# Patient Record
Sex: FEMALE | Race: WHITE | NOT HISPANIC OR LATINO | Employment: FULL TIME | URBAN - METROPOLITAN AREA
[De-identification: names, ages, dates, MRNs, and addresses within clinical notes are randomized per-mention and may not be internally consistent; named-entity substitution may affect disease eponyms.]

---

## 2017-01-10 ENCOUNTER — OFFICE VISIT (OUTPATIENT)
Dept: MIDWIFE SERVICES | Facility: CLINIC | Age: 37
End: 2017-01-10
Payer: COMMERCIAL

## 2017-01-10 VITALS
HEART RATE: 112 BPM | SYSTOLIC BLOOD PRESSURE: 143 MMHG | BODY MASS INDEX: 26.92 KG/M2 | OXYGEN SATURATION: 95 % | WEIGHT: 177 LBS | DIASTOLIC BLOOD PRESSURE: 82 MMHG

## 2017-01-10 DIAGNOSIS — N76.0 VAGINITIS AND VULVOVAGINITIS: Primary | ICD-10-CM

## 2017-01-10 LAB
MICRO REPORT STATUS: NORMAL
SPECIMEN SOURCE: NORMAL
WET PREP SPEC: NORMAL

## 2017-01-10 PROCEDURE — 87210 SMEAR WET MOUNT SALINE/INK: CPT | Performed by: ADVANCED PRACTICE MIDWIFE

## 2017-01-10 PROCEDURE — 99207 ZZC PRENATAL VISIT: CPT | Performed by: ADVANCED PRACTICE MIDWIFE

## 2017-01-10 RX ORDER — TRIAMCINOLONE ACETONIDE 1 MG/G
OINTMENT TOPICAL
Qty: 80 G | Refills: 0 | Status: SHIPPED | OUTPATIENT
Start: 2017-01-10 | End: 2018-01-10

## 2017-01-10 RX ORDER — OXYCODONE AND ACETAMINOPHEN 5; 325 MG/1; MG/1
1 TABLET ORAL EVERY 4 HOURS PRN
Qty: 10 TABLET | Refills: 0 | Status: SHIPPED | OUTPATIENT
Start: 2017-01-10 | End: 2018-01-10

## 2017-01-10 NOTE — NURSING NOTE
"Chief Complaint   Patient presents with     Vaginal Discharge       Initial /82 mmHg  Pulse 112  Wt 177 lb (80.287 kg)  SpO2 95%  Breastfeeding? Yes Estimated body mass index is 26.92 kg/(m^2) as calculated from the following:    Height as of 10/5/16: 5' 8\" (1.727 m).    Weight as of this encounter: 177 lb (80.287 kg).  BP completed using cuff size: regular        The following HM Due: NONE      The following patient reported/Care Every where data was sent to:  P ABSTRACT QUALITY INITIATIVES [[id number]]  none     n/a               "

## 2017-01-11 NOTE — PROGRESS NOTES
"  SUBJECTIVE:                                                    Traci Sloan is a 36 year old female who is here today for a medication check and ADHD follow up as well as skin check.      Skin check - rash    Recurring rash - first started four years ago on back; grew for over one year.  Attributed to yeast at [employer].  Never fully resolves, but could be dormant for 6 months.  Got worse during pregnancy and breastfeeding when not taking the medication.  This is the worst case she has had and and she has the rash on her labia.  Saw midwifery yesterday and prescribed cream.     Intensity:  severe    Accompanying signs and symptoms: Redness and swelling inner thighs. Very itchy. Been using cream.    History (similar episodes/previous evaluation): Yes, not as bad    Precipitating or alleviating factors:  New exposures:  Sun hitting skin.  Recent travel: no      Therapies tried and outcome:  ketoconazole 200 mg tablets x 5 days - tends to resolve for up to 6 months and then return        CURRENT ADHD CONCERNS:  No just refilling prescriptions.      CURRENT PRESCRIPTIONS:    Adderall XR 15 mg in the morning, 22.5 mg in the PM     MEDICATION BENEFITS:  Controlled symptoms:  Hyperactivity - motor restlessness, Attention span, Distractability, Finishing tasks and Frustration tolerance  Uncontrolled symptoms:  None     MEDICATION SIDE EFFECTS:   Has:  none  Denies:  appetite suppression, weight loss, insomnia, tics, palpitations, stomach ache, headache, emotional lability, rebound irritability, drowsiness, \"zombie\" effect, growth suppression and dry mouth      Problem list and histories reviewed & adjusted, as indicated.  Additional history: as documented    Patient Active Problem List   Diagnosis     ADHD (attention deficit hyperactivity disorder)     Infertility     MTHFR mutation (H)     Shift work sleep disorder     Adjustment disorder with anxiety     Past Surgical History   Procedure Laterality Date     " "Cholecystectomy, laporoscopic  10/2010     Hc tooth extraction w/forcep  2013     Blood patch N/A 10/29/2015     Procedure: EPIDURAL BLOOD PATCH;  Surgeon: GENERIC ANESTHESIA PROVIDER;  Location:  OR       Social History   Substance Use Topics     Smoking status: Former Smoker     Smokeless tobacco: Former User     Quit date: 01/01/2009     Alcohol Use: No     Family History   Problem Relation Age of Onset     DIABETES Father      d/t benign pituitary tumors     Breast Cancer Paternal Aunt      age 58         Current Outpatient Prescriptions   Medication Sig Dispense Refill     amphetamine-dextroamphetamine (ADDERALL) 20 MG tablet Take 1 tablet (20 mg) by mouth 2 times daily 60 tablet 0     amphetamine-dextroamphetamine (ADDERALL) 20 MG tablet Take 1 tablet (20 mg) by mouth 2 times daily 60 tablet 0     amphetamine-dextroamphetamine (ADDERALL) 20 MG tablet Take 1 tablet (20 mg) by mouth 2 times daily 60 tablet 0     METHYLPHENIDATE HCL PO Take 10 mg by mouth daily       BP Readings from Last 3 Encounters:   06/07/16 123/79   03/10/16 98/72   12/03/15 103/68    Wt Readings from Last 3 Encounters:   06/07/16 187 lb 14.4 oz (85.231 kg)   03/10/16 194 lb 9.6 oz (88.27 kg)   12/03/15 189 lb 6.4 oz (85.911 kg)               ROS:  Constitutional, HEENT, cardiovascular, pulmonary, gi and gu systems are negative, except as otherwise noted.    OBJECTIVE:                                                    /82 mmHg  Pulse 70  Temp(Src) 96.7  F (35.9  C) (Oral)  Ht 5' 6.53\" (1.69 m)  Wt 177 lb 1.6 oz (80.332 kg)  BMI 28.13 kg/m2  SpO2 97%  LMP 01/09/2017      EXAM:GENERAL:  Alert and interactive., EYES:  Normal extra-ocular movements.  PERRLA, LUNGS:  Clear, HEART:  Normal rate and rhythm.  Normal S1 and S2.  No murmurs., ABDOMEN:  Soft, non-tender, no organomegaly., NEURO:  No tics or tremor.  Normal tone and strength. Normal gait and balance.  and   SKIN: hypopigmented macular lesions shoulders bilaterally, " faint pink macular lesions on back, red macular lesions on chest between and under breasts  MENTAL STATUS EXAM  Appearance: appropriate  Attitude: cooperative  Behavior: normal  Ere Contact: normal  Speech: normal  Orientation: oriented to person , place, time and situation  Mood:  happy  Affect: Mood Congruent      Diagnostic Test Results:  none      ASSESSMENT/PLAN:                                                    (F90.2) Attention deficit hyperactivity disorder (ADHD), combined type  (primary encounter diagnosis)  Comment:   Plan: amphetamine-dextroamphetamine (ADDERALL XR) 15         MG per 24 hr capsule,         amphetamine-dextroamphetamine (ADDERALL XR) 15         MG per 24 hr capsule,         amphetamine-dextroamphetamine (ADDERALL XR) 15         MG per 24 hr capsule,         amphetamine-dextroamphetamine (ADDERALL XR) 15         MG per 24 hr capsule,         amphetamine-dextroamphetamine (ADDERALL XR) 15         MG per 24 hr capsule,         amphetamine-dextroamphetamine (ADDERALL XR) 15         MG per 24 hr capsule            (B36.0) Tinea versicolor  Comment:   Plan: ketoconazole (NIZORAL) 200 MG tablet        Will try longer course.  If return, see dermatology    (R09.81) Nasal congestion  Comment:   Plan: loratadine (CLARITIN) 10 MG tablet        Refill request      See Patient Instructions    WINSTON [facility]

## 2017-01-11 NOTE — PROGRESS NOTES
Quick Note:    Dear Traci,    Your test results are attached below. Your wet prep was negative for infections. If you have any questions, please contact me via [id number] or you can call our office at [phone].    Cindy Hatfield CNM, DAVID  ______

## 2017-01-12 ENCOUNTER — OFFICE VISIT (OUTPATIENT)
Dept: FAMILY MEDICINE | Facility: CLINIC | Age: 37
End: 2017-01-12
Payer: COMMERCIAL

## 2017-01-12 VITALS
DIASTOLIC BLOOD PRESSURE: 82 MMHG | HEART RATE: 70 BPM | WEIGHT: 177.1 LBS | BODY MASS INDEX: 27.8 KG/M2 | SYSTOLIC BLOOD PRESSURE: 124 MMHG | OXYGEN SATURATION: 97 % | HEIGHT: 67 IN | TEMPERATURE: 96.7 F

## 2017-01-12 DIAGNOSIS — F90.2 ATTENTION DEFICIT HYPERACTIVITY DISORDER (ADHD), COMBINED TYPE: Primary | ICD-10-CM

## 2017-01-12 DIAGNOSIS — R09.81 NASAL CONGESTION: ICD-10-CM

## 2017-01-12 DIAGNOSIS — B36.0 TINEA VERSICOLOR: ICD-10-CM

## 2017-01-12 LAB
SPECIMEN SOURCE: ABNORMAL
STATUS - QUEST: ABNORMAL
VZV SPEC QL CULT: ABNORMAL

## 2017-01-12 PROCEDURE — 99214 OFFICE O/P EST MOD 30 MIN: CPT | Performed by: NURSE PRACTITIONER

## 2017-01-12 RX ORDER — KETOCONAZOLE 200 MG/1
200 TABLET ORAL DAILY
Qty: 10 TABLET | Refills: 0 | Status: SHIPPED | OUTPATIENT
Start: 2017-01-12 | End: 2018-01-10

## 2017-01-12 RX ORDER — DEXTROAMPHETAMINE SACCHARATE, AMPHETAMINE ASPARTATE MONOHYDRATE, DEXTROAMPHETAMINE SULFATE AND AMPHETAMINE SULFATE 3.75; 3.75; 3.75; 3.75 MG/1; MG/1; MG/1; MG/1
15 CAPSULE, EXTENDED RELEASE ORAL DAILY
Qty: 45 CAPSULE | Refills: 0 | Status: SHIPPED | OUTPATIENT
Start: 2017-01-12 | End: 2017-02-11

## 2017-01-12 RX ORDER — DEXTROAMPHETAMINE SACCHARATE, AMPHETAMINE ASPARTATE MONOHYDRATE, DEXTROAMPHETAMINE SULFATE AND AMPHETAMINE SULFATE 3.75; 3.75; 3.75; 3.75 MG/1; MG/1; MG/1; MG/1
15 CAPSULE, EXTENDED RELEASE ORAL DAILY
Qty: 28 CAPSULE | Refills: 0 | Status: SHIPPED | OUTPATIENT
Start: 2017-02-12 | End: 2017-03-12

## 2017-01-12 RX ORDER — LORATADINE 10 MG/1
10 TABLET ORAL DAILY
Qty: 90 TABLET | Refills: 3 | Status: SHIPPED | OUTPATIENT
Start: 2017-01-12 | End: 2018-01-10

## 2017-01-12 RX ORDER — DEXTROAMPHETAMINE SACCHARATE, AMPHETAMINE ASPARTATE MONOHYDRATE, DEXTROAMPHETAMINE SULFATE AND AMPHETAMINE SULFATE 3.75; 3.75; 3.75; 3.75 MG/1; MG/1; MG/1; MG/1
15 CAPSULE, EXTENDED RELEASE ORAL DAILY
Qty: 45 CAPSULE | Refills: 0 | Status: SHIPPED | OUTPATIENT
Start: 2017-03-12 | End: 2017-04-11

## 2017-01-12 RX ORDER — DEXTROAMPHETAMINE SACCHARATE, AMPHETAMINE ASPARTATE MONOHYDRATE, DEXTROAMPHETAMINE SULFATE AND AMPHETAMINE SULFATE 3.75; 3.75; 3.75; 3.75 MG/1; MG/1; MG/1; MG/1
15 CAPSULE, EXTENDED RELEASE ORAL DAILY
Qty: 75 CAPSULE | Refills: 0 | Status: SHIPPED | OUTPATIENT
Start: 2017-02-12 | End: 2017-03-14

## 2017-01-12 RX ORDER — DEXTROAMPHETAMINE SACCHARATE, AMPHETAMINE ASPARTATE MONOHYDRATE, DEXTROAMPHETAMINE SULFATE AND AMPHETAMINE SULFATE 3.75; 3.75; 3.75; 3.75 MG/1; MG/1; MG/1; MG/1
15 CAPSULE, EXTENDED RELEASE ORAL DAILY
Qty: 75 CAPSULE | Refills: 0 | Status: SHIPPED | OUTPATIENT
Start: 2017-03-12 | End: 2017-04-11

## 2017-01-12 RX ORDER — DEXTROAMPHETAMINE SACCHARATE, AMPHETAMINE ASPARTATE MONOHYDRATE, DEXTROAMPHETAMINE SULFATE AND AMPHETAMINE SULFATE 3.75; 3.75; 3.75; 3.75 MG/1; MG/1; MG/1; MG/1
15 CAPSULE, EXTENDED RELEASE ORAL DAILY
Qty: 75 CAPSULE | Refills: 0 | Status: SHIPPED | OUTPATIENT
Start: 2017-01-12 | End: 2017-02-11

## 2017-01-12 NOTE — PROGRESS NOTES
SUBJECTIVE:  Traci Sloan is a 36 year old female presents with local irritation and pain for 7 days. She has used monsitat, and lidocaine gell. Reports pain with movement or touch.       REVIEW OF SYSTEMS  C: NEGATIVE for fever, chills, change in weight  R: NEGATIVE for significant cough or SOB  CV: NEGATIVE for chest pain, palpitations or peripheral edema  GI: NEGATIVE for nausea, abdominal pain, heartburn, or change in bowel habits  : NEGATIVE for frequency, dysuria, or hematuria      General medical, surgical, OB/Gyn and social histories   reviewed and updated in Histories section of [facility].     OBJECTIVE:   EXAM  /82 mmHg  Pulse 112  Wt 177 lb (80.287 kg)  SpO2 95%  LMP 01/09/2017  Breastfeeding? Yes  Patient appears well.    Abdomen normal, soft without tenderness, guarding, mass or organomegaly.   No inguinal adenopathy or CVA tenderness.    PELVIC EXAM:  Vulva: BUS WNL, Diffuse rash over vulva, inner thighs, and perianal area. Very tender to touch.   Vagina: Discharge normal and physiologic, no lesions, well rugated, good tone,   Cervix: Smooth, pink, no visible lesions, neg CMT,   Uterus: Normal size and position, non-tender, mobile,   Ovaries: No masses palpable, non-tender, mobile,   Rectal exam: deferred    WET PREP: no trichomonas, monilia, or clue cells   GC/CHLAMYDIA CULTURE OBTAINED:PATIENT DECLINED    ASSESSMENT:   Vaginitis r/t likely contact dermatitis.  (N76.0) Vaginitis and vulvovaginitis  (primary encounter diagnosis)  Comment:   Plan: Wet prep, Herpes simplex culture, triamcinolone        (KENALOG) 0.1 % ointment,         oxyCODONE-acetaminophen (PERCOCET) 5-325 MG per        tablet              PLAN:  Patient instructed to avoid putting any lotion, or soap on area. Rx given for triamcinolone. Soak in warm water.   Treatment plan per orders in [facility].   STD prevention discussed. Birth control needs reviewed.  Abstain from intercourse for duration of treatment.   Return if  symptoms do not resolve as anticipated.  Given letter/ handout on healthy vaginal environment.      Cindy Liao CNM APRN

## 2017-01-12 NOTE — PATIENT INSTRUCTIONS
We will try a little longer course of the ketoconazole to try to eliminate the tinea infection.  We won't know until 6-12 months if this has worked.    If it returns, I would recommend seeing a dermatologist at that point.

## 2017-01-12 NOTE — NURSING NOTE
"Chief Complaint   Patient presents with     A.D.H.D     Derm Problem       Initial /82 mmHg  Pulse 70  Temp(Src) 96.7  F (35.9  C) (Oral)  Ht 5' 6.53\" (1.69 m)  Wt 177 lb 1.6 oz (80.332 kg)  BMI 28.13 kg/m2  SpO2 97%  LMP 01/09/2017 Estimated body mass index is 28.13 kg/(m^2) as calculated from the following:    Height as of this encounter: 5' 6.54\" (1.69 m).    Weight as of this encounter: 177 lb 1.6 oz (80.332 kg).  BP completed using cuff size: regular      Rodger Munoz MA      "

## 2017-01-12 NOTE — MR AVS SNAPSHOT
After Visit Summary   1/12/2017    Traci Sloan    MRN: [id number]           Patient Information     Date Of Birth          1980        Visit Information        Provider Department      1/12/2017 9:30 AM Valarie Medrano APRN CNP [facility]        Today's Diagnoses     Attention deficit hyperactivity disorder (ADHD), combined type    -  1     Tinea versicolor         Nasal congestion           Care Instructions    We will try a little longer course of the ketoconazole to try to eliminate the tinea infection.  We won't know until 6-12 months if this has worked.    If it returns, I would recommend seeing a dermatologist at that point.        Follow-ups after your visit        Who to contact     If you have questions or need follow up information about today's clinic visit or your schedule please contact [facility] directly at [phone].  Normal or non-critical lab and imaging results will be communicated to you by [employer], letter or phone within 4 business days after the clinic has received the results. If you do not hear from us within 7 days, please contact the clinic through [employer] or phone. If you have a critical or abnormal lab result, we will notify you by phone as soon as possible.  Submit refill requests through [employer] or call your pharmacy and they will forward the refill request to us. Please allow 3 business days for your refill to be completed.          Additional Information About Your Visit        MyChart Information     [employer] gives you secure access to your electronic health record. If you see a primary care provider, you can also send messages to your care team and make appointments. If you have questions, please call your primary care clinic.  If you do not have a primary care provider, please call [phone] and they will assist you.        Care EveryWhere ID     This is your Care EveryWhere ID. This could be used by other organizations to  "access your [place] medical records  [id number]        Your Vitals Were     Pulse Temperature Height BMI (Body Mass Index) Pulse Oximetry Last Period    70 96.7  F (35.9  C) (Oral) 5' 6.53\" (1.69 m) 28.13 kg/m2 97% 01/09/2017       Blood Pressure from Last 3 Encounters:   01/12/17 124/82   01/10/17 143/82   10/20/16 123/79    Weight from Last 3 Encounters:   01/12/17 177 lb 1.6 oz (80.332 kg)   01/10/17 177 lb (80.287 kg)   10/20/16 172 lb 6.4 oz (78.2 kg)              Today, you had the following     No orders found for display         Today's Medication Changes          These changes are accurate as of: 1/12/17 10:17 AM.  If you have any questions, ask your nurse or doctor.               Start taking these medicines.        Dose/Directions    * amphetamine-dextroamphetamine 15 MG per 24 hr capsule   Commonly known as:  ADDERALL XR   Used for:  Attention deficit hyperactivity disorder (ADHD), combined type   Replaces:  amphetamine-dextroamphetamine 15 MG per tablet   Started by:  Valarie Medrano APRN CNP        Dose:  15 mg   Take 1 capsule (15 mg) by mouth daily CHANGE TO: ?Take 1.5 tablet (22.5 mg) by mouth in the AM and 1 tablet (15 mg) in the PM   Quantity:  45 capsule   Refills:  0       * amphetamine-dextroamphetamine 15 MG per 24 hr capsule   Commonly known as:  ADDERALL XR   Used for:  Attention deficit hyperactivity disorder (ADHD), combined type   Started by:  Valarie Medrano APRN CNP        Dose:  15 mg   Take 1 capsule (15 mg) by mouth daily CHANGE TO: ?Take 1.5 tablet (22.5 mg) by mouth in the AM and 1 tablet (15 mg) in the PM   Quantity:  75 capsule   Refills:  0       * amphetamine-dextroamphetamine 15 MG per 24 hr capsule   Commonly known as:  ADDERALL XR   Used for:  Attention deficit hyperactivity disorder (ADHD), combined type   Started by:  Valarie Medrano APRN CNP        Dose:  15 mg   Start taking on:  2/12/2017   Take 1 capsule (15 mg) by mouth daily for 28 days CHANGE TO: ?Take 1.5 " tablet (22.5 mg) by mouth in the AM and 1 tablet (15 mg) in the PM   Quantity:  28 capsule   Refills:  0       * amphetamine-dextroamphetamine 15 MG per 24 hr capsule   Commonly known as:  ADDERALL XR   Used for:  Attention deficit hyperactivity disorder (ADHD), combined type   Started by:  Valarie Medrano APRN CNP        Dose:  15 mg   Start taking on:  2/12/2017   Take 1 capsule (15 mg) by mouth daily CHANGE TO: ?Take 1.5 tablet (22.5 mg) by mouth in the AM and 1 tablet (15 mg) in the PM   Quantity:  75 capsule   Refills:  0       * amphetamine-dextroamphetamine 15 MG per 24 hr capsule   Commonly known as:  ADDERALL XR   Used for:  Attention deficit hyperactivity disorder (ADHD), combined type   Started by:  Valarie Medrano APRN CNP        Dose:  15 mg   Start taking on:  3/12/2017   Take 1 capsule (15 mg) by mouth daily CHANGE TO: ?Take 1.5 tablet (22.5 mg) by mouth in the AM and 1 tablet (15 mg) in the PM   Quantity:  45 capsule   Refills:  0       * amphetamine-dextroamphetamine 15 MG per 24 hr capsule   Commonly known as:  ADDERALL XR   Used for:  Attention deficit hyperactivity disorder (ADHD), combined type   Started by:  Valarie Medrano APRN CNP        Dose:  15 mg   Start taking on:  3/12/2017   Take 1 capsule (15 mg) by mouth daily CHANGE TO: ?Take 1.5 tablet (22.5 mg) by mouth in the AM and 1 tablet (15 mg) in the PM   Quantity:  75 capsule   Refills:  0       ketoconazole 200 MG tablet   Commonly known as:  NIZORAL   Used for:  Tinea versicolor   Started by:  Valarie Medrano APRN CNP        Dose:  200 mg   Take 1 tablet (200 mg) by mouth daily   Quantity:  10 tablet   Refills:  0       * Notice:  This list has 6 medication(s) that are the same as other medications prescribed for you. Read the directions carefully, and ask your doctor or other care provider to review them with you.      Stop taking these medicines if you haven't already. Please contact your care team if you have questions.      amphetamine-dextroamphetamine 15 MG per tablet   Commonly known as:  ADDERALL   Replaced by:  amphetamine-dextroamphetamine 15 MG per 24 hr capsule   Stopped by:  Valarie Medrano APRN CNP           NIFEdipine ER 30 MG Tb24   Commonly known as:  ADALAT CC   Stopped by:  Valarie Medrano APRN CNP           senna-docusate 8.6-50 MG per tablet   Commonly known as:  SENOKOT-S;PERICOLACE   Stopped by:  Valarie Medrano APRN CNP                Where to get your medicines      These medications were sent to [place], [place] - [address]  [address] [address]     Phone:  [phone]    - ketoconazole 200 MG tablet  - loratadine 10 MG tablet      Some of these will need a paper prescription and others can be bought over the counter.  Ask your nurse if you have questions.     Bring a paper prescription for each of these medications    - amphetamine-dextroamphetamine 15 MG per 24 hr capsule  - amphetamine-dextroamphetamine 15 MG per 24 hr capsule  - amphetamine-dextroamphetamine 15 MG per 24 hr capsule  - amphetamine-dextroamphetamine 15 MG per 24 hr capsule  - amphetamine-dextroamphetamine 15 MG per 24 hr capsule  - amphetamine-dextroamphetamine 15 MG per 24 hr capsule             Primary Care Provider    Physician No Ref-Primary       No address on file        Thank you!     Thank you for choosing [facility]  for your care. Our goal is always to provide you with excellent care. Hearing back from our patients is one way we can continue to improve our services. Please take a few minutes to complete the written survey that you may receive in the mail after your visit with us. Thank you!             Your Updated Medication List - Protect others around you: Learn how to safely use, store and throw away your medicines at www.disposemymeds.org.          This list is accurate as of: 1/12/17 10:17 AM.  Always use your most recent med list.                    Brand Name Dispense Instructions for use    * amphetamine-dextroamphetamine 15 MG per 24 hr capsule    ADDERALL XR    45 capsule    Take 1 capsule (15 mg) by mouth daily CHANGE TO: ?Take 1.5 tablet (22.5 mg) by mouth in the AM and 1 tablet (15 mg) in the PM       * amphetamine-dextroamphetamine 15 MG per 24 hr capsule    ADDERALL XR    75 capsule    Take 1 capsule (15 mg) by mouth daily CHANGE TO: ?Take 1.5 tablet (22.5 mg) by mouth in the AM and 1 tablet (15 mg) in the PM       * amphetamine-dextroamphetamine 15 MG per 24 hr capsule   Start taking on:  2/12/2017    ADDERALL XR    28 capsule    Take 1 capsule (15 mg) by mouth daily for 28 days CHANGE TO: ?Take 1.5 tablet (22.5 mg) by mouth in the AM and 1 tablet (15 mg) in the PM       * amphetamine-dextroamphetamine 15 MG per 24 hr capsule   Start taking on:  2/12/2017    ADDERALL XR    75 capsule    Take 1 capsule (15 mg) by mouth daily CHANGE TO: ?Take 1.5 tablet (22.5 mg) by mouth in the AM and 1 tablet (15 mg) in the PM       * amphetamine-dextroamphetamine 15 MG per 24 hr capsule   Start taking on:  3/12/2017    ADDERALL XR    45 capsule    Take 1 capsule (15 mg) by mouth daily CHANGE TO: ?Take 1.5 tablet (22.5 mg) by mouth in the AM and 1 tablet (15 mg) in the PM       * amphetamine-dextroamphetamine 15 MG per 24 hr capsule   Start taking on:  3/12/2017    ADDERALL XR    75 capsule    Take 1 capsule (15 mg) by mouth daily CHANGE TO: ?Take 1.5 tablet (22.5 mg) by mouth in the AM and 1 tablet (15 mg) in the PM       ketoconazole 200 MG tablet    NIZORAL    10 tablet    Take 1 tablet (200 mg) by mouth daily       loratadine 10 MG tablet    CLARITIN    90 tablet    Take 1 tablet (10 mg) by mouth daily       oxyCODONE-acetaminophen 5-325 MG per tablet    PERCOCET    10 tablet    Take 1 tablet by mouth every 4 hours as needed for pain maximum 6 tablet(s) per day       prenatal multivitamin  plus iron 27-0.8 MG Tabs per tablet      Take 1 tablet by mouth daily        triamcinolone 0.1 % ointment    KENALOG    80 g    Apply sparingly to affected area three times daily for 14 days.       * Notice:  This list has 6 medication(s) that are the same as other medications prescribed for you. Read the directions carefully, and ask your doctor or other care provider to review them with you.

## 2017-01-13 ENCOUNTER — TELEPHONE (OUTPATIENT)
Dept: FAMILY MEDICINE | Facility: CLINIC | Age: 37
End: 2017-01-13

## 2017-01-13 DIAGNOSIS — F90.2 ATTENTION DEFICIT HYPERACTIVITY DISORDER (ADHD), COMBINED TYPE: Primary | ICD-10-CM

## 2017-01-13 NOTE — TELEPHONE ENCOUNTER
Pt states the script for Adderall is written wrong; it should be the tablets and not the XR    Pt can be reached @ [phone] [facility]

## 2017-01-13 NOTE — TELEPHONE ENCOUNTER
Patient calling to request tablets instead of capsules for her Adderall 15 mg medication.     Patient will  new scripts in clinic and provide us the old to shred.     Medication orders cued for review by Yee Medrano.         Thank you,   Mellissa Carrasco RN  [facility]

## 2017-01-17 RX ORDER — DEXTROAMPHETAMINE SACCHARATE, AMPHETAMINE ASPARTATE, DEXTROAMPHETAMINE SULFATE AND AMPHETAMINE SULFATE 3.75; 3.75; 3.75; 3.75 MG/1; MG/1; MG/1; MG/1
TABLET ORAL
Qty: 75 TABLET | Refills: 0 | Status: SHIPPED | OUTPATIENT
Start: 2017-03-12 | End: 2017-04-04

## 2017-01-17 RX ORDER — DEXTROAMPHETAMINE SACCHARATE, AMPHETAMINE ASPARTATE, DEXTROAMPHETAMINE SULFATE AND AMPHETAMINE SULFATE 3.75; 3.75; 3.75; 3.75 MG/1; MG/1; MG/1; MG/1
TABLET ORAL
Qty: 75 TABLET | Refills: 0 | Status: SHIPPED | OUTPATIENT
Start: 2017-02-12 | End: 2017-03-14

## 2017-01-17 RX ORDER — DEXTROAMPHETAMINE SACCHARATE, AMPHETAMINE ASPARTATE, DEXTROAMPHETAMINE SULFATE AND AMPHETAMINE SULFATE 3.75; 3.75; 3.75; 3.75 MG/1; MG/1; MG/1; MG/1
TABLET ORAL
Qty: 75 TABLET | Refills: 0 | Status: SHIPPED | OUTPATIENT
Start: 2017-01-17 | End: 2017-02-11

## 2017-01-17 NOTE — TELEPHONE ENCOUNTER
Patient brought in scripts that were given to her previously for Adderall XR, they have been shredded

## 2017-01-17 NOTE — TELEPHONE ENCOUNTER
Spoke with patient making her aware the scripts for Adderall Tablets are ready to be picked up at the , also reminded pt to bring photo ID

## 2017-04-04 ENCOUNTER — MYC REFILL (OUTPATIENT)
Dept: FAMILY MEDICINE | Facility: CLINIC | Age: 37
End: 2017-04-04

## 2017-04-04 DIAGNOSIS — F90.2 ATTENTION DEFICIT HYPERACTIVITY DISORDER (ADHD), COMBINED TYPE: ICD-10-CM

## 2017-04-04 NOTE — TELEPHONE ENCOUNTER
Yee--    Patient is requesting refill of Adderall 15 mg tablets.       Mellissa Carrasco RN  [facility]

## 2017-04-05 RX ORDER — DEXTROAMPHETAMINE SACCHARATE, AMPHETAMINE ASPARTATE, DEXTROAMPHETAMINE SULFATE AND AMPHETAMINE SULFATE 3.75; 3.75; 3.75; 3.75 MG/1; MG/1; MG/1; MG/1
15 TABLET ORAL 2 TIMES DAILY
Qty: 75 TABLET | Refills: 0 | Status: SHIPPED | OUTPATIENT
Start: 2017-06-05 | End: 2017-08-16

## 2017-04-05 RX ORDER — DEXTROAMPHETAMINE SACCHARATE, AMPHETAMINE ASPARTATE, DEXTROAMPHETAMINE SULFATE AND AMPHETAMINE SULFATE 3.75; 3.75; 3.75; 3.75 MG/1; MG/1; MG/1; MG/1
TABLET ORAL
Qty: 75 TABLET | Refills: 0 | Status: SHIPPED | OUTPATIENT
Start: 2017-04-05 | End: 2017-07-03

## 2017-04-05 RX ORDER — DEXTROAMPHETAMINE SACCHARATE, AMPHETAMINE ASPARTATE, DEXTROAMPHETAMINE SULFATE AND AMPHETAMINE SULFATE 3.75; 3.75; 3.75; 3.75 MG/1; MG/1; MG/1; MG/1
15 TABLET ORAL 2 TIMES DAILY
Qty: 75 TABLET | Refills: 0 | Status: SHIPPED | OUTPATIENT
Start: 2017-05-05 | End: 2017-08-16

## 2017-05-03 ENCOUNTER — HOSPITAL ENCOUNTER (EMERGENCY)
Facility: CLINIC | Age: 37
Discharge: HOME | End: 2017-05-03
Attending: EMERGENCY MEDICINE | Admitting: EMERGENCY MEDICINE
Payer: COMMERCIAL

## 2017-05-03 VITALS
DIASTOLIC BLOOD PRESSURE: 79 MMHG | SYSTOLIC BLOOD PRESSURE: 126 MMHG | OXYGEN SATURATION: 99 % | RESPIRATION RATE: 18 BRPM | TEMPERATURE: 98.2 F | HEART RATE: 89 BPM

## 2017-05-03 DIAGNOSIS — T07.XXXA MULTIPLE ABRASIONS: ICD-10-CM
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
## 2017-05-03 DIAGNOSIS — S31.114A: ICD-10-CM

## 2017-05-03 DIAGNOSIS — T07.XXXA MULTIPLE CONTUSIONS: ICD-10-CM

## 2017-05-03 DIAGNOSIS — S60.512A ABRASION OF LEFT HAND: ICD-10-CM

## 2017-05-03 DIAGNOSIS — S70.12XA CONTUSION OF LEFT THIGH: ICD-10-CM

## 2017-05-03 DIAGNOSIS — S60.511A ABRASION OF RIGHT HAND: ICD-10-CM

## 2017-05-03 DIAGNOSIS — S31.119A LACERATION OF GROIN, INITIAL ENCOUNTER: ICD-10-CM

## 2017-05-03 PROCEDURE — 12002 RPR S/N/AX/GEN/TRNK2.6-7.5CM: CPT | Mod: Z6 | Performed by: EMERGENCY MEDICINE

## 2017-05-03 PROCEDURE — 25000128 H RX IP 250 OP 636

## 2017-05-03 PROCEDURE — 96361 HYDRATE IV INFUSION ADD-ON: CPT | Mod: 59

## 2017-05-03 PROCEDURE — 99285 EMERGENCY DEPT VISIT HI MDM: CPT | Mod: 25

## 2017-05-03 PROCEDURE — 96374 THER/PROPH/DIAG INJ IV PUSH: CPT

## 2017-05-03 PROCEDURE — 12002 RPR S/N/AX/GEN/TRNK2.6-7.5CM: CPT

## 2017-05-03 PROCEDURE — 96375 TX/PRO/DX INJ NEW DRUG ADDON: CPT

## 2017-05-03 PROCEDURE — 99284 EMERGENCY DEPT VISIT MOD MDM: CPT | Mod: 25 | Performed by: EMERGENCY MEDICINE

## 2017-05-03 PROCEDURE — 25000128 H RX IP 250 OP 636: Performed by: EMERGENCY MEDICINE

## 2017-05-03 RX ORDER — SODIUM CHLORIDE 9 MG/ML
INJECTION, SOLUTION INTRAVENOUS CONTINUOUS
Status: DISCONTINUED | OUTPATIENT
Start: 2017-05-03 | End: 2017-05-03 | Stop reason: HOSPADM

## 2017-05-03 RX ORDER — HYDROMORPHONE HYDROCHLORIDE 1 MG/ML
0.5 INJECTION, SOLUTION INTRAMUSCULAR; INTRAVENOUS; SUBCUTANEOUS
Status: COMPLETED | OUTPATIENT
Start: 2017-05-03 | End: 2017-05-03

## 2017-05-03 RX ORDER — HYDROCODONE BITARTRATE AND ACETAMINOPHEN 5; 325 MG/1; MG/1
1-2 TABLET ORAL EVERY 4 HOURS PRN
Qty: 15 TABLET | Refills: 0 | Status: SHIPPED | OUTPATIENT
Start: 2017-05-03 | End: 2018-01-10

## 2017-05-03 RX ORDER — IBUPROFEN 600 MG/1
600 TABLET, FILM COATED ORAL EVERY 8 HOURS PRN
Qty: 20 TABLET | Refills: 0 | Status: SHIPPED | OUTPATIENT
Start: 2017-05-03 | End: 2018-01-10

## 2017-05-03 RX ORDER — SODIUM CHLORIDE 9 MG/ML
INJECTION, SOLUTION INTRAVENOUS
Status: COMPLETED
Start: 2017-05-03 | End: 2017-05-03

## 2017-05-03 RX ORDER — LIDOCAINE HYDROCHLORIDE AND EPINEPHRINE 10; 10 MG/ML; UG/ML
INJECTION, SOLUTION INFILTRATION; PERINEURAL
Status: DISCONTINUED
Start: 2017-05-03 | End: 2017-05-03 | Stop reason: HOSPADM

## 2017-05-03 RX ORDER — ONDANSETRON 2 MG/ML
4 INJECTION INTRAMUSCULAR; INTRAVENOUS EVERY 30 MIN PRN
Status: DISCONTINUED | OUTPATIENT
Start: 2017-05-03 | End: 2017-05-03 | Stop reason: HOSPADM

## 2017-05-03 RX ADMIN — HYDROMORPHONE HYDROCHLORIDE 0.5 MG: 1 INJECTION, SOLUTION INTRAMUSCULAR; INTRAVENOUS; SUBCUTANEOUS at 17:15

## 2017-05-03 RX ADMIN — HYDROMORPHONE HYDROCHLORIDE 0.5 MG: 1 INJECTION, SOLUTION INTRAMUSCULAR; INTRAVENOUS; SUBCUTANEOUS at 16:59

## 2017-05-03 RX ADMIN — HYDROMORPHONE HYDROCHLORIDE 0.5 MG: 1 INJECTION, SOLUTION INTRAMUSCULAR; INTRAVENOUS; SUBCUTANEOUS at 17:27

## 2017-05-03 RX ADMIN — SODIUM CHLORIDE: 9 INJECTION, SOLUTION INTRAVENOUS at 16:59

## 2017-05-03 RX ADMIN — ONDANSETRON 4 MG: 2 INJECTION INTRAMUSCULAR; INTRAVENOUS at 16:59

## 2017-05-03 ASSESSMENT — ENCOUNTER SYMPTOMS
BRUISES/BLEEDS EASILY: 1
WOUND: 1
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
## 2017-05-03 NOTE — ED AVS SNAPSHOT
[facility], [place], Emergency Department    [address]    [place] [address]    Phone:  [phone]    Fax:  [phone]                                       Traci Sloan   MRN: [id number]    Department:  [facility], Emergency Department   Date of Visit:  5/3/2017           After Visit Summary Signature Page     I have received my discharge instructions, and my questions have been answered. I have discussed any challenges I see with this plan with the nurse or doctor.    ..........................................................................................................................................  Patient/Patient Representative Signature      ..........................................................................................................................................  Patient Representative Print Name and Relationship to Patient    ..................................................               ................................................  Date                                            Time    ..........................................................................................................................................  Reviewed by Signature/Title    ...................................................              ..............................................  Date                                                            Time
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
## 2017-05-03 NOTE — DISCHARGE INSTRUCTIONS
No vigorous activity.  Apply bacitracin daily, keep the wound covered for 5 days.    Use pain medication as needed.    Have the sutures removed in 10-14 days  Return to the Emergency Department if any concerns, new injuries, signs of infection.

## 2017-05-19 ENCOUNTER — OFFICE VISIT (OUTPATIENT)
Dept: FAMILY MEDICINE | Facility: CLINIC | Age: 37
End: 2017-05-19
Payer: COMMERCIAL

## 2017-05-19 VITALS
TEMPERATURE: 98.5 F | HEART RATE: 83 BPM | WEIGHT: 179.9 LBS | DIASTOLIC BLOOD PRESSURE: 64 MMHG | SYSTOLIC BLOOD PRESSURE: 122 MMHG | BODY MASS INDEX: 28.57 KG/M2 | OXYGEN SATURATION: 100 %

## 2017-05-19 DIAGNOSIS — S31.119D: Primary | ICD-10-CM

## 2017-05-19 PROCEDURE — 99212 OFFICE O/P EST SF 10 MIN: CPT | Performed by: NURSE PRACTITIONER
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
## 2017-05-19 NOTE — NURSING NOTE
"Chief Complaint   Patient presents with     Suture Removal       Initial /64  Pulse 83  Temp 98.5  F (36.9  C) (Oral)  Wt 179 lb 14.4 oz (81.6 kg)  SpO2 100%  BMI 28.57 kg/m2 Estimated body mass index is 28.57 kg/(m^2) as calculated from the following:    Height as of 1/12/17: 5' 6.53\" (1.69 m).    Weight as of this encounter: 179 lb 14.4 oz (81.6 kg).  Medication Reconciliation: complete     Arianna Velasquez MA      "
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
## 2017-07-03 ENCOUNTER — TELEPHONE (OUTPATIENT)
Dept: FAMILY MEDICINE | Facility: CLINIC | Age: 37
End: 2017-07-03

## 2017-07-03 DIAGNOSIS — F90.2 ATTENTION DEFICIT HYPERACTIVITY DISORDER (ADHD), COMBINED TYPE: ICD-10-CM

## 2017-07-03 RX ORDER — DEXTROAMPHETAMINE SACCHARATE, AMPHETAMINE ASPARTATE, DEXTROAMPHETAMINE SULFATE AND AMPHETAMINE SULFATE 3.75; 3.75; 3.75; 3.75 MG/1; MG/1; MG/1; MG/1
TABLET ORAL
Qty: 75 TABLET | Refills: 0 | Status: SHIPPED | OUTPATIENT
Start: 2017-07-03 | End: 2017-10-17

## 2017-07-03 NOTE — TELEPHONE ENCOUNTER
Writer called patient to offer an appointment with WINSTON Howard sooner that 7/12. Writer realized that was when patient stated she would be out of town. No availability. Routing request to provider for review.       [facility] request for refill of medication per patient:        ----- Message -----         From: Traci Sloan        Sent: 7/1/2017  12:28 PM          To: Bryan Reception     Subject: RE: Appointment scheduled from [employer] [facility]                I was wondering if it would be possible to get my medication prior to appointment on 7/12?  I normally fill my Adderall prescription on the 5th of each month but was unable to get in prior to that.  If so, I was hoping to come and get it on Monday 7/3 or Tuesday 7/4, depending on holiday hours.  I will be out of town for a week and am not returning until 7/10.          Thank you,       Thanks! Fadumo Interiano RN

## 2017-08-15 NOTE — PROGRESS NOTES
SUBJECTIVE:                                                    Traci Sloan is a 37 year old female who presents to clinic today for the following health issues:    CURRENT CONCERNS:  Used to take Adderall XR 30 mg with a small IR dose  Rash on stomach has returned.  Has tried two treatments and they work, but rash returns     Review of past medical history:  Data Unavailable     CURRENT PRESCRIPTIONS:    Adderall 22.5 mg in the morning and 15 mg in the afternoon     MEDICATION BENEFITS:  Controlled symptoms:  Hyperactivity - motor restlessness, Attention span, Distractability, Finishing tasks, Impulse control and Frustration tolerance  Uncontrolled symptoms:  None     MEDICATION SIDE EFFECTS:   Has:  none  Denies:  none    Problem list and histories reviewed & adjusted, as indicated.  Additional history: as documented    Reviewed and updated as needed this visit by clinical staff  Tobacco  Allergies  Meds  Med Hx  Surg Hx  Fam Hx  Soc Hx      Reviewed and updated as needed this visit by Provider         ROS:  Constitutional, HEENT, cardiovascular, pulmonary, gi and gu systems are negative, except as otherwise noted.      OBJECTIVE:   /86  Pulse 94  Temp 98.2  F (36.8  C) (Oral)  Wt 178 lb 3.2 oz (80.8 kg)  SpO2 97%  BMI 28.3 kg/m2  Body mass index is 28.3 kg/(m^2).  GENERAL: healthy, alert and no distress  EYES: Eyes grossly normal to inspection, PERRL and conjunctivae and sclerae normal  HENT: ear canals and TM's normal, nose and mouth without ulcers or lesions  NECK: no adenopathy, no asymmetry, masses, or scars and thyroid normal to palpation  RESP: lungs clear to auscultation - no rales, rhonchi or wheezes  CV: regular rate and rhythm, normal S1 S2, no S3 or S4, no murmur, click or rub, no peripheral edema and peripheral pulses strong  ABDOMEN: soft, nontender, no hepatosplenomegaly, no masses and bowel sounds normal  MS: no gross musculoskeletal defects noted, no edema  SKIN: no suspicious  lesions or rashes  NEURO: Normal strength and tone, mentation intact and speech normal  PSYCH: mentation appears normal, affect normal/bright    Diagnostic Test Results:  none     ASSESSMENT/PLAN:         (F90.2) Attention deficit hyperactivity disorder (ADHD), combined type  (primary encounter diagnosis)  Comment:   Plan: amphetamine-dextroamphetamine (ADDERALL XR) 30         MG per 24 hr capsule,         amphetamine-dextroamphetamine (ADDERALL XR) 30         MG per 24 hr capsule,         amphetamine-dextroamphetamine (ADDERALL XR) 30         MG per 24 hr capsule,         amphetamine-dextroamphetamine (ADDERALL) 5 MG         per tablet   Will try XR script and see if covered.  Provided one IR 5 mg script for possible breakthrough afternoon needs.    (B36.0) Tinea versicolor  Comment:   Plan: DERMATOLOGY REFERRAL            MARIBETH Hernandez

## 2017-08-16 ENCOUNTER — OFFICE VISIT (OUTPATIENT)
Dept: FAMILY MEDICINE | Facility: CLINIC | Age: 37
End: 2017-08-16
Payer: COMMERCIAL

## 2017-08-16 VITALS
DIASTOLIC BLOOD PRESSURE: 86 MMHG | BODY MASS INDEX: 28.3 KG/M2 | TEMPERATURE: 98.2 F | WEIGHT: 178.2 LBS | SYSTOLIC BLOOD PRESSURE: 138 MMHG | HEART RATE: 94 BPM | OXYGEN SATURATION: 97 %

## 2017-08-16 DIAGNOSIS — F90.2 ATTENTION DEFICIT HYPERACTIVITY DISORDER (ADHD), COMBINED TYPE: Primary | ICD-10-CM

## 2017-08-16 DIAGNOSIS — B36.0 TINEA VERSICOLOR: ICD-10-CM

## 2017-08-16 PROCEDURE — 99214 OFFICE O/P EST MOD 30 MIN: CPT | Performed by: NURSE PRACTITIONER

## 2017-08-16 RX ORDER — DEXTROAMPHETAMINE SACCHARATE, AMPHETAMINE ASPARTATE MONOHYDRATE, DEXTROAMPHETAMINE SULFATE AND AMPHETAMINE SULFATE 7.5; 7.5; 7.5; 7.5 MG/1; MG/1; MG/1; MG/1
30 CAPSULE, EXTENDED RELEASE ORAL DAILY
Qty: 30 CAPSULE | Refills: 0 | Status: SHIPPED | OUTPATIENT
Start: 2017-10-17 | End: 2017-10-18

## 2017-08-16 RX ORDER — DEXTROAMPHETAMINE SACCHARATE, AMPHETAMINE ASPARTATE MONOHYDRATE, DEXTROAMPHETAMINE SULFATE AND AMPHETAMINE SULFATE 7.5; 7.5; 7.5; 7.5 MG/1; MG/1; MG/1; MG/1
30 CAPSULE, EXTENDED RELEASE ORAL DAILY
Qty: 30 CAPSULE | Refills: 0 | Status: SHIPPED | OUTPATIENT
Start: 2017-08-16 | End: 2017-09-15

## 2017-08-16 RX ORDER — DEXTROAMPHETAMINE SACCHARATE, AMPHETAMINE ASPARTATE MONOHYDRATE, DEXTROAMPHETAMINE SULFATE AND AMPHETAMINE SULFATE 7.5; 7.5; 7.5; 7.5 MG/1; MG/1; MG/1; MG/1
30 CAPSULE, EXTENDED RELEASE ORAL DAILY
Qty: 30 CAPSULE | Refills: 0 | Status: SHIPPED | OUTPATIENT
Start: 2017-09-16 | End: 2017-10-16

## 2017-08-16 RX ORDER — DEXTROAMPHETAMINE SACCHARATE, AMPHETAMINE ASPARTATE, DEXTROAMPHETAMINE SULFATE AND AMPHETAMINE SULFATE 1.25; 1.25; 1.25; 1.25 MG/1; MG/1; MG/1; MG/1
5 TABLET ORAL DAILY
Qty: 30 TABLET | Refills: 0 | Status: SHIPPED | OUTPATIENT
Start: 2017-08-16 | End: 2018-01-10

## 2017-08-16 NOTE — MR AVS SNAPSHOT
After Visit Summary   8/16/2017    Traci Sloan    MRN: [id number]           Patient Information     Date Of Birth          1980        Visit Information        Provider Department      8/16/2017 9:30 AM Valarie eMdrano APRN [facility]        Today's Diagnoses     Attention deficit hyperactivity disorder (ADHD), combined type    -  1    Tinea versicolor           Follow-ups after your visit        Additional Services     DERMATOLOGY REFERRAL       Your provider has referred you to:   [facility]: [facility] [place] [phone]   [email or web address]  [facility]: [facility] [place] [phone]   [email or web address]    Please be aware that coverage of these services is subject to the terms and limitations of your health insurance plan.  Call member services at your health plan with any benefit or coverage questions.      Please bring the following with you to your appointment:    (1) Any X-Rays, CTs or MRIs which have been performed.  Contact the facility where they were done to arrange for  prior to your scheduled appointment.    (2) List of current medications  (3) This referral request   (4) Any documents/labs given to you for this referral                  Who to contact     If you have questions or need follow up information about today's clinic visit or your schedule please contact [facility] [place] at [phone].  Normal or non-critical lab and imaging results will be communicated to you by MyChart, letter or phone within 4 business days after the clinic has received the results. If you do not hear from us within 7 days, please contact the clinic through MyChart or phone. If you have a critical or abnormal lab result, we will notify you by phone as soon as possible.  Submit refill requests through [employer] or call your pharmacy and they will forward the refill request to us. Please allow 3  business days for your refill to be completed.          Additional Information About Your Visit        [employer] Information     [employer] gives you secure access to your electronic health record. If you see a primary care provider, you can also send messages to your care team and make appointments. If you have questions, please call your primary care clinic.  If you do not have a primary care provider, please call [phone] and they will assist you.        Care EveryWhere ID     This is your Care EveryWhere ID. This could be used by other organizations to access your [facility] medical records  [id number]        Your Vitals Were     Pulse Temperature Pulse Oximetry BMI (Body Mass Index)          94 98.2  F (36.8  C) (Oral) 97% 28.3 kg/m2         Blood Pressure from Last 3 Encounters:   08/16/17 138/86   05/19/17 122/64   05/03/17 126/79    Weight from Last 3 Encounters:   08/16/17 178 lb 3.2 oz (80.8 kg)   05/19/17 179 lb 14.4 oz (81.6 kg)   01/12/17 177 lb 1.6 oz (80.3 kg)              We Performed the Following     DERMATOLOGY REFERRAL          Today's Medication Changes          These changes are accurate as of: 8/16/17 10:01 AM.  If you have any questions, ask your nurse or doctor.               These medicines have changed or have updated prescriptions.        Dose/Directions    * amphetamine-dextroamphetamine 15 MG per tablet   Commonly known as:  ADDERALL   This may have changed:    - Another medication with the same name was added. Make sure you understand how and when to take each.  - Another medication with the same name was removed. Continue taking this medication, and follow the directions you see here.   Used for:  Attention deficit hyperactivity disorder (ADHD), combined type   Changed by:  No Ref-Primary, Physician        Take 1.5 tablet (22.5 mg) by mouth in the AM and 1 tablet (15 mg) in the PM   Quantity:  75 tablet   Refills:  0       * amphetamine-dextroamphetamine 30 MG per 24 hr capsule    Commonly known as:  ADDERALL XR   This may have changed:  You were already taking a medication with the same name, and this prescription was added. Make sure you understand how and when to take each.   Used for:  Attention deficit hyperactivity disorder (ADHD), combined type   Replaces:  amphetamine-dextroamphetamine 15 MG per tablet   Changed by:  Valarie Medrano APRN CNP        Dose:  30 mg   Take 1 capsule (30 mg) by mouth daily   Quantity:  30 capsule   Refills:  0       * amphetamine-dextroamphetamine 5 MG per tablet   Commonly known as:  ADDERALL   This may have changed:  You were already taking a medication with the same name, and this prescription was added. Make sure you understand how and when to take each.   Used for:  Attention deficit hyperactivity disorder (ADHD), combined type   Changed by:  Valarie Medrano APRN CNP        Dose:  5 mg   Take 1 tablet (5 mg) by mouth daily   Quantity:  30 tablet   Refills:  0       * amphetamine-dextroamphetamine 30 MG per 24 hr capsule   Commonly known as:  ADDERALL XR   This may have changed:  You were already taking a medication with the same name, and this prescription was added. Make sure you understand how and when to take each.   Used for:  Attention deficit hyperactivity disorder (ADHD), combined type   Replaces:  amphetamine-dextroamphetamine 15 MG per tablet   Changed by:  Valarie Medrano APRN CNP        Dose:  30 mg   Start taking on:  9/16/2017   Take 1 capsule (30 mg) by mouth daily   Quantity:  30 capsule   Refills:  0       * amphetamine-dextroamphetamine 30 MG per 24 hr capsule   Commonly known as:  ADDERALL XR   This may have changed:  You were already taking a medication with the same name, and this prescription was added. Make sure you understand how and when to take each.   Used for:  Attention deficit hyperactivity disorder (ADHD), combined type   Changed by:  Valarie Medrano APRN CNP        Dose:  30 mg   Start taking on:  10/17/2017   Take  1 capsule (30 mg) by mouth daily   Quantity:  30 capsule   Refills:  0       * Notice:  This list has 5 medication(s) that are the same as other medications prescribed for you. Read the directions carefully, and ask your doctor or other care provider to review them with you.      Stop taking these medicines if you haven't already. Please contact your care team if you have questions.     amphetamine-dextroamphetamine 15 MG per tablet   Commonly known as:  ADDERALL   Replaced by:  amphetamine-dextroamphetamine 30 MG per 24 hr capsule   You also have another medication with the same name that you need to continue taking as instructed.   Stopped by:  Valarie Medrano APRN CNP           amphetamine-dextroamphetamine 15 MG per tablet   Commonly known as:  ADDERALL   Replaced by:  amphetamine-dextroamphetamine 30 MG per 24 hr capsule   You also have another medication with the same name that you need to continue taking as instructed.   Stopped by:  Valarie Medrano APRN CNP                Where to get your medicines      Some of these will need a paper prescription and others can be bought over the counter.  Ask your nurse if you have questions.     Bring a paper prescription for each of these medications     amphetamine-dextroamphetamine 30 MG per 24 hr capsule    amphetamine-dextroamphetamine 30 MG per 24 hr capsule    amphetamine-dextroamphetamine 30 MG per 24 hr capsule    amphetamine-dextroamphetamine 5 MG per tablet                Primary Care Provider    Physician No Ref-Primary       No address on file        Equal Access to Services     ESHA CURIEL : Hadii michelle aguilar Sosayra, waaxda luqadaha, qaybta kaalmada adeegyada, winifred villaseñor. So [facility] [phone].    ATENCIÓN: Si habla español, tiene a garcia disposición servicios gratuitos de asistencia lingüística. Llame al [phone].    We comply with applicable federal civil rights laws and [place] laws. We do not discriminate on the  basis of race, color, national origin, age, disability sex, sexual orientation or gender identity.            Thank you!     Thank you [place] [facility] [place]. Our goal is always to provide you with excellent care. Hearing back from our patients is one way we can continue to improve our services. Please take a few minutes to complete the written survey that you may receive in the mail after your visit with us. Thank you!             Your Updated Medication List - Protect others around you: Learn how to safely use, store and throw away your medicines at www.disposemymeds.org.          This list is accurate as of: 8/16/17 10:01 AM.  Always use your most recent med list.                   Brand Name Dispense Instructions for use Diagnosis    * amphetamine-dextroamphetamine 15 MG per tablet    ADDERALL    75 tablet    Take 1.5 tablet (22.5 mg) by mouth in the AM and 1 tablet (15 mg) in the PM    Attention deficit hyperactivity disorder (ADHD), combined type       * amphetamine-dextroamphetamine 30 MG per 24 hr capsule    ADDERALL XR    30 capsule    Take 1 capsule (30 mg) by mouth daily    Attention deficit hyperactivity disorder (ADHD), combined type       * amphetamine-dextroamphetamine 5 MG per tablet    ADDERALL    30 tablet    Take 1 tablet (5 mg) by mouth daily    Attention deficit hyperactivity disorder (ADHD), combined type       * amphetamine-dextroamphetamine 30 MG per 24 hr capsule   Start taking on:  9/16/2017    ADDERALL XR    30 capsule    Take 1 capsule (30 mg) by mouth daily    Attention deficit hyperactivity disorder (ADHD), combined type       * amphetamine-dextroamphetamine 30 MG per 24 hr capsule   Start taking on:  10/17/2017    ADDERALL XR    30 capsule    Take 1 capsule (30 mg) by mouth daily    Attention deficit hyperactivity disorder (ADHD), combined type       HYDROcodone-acetaminophen 5-325 MG per tablet    NORCO    15 tablet    Take 1-2 tablets by mouth every 4  hours as needed for moderate to severe pain        ibuprofen 600 MG tablet    ADVIL/MOTRIN    20 tablet    Take 1 tablet (600 mg) by mouth every 8 hours as needed for moderate pain        ketoconazole 200 MG tablet    NIZORAL    10 tablet    Take 1 tablet (200 mg) by mouth daily    Tinea versicolor       loratadine 10 MG tablet    CLARITIN    90 tablet    Take 1 tablet (10 mg) by mouth daily    Nasal congestion       oxyCODONE-acetaminophen 5-325 MG per tablet    PERCOCET    10 tablet    Take 1 tablet by mouth every 4 hours as needed for pain maximum 6 tablet(s) per day    Vaginitis and vulvovaginitis       prenatal multivitamin plus iron 27-0.8 MG Tabs per tablet      Take 1 tablet by mouth daily        triamcinolone 0.1 % ointment    KENALOG    80 g    Apply sparingly to affected area three times daily for 14 days.    Vaginitis and vulvovaginitis       * Notice:  This list has 5 medication(s) that are the same as other medications prescribed for you. Read the directions carefully, and ask your doctor or other care provider to review them with you.

## 2017-08-16 NOTE — NURSING NOTE
"Chief Complaint   Patient presents with     Behavioral Problem       Initial /86  Pulse 94  Temp 98.2  F (36.8  C) (Oral)  Wt 178 lb 3.2 oz (80.8 kg)  SpO2 97%  BMI 28.3 kg/m2 Estimated body mass index is 28.3 kg/(m^2) as calculated from the following:    Height as of 1/12/17: 5' 6.53\" (1.69 m).    Weight as of this encounter: 178 lb 3.2 oz (80.8 kg).  Medication Reconciliation: complete     Rodger Munoz MA      "

## 2017-10-17 ENCOUNTER — MYC MEDICAL ADVICE (OUTPATIENT)
Dept: FAMILY MEDICINE | Facility: CLINIC | Age: 37
End: 2017-10-17

## 2017-10-17 DIAGNOSIS — F90.2 ATTENTION DEFICIT HYPERACTIVITY DISORDER (ADHD), COMBINED TYPE: ICD-10-CM

## 2017-10-17 NOTE — TELEPHONE ENCOUNTER
Yee,     Patient's insurance did not cover Adderall 30 mg XR.     She is wondering if she can get a new prescription for what she was using previously, Adderall 15 mg.     Medication cued, if okay. Patient states she will exchange old script for new, if okay.     Thank you,   JOSHUA LooN RN  [facility]

## 2017-10-18 RX ORDER — DEXTROAMPHETAMINE SACCHARATE, AMPHETAMINE ASPARTATE, DEXTROAMPHETAMINE SULFATE AND AMPHETAMINE SULFATE 3.75; 3.75; 3.75; 3.75 MG/1; MG/1; MG/1; MG/1
TABLET ORAL
Qty: 75 TABLET | Refills: 0 | Status: SHIPPED | OUTPATIENT
Start: 2017-10-18 | End: 2018-01-10

## 2017-10-18 RX ORDER — DEXTROAMPHETAMINE SACCHARATE, AMPHETAMINE ASPARTATE, DEXTROAMPHETAMINE SULFATE AND AMPHETAMINE SULFATE 3.75; 3.75; 3.75; 3.75 MG/1; MG/1; MG/1; MG/1
15 TABLET ORAL 2 TIMES DAILY
Qty: 75 TABLET | Refills: 0 | Status: SHIPPED | OUTPATIENT
Start: 2017-11-18 | End: 2017-11-10

## 2017-10-18 RX ORDER — DEXTROAMPHETAMINE SACCHARATE, AMPHETAMINE ASPARTATE, DEXTROAMPHETAMINE SULFATE AND AMPHETAMINE SULFATE 3.75; 3.75; 3.75; 3.75 MG/1; MG/1; MG/1; MG/1
15 TABLET ORAL 2 TIMES DAILY
Qty: 75 TABLET | Refills: 0 | Status: SHIPPED | OUTPATIENT
Start: 2017-10-18 | End: 2017-10-24

## 2017-10-18 NOTE — TELEPHONE ENCOUNTER
Pt informed and will bring the other scripts back to us when she p/u new scripts  scripts at reception desk    Lexy Luciano RN   [facility]

## 2017-10-24 ENCOUNTER — MYC MEDICAL ADVICE (OUTPATIENT)
Dept: FAMILY MEDICINE | Facility: CLINIC | Age: 37
End: 2017-10-24

## 2017-10-24 DIAGNOSIS — F90.2 ATTENTION DEFICIT HYPERACTIVITY DISORDER (ADHD), COMBINED TYPE: ICD-10-CM

## 2017-10-24 NOTE — TELEPHONE ENCOUNTER
Yee,  Please see [facility] message below. Pended requested script.  report printed and placed on your desk. Thanks.    Liz Bernard RN  [facility]

## 2017-10-25 RX ORDER — DEXTROAMPHETAMINE SACCHARATE, AMPHETAMINE ASPARTATE, DEXTROAMPHETAMINE SULFATE AND AMPHETAMINE SULFATE 3.75; 3.75; 3.75; 3.75 MG/1; MG/1; MG/1; MG/1
15 TABLET ORAL 2 TIMES DAILY
Qty: 15 TABLET | Refills: 0 | Status: SHIPPED | OUTPATIENT
Start: 2017-10-25 | End: 2017-11-10

## 2017-11-09 ENCOUNTER — MYC MEDICAL ADVICE (OUTPATIENT)
Dept: FAMILY MEDICINE | Facility: CLINIC | Age: 37
End: 2017-11-09

## 2017-11-09 DIAGNOSIS — F90.2 ATTENTION DEFICIT HYPERACTIVITY DISORDER (ADHD), COMBINED TYPE: ICD-10-CM

## 2017-11-09 NOTE — TELEPHONE ENCOUNTER
WINSTON Esquivel,     Patient lost Rx for Adderall for November and December. Please advise.       Controlled Substance Refill Request for amphetamine-dextroamphetamine (ADDERALL) 15 MG per tablet    Last refill: 10/25/2017     Last clinic visit: 8/16/2017    Clinic visit frequency required: Q 3 months      Controlled substance agreement on file: No.    Documentation in problem list reviewed:  Yes    Processing:  Patient will  in clinic    [place] reviewed:  reviewed- no concerns  [place]:  [email or web address]      Thanks! Fadumo Interiano RN

## 2017-11-10 RX ORDER — DEXTROAMPHETAMINE SACCHARATE, AMPHETAMINE ASPARTATE, DEXTROAMPHETAMINE SULFATE AND AMPHETAMINE SULFATE 3.75; 3.75; 3.75; 3.75 MG/1; MG/1; MG/1; MG/1
15 TABLET ORAL 2 TIMES DAILY
Qty: 75 TABLET | Refills: 0 | Status: SHIPPED | OUTPATIENT
Start: 2017-12-10 | End: 2018-01-10

## 2017-11-10 RX ORDER — DEXTROAMPHETAMINE SACCHARATE, AMPHETAMINE ASPARTATE, DEXTROAMPHETAMINE SULFATE AND AMPHETAMINE SULFATE 3.75; 3.75; 3.75; 3.75 MG/1; MG/1; MG/1; MG/1
15 TABLET ORAL 2 TIMES DAILY
Qty: 75 TABLET | Refills: 0 | Status: SHIPPED | OUTPATIENT
Start: 2017-11-18 | End: 2018-01-10

## 2017-11-10 NOTE — TELEPHONE ENCOUNTER
Refill on adderall at  called patient and left voice mail informing her of this- with proper photo id

## 2018-01-09 NOTE — PROGRESS NOTES
"  SUBJECTIVE:                                                    Traci Sloan is a 37 year old female who is here today for a medication check and ADHD follow up.      CURRENT CONCERNS:  None     CURRENT PRESCRIPTIONS:    Adderall 15 mg for 75 mg total daily   Best dose combo was adderall 30 xl with 5 mg IR PRN - not doing this due to insurance coverage of the XL     MEDICATION BENEFITS:  Controlled symptoms:  Hyperactivity - motor restlessness, Attention span, Distractability and Finishing tasks  Uncontrolled symptoms:  None     MEDICATION SIDE EFFECTS:   Has:  none  Denies:  no  Problem list and histories reviewed & adjusted, as indicated.  Additional history: as documented    Patient Active Problem List   Diagnosis     Need for Tdap vaccination     Polypharmacy     Gastroesophageal reflux disease     Irritable bowel syndrome     History of chronic urinary tract infection     Attention deficit hyperactivity disorder (ADHD), combined type     Encounter for supervision of normal first pregnancy     Indication for care in labor or delivery     Pregnancy      (spontaneous vaginal delivery)     BP Readings from Last 6 Encounters:   01/10/18 136/80   17 138/86   17 122/64   17 126/79   17 124/82   01/10/17 143/82     Current Outpatient Prescriptions   Medication     amphetamine-dextroamphetamine (ADDERALL) 15 MG per tablet     amphetamine-dextroamphetamine (ADDERALL) 15 MG per tablet     amphetamine-dextroamphetamine (ADDERALL) 15 MG per tablet     amphetamine-dextroamphetamine (ADDERALL) 5 MG per tablet     No current facility-administered medications for this visit.      ROS:  Constitutional, HEENT, cardiovascular, pulmonary, gi and gu systems are negative, except as otherwise noted.      OBJECTIVE:                                                    /80  Pulse 110  Temp 98.2  F (36.8  C) (Oral)  Ht 5' 6.34\" (1.685 m)  Wt 171 lb (77.6 kg)  SpO2 96%  BMI 27.32 " kg/m2    Exam:  Constitutional: healthy, alert and no distress  Head: Normocephalic  Neck: Neck supple. No adenopathy. Thyroid symmetric, normal size,  ENT: ENT exam normal, no neck nodes or sinus tenderness  Cardiovascular: negative RRR. No murmurs, clicks gallops or rub  Respiratory: negative, lungs clear  Musculoskeletal: extremities normal- no gross deformities noted, gait normal and normal muscle tone  Skin: no suspicious lesions or rashes  Neurologic: Gait normal. Reflexes normal and symmetric. Sensation grossly WNL.  Psychiatric: mentation appears normal and affect normal/bright  Hematologic/Lymphatic/Immunologic: Normal cervical lymph nodes  MENTAL STATUS EXAM  Appearance: appropriate  Attitude: cooperative  Behavior: normal  Eye Contact: normal  Speech: normal  Orientation: oriented to person , place, time and situation  Mood:  happy  Affect: Mood Congruent  Thought Process: clear  Hallucination: no    Diagnostic Test Results:  none      ASSESSMENT/PLAN:                                                        (F90.2) Attention deficit hyperactivity disorder (ADHD), combined type  Comment:   Plan: amphetamine-dextroamphetamine (ADDERALL) 15 MG         per tablet    New insurance.  Will test script this visit and if covered without problem patient will call clinic for two more scripts      WINSTON Richards CNP  [facility]

## 2018-01-10 ENCOUNTER — OFFICE VISIT (OUTPATIENT)
Dept: FAMILY MEDICINE | Facility: CLINIC | Age: 38
End: 2018-01-10

## 2018-01-10 VITALS
BODY MASS INDEX: 27.48 KG/M2 | WEIGHT: 171 LBS | TEMPERATURE: 98.2 F | HEART RATE: 110 BPM | DIASTOLIC BLOOD PRESSURE: 80 MMHG | OXYGEN SATURATION: 96 % | HEIGHT: 66 IN | SYSTOLIC BLOOD PRESSURE: 136 MMHG

## 2018-01-10 DIAGNOSIS — F90.2 ATTENTION DEFICIT HYPERACTIVITY DISORDER (ADHD), COMBINED TYPE: ICD-10-CM

## 2018-01-10 PROCEDURE — 99213 OFFICE O/P EST LOW 20 MIN: CPT | Performed by: NURSE PRACTITIONER

## 2018-01-10 RX ORDER — DEXTROAMPHETAMINE SACCHARATE, AMPHETAMINE ASPARTATE, DEXTROAMPHETAMINE SULFATE AND AMPHETAMINE SULFATE 3.75; 3.75; 3.75; 3.75 MG/1; MG/1; MG/1; MG/1
TABLET ORAL
Qty: 75 TABLET | Refills: 0 | Status: SHIPPED | OUTPATIENT
Start: 2018-01-10 | End: 2018-02-02

## 2018-01-10 NOTE — NURSING NOTE
"Chief Complaint   Patient presents with     Behavioral Problem       Initial /80  Pulse 110  Temp 98.2  F (36.8  C) (Oral)  Ht 5' 6.34\" (1.685 m)  Wt 171 lb (77.6 kg)  SpO2 96%  BMI 27.32 kg/m2 Estimated body mass index is 27.32 kg/(m^2) as calculated from the following:    Height as of this encounter: 5' 6.34\" (1.685 m).    Weight as of this encounter: 171 lb (77.6 kg).  Medication Reconciliation: complete       Rodger Munoz MA      "

## 2018-01-10 NOTE — MR AVS SNAPSHOT
"              After Visit Summary   1/10/2018    Traci Sloan    MRN: [id number]           Patient Information     Date Of Birth          1980        Visit Information        Provider Department      1/10/2018 4:00 PM Valarie Medrano APRN CNP [facility]        Today's Diagnoses     Attention deficit hyperactivity disorder (ADHD), combined type           Follow-ups after your visit        Who to contact     If you have questions or need follow up information about today's clinic visit or your schedule please contact [facility] directly at [phone].  Normal or non-critical lab and imaging results will be communicated to you by E96hart, letter or phone within 4 business days after the clinic has received the results. If you do not hear from us within 7 days, please contact the clinic through [employer] or phone. If you have a critical or abnormal lab result, we will notify you by phone as soon as possible.  Submit refill requests through [facility] ([facility]) or call your pharmacy and they will forward the refill request to us. Please allow 3 business days for your refill to be completed.          Additional Information About Your Visit        MyChart Information     [facility] ([facility]) gives you secure access to your electronic health record. If you see a primary care provider, you can also send messages to your care team and make appointments. If you have questions, please call your primary care clinic.  If you do not have a primary care provider, please call [phone] and they will assist you.        Care EveryWhere ID     This is your Care EveryWhere ID. This could be used by other organizations to access your [facility] medical records  [id number]        Your Vitals Were     Pulse Temperature Height Pulse Oximetry BMI (Body Mass Index)       110 98.2  F (36.8  C) (Oral) 5' 6.34\" (1.685 m) 96% 27.32 kg/m2        Blood Pressure from Last 3 Encounters:   01/10/18 136/80   08/16/17 138/86   05/19/17 " 122/64    Weight from Last 3 Encounters:   01/10/18 171 lb (77.6 kg)   08/16/17 178 lb 3.2 oz (80.8 kg)   05/19/17 179 lb 14.4 oz (81.6 kg)              Today, you had the following     No orders found for display         Today's Medication Changes          These changes are accurate as of: 1/10/18  6:30 PM.  If you have any questions, ask your nurse or doctor.               These medicines have changed or have updated prescriptions.        Dose/Directions    amphetamine-dextroamphetamine 15 MG per tablet   Commonly known as:  ADDERALL   This may have changed:  Another medication with the same name was removed. Continue taking this medication, and follow the directions you see here.   Used for:  Attention deficit hyperactivity disorder (ADHD), combined type   Changed by:  Valarie Medrano APRN CNP        Take 1.5 tablet (22.5 mg) by mouth in the AM and 1 tablet (15 mg) in the PM   Quantity:  75 tablet   Refills:  0            Where to get your medicines      Some of these will need a paper prescription and others can be bought over the counter.  Ask your nurse if you have questions.     Bring a paper prescription for each of these medications     amphetamine-dextroamphetamine 15 MG per tablet                Primary Care Provider Fax #    Physician No Ref-Primary [phone]       No address on file        Equal Access to Services     ESHA CURIEL : Kaushik Monk, waaxda luqadaha, qaybta kaalmada adeegyada, winifred villaseñor. So [facility] [phone].    ATENCIÓN: Si habla español, tiene a garcia disposición servicios gratuitos de asistencia lingüística. Llame al [phone].    We comply with applicable federal civil rights laws and [place] laws. We do not discriminate on the basis of race, color, national origin, age, disability, sex, sexual orientation, or gender identity.            Thank you!     Thank you for choosing [facility]  for your care. Our goal is  always to provide you with excellent care. Hearing back from our patients is one way we can continue to improve our services. Please take a few minutes to complete the written survey that you may receive in the mail after your visit with us. Thank you!             Your Updated Medication List - Protect others around you: Learn how to safely use, store and throw away your medicines at www.disposemymeds.org.          This list is accurate as of: 1/10/18  6:30 PM.  Always use your most recent med list.                   Brand Name Dispense Instructions for use Diagnosis    amphetamine-dextroamphetamine 15 MG per tablet    ADDERALL    75 tablet    Take 1.5 tablet (22.5 mg) by mouth in the AM and 1 tablet (15 mg) in the PM    Attention deficit hyperactivity disorder (ADHD), combined type

## 2018-02-02 ENCOUNTER — MYC REFILL (OUTPATIENT)
Dept: FAMILY MEDICINE | Facility: CLINIC | Age: 38
End: 2018-02-02

## 2018-02-02 DIAGNOSIS — F90.2 ATTENTION DEFICIT HYPERACTIVITY DISORDER (ADHD), COMBINED TYPE: ICD-10-CM

## 2018-02-02 NOTE — TELEPHONE ENCOUNTER
Yee -- please review and sign/advise. Thank you    Makenna Newberry, JOSHUAN, RN  [facility]

## 2018-02-02 NOTE — TELEPHONE ENCOUNTER
Message from [employer]:  Original authorizing provider: WINSTON Richards CNP    Traci Luther would like a refill of the following medications:  amphetamine-dextroamphetamine (ADDERALL) 15 MG per tablet [WINSTON Richards CNP]    Preferred pharmacy: [id number] [facility] [address]    Comment:  For now, the best version of my script through my insurance is the 15mg instant adderall 1.5 in am 1 in pm. I'll check back in in 3 months. Thank you, Traci

## 2018-02-05 ENCOUNTER — MYC MEDICAL ADVICE (OUTPATIENT)
Dept: FAMILY MEDICINE | Facility: CLINIC | Age: 38
End: 2018-02-05

## 2018-02-07 RX ORDER — DEXTROAMPHETAMINE SACCHARATE, AMPHETAMINE ASPARTATE, DEXTROAMPHETAMINE SULFATE AND AMPHETAMINE SULFATE 3.75; 3.75; 3.75; 3.75 MG/1; MG/1; MG/1; MG/1
TABLET ORAL
Qty: 75 TABLET | Refills: 0 | Status: SHIPPED | OUTPATIENT
Start: 2018-02-07 | End: 2018-02-28

## 2018-02-07 NOTE — TELEPHONE ENCOUNTER
Script for Adderall was left at the  as Traci was contacted and said that she wanted to pick it up.

## 2018-02-28 ENCOUNTER — MYC REFILL (OUTPATIENT)
Dept: FAMILY MEDICINE | Facility: CLINIC | Age: 38
End: 2018-02-28

## 2018-02-28 DIAGNOSIS — F90.2 ATTENTION DEFICIT HYPERACTIVITY DISORDER (ADHD), COMBINED TYPE: ICD-10-CM

## 2018-03-01 RX ORDER — DEXTROAMPHETAMINE SACCHARATE, AMPHETAMINE ASPARTATE, DEXTROAMPHETAMINE SULFATE AND AMPHETAMINE SULFATE 3.75; 3.75; 3.75; 3.75 MG/1; MG/1; MG/1; MG/1
TABLET ORAL
Qty: 75 TABLET | Refills: 0 | Status: SHIPPED | OUTPATIENT
Start: 2018-03-01 | End: 2018-03-28

## 2018-03-01 NOTE — TELEPHONE ENCOUNTER
Message from MyChart:  Original authorizing provider: WINSTON Richards CNP would like a refill of the following medications:  amphetamine-dextroamphetamine (ADDERALL) 15 MG per tablet [WINSTON Richards CNP]    Preferred pharmacy: [facility] [id number] [address]    Comment:  Requesting for March 2018. Thank you, Doc

## 2018-03-01 NOTE — TELEPHONE ENCOUNTER
Yee--    Please review/sign or advise. Per , last filled on 2/7/18    Thank you,   JOSHUA LooN RN  [facility]

## 2018-04-02 ENCOUNTER — MYC MEDICAL ADVICE (OUTPATIENT)
Dept: FAMILY MEDICINE | Facility: CLINIC | Age: 38
End: 2018-04-02

## 2018-04-26 ENCOUNTER — MYC REFILL (OUTPATIENT)
Dept: FAMILY MEDICINE | Facility: CLINIC | Age: 38
End: 2018-04-26

## 2018-04-26 DIAGNOSIS — F90.2 ATTENTION DEFICIT HYPERACTIVITY DISORDER (ADHD), COMBINED TYPE: ICD-10-CM

## 2018-04-26 RX ORDER — DEXTROAMPHETAMINE SACCHARATE, AMPHETAMINE ASPARTATE, DEXTROAMPHETAMINE SULFATE AND AMPHETAMINE SULFATE 3.75; 3.75; 3.75; 3.75 MG/1; MG/1; MG/1; MG/1
TABLET ORAL
Qty: 75 TABLET | Refills: 0 | Status: SHIPPED | OUTPATIENT
Start: 2018-04-26 | End: 2018-05-22

## 2018-04-26 NOTE — TELEPHONE ENCOUNTER
Yee,     Please review/sign or advise on refill request for:     amphetamine-dextroamphetamine (ADDERALL) 15 MG per tablets    Per , last filled 4/2/18 #75, 30 days.    Thank you,  JOSHUA LooN RN  [facility]

## 2018-04-26 NOTE — TELEPHONE ENCOUNTER
Message from MyChart:  Original authorizing provider: Haider Mullins MD    Traci Sloan would like a refill of the following medications:  amphetamine-dextroamphetamine (ADDERALL) 15 MG per tablet [Haider Mullins MD]    Preferred pharmacy: [facility] [address] [place] [address] AT [address]    Comment:  Requesting renewal. Thank you.

## 2018-05-06 ENCOUNTER — HEALTH MAINTENANCE LETTER (OUTPATIENT)
Age: 38
End: 2018-05-06

## 2018-05-08 ENCOUNTER — OFFICE VISIT (OUTPATIENT)
Dept: DERMATOLOGY | Facility: CLINIC | Age: 38
End: 2018-05-08
Payer: COMMERCIAL

## 2018-05-08 VITALS
SYSTOLIC BLOOD PRESSURE: 133 MMHG | DIASTOLIC BLOOD PRESSURE: 81 MMHG | WEIGHT: 169 LBS | HEIGHT: 66 IN | HEART RATE: 90 BPM | BODY MASS INDEX: 27.16 KG/M2

## 2018-05-08 DIAGNOSIS — B36.0 TV (TINEA VERSICOLOR): Primary | ICD-10-CM

## 2018-05-08 PROCEDURE — 99213 OFFICE O/P EST LOW 20 MIN: CPT | Performed by: PHYSICIAN ASSISTANT

## 2018-05-08 RX ORDER — ITRACONAZOLE 100 MG/1
CAPSULE ORAL
Qty: 28 CAPSULE | Refills: 1 | Status: SHIPPED | OUTPATIENT
Start: 2018-05-08 | End: 2018-07-26

## 2018-05-08 ASSESSMENT — PAIN SCALES - GENERAL: PAINLEVEL: MILD PAIN (2)

## 2018-05-08 NOTE — MR AVS SNAPSHOT
"              After Visit Summary   5/8/2018    Traci Sloan    MRN: [id number]           Patient Information     Date Of Birth          1980        Visit Information        Provider Department      5/8/2018 11:00 AM Yee Mccormack PA-C [facility]        Today's Diagnoses     TV (tinea versicolor)    -  1       Follow-ups after your visit        Who to contact     If you have questions or need follow up information about today's clinic visit or your schedule please contact [facility] directly at [phone].  Normal or non-critical lab and imaging results will be communicated to you by Sportsgrith[employer], letter or phone within 4 business days after the clinic has received the results. If you do not hear from us within 7 days, please contact the clinic through [employer] or phone. If you have a critical or abnormal lab result, we will notify you by phone as soon as possible.  Submit refill requests through [employer] or call your pharmacy and they will forward the refill request to us. Please allow 3 business days for your refill to be completed.          Additional Information About Your Visit        MyChart Information     [employer] gives you secure access to your electronic health record. If you see a primary care provider, you can also send messages to your care team and make appointments. If you have questions, please call your primary care clinic.  If you do not have a primary care provider, please call [phone] and they will assist you.        Care EveryWhere ID     This is your Care EveryWhere ID. This could be used by other organizations to access your [facility] medical records  [id number]        Your Vitals Were     Pulse Height BMI (Body Mass Index)             90 1.676 m (5' 6\") 27.28 kg/m2          Blood Pressure from Last 3 Encounters:   05/08/18 133/81   01/10/18 136/80   08/16/17 138/86    Weight from Last 3 Encounters:   05/08/18 76.7 kg (169 lb) "   01/10/18 77.6 kg (171 lb)   08/16/17 80.8 kg (178 lb 3.2 oz)              Today, you had the following     No orders found for display         Today's Medication Changes          These changes are accurate as of 5/8/18  1:38 PM.  If you have any questions, ask your nurse or doctor.               Start taking these medicines.        Dose/Directions    itraconazole 100 MG capsule   Commonly known as:  SPORANOX   Used for:  TV (tinea versicolor)   Started by:  Yee Mccormack PA-C        2 tablets PO BID x 1 week   Quantity:  28 capsule   Refills:  1            Where to get your medicines      These medications were sent to [facility] [address] [place] [address] AT [address] [address]     Phone:  [phone]     itraconazole 100 MG capsule                Primary Care Provider Fax #    Physician No Ref-Primary [phone]       No address on file        Equal Access to Services     ESHA CURIEL : Hadii michelle aponte hadasho Soomaali, waaxda luqadaha, qaybta kaalmada adeegyada, waxay rosa mariain haymónica bowen . So [facility] [phone].    ATENCIÓN: Si habla español, tiene a garcia disposición servicios gratuitos de asistencia lingüística. Llame al [phone].    We comply with applicable federal civil rights laws and [place] laws. We do not discriminate on the basis of race, color, national origin, age, disability, sex, sexual orientation, or gender identity.            Thank you!     Thank you for choosing [facility]  for your care. Our goal is always to provide you with excellent care. Hearing back from our patients is one way we can continue to improve our services. Please take a few minutes to complete the written survey that you may receive in the mail after your visit with us. Thank you!             Your Updated Medication List - Protect others around you: Learn how to safely use, store and throw away your medicines at  www.disposemymeds.org.          This list is accurate as of 5/8/18  1:38 PM.  Always use your most recent med list.                   Brand Name Dispense Instructions for use Diagnosis    amphetamine-dextroamphetamine 15 MG per tablet    ADDERALL    75 tablet    Take 1.5 tablet (22.5 mg) by mouth in the AM and 1 tablet (15 mg) in the PM    Attention deficit hyperactivity disorder (ADHD), combined type       itraconazole 100 MG capsule    SPORANOX    28 capsule    2 tablets PO BID x 1 week    TV (tinea versicolor)

## 2018-05-08 NOTE — PROGRESS NOTES
"HPI:   Traci Sloan is a 37 year old female who presents for evaluation of recurrent rash   chief complaint  Location: chest, back, neck  Condition present for: years.   Previous treatments include: antifungal pills and cream, show improvement but returns every year.     Review Of Systems  Eyes: negative  Ears/Nose/Throat: negative  Respiratory: No shortness of breath, dyspnea on exertion, cough, or hemoptysis  Cardiovascular: negative  Gastrointestinal: negative  Genitourinary: negative  Musculoskeletal: negative  Neurologic: negative  Psychiatric: negative    This document serves as a record of the services and decisions personally performed and made by Yee Mccormack, MS, PA-C. It was created on her behalf by Marylou Easton, a trained medical scribe. The creation of this document is based on the provider's statements to the medical scribe.  Marylou Easton 11:05 AM May 8, 2018    PHYSICAL EXAM:    /81  Pulse 90  Ht 1.676 m (5' 6\")  Wt 76.7 kg (169 lb)  BMI 27.28 kg/m2  Skin exam performed as follows: Type 2 skin. Mood appropriate  Alert and Oriented X 3. Well developed, well nourished in no distress.  General appearance: Normal  Head including face: Normal  Eyes: conjunctiva and lids: Normal  Mouth: Lips, teeth, gums: Normal  Neck: Normal  Chest-breast/axillae: Normal  Back: Normal  Spleen and liver: Normal  Cardiovascular: Exam of peripheral vascular system by observation for swelling, varicosities, edema: Normal  Genitalia: groin, buttocks: Normal  Extremities: digits/nails (clubbing): Normal  Eccrine and Apocrine glands: Normal  Right upper extremity: Normal  Left upper extremity: Normal  Right lower extremity: Normal  Left lower extremity: Normal  Skin: Scalp and body hair: See below    1. Hyperpigmented patches with scale on the upper back, chest and left neck    ASSESSMENT/PLAN:     1. Tinea versicolor - advised on diagnosis and treatment options. Discussed that is secondary " to fungal overgrowth that tends to worsen with heat and perspiration. Condition does tend be be recurrent. Has tried PO ketoconazole which helps, but then returns.  Discussed different antifungal vs topicals. She would prefer to start with PO medications.   --Start Sporanox 200 mg BID x 1 week        Follow-up: yearly/PRN sooner  CC:   Scribed By:Marylou Easton, Medical Scribe    The information in this document, created by the medical scribe for me, accurately reflects the services I personally performed and the decisions made by me. I have reviewed and approved this document for accuracy prior to leaving the patient care area.  May 8, 2018 11:09 AM    Yee Mccormack MS, PA-C

## 2018-05-08 NOTE — LETTER
"    5/8/2018         RE: Traci Sloan  [address]  [place] [address]        Dear Colleague,    Thank you for referring your patient, Traci Sloan, to the [facility]. Please see a copy of my visit note below.    HPI:   Traci Sloan is a 37 year old female who presents for evaluation of recurrent rash   chief complaint  Location: chest, back, neck  Condition present for: years.   Previous treatments include: antifungal pills and cream, show improvement but returns every year.     Review Of Systems  Eyes: negative  Ears/Nose/Throat: negative  Respiratory: No shortness of breath, dyspnea on exertion, cough, or hemoptysis  Cardiovascular: negative  Gastrointestinal: negative  Genitourinary: negative  Musculoskeletal: negative  Neurologic: negative  Psychiatric: negative    This document serves as a record of the services and decisions personally performed and made by Yee Mccormack, MS, PA-C. It was created on her behalf by Marylou Easton, a trained medical scribe. The creation of this document is based on the provider's statements to the medical scribe.  Marylou Easton 11:05 AM May 8, 2018    PHYSICAL EXAM:    /81  Pulse 90  Ht 1.676 m (5' 6\")  Wt 76.7 kg (169 lb)  BMI 27.28 kg/m2  Skin exam performed as follows: Type 2 skin. Mood appropriate  Alert and Oriented X 3. Well developed, well nourished in no distress.  General appearance: Normal  Head including face: Normal  Eyes: conjunctiva and lids: Normal  Mouth: Lips, teeth, gums: Normal  Neck: Normal  Chest-breast/axillae: Normal  Back: Normal  Spleen and liver: Normal  Cardiovascular: Exam of peripheral vascular system by observation for swelling, varicosities, edema: Normal  Genitalia: groin, buttocks: Normal  Extremities: digits/nails (clubbing): Normal  Eccrine and Apocrine glands: Normal  Right upper extremity: Normal  Left upper extremity: Normal  Right lower extremity: Normal  Left lower " extremity: Normal  Skin: Scalp and body hair: See below    1. Hyperpigmented patches with scale on the upper back, chest and left neck    ASSESSMENT/PLAN:     1. Tinea versicolor - advised on diagnosis and treatment options. Discussed that is secondary to fungal overgrowth that tends to worsen with heat and perspiration. Condition does tend be be recurrent. Has tried PO ketoconazole which helps, but then returns.  Discussed different antifungal vs topicals. She would prefer to start with PO medications.   --Start Sporanox 200 mg BID x 1 week        Follow-up: yearly/PRN sooner  CC:   Scribed By:Marylou Easton, Medical Scribe    The information in this document, created by the medical scribe for me, accurately reflects the services I personally performed and the decisions made by me. I have reviewed and approved this document for accuracy prior to leaving the patient care area.  May 8, 2018 11:09 AM    Yee Mccormack MS, JOSEPH      Again, thank you for allowing me to participate in the care of your patient.        Sincerely,        Yee Mccormack PA-C

## 2018-05-22 ENCOUNTER — MYC REFILL (OUTPATIENT)
Dept: FAMILY MEDICINE | Facility: CLINIC | Age: 38
End: 2018-05-22

## 2018-05-22 DIAGNOSIS — F90.2 ATTENTION DEFICIT HYPERACTIVITY DISORDER (ADHD), COMBINED TYPE: ICD-10-CM

## 2018-05-22 RX ORDER — DEXTROAMPHETAMINE SACCHARATE, AMPHETAMINE ASPARTATE, DEXTROAMPHETAMINE SULFATE AND AMPHETAMINE SULFATE 3.75; 3.75; 3.75; 3.75 MG/1; MG/1; MG/1; MG/1
TABLET ORAL
Qty: 75 TABLET | Refills: 0 | Status: SHIPPED | OUTPATIENT
Start: 2018-05-22 | End: 2018-06-21

## 2018-05-22 NOTE — TELEPHONE ENCOUNTER
Called Traci to let her know that script was ready and she said that she would pick it up either today or tomorrow. Left at the .

## 2018-05-22 NOTE — TELEPHONE ENCOUNTER
Yee/Provider Pool,     Please review/sign or advise for refill of Adderall 15 mg tablets.    : Last filled on 04/29/18 for 75 tablets.  LOV: 01/10/18.    Patient's "[employer]t message stated that she will be in for OV next month. No visit scheduled currently.    Luna Bran RN  [facility]

## 2018-05-22 NOTE — TELEPHONE ENCOUNTER
[facility]  Original authorizing provider: WINSTON Richards CNP    Traci Sloan would like a refill of the following medications:  amphetamine-dextroamphetamine (ADDERALL) 15 MG per tablet [WINSTON Richards CNP]    Preferred pharmacy: [facility] [address] [address]    Comment:  Fill request. Will be in for office visit next month.

## 2018-06-13 ENCOUNTER — HOSPITAL ENCOUNTER (OUTPATIENT)
Dept: GENERAL RADIOLOGY | Facility: CLINIC | Age: 38
Discharge: HOME | End: 2018-06-13
Attending: NURSE PRACTITIONER | Admitting: NURSE PRACTITIONER

## 2018-06-13 ENCOUNTER — OFFICE VISIT (OUTPATIENT)
Dept: FAMILY MEDICINE | Facility: CLINIC | Age: 38
End: 2018-06-13

## 2018-06-13 VITALS
BODY MASS INDEX: 28.05 KG/M2 | OXYGEN SATURATION: 96 % | SYSTOLIC BLOOD PRESSURE: 136 MMHG | DIASTOLIC BLOOD PRESSURE: 86 MMHG | HEART RATE: 62 BPM | TEMPERATURE: 98.5 F | WEIGHT: 173.8 LBS

## 2018-06-13 DIAGNOSIS — S99.912A ANKLE INJURY, LEFT, INITIAL ENCOUNTER: ICD-10-CM

## 2018-06-13 DIAGNOSIS — S82.892A ANKLE FRACTURE, LEFT, CLOSED, INITIAL ENCOUNTER: Primary | ICD-10-CM

## 2018-06-13 PROCEDURE — 73610 X-RAY EXAM OF ANKLE: CPT | Mod: LT

## 2018-06-13 PROCEDURE — 99213 OFFICE O/P EST LOW 20 MIN: CPT | Performed by: NURSE PRACTITIONER

## 2018-06-13 NOTE — PATIENT INSTRUCTIONS
Start wearing walking boot  Schedule with orthopedics within the next two days    Leg Fracture    You have a break (fracture) of the leg. A fracture is treated with a splint, cast, or special boot. It will usually take at about 8 to 12 weeks for the fracture to heal, but it can take several months in some cases. If you have a severe injury, you may need surgery to fix it.  Home care  Follow these guidelines when caring for yourself at home:    You will be given a splint, cast, boot, or other device to keep the injured area from moving. Unless you were told otherwise, use crutches or a walker. Don t put weight on the injured leg until your healthcare provider says you can do so. (You can rent crutches and a walker at many pharmacies and surgical or orthopedic supply stores.)    Keep your leg elevated to reduce pain and swelling. When sleeping, put a pillow under the injured leg. When sitting, support the injured leg so it is above your waist. This is very important during the first 2 days (48 hours).    Put an ice pack on the injured area. Do this for 20 minutes every 1 to 2 hours the first day for pain relief. You can make an ice pack by wrapping a plastic bag of ice cubes in a thin towel. As the ice melts, be careful that the cast, splint, or boot doesn t get wet. You can put the ice pack directly over the splint or cast. Continue using the ice pack 3 to 4 times a day for the next 2 days. Then use the ice pack as needed to ease pain and swelling.    Keep the cast, splint, or boot completely dry at all times. Bathe with your cast, splint, or boot out of the water. Protect it with a large plastic bag, rubber-banded at the top end. If a boot or fiberglass cast or splint gets wet, you can dry it with a hair dryer.    You may use acetaminophen or ibuprofen to control pain, unless another pain medicine was prescribed. If you have chronic liver or kidney disease, talk with your healthcare provider before using these  medicines. Also talk with your provider if you ve had a stomach ulcer or gastrointestinal bleeding.    Don t put creams or objects under the cast if you have itching.  Follow-up care  Follow up with your healthcare provider, or as advised. This is to make sure the bone is healing the way it should. If a splint was put on, it may be converted to a cast at your next visit.  X-rays may be taken. You will be told of any new findings that may affect your care.  When to seek medical advice  Call your healthcare provider right away if any of these occur:    The cast or splint cracks    The plaster cast or splint becomes wet or soft    The fiberglass cast or splint stays wet for more than 24 hours    Bad odor from the cast or wound fluid stains the cast    Tightness or pain under the cast or splint gets worse    Toes become swollen, cold, blue, numb, or tingly    You can t move your toes    Skin around cast or splint becomes red    Fever of 100.4 F (38 C) or higher, or as directed by your healthcare provider  Date Last Reviewed: 2/1/2017 2000-2017 [employer]. [address] [place] [address]. All rights reserved. This information is not intended as a substitute for professional medical care. Always follow your healthcare professional's instructions.

## 2018-06-13 NOTE — MR AVS SNAPSHOT
After Visit Summary   6/13/2018    Traci Sloan    MRN: [id number]           Patient Information     Date Of Birth          1980        Visit Information        Provider Department      6/13/2018 11:45 AM Valarie Medrano APRN [facility]        Today's Diagnoses     Ankle injury, left, initial encounter    -  1    Ankle fracture, left, closed, initial encounter          Care Instructions    Start wearing walking boot  Schedule with orthopedics within the next two days    Leg Fracture    You have a break (fracture) of the leg. A fracture is treated with a splint, cast, or special boot. It will usually take at about 8 to 12 weeks for the fracture to heal, but it can take several months in some cases. If you have a severe injury, you may need surgery to fix it.  Home care  Follow these guidelines when caring for yourself at home:    You will be given a splint, cast, boot, or other device to keep the injured area from moving. Unless you were told otherwise, use crutches or a walker. Don t put weight on the injured leg until your healthcare provider says you can do so. (You can rent crutches and a walker at many pharmacies and surgical or orthopedic supply stores.)    Keep your leg elevated to reduce pain and swelling. When sleeping, put a pillow under the injured leg. When sitting, support the injured leg so it is above your waist. This is very important during the first 2 days (48 hours).    Put an ice pack on the injured area. Do this for 20 minutes every 1 to 2 hours the first day for pain relief. You can make an ice pack by wrapping a plastic bag of ice cubes in a thin towel. As the ice melts, be careful that the cast, splint, or boot doesn t get wet. You can put the ice pack directly over the splint or cast. Continue using the ice pack 3 to 4 times a day for the next 2 days. Then use the ice pack as needed to ease pain and swelling.    Keep the cast, splint, or boot completely  dry at all times. Bathe with your cast, splint, or boot out of the water. Protect it with a large plastic bag, rubber-banded at the top end. If a boot or fiberglass cast or splint gets wet, you can dry it with a hair dryer.    You may use acetaminophen or ibuprofen to control pain, unless another pain medicine was prescribed. If you have chronic liver or kidney disease, talk with your healthcare provider before using these medicines. Also talk with your provider if you ve had a stomach ulcer or gastrointestinal bleeding.    Don t put creams or objects under the cast if you have itching.  Follow-up care  Follow up with your healthcare provider, or as advised. This is to make sure the bone is healing the way it should. If a splint was put on, it may be converted to a cast at your next visit.  X-rays may be taken. You will be told of any new findings that may affect your care.  When to seek medical advice  Call your healthcare provider right away if any of these occur:    The cast or splint cracks    The plaster cast or splint becomes wet or soft    The fiberglass cast or splint stays wet for more than 24 hours    Bad odor from the cast or wound fluid stains the cast    Tightness or pain under the cast or splint gets worse    Toes become swollen, cold, blue, numb, or tingly    You can t move your toes    Skin around cast or splint becomes red    Fever of 100.4 F (38 C) or higher, or as directed by your healthcare provider  Date Last Reviewed: 2/1/2017 2000-2017 [employer]. [address] [place] [address], [place] [address]. All rights reserved. This information is not intended as a substitute for professional medical care. Always follow your healthcare professional's instructions.                Follow-ups after your visit        Additional Services     ORTHO  REFERRAL       [facility] is referring you to the Orthopedic  Services at [facility].       The  Kristyn Representative will assist you in the coordination of your Orthopedic and Musculoskeletal Care as prescribed by your physician.    The Kristyn Representative will call you within 1 business day to help schedule your appointment, or you may contact the [facility] Representative at:    All areas ~ [phone]     Type of Referral : [facility] / [facility]le [facility]       Timeframe requested: 1 - 2 days    Coverage of these services is subject to the terms and limitations of your health insurance plan.  Please call member services at your health plan with any benefit or coverage questions.      If X-rays, CT or MRI's have been performed, please contact the facility where they were done to arrange for , prior to your scheduled appointment.  Please bring this referral request to your appointment and present it to your specialist.                  Future tests that were ordered for you today     Open Future Orders        Priority Expected Expires Ordered    XR Ankle Left G/E 3 Views Routine 6/13/2018 6/13/2019 6/13/2018            Who to contact     If you have questions or need follow up information about today's clinic visit or your schedule please contact [facility] directly at [phone].  Normal or non-critical lab and imaging results will be communicated to you by MyChart, letter or phone within 4 business days after the clinic has received the results. If you do not hear from us within 7 days, please contact the clinic through [employer] or phone. If you have a critical or abnormal lab result, we will notify you by phone as soon as possible.  Submit refill requests through [email or web address] or call your pharmacy and they will forward the refill request to us. Please allow 3 business days for your refill to be completed.          Additional Information About Your Visit        [employer] Information     [email or web address] gives you secure access to your electronic health record. If you see a  primary care provider, you can also send messages to your care team and make appointments. If you have questions, please call your primary care clinic.  If you do not have a primary care provider, please call [phone] and they will assist you.        Care EveryWhere ID     This is your Care EveryWhere ID. This could be used by other organizations to access your [facility] [facility] medical records  [id number]        Your Vitals Were     Pulse Temperature Pulse Oximetry BMI (Body Mass Index)          62 98.5  F (36.9  C) (Oral) 96% 28.05 kg/m2         Blood Pressure from Last 3 Encounters:   06/13/18 136/86   05/08/18 133/81   01/10/18 136/80    Weight from Last 3 Encounters:   06/13/18 173 lb 12.8 oz (78.8 kg)   05/08/18 169 lb (76.7 kg)   01/10/18 171 lb (77.6 kg)              We Performed the Following     ORTHO  REFERRAL        Primary Care Provider Fax #    Physician No Ref-Primary [phone]       No address on file        Equal Access to Services     ESHA CURIEL : Hadii michelle ku hadasho Soomaali, waaxda luqadaha, qaybta kaalmada adeegyalion, winifred bowen . So [facility] [phone].    ATENCIÓN: Si habla español, tiene a garcia disposición servicios gratuitos de asistencia lingüística. Llame al [phone].    We comply with applicable federal civil rights laws and [place] laws. We do not discriminate on the basis of race, color, national origin, age, disability, sex, sexual orientation, or gender identity.            Thank you!     Thank you for choosing [facility]  for your care. Our goal is always to provide you with excellent care. Hearing back from our patients is one way we can continue to improve our services. Please take a few minutes to complete the written survey that you may receive in the mail after your visit with us. Thank you!             Your Updated Medication List - Protect others around you: Learn how to safely use, store and throw away your medicines  at www.disposemymeds.org.          This list is accurate as of 6/13/18  2:05 PM.  Always use your most recent med list.                   Brand Name Dispense Instructions for use Diagnosis    amphetamine-dextroamphetamine 15 MG per tablet    ADDERALL    75 tablet    Take 1.5 tablet (22.5 mg) by mouth in the AM and 1 tablet (15 mg) in the PM    Attention deficit hyperactivity disorder (ADHD), combined type       itraconazole 100 MG capsule    SPORANOX    28 capsule    2 tablets PO BID x 1 week    TV (tinea versicolor)

## 2018-06-13 NOTE — PROGRESS NOTES
SUBJECTIVE:   Traci Sloan is a 37 year old female who presents to clinic today for the following health issues:    Joint Pain    Onset: Sunday night     Description:   Location: left ankle - attached to pedal of bike and went around with pedal, felt a snap  Character: Sharp, Dull ache, Stabbing and Burning  Difficulty with flex and point  Swelling has reduced  Able to walk on it    Intensity: moderate    Progression of Symptoms: hurt more but swelling is going down     Accompanying Signs & Symptoms:  Other symptoms: swelling    History:   Previous similar pain: YES- sprained it a few times over her life.  Last was 6 years ago      Precipitating factors:   Trauma or overuse: YES- was getting on a bike and it had cages on the peddles, and foot went in the wrong way.     Alleviating factors:  Improved by: rest/inactivity, ice and elevating it.     Therapies Tried and outcome: ibuprofen but no changes.       Problem list and histories reviewed & adjusted, as indicated.  Additional history: as documented    Reviewed and updated as needed this visit by clinical staff       Reviewed and updated as needed this visit by Provider         ROS:    ROS:5 point ROS including CONST, HEENT, Respiratory, CV, and GI other than that noted in the HPI,  is negative       OBJECTIVE:     /86  Pulse 62  Temp 98.5  F (36.9  C) (Oral)  Wt 173 lb 12.8 oz (78.8 kg)  SpO2 96%  BMI 28.05 kg/m2  Body mass index is 28.05 kg/(m^2).  GENERAL: healthy, alert and no distress  MS: left foot moderately swollen with pitting, mild ankle swelling, no ecchymosis; point tenderness at posterior lateral malleolus, no proximal 5th metatarsal tenderness, no anterior ligament tenderness; minimal ROM in any direction, able to walk with limp about 3 steps    Diagnostic Test Results:  Xray - left distal tibal transverse fracture, not appearing displaced, soft tissue swelling    ASSESSMENT/PLAN:       (B16.464Y) Ankle injury, left, initial encounter   (primary encounter diagnosis)  Comment:   Plan: XR Ankle Left G/E 3 Views        Provided with short walking boot and orthopedic referral.  Advised to minimize weight bearing activity and wear boot when weight bearing.      (M50.184W) Ankle fracture, left, closed, initial encounter  Comment:   Plan: ORTHO  REFERRAL              See Patient Instructions    WINSTON Richards [facility]

## 2018-06-14 ENCOUNTER — OFFICE VISIT (OUTPATIENT)
Dept: PODIATRY | Facility: CLINIC | Age: 38
End: 2018-06-14

## 2018-06-14 VITALS
BODY MASS INDEX: 27.93 KG/M2 | SYSTOLIC BLOOD PRESSURE: 128 MMHG | WEIGHT: 173.8 LBS | HEIGHT: 66 IN | DIASTOLIC BLOOD PRESSURE: 80 MMHG

## 2018-06-14 DIAGNOSIS — S82.832A OTHER CLOSED FRACTURE OF DISTAL END OF LEFT FIBULA, INITIAL ENCOUNTER: Primary | ICD-10-CM

## 2018-06-14 PROCEDURE — 99243 OFF/OP CNSLTJ NEW/EST LOW 30: CPT | Performed by: PODIATRIST

## 2018-06-14 NOTE — LETTER
2018         RE: Traci Sloan  [address]  [place] [address]        Dear Colleague,    Thank you for referring your patient, Traci Sloan, to the [facility]. Please see a copy of my visit note below.      ASSESSMENT/PLAN:    Encounter Diagnosis   Name Primary?     Other closed fracture of distal end of left fibula, initial encounter Yes     Recommendations:    CAM walker/ aircast x 6 weeks  Rest, ice, compression, elevation  Tylenol for pain    Pt advised to remove CAM walker several times a day and do ankle ROM exercises/wiggle toes.   It is also recommended that a thick-soled shoe be worn on the contralateral foot to offset any created leg length issue.  CAM does not have to be worn at night.    We discussed immoblization and the risk of blood clot.  ROM exercises and knee-high compression recommended.  Patient to seek medical attention if calf swelling and/or pain, chest pain, shortness of breath.    Follow up in 2 weeks for XR     Body mass index is 28.05 kg/(m^2).    Weight management plan: Patient was referred to their PCP to discuss a diet and exercise plan.      Sorin Mcrae DPM, FACFAS, MS    [place] Department of Podiatry/Foot & Ankle Surgery      ____________________________________________________________________    HPI:       I was asked by Valarie Medrano CNP to evaluate this patient regarding left ankle fracture.     Chief Complaint: fracture, left fibula  She got her foot caught in stirrup on her bike and twisted her ankle.  Onset of problem: 3 days  Pain/ discomfort is described as:  Sharp, shooting  Ratin/10   Frequency:  daily    The pain is made worse with sudden movement, being bumped  Previous treatment: evaluated in primary care yesterday. XR, CAM walker, ice, compression      Patient Active Problem List   Diagnosis     Need for Tdap vaccination     Polypharmacy     Gastroesophageal reflux disease     Irritable bowel syndrome     History of chronic urinary  tract infection     Attention deficit hyperactivity disorder (ADHD), combined type     Encounter for supervision of normal first pregnancy     Indication for care in labor or delivery     Pregnancy      (spontaneous vaginal delivery)   *  *  Past Surgical History:   Procedure Laterality Date     CYSTOSCOPY, DILATE URETHRA, COMBINED  1999    for frequent UTI'S     KNEE SURGERY  10/1997   *  *  Current Outpatient Prescriptions   Medication Sig Dispense Refill     amphetamine-dextroamphetamine (ADDERALL) 15 MG per tablet Take 1.5 tablet (22.5 mg) by mouth in the AM and 1 tablet (15 mg) in the PM 75 tablet 0     itraconazole (SPORANOX) 100 MG capsule 2 tablets PO BID x 1 week (Patient not taking: Reported on 2018) 28 capsule 1       ROS:     A 10-point review of systems was performed and is positive for that noted in the HPI and as seen below.  All other areas are negative.     Numbness in feet?  yes   Calf pain with walking? no  Recent foot/ankle injury? History of ankle sprains  Weight change over past 12 months? 20# loss  Self perception as overweight? yes  Recent flu-like symptoms? no  Joint pain other than feet ? no    Social History: Employment:  [employer];  Exercise/Physical activity:  7 days/ week - gym, biking;  Tobacco use:  no  Social History     Social History     Marital status: Single     Spouse name: N/A     Number of children: N/A     Years of education: N/A     Occupational History     Not on file.     Social History Main Topics     Smoking status: Former Smoker     Quit date: 10/25/2015     Smokeless tobacco: Never Used     Alcohol use 0.0 oz/week     0 Standard drinks or equivalent per week      Comment: occ     Drug use: No     Sexual activity: Yes     Partners: Male     Other Topics Concern     Parent/Sibling W/ Cabg, Mi Or Angioplasty Before 65f 55m? No     Social History Narrative    Caffeine intake/servings daily - 1    Calcium intake/servings daily - 3    Exercise 5 times weekly -  "describe ; bikes    Sunscreen used - Yes    Seatbelts used - Yes    Guns stored in the home - No    Self Breast Exam - No    Pap test up to date -  No    Eye exam up to date -  No    Dental exam up to date -  No    DEXA scan up to date -  No    Flex Sig/Colonoscopy up to date -  No    Mammography up to date -  No    Immunizations reviewed and up to date - Yes    Abuse: Current or Past (Physical, Sexual or Emotional) - No    Do you feel safe in your environment - Yes    Do you cope well with stress - Yes    Do you suffer from insomnia - No    Last updated by: Halley Dela Cruz  3/30/2016               Family history:  Family History   Problem Relation Age of Onset     Heart Failure Maternal Grandmother      CEREBROVASCULAR DISEASE Maternal Grandfather      DIABETES Maternal Grandfather      Breast Cancer Maternal Grandmother      Colon Cancer Maternal Grandmother      Other Cancer Maternal Grandfather      MENTAL ILLNESS Paternal Grandfather        Rheumatoid arthritis:  no  Foot Problems: no  Diabetes: grandparent.      EXAM:    Vitals: /80  Ht 5' 6\" (1.676 m)  Wt 173 lb 12.8 oz (78.8 kg)  BMI 28.05 kg/m2  BMI: Body mass index is 28.05 kg/(m^2).  Height: 5' 6\"    Constitutional/ general:  Pt is in no apparent distress, appears well-nourished.  Cooperative with history and physical exam.     Vascular:  Pedal pulses are palpable bilaterally for both the DP and PT arteries.  CFT < 3 sec.  No edema.  Pedal hair growth noted.     Neuro:  Alert and oriented x 3. Coordinated gait.  Light touch sensation is intact to the L4, L5, S1 distributions. No obvious deficits.  No evidence of neurological-based weakness, spasticity, or contracture in the lower extremities.     Derm: Normal texture and turgor.    No open lesions. Ecchymosis along the medial left heel.     Musculoskeletal:    Lower extremity muscle strength is normal.  Patient is ambulatory without an assistive device or brace .  No gross " deformities.  Pain on palpation: distal left fibula. Some pain over the anterior aspect of the deltoid ligament.  Mild lateral pain with abduction of the foot on the ankle.       Radiographic Exam:  3 views left ankle radiographs 6/13/2018 2:45 PM     History: ankle injury 6/8; meets [place] rules with point tenderness of  posterior lateral malleolus; Ankle injury, left, initial encounter      Comparison: None     Findings:     AP, oblique, and lateral  views of the left ankle were obtained.      Transverse fracture through the distal fibula with minimal  displacements.     Ankle mortise and syndesmosis are congruent on this non-weight bearing  images.     Os trigonum.     Lateral soft tissue swelling.         Impression:  Transverse fracture through the distal fibula.     MD Sorin GARCÍA DPM (Joe), MS LINDSEY    [place] Department of Podiatry/Foot & Ankle Surgery                Again, thank you for allowing me to participate in the care of your patient.        Sincerely,        Sorin Mcrae DPM

## 2018-06-14 NOTE — PROGRESS NOTES
ASSESSMENT/PLAN:    Encounter Diagnosis   Name Primary?     Other closed fracture of distal end of left fibula, initial encounter Yes     Recommendations:    CAM walker/ aircast x 6 weeks  Rest, ice, compression, elevation  Tylenol for pain    Pt advised to remove CAM walker several times a day and do ankle ROM exercises/wiggle toes.   It is also recommended that a thick-soled shoe be worn on the contralateral foot to offset any created leg length issue.  CAM does not have to be worn at night.    We discussed immoblization and the risk of blood clot.  ROM exercises and knee-high compression recommended.  Patient to seek medical attention if calf swelling and/or pain, chest pain, shortness of breath.    Follow up in 2 weeks for XR     Body mass index is 28.05 kg/(m^2).    Weight management plan: Patient was referred to their PCP to discuss a diet and exercise plan.      Sorin Mcrae DPM, FACFAS, [facility] Department of Podiatry/Foot & Ankle Surgery      ____________________________________________________________________    HPI:       I was asked by Valarie Medrano CNP to evaluate this patient regarding left ankle fracture.     Chief Complaint: fracture, left fibula  She got her foot caught in stirrup on her bike and twisted her ankle.  Onset of problem: 3 days  Pain/ discomfort is described as:  Sharp, shooting  Ratin/10   Frequency:  daily    The pain is made worse with sudden movement, being bumped  Previous treatment: evaluated in primary care yesterday. XR, CAM walker, ice, compression      Patient Active Problem List   Diagnosis     Need for Tdap vaccination     Polypharmacy     Gastroesophageal reflux disease     Irritable bowel syndrome     History of chronic urinary tract infection     Attention deficit hyperactivity disorder (ADHD), combined type     Encounter for supervision of normal first pregnancy     Indication for care in labor or delivery     Pregnancy      (spontaneous vaginal delivery)    *  *  Past Surgical History:   Procedure Laterality Date     CYSTOSCOPY, DILATE URETHRA, COMBINED  11/1999    for frequent UTI'S     KNEE SURGERY  10/1997   *  *  Current Outpatient Prescriptions   Medication Sig Dispense Refill     amphetamine-dextroamphetamine (ADDERALL) 15 MG per tablet Take 1.5 tablet (22.5 mg) by mouth in the AM and 1 tablet (15 mg) in the PM 75 tablet 0     itraconazole (SPORANOX) 100 MG capsule 2 tablets PO BID x 1 week (Patient not taking: Reported on 6/13/2018) 28 capsule 1       ROS:     A 10-point review of systems was performed and is positive for that noted in the HPI and as seen below.  All other areas are negative.     Numbness in feet?  yes   Calf pain with walking? no  Recent foot/ankle injury? History of ankle sprains  Weight change over past 12 months? 20# loss  Self perception as overweight? yes  Recent flu-like symptoms? no  Joint pain other than feet ? no    Social History: Employment:  [employer];  Exercise/Physical activity:  7 days/ week - gym, biking;  Tobacco use:  no  Social History     Social History     Marital status: Single     Spouse name: N/A     Number of children: N/A     Years of education: N/A     Occupational History     Not on file.     Social History Main Topics     Smoking status: Former Smoker     Quit date: 10/25/2015     Smokeless tobacco: Never Used     Alcohol use 0.0 oz/week     0 Standard drinks or equivalent per week      Comment: occ     Drug use: No     Sexual activity: Yes     Partners: Male     Other Topics Concern     Parent/Sibling W/ Cabg, Mi Or Angioplasty Before 65f 55m? No     Social History Narrative    Caffeine intake/servings daily - 1    Calcium intake/servings daily - 3    Exercise 5 times weekly - describe ; bikes    Sunscreen used - Yes    Seatbelts used - Yes    Guns stored in the home - No    Self Breast Exam - No    Pap test up to date -  No    Eye exam up to date -  No    Dental exam up to date -  No    DEXA scan up to date -   "No    Flex Sig/Colonoscopy up to date -  No    Mammography up to date -  No    Immunizations reviewed and up to date - Yes    Abuse: Current or Past (Physical, Sexual or Emotional) - No    Do you feel safe in your environment - Yes    Do you cope well with stress - Yes    Do you suffer from insomnia - No    Last updated by: Halley Dela Cruz  3/30/2016               Family history:  Family History   Problem Relation Age of Onset     Heart Failure Maternal Grandmother      CEREBROVASCULAR DISEASE Maternal Grandfather      DIABETES Maternal Grandfather      Breast Cancer Maternal Grandmother      Colon Cancer Maternal Grandmother      Other Cancer Maternal Grandfather      MENTAL ILLNESS Paternal Grandfather        Rheumatoid arthritis:  no  Foot Problems: no  Diabetes: grandparent.      EXAM:    Vitals: /80  Ht 5' 6\" (1.676 m)  Wt 173 lb 12.8 oz (78.8 kg)  BMI 28.05 kg/m2  BMI: Body mass index is 28.05 kg/(m^2).  Height: 5' 6\"    Constitutional/ general:  Pt is in no apparent distress, appears well-nourished.  Cooperative with history and physical exam.     Vascular:  Pedal pulses are palpable bilaterally for both the DP and PT arteries.  CFT < 3 sec.  No edema.  Pedal hair growth noted.     Neuro:  Alert and oriented x 3. Coordinated gait.  Light touch sensation is intact to the L4, L5, S1 distributions. No obvious deficits.  No evidence of neurological-based weakness, spasticity, or contracture in the lower extremities.     Derm: Normal texture and turgor.    No open lesions. Ecchymosis along the medial left heel.     Musculoskeletal:    Lower extremity muscle strength is normal.  Patient is ambulatory without an assistive device or brace .  No gross deformities.  Pain on palpation: distal left fibula. Some pain over the anterior aspect of the deltoid ligament.  Mild lateral pain with abduction of the foot on the ankle.       Radiographic Exam:  3 views left ankle radiographs 6/13/2018 2:45 " PM     History: ankle injury 6/8; meets Bethel rules with point tenderness of  posterior lateral malleolus; Ankle injury, left, initial encounter      Comparison: None     Findings:     AP, oblique, and lateral  views of the left ankle were obtained.      Transverse fracture through the distal fibula with minimal  displacements.     Ankle mortise and syndesmosis are congruent on this non-weight bearing  images.     Os trigonum.     Lateral soft tissue swelling.         Impression:  Transverse fracture through the distal fibula.     MD Sorin GARCÍA DPM (Joe), FACFAS, MS    [facility] Department of Podiatry/Foot & Ankle Surgery

## 2018-06-14 NOTE — MR AVS SNAPSHOT
After Visit Summary   6/14/2018    Traci Sloan    MRN: [id number]           Patient Information     Date Of Birth          1980        Visit Information        Provider Department      6/14/2018 1:00 PM Sorin Rodas DPM [facility]        Care Instructions    Thank you for choosing [facility] / [facility]!    DR. RODAS'S CLINIC LOCATIONS     MONDAY - [place] WEDNES[place] - [place]   [address] [address] [place] [address]   [phone] / [phone] / [phone]       [facility] [phone]   [address] APPOINTMENTS: [phone]   [place] [address] BILLING QUESTIONS: [phone] / [phone]       AIRCAST / CAM WALKING BOOT INSTRUCTIONS  - Do NOT drive with CAM walker on. This is due to safety and legal issues.   - Remove the CAM walker several times a day and do ankle range of motion (ROM) exercises/wiggle toes.  - It is recommended that a thick-soled shoe be worn on the other foot to offset any created leg length issue.   - The boot does not have to be worn at night.   - There is an increased risk of developing a blood clot with lower extremity immobilization. ROM exercises and knee-high compression (tenso /ACE wrap) is recommended to lower that risk.   - You should seek medical attention if you experience calf swelling and/or pain, chest pain, or shortness of breath.     PRICE THERAPY  Many aches and pains throughout the foot and ankle can be helped with many simple treatments. This is usually described as PRICE Therapy.      P - Protection - often times, inflammation/pain in the lower extremity is not able to improve simply because the areas involved are never allowed to rest. Every step we take can bother the problematic area. Protecting those areas is an important step in the healing process. This may involve a walking cast  boot, a special insert/orthotic device, an ankle brace, or simply avoiding barefoot walking.    R - Rest - in addition to protecting the foot/ankle, resting is an important, but often times difficult, treatment option. Getting off your feet when they bother you, and specifically avoiding activities that cause pain/discomfort, are very beneficial to prevent, and treat, foot/ankle pain.      I - Ice - icing regularly can help to decrease inflammation and swelling in the foot, thus decreasing pain. Using an ice pack or a bag of frozen veggies works very well. Ice for 20 minutes multiple times per day as needed.  Do not place the ice directly on the skin as this can cause tissue damage.    C - Compression - using a compression wrap or an ACE wrap can help to decrease swelling, which can help to decrease pain. Wearing the wraps is generally not needed at night, but they should be worn on a regular basis when you are going to be on your feet for prolonged periods as gravity tends to pull fluids down to your feet/ankles.    E - Elevation - elevating your lower extremities multiple times daily for 15-20 minutes can help to decrease swelling, which works well in decreasing pain levels.    NSAID/Tylenol - Anti-inflammatories like Aleve or ibuprofen, and/or a pain medication, such as Tylenol, can help to improve pain levels and get the issue resolved sooner rather than later. Anyone with liver issues should be careful with Tylenol, and anyone with high blood pressure or heart, stomach or kidney issues should be careful with anti-inflammatories. Please ask if you have questions about these medications, including dosage.        BODY MASS INDEX (BMI)  Many things can cause foot and ankle problems. Foot structure, activity level, foot mechanics and injuries are common causes of pain.  One very important issue that often goes unmentioned, is body weight.  Extra weight can cause increased stress on muscles, ligaments, bones and  tendons.  Sometimes just a few extra pounds is all it takes to put one over her/his threshold. Without reducing that stress, it can be difficult to alleviate pain. Some people are uncomfortable addressing this issue, but we feel it is important for you to think about it. As Foot &  Ankle specialists, our job is addressing the lower extremity problem and possible causes. Regarding extra body weight, we encourage patients to discuss diet and weight management plans with their primary care doctors. It is this team approach that gives you the best opportunity for pain relief and getting you back on your feet.                Follow-ups after your visit        Future tests that were ordered for you today     Open Future Orders        Priority Expected Expires Ordered    XR Ankle Left G/E 3 Views Routine 6/13/2018 6/13/2019 6/13/2018            Who to contact     If you have questions or need follow up information about today's clinic visit or your schedule please contact [facility] directly at [phone].  Normal or non-critical lab and imaging results will be communicated to you by Likeabilityhart, letter or phone within 4 business days after the clinic has received the results. If you do not hear from us within 7 days, please contact the clinic through Silvigent or phone. If you have a critical or abnormal lab result, we will notify you by phone as soon as possible.  Submit refill requests through Happy Industry or call your pharmacy and they will forward the refill request to us. Please allow 3 business days for your refill to be completed.          Additional Information About Your Visit        Happy [employer] Information     [employer] gives you secure access to your electronic health record. If you see a primary care provider, you can also send messages to your care team and make appointments. If you have questions, please call your primary care clinic.  If you do not have a primary care provider, please call [phone] and  "they will assist you.        Care EveryWhere ID     This is your Care EveryWhere ID. This could be used by other organizations to access your [place] medical records  [id number]        Your Vitals Were     Height BMI (Body Mass Index)                5' 6\" (1.676 m) 28.05 kg/m2           Blood Pressure from Last 3 Encounters:   06/14/18 128/80   06/13/18 136/86   05/08/18 133/81    Weight from Last 3 Encounters:   06/14/18 173 lb 12.8 oz (78.8 kg)   06/13/18 173 lb 12.8 oz (78.8 kg)   05/08/18 169 lb (76.7 kg)              Today, you had the following     No orders found for display       Primary Care Provider Fax #    Physician No Ref-Primary [phone]       No address on file        Equal Access to Services     [facility] : Hadii michelle Monk, waaxlion luqadaha, qaybdre kaalmada maricruz, winifred bowen . So [facility] [phone].    ATENCIÓN: Si habla español, tiene a garcia disposición servicios gratuitos de asistencia lingüística. Manju al [phone].    We comply with applicable federal civil rights laws and [place] laws. We do not discriminate on the basis of race, color, national origin, age, disability, sex, sexual orientation, or gender identity.            Thank you!     Thank you for choosing [facility]  for your care. Our goal is always to provide you with excellent care. Hearing back from our patients is one way we can continue to improve our services. Please take a few minutes to complete the written survey that you may receive in the mail after your visit with us. Thank you!             Your Updated Medication List - Protect others around you: Learn how to safely use, store and throw away your medicines at www.disposemymeds.org.          This list is accurate as of 6/14/18  1:12 PM.  Always use your most recent med list.                   Brand Name Dispense Instructions for use Diagnosis    amphetamine-dextroamphetamine 15 MG per tablet    " ADDERALL    75 tablet    Take 1.5 tablet (22.5 mg) by mouth in the AM and 1 tablet (15 mg) in the PM    Attention deficit hyperactivity disorder (ADHD), combined type       itraconazole 100 MG capsule    SPORANOX    28 capsule    2 tablets PO BID x 1 week    TV (tinea versicolor)

## 2018-06-14 NOTE — PATIENT INSTRUCTIONS
Thank you for choosing [facility] / [facility] & [facility]le Surgery!    DR. RODAS'S CLINIC LOCATIONS     MONDAY - [place] WEDNESDAY - [place]   [address] [place] [address] [place] [address]   [phone] / [phone] / [phone]       THURSDAY - [place] SCHEDULE SURGERY: [phone]   [address] APPOINTMENTS: [phone]   [place] [address] BILLING QUESTIONS: [phone] / [phone]       AIRCAST / CAM WALKING BOOT INSTRUCTIONS  - Do NOT drive with CAM walker on. This is due to safety and legal issues.   - Remove the CAM walker several times a day and do ankle range of motion (ROM) exercises/wiggle toes.  - It is recommended that a thick-soled shoe be worn on the other foot to offset any created leg length issue.   - The boot does not have to be worn at night.   - There is an increased risk of developing a blood clot with lower extremity immobilization. ROM exercises and knee-high compression (tenso /ACE wrap) is recommended to lower that risk.   - You should seek medical attention if you experience calf swelling and/or pain, chest pain, or shortness of breath.     PRICE THERAPY  Many aches and pains throughout the foot and ankle can be helped with many simple treatments. This is usually described as PRICE Therapy.      P - Protection - often times, inflammation/pain in the lower extremity is not able to improve simply because the areas involved are never allowed to rest. Every step we take can bother the problematic area. Protecting those areas is an important step in the healing process. This may involve a walking cast boot, a special insert/orthotic device, an ankle brace, or simply avoiding barefoot walking.    R - Rest - in addition to protecting the foot/ankle, resting is an important, but often times difficult, treatment option. Getting off your feet when they bother you, and specifically avoiding activities  that cause pain/discomfort, are very beneficial to prevent, and treat, foot/ankle pain.      I - Ice - icing regularly can help to decrease inflammation and swelling in the foot, thus decreasing pain. Using an ice pack or a bag of frozen veggies works very well. Ice for 20 minutes multiple times per day as needed.  Do not place the ice directly on the skin as this can cause tissue damage.    C - Compression - using a compression wrap or an ACE wrap can help to decrease swelling, which can help to decrease pain. Wearing the wraps is generally not needed at night, but they should be worn on a regular basis when you are going to be on your feet for prolonged periods as gravity tends to pull fluids down to your feet/ankles.    E - Elevation - elevating your lower extremities multiple times daily for 15-20 minutes can help to decrease swelling, which works well in decreasing pain levels.    NSAID/Tylenol - Anti-inflammatories like Aleve or ibuprofen, and/or a pain medication, such as Tylenol, can help to improve pain levels and get the issue resolved sooner rather than later. Anyone with liver issues should be careful with Tylenol, and anyone with high blood pressure or heart, stomach or kidney issues should be careful with anti-inflammatories. Please ask if you have questions about these medications, including dosage.        BODY MASS INDEX (BMI)  Many things can cause foot and ankle problems. Foot structure, activity level, foot mechanics and injuries are common causes of pain.  One very important issue that often goes unmentioned, is body weight.  Extra weight can cause increased stress on muscles, ligaments, bones and tendons.  Sometimes just a few extra pounds is all it takes to put one over her/his threshold. Without reducing that stress, it can be difficult to alleviate pain. Some people are uncomfortable addressing this issue, but we feel it is important for you to think about it. As Foot &  Ankle specialists,  our job is addressing the lower extremity problem and possible causes. Regarding extra body weight, we encourage patients to discuss diet and weight management plans with their primary care doctors. It is this team approach that gives you the best opportunity for pain relief and getting you back on your feet.

## 2018-06-21 ENCOUNTER — MYC REFILL (OUTPATIENT)
Dept: FAMILY MEDICINE | Facility: CLINIC | Age: 38
End: 2018-06-21

## 2018-06-21 DIAGNOSIS — F90.2 ATTENTION DEFICIT HYPERACTIVITY DISORDER (ADHD), COMBINED TYPE: ICD-10-CM

## 2018-06-21 RX ORDER — DEXTROAMPHETAMINE SACCHARATE, AMPHETAMINE ASPARTATE, DEXTROAMPHETAMINE SULFATE AND AMPHETAMINE SULFATE 3.75; 3.75; 3.75; 3.75 MG/1; MG/1; MG/1; MG/1
TABLET ORAL
Qty: 75 TABLET | Refills: 0 | Status: SHIPPED | OUTPATIENT
Start: 2018-06-21 | End: 2018-07-17

## 2018-06-21 NOTE — TELEPHONE ENCOUNTER
Yee,    Please review/sign or advise on refill request for Adderall 15 mg tablet.    : last filled on 05/26/18 for #75, 0 refills.    Patient's mychart comment:  Requesting re-fill.  There was an error made recently in regards to my insurance.  It should be 'cleared' up soon but I want to hold off on an office visit until things are squared away if possible.  As soon as that happens, I will schedule an office visit. Thank you, Traci Bran RN  [facility]

## 2018-06-21 NOTE — TELEPHONE ENCOUNTER
Message from HelpH[employer]:  Original authorizing provider: Michael Grayson MD    Traci Sloan would like a refill of the following medications:  amphetamine-dextroamphetamine (ADDERALL) 15 MG per tablet [Michael Grayson MD]    Preferred pharmacy: [facility] [address] [facility] [address]    Comment:  Requesting re-fill. There was an error made recently in regards to my insurance. It should be 'cleared' up soon but I want to hold off on an office visit until things are squared away if possible. As soon as that happens, I will schedule an office visit. Thank you, Traci

## 2018-07-12 ENCOUNTER — OFFICE VISIT (OUTPATIENT)
Dept: PODIATRY | Facility: CLINIC | Age: 38
End: 2018-07-12

## 2018-07-12 ENCOUNTER — RADIANT APPOINTMENT (OUTPATIENT)
Dept: GENERAL RADIOLOGY | Facility: CLINIC | Age: 38
End: 2018-07-12
Attending: PODIATRIST

## 2018-07-12 VITALS
BODY MASS INDEX: 27.8 KG/M2 | WEIGHT: 173 LBS | SYSTOLIC BLOOD PRESSURE: 124 MMHG | DIASTOLIC BLOOD PRESSURE: 76 MMHG | HEIGHT: 66 IN

## 2018-07-12 DIAGNOSIS — S82.832D OTHER CLOSED FRACTURE OF DISTAL END OF LEFT FIBULA WITH ROUTINE HEALING, SUBSEQUENT ENCOUNTER: Primary | ICD-10-CM

## 2018-07-12 DIAGNOSIS — S82.832D OTHER CLOSED FRACTURE OF DISTAL END OF LEFT FIBULA WITH ROUTINE HEALING, SUBSEQUENT ENCOUNTER: ICD-10-CM

## 2018-07-12 PROCEDURE — 73610 X-RAY EXAM OF ANKLE: CPT | Mod: LT

## 2018-07-12 PROCEDURE — 99213 OFFICE O/P EST LOW 20 MIN: CPT | Performed by: PODIATRIST

## 2018-07-12 NOTE — PROGRESS NOTES
"ASSESSMENT/PLAN:    Encounter Diagnosis   Name Primary?     Other closed fracture of distal end of left fibula with routine healing, subsequent encounter Yes     Clinically healing left ankle fracture.   She was reminded to try to wear the CAM walker whenever weight bearing.  Continue ankle ROM exercises  In tw weeks, she is to wean out of CAM walker into supportive shoes.  Follow up in 1 month  No high impact activity for another two months.    Body mass index is 27.92 kg/(m^2).    Weight management plan: Patient was referred to their PCP to discuss a diet and exercise plan.      Sorin Mcrae DPM, FACFAS, MS    [place] Department of Podiatry/Foot & Ankle Surgery      ____________________________________________________________________    HPI:         Chief Complaint: follow up for a distal left fibula fracture  Onset of problem: 1 month  Pain/ discomfort is described as:  Tightness, stiffness  Ratin/10   Frequency:  rare    Previous treatment: CAM walker immobilization; she admits to going to the store w/o the boot on; \"I had to move it around.\"  There was no pain.    *  Past Medical History:   Diagnosis Date     ADHD (attention deficit hyperactivity disorder)      Anemia      Gastric reflux      IBS (irritable bowel syndrome)    *  *  Past Surgical History:   Procedure Laterality Date     CYSTOSCOPY, DILATE URETHRA, COMBINED  1999    for frequent UTI'S     KNEE SURGERY  10/1997   *  *  Current Outpatient Prescriptions   Medication Sig Dispense Refill     amphetamine-dextroamphetamine (ADDERALL) 15 MG per tablet Take 1.5 tablet (22.5 mg) by mouth in the AM and 1 tablet (15 mg) in the PM 75 tablet 0     itraconazole (SPORANOX) 100 MG capsule 2 tablets PO BID x 1 week (Patient not taking: Reported on 2018) 28 capsule 1     EXAM:    Vitals: There were no vitals taken for this visit.  BMI: There is no height or weight on file to calculate BMI.  Height: Data Unavailable    Constitutional/ general:  Pt is " in no apparent distress, appears well-nourished.  Cooperative with history and physical exam.     Vascular:  Pedal pulses are palpable bilaterally for both the DP and PT arteries.  CFT < 3 sec.  No edema.  Pedal hair growth noted.     Neuro:  Alert and oriented x 3. Coordinated gait.  Light touch sensation is intact to the L4, L5, S1 distributions. No obvious deficits.  No evidence of neurological-based weakness, spasticity, or contracture in the lower extremities.     Derm: Normal texture and turgor.  No erythema, ecchymosis, or cyanosis.  No open lesions.     Musculoskeletal:    Lower extremity muscle strength is normal.  Patient is ambulatory without an assistive device or brace .  No gross deformities.  Pain on palpation: no pain on light palpation around the left ankle. Smooth left ankle ROM.  No pain with passive inversion of the foot.       Radiographic Exam:  X-Ray Findings:  I personally reviewed the left ankle films.  No interval displacement.  Fracture still seen. I do not appreciate any callus formation.      Sorin Mcrae DPM, FACFAS, MS    [place] Department of Podiatry/Foot & Ankle Surgery

## 2018-07-12 NOTE — LETTER
"    2018         RE: Traci Sloan  [address]  [facility] [address]        Dear Colleague,    Thank you for referring your patient, Traci Sloan, to the [facility]. Please see a copy of my visit note below.    ASSESSMENT/PLAN:    Encounter Diagnosis   Name Primary?     Other closed fracture of distal end of left fibula with routine healing, subsequent encounter Yes     Clinically healing left ankle fracture.   She was reminded to try to wear the CAM walker whenever weight bearing.  Continue ankle ROM exercises  In tw weeks, she is to wean out of CAM walker into supportive shoes.  Follow up in 1 month  No high impact activity for another two months.    Body mass index is 27.92 kg/(m^2).    Weight management plan: Patient was referred to their PCP to discuss a diet and exercise plan.      Sorin Mcrae DPM, FACFAS, MS    [place] Department of Podiatry/Foot & Ankle Surgery      ____________________________________________________________________    HPI:         Chief Complaint: follow up for a distal left fibula fracture  Onset of problem: 1 month  Pain/ discomfort is described as:  Tightness, stiffness  Ratin/10   Frequency:  rare    Previous treatment: CAM walker immobilization; she admits to going to the store w/o the boot on; \"I had to move it around.\"  There was no pain.    *  Past Medical History:   Diagnosis Date     ADHD (attention deficit hyperactivity disorder)      Anemia      Gastric reflux      IBS (irritable bowel syndrome)    *  *  Past Surgical History:   Procedure Laterality Date     CYSTOSCOPY, DILATE URETHRA, COMBINED  1999    for frequent UTI'S     KNEE SURGERY  10/1997   *  *  Current Outpatient Prescriptions   Medication Sig Dispense Refill     amphetamine-dextroamphetamine (ADDERALL) 15 MG per tablet Take 1.5 tablet (22.5 mg) by mouth in the AM and 1 tablet (15 mg) in the PM 75 tablet 0     itraconazole (SPORANOX) 100 MG capsule 2 tablets PO BID x 1 week (Patient " not taking: Reported on 6/13/2018) 28 capsule 1     EXAM:    Vitals: There were no vitals taken for this visit.  BMI: There is no height or weight on file to calculate BMI.  Height: Data Unavailable    Constitutional/ general:  Pt is in no apparent distress, appears well-nourished.  Cooperative with history and physical exam.     Vascular:  Pedal pulses are palpable bilaterally for both the DP and PT arteries.  CFT < 3 sec.  No edema.  Pedal hair growth noted.     Neuro:  Alert and oriented x 3. Coordinated gait.  Light touch sensation is intact to the L4, L5, S1 distributions. No obvious deficits.  No evidence of neurological-based weakness, spasticity, or contracture in the lower extremities.     Derm: Normal texture and turgor.  No erythema, ecchymosis, or cyanosis.  No open lesions.     Musculoskeletal:    Lower extremity muscle strength is normal.  Patient is ambulatory without an assistive device or brace .  No gross deformities.  Pain on palpation: no pain on light palpation around the left ankle. Smooth left ankle ROM.  No pain with passive inversion of the foot.       Radiographic Exam:  X-Ray Findings:  I personally reviewed the left ankle films.  No interval displacement.  Fracture still seen. I do not appreciate any callus formation.      Sorin Mcrae DPM, FACFAS, MS    [place] Department of Podiatry/Foot & Ankle Surgery              Again, thank you for allowing me to participate in the care of your patient.        Sincerely,        Sorin Mcrae DPM

## 2018-07-12 NOTE — MR AVS SNAPSHOT
"              After Visit Summary   7/12/2018    Traci Sloan    MRN: [id number]           Patient Information     Date Of Birth          1980        Visit Information        Provider Department      7/12/2018 7:30 AM Sorin Mcrae DPM [facility]        Today's Diagnoses     Other closed fracture of distal end of left fibula with routine healing, subsequent encounter    -  1       Follow-ups after your visit        Who to contact     If you have questions or need follow up information about today's clinic visit or your schedule please contact [facility] directly at [phone].  Normal or non-critical lab and imaging results will be communicated to you by [employer], letter or phone within 4 business days after the clinic has received the results. If you do not hear from us within 7 days, please contact the clinic through [employer] or phone. If you have a critical or abnormal lab result, we will notify you by phone as soon as possible.  Submit refill requests through [employer] or call your pharmacy and they will forward the refill request to us. Please allow 3 business days for your refill to be completed.          Additional Information About Your Visit        MyChart Information     [employer] gives you secure access to your electronic health record. If you see a primary care provider, you can also send messages to your care team and make appointments. If you have questions, please call your primary care clinic.  If you do not have a primary care provider, please call [phone] and they will assist you.        Care EveryWhere ID     This is your Care EveryWhere ID. This could be used by other organizations to access your [place] medical records  [id number]        Your Vitals Were     Height BMI (Body Mass Index)                5' 6\" (1.676 m) 27.92 kg/m2           Blood Pressure from Last 3 Encounters:   07/12/18 124/76   06/14/18 128/80   06/13/18 136/86    Weight from Last 3 " Encounters:   07/12/18 173 lb (78.5 kg)   06/14/18 173 lb 12.8 oz (78.8 kg)   06/13/18 173 lb 12.8 oz (78.8 kg)               Primary Care Provider Office Phone # Fax #    WINSTON Griffin [facility] [phone]       [address] [facility] [address]  [facility] [address]        Equal Access to Services     ESHA CURIEL AH: Hadii aad ku hadasho Soomaali, waaxda luqadaha, qaybta kaalmada adeegyada, waxay idiin hayaan adeeg kharash la'leenan . So [facility] [phone].    ATENCIÓN: Si habla espcee, tiene a garcia disposición servicios gratuitos de asistencia lingüística. Llame al [phone].    We comply with applicable federal civil rights laws and [place] laws. We do not discriminate on the basis of race, color, national origin, age, disability, sex, sexual orientation, or gender identity.            Thank you!     Thank you for choosing [facility]  for your care. Our goal is always to provide you with excellent care. Hearing back from our patients is one way we can continue to improve our services. Please take a few minutes to complete the written survey that you may receive in the mail after your visit with us. Thank you!             Your Updated Medication List - Protect others around you: Learn how to safely use, store and throw away your medicines at www.disposemymeds.org.          This list is accurate as of 7/12/18  8:16 AM.  Always use your most recent med list.                   Brand Name Dispense Instructions for use Diagnosis    amphetamine-dextroamphetamine 15 MG per tablet    ADDERALL    75 tablet    Take 1.5 tablet (22.5 mg) by mouth in the AM and 1 tablet (15 mg) in the PM    Attention deficit hyperactivity disorder (ADHD), combined type       itraconazole 100 MG capsule    SPORANOX    28 capsule    2 tablets PO BID x 1 week    TV (tinea versicolor)

## 2018-07-17 ENCOUNTER — MYC REFILL (OUTPATIENT)
Dept: FAMILY MEDICINE | Facility: CLINIC | Age: 38
End: 2018-07-17

## 2018-07-17 DIAGNOSIS — F90.2 ATTENTION DEFICIT HYPERACTIVITY DISORDER (ADHD), COMBINED TYPE: ICD-10-CM

## 2018-07-17 NOTE — TELEPHONE ENCOUNTER
Message from MyChart:  Original authorizing provider: WINSTON Richards CNP would like a refill of the following medications:  amphetamine-dextroamphetamine (ADDERALL) 15 MG per tablet [WINSTON Richards CNP]    Preferred pharmacy: [facility] [id number] [address]    Comment:  Requesting monthly refill. Thank you, Traci

## 2018-07-20 ENCOUNTER — MYC REFILL (OUTPATIENT)
Dept: FAMILY MEDICINE | Facility: CLINIC | Age: 38
End: 2018-07-20

## 2018-07-20 DIAGNOSIS — F90.2 ATTENTION DEFICIT HYPERACTIVITY DISORDER (ADHD), COMBINED TYPE: ICD-10-CM

## 2018-07-20 RX ORDER — DEXTROAMPHETAMINE SACCHARATE, AMPHETAMINE ASPARTATE, DEXTROAMPHETAMINE SULFATE AND AMPHETAMINE SULFATE 3.75; 3.75; 3.75; 3.75 MG/1; MG/1; MG/1; MG/1
TABLET ORAL
Qty: 75 TABLET | Refills: 0 | Status: SHIPPED | OUTPATIENT
Start: 2018-07-20 | End: 2018-07-26

## 2018-07-20 RX ORDER — DEXTROAMPHETAMINE SACCHARATE, AMPHETAMINE ASPARTATE, DEXTROAMPHETAMINE SULFATE AND AMPHETAMINE SULFATE 3.75; 3.75; 3.75; 3.75 MG/1; MG/1; MG/1; MG/1
TABLET ORAL
Qty: 75 TABLET | Refills: 0 | Status: CANCELLED | OUTPATIENT
Start: 2018-07-20

## 2018-07-20 NOTE — TELEPHONE ENCOUNTER
Yee,    Please review/sign or advise for refill of amphetamine-dextroamphetamine (Adderall) 15 mg tablet.    : Last filled on 06/22/18 for 75 tablets.     Luna Bran RN  [facility]

## 2018-07-20 NOTE — TELEPHONE ENCOUNTER
Duplicate request.     Please see encounter 07/17/18.    Luna Bran RN  [facility]

## 2018-07-20 NOTE — TELEPHONE ENCOUNTER
Message from [employer]:  Original authorizing provider: WINSTON Richards CNP would like a refill of the following medications:  amphetamine-dextroamphetamine (ADDERALL) 15 MG per tablet [WINSTON Richards CNP]    Preferred pharmacy: [facility] [id number] [address]    Comment:  Just checking on prescription request sent a few days ago. Wanted to check in before the weekend since Yee will be out of the office next week. Thank you, Traci

## 2018-07-26 ENCOUNTER — PRENATAL OFFICE VISIT (OUTPATIENT)
Dept: NURSING | Facility: CLINIC | Age: 38
End: 2018-07-26

## 2018-07-26 VITALS
HEART RATE: 81 BPM | HEIGHT: 66 IN | WEIGHT: 170.5 LBS | DIASTOLIC BLOOD PRESSURE: 84 MMHG | SYSTOLIC BLOOD PRESSURE: 126 MMHG | TEMPERATURE: 98 F | BODY MASS INDEX: 27.4 KG/M2

## 2018-07-26 DIAGNOSIS — Z23 NEED FOR TDAP VACCINATION: ICD-10-CM

## 2018-07-26 DIAGNOSIS — O09.529 AMA (ADVANCED MATERNAL AGE) MULTIGRAVIDA 35+: Primary | ICD-10-CM

## 2018-07-26 LAB
ABO + RH BLD: NORMAL
ABO + RH BLD: NORMAL
ALBUMIN UR-MCNC: NEGATIVE MG/DL
APPEARANCE UR: CLEAR
BILIRUB UR QL STRIP: NEGATIVE
BLD GP AB SCN SERPL QL: NORMAL
BLOOD BANK CMNT PATIENT-IMP: NORMAL
COLOR UR AUTO: YELLOW
ERYTHROCYTE [DISTWIDTH] IN BLOOD BY AUTOMATED COUNT: 11.8 % (ref 10–15)
GLUCOSE UR STRIP-MCNC: NEGATIVE MG/DL
HCT VFR BLD AUTO: 35 % (ref 35–47)
HGB BLD-MCNC: 11.6 G/DL (ref 11.7–15.7)
HGB UR QL STRIP: NEGATIVE
KETONES UR STRIP-MCNC: NEGATIVE MG/DL
LEUKOCYTE ESTERASE UR QL STRIP: NEGATIVE
MCH RBC QN AUTO: 30.6 PG (ref 26.5–33)
MCHC RBC AUTO-ENTMCNC: 33.1 G/DL (ref 31.5–36.5)
MCV RBC AUTO: 92 FL (ref 78–100)
NITRATE UR QL: NEGATIVE
PH UR STRIP: 5.5 PH (ref 5–7)
PLATELET # BLD AUTO: 325 10E9/L (ref 150–450)
RBC # BLD AUTO: 3.79 10E12/L (ref 3.8–5.2)
SOURCE: NORMAL
SP GR UR STRIP: 1.02 (ref 1–1.03)
SPECIMEN EXP DATE BLD: NORMAL
UROBILINOGEN UR STRIP-ACNC: 0.2 EU/DL (ref 0.2–1)
WBC # BLD AUTO: 6.1 10E9/L (ref 4–11)

## 2018-07-26 PROCEDURE — 99207 ZZC NO CHARGE NURSE ONLY: CPT

## 2018-07-26 PROCEDURE — 81003 URINALYSIS AUTO W/O SCOPE: CPT | Performed by: OBSTETRICS & GYNECOLOGY

## 2018-07-26 PROCEDURE — 87086 URINE CULTURE/COLONY COUNT: CPT | Performed by: OBSTETRICS & GYNECOLOGY

## 2018-07-26 PROCEDURE — 86762 RUBELLA ANTIBODY: CPT | Performed by: OBSTETRICS & GYNECOLOGY

## 2018-07-26 PROCEDURE — 86901 BLOOD TYPING SEROLOGIC RH(D): CPT | Performed by: OBSTETRICS & GYNECOLOGY

## 2018-07-26 PROCEDURE — 86780 TREPONEMA PALLIDUM: CPT | Performed by: OBSTETRICS & GYNECOLOGY

## 2018-07-26 PROCEDURE — 87389 HIV-1 AG W/HIV-1&-2 AB AG IA: CPT | Performed by: OBSTETRICS & GYNECOLOGY

## 2018-07-26 PROCEDURE — 36415 COLL VENOUS BLD VENIPUNCTURE: CPT | Performed by: OBSTETRICS & GYNECOLOGY

## 2018-07-26 PROCEDURE — 86850 RBC ANTIBODY SCREEN: CPT | Performed by: OBSTETRICS & GYNECOLOGY

## 2018-07-26 PROCEDURE — 86900 BLOOD TYPING SEROLOGIC ABO: CPT | Performed by: OBSTETRICS & GYNECOLOGY

## 2018-07-26 PROCEDURE — 87340 HEPATITIS B SURFACE AG IA: CPT | Performed by: OBSTETRICS & GYNECOLOGY

## 2018-07-26 PROCEDURE — 85027 COMPLETE CBC AUTOMATED: CPT | Performed by: OBSTETRICS & GYNECOLOGY

## 2018-07-26 RX ORDER — PYRIDOXINE HCL (VITAMIN B6) 25 MG
25 TABLET ORAL 4 TIMES DAILY PRN
Qty: 100 TABLET | Refills: 1 | Status: ON HOLD | OUTPATIENT
Start: 2018-07-26 | End: 2019-02-17

## 2018-07-26 RX ORDER — PRENATAL VIT/IRON FUM/FOLIC AC 27MG-0.8MG
1 TABLET ORAL DAILY
Qty: 100 TABLET | Refills: 3 | Status: SHIPPED | OUTPATIENT
Start: 2018-07-26 | End: 2019-02-19

## 2018-07-26 RX ORDER — DEXTROAMPHETAMINE SACCHARATE, AMPHETAMINE ASPARTATE, DEXTROAMPHETAMINE SULFATE AND AMPHETAMINE SULFATE 3.75; 3.75; 3.75; 3.75 MG/1; MG/1; MG/1; MG/1
TABLET ORAL
Refills: 0 | COMMUNITY
Start: 2018-06-22 | End: 2018-10-18

## 2018-07-26 NOTE — PROGRESS NOTES
.    Important Information for Provider:   Patient presents for new ob teaching and labs, fourth pregnancy, AMA. History of , SAB's. Ultrasound scheduled 18 with CNM appointment after to review results. NOB with Dr Haider 18. First trimester screening was ordered  Prenatal OB Questionnaire    Patient supplied answers from flow sheet for:  Prenatal OB Questionnaire.  Past Medical History  Diabetes?: No  Hypertension : No  Heart disease, mitral valve prolapse or rheumatic fever?: No  An autoimmune disease such as lupus or rheumatoid arthritis?: No  Kidney disease or urinary tract infection?: (!) Yes  Epilepsy, seizures or spells?: No  Migraine headaches?: No  A stroke or loss of function or sensation?: No  Any other neurological problems?: No  Have you ever been treated for depression?: (!) Yes takes Adderall  Are you having problems with crying spells or loss of self-esteem?: No  Have you ever required psychiatric care?: No  Have you ever had hepatitis, liver disease or jaundice?: No  Have you been treated for blood clots in your veins, deep vein thromosis, inflammation in the veins, thrombosis, phlebitis, pulmonary embolism or varicosities?: No  Have you had excessive bleeding after surgery or dental work?: No  Do you bleed more than other women after a cut or scratch?: No  Do you have a history of anemia?: (!) Yes  Have you ever had thyroid problems or taken thyroid medication?: No   Do you have any endocrine problems?: No  Have you ever been in a major accident or suffered serious trauma?: No  Within the last year, has anyone hit, slapped, kicked or otherwise hurt you?: No  In the last year, has anyone forced you to have sex when you didn't want to?: No    Past Medical History 2   Have you ever received a blood transfusion?: No  Would you refuse a blood transfusion if a doctor judged it to be medically necessary?: No   If you answered Yes, would you rather die than receive a blood transfusion?:  No  If you answered Yes, is this for Lutheran reasons?: No  Does anyone in your home smoke?: No  Do you use tobacco products?: No  Do you drink beer, wine or hard liquor?: No  Do you use any of the following: marijuana, speed, cocaine, heroin, hallucinogens or other drugs?: No   Is your blood type Rh negative?: No  Have you ever had abnormal antibodies in your blood?: No  Have you ever had asthma?: No  Have you ever had tuberculosis?: No  Do you have any allergies to drugs or over-the-counter medications?: No  Allergies: Dust Mites, Aspartame, Ethanol, Venlafaxine, Hydrochloride, Sertraline: (!) Yes  Have you had any breast problems?: No  Have you ever ?: (!) Yes  Have you had any gynecological surgical procedures such as cervical conization, a LEEP procedure, laser treatment, cryosurgery of the cervix or a dilation and curettage, etc?: No  Have you ever had any other surgical procedures?: (!) Yes  Have you been hospitalized for a nonsurgical reason excluding normal delivery?: (!) Yes  Have you ever had any anesthetic complications?: No  Have you ever had an abnormal pap smear?: No    Past Medical History (Continued)  Do you have a history of abnormalities of the uterus?: (!) Yes (cysts and fibroids )  Did your mother take BRITTNEE or any other hormones when she was pregnant with you?: No  Did it take you more than a year to become pregnant?: No  Have you ever been evaluated or treated for infertility?: No  Is there a history of medical problems in your family, which you feel may be important to this pregnancy?: No  Do you have any other problems we have not asked about which you feel may be important to this pregnancy?: No    Symptoms since last menstrual period  Do you have any of the following symptoms: abdominal pain, blood in stools or urine, chest pain, shortness of breath, coughing or vomiting up blood, your heart racing or skipping beats, nausea and vomiting, pain on urination or vaginal discharge or  bleed: (!) Yes (constipation/ gas pains/ reviewed medications)  Current medications, including over-the-counter medications, you are using? (If not applicable answer none): adderall  Will the patient be 35 years old or older at the time of delivery?: (!) Yes    Has the patient, baby's father or anyone in either family had:  Thalassemia (Italian, Greek, Mediterranean or  background only) and an MCV result less than 80?: No  Neural tube defect such as meningomyelocele, spina bifida or anencephaly?: No  Congenital heart defect?: No  Down's Syndrome?: No  Solomon-Sachs disease (Anabaptism, Cajun, Korean-Ulster)?: No  Sickle cell disease or trait ()?: No  Hemophilia or other inherited problems of blood?: No  Muscular dystrophy?: No  Cystic fibrosis?: No  [place]'s chorea?: No  Mental retardation/autism?: No  If yes, was the person tested for fragile X?: No  Any other inherited genetic or chromosomal disorder?: No  Maternal metabolic disorder (e.g Insulin-dependent diabetes, PKU)?: No  A child with birth defects not listed above?: No  Recurrent pregnancy loss or stillbirth?: No   Has the patient had any medications/street drugs/alcohol since her last menstrual period?: No  Does the patient or baby's father have any other genetic risks?: No    Infection History   Do you object to being tested for Hepatitis B?: No  Do you object to being tested for HIV?: No   Do you feel that you are at high risk for coming in contact with the AIDS virus?: No  Have you ever been treated for tuberculosis?: Unknown  Have you ever had a positive skin test for tuberculosis?: No  Do you live with someone who has tuberculosis?: No  Have you ever been exposed to tuberculosis?: No  Do you have genital herpes?: No  Does your partner have genital herpes?: No  Have you had a viral illness since your last period?: No  Have you ever had gonorrhea, chlamydia, syphilis, venereal warts, trichomoniasis, pelvic inflammatory disease or any other  sexually transmitted disease?: No  Do you know if you are a genital group B streptococcus carrier?: No  Have you had chicken pox/varicella?: (!) Yes   Have you been vaccinated against chicken Pox?: No  Have you had any other infectious diseases?: No    {Allergies as of 7/26/2018:    Allergies as of 07/26/2018     (No Known Allergies)       Current medications are:  Current Outpatient Prescriptions   Medication Sig Dispense Refill     amphetamine-dextroamphetamine (ADDERALL) 15 MG per tablet TK 1.5 TB BY MOUTH IN THE AM AND 1 TB IN THE PM  0     doxylamine (UNISOM) 25 MG TABS tablet Take 0.5 tablets (12.5 mg) by mouth 4 times daily as needed 100 each 1     Prenatal Vit-Fe Fumarate-FA (PRENATAL MULTIVITAMIN PLUS IRON) 27-0.8 MG TABS per tablet Take 1 tablet by mouth daily 100 tablet 3     pyridOXINE (VITAMIN  B-6) 25 MG tablet Take 1 tablet (25 mg) by mouth 4 times daily as needed 100 tablet 1         Early ultrasound screening tool:    Does patient have irregular periods?  Yes  Did patient use hormonal birth control in the three months prior to positive urine pregnancy test? No  Is the patient breastfeeding?  No  Is the patient 10 weeks or greater at time of education visit?  No

## 2018-07-26 NOTE — MR AVS SNAPSHOT
After Visit Summary   7/26/2018    Traci Sloan    MRN: [id number]           Patient Information     Date Of Birth          1980        Visit Information        Provider Department      7/26/2018 11:00 AM RD OB NURSE EDUCATION [facility]        Today's Diagnoses     AMA (advanced maternal age) multigravida 35+    -  1    Need for Tdap vaccination           Follow-ups after your visit        Additional Services     MAT FETAL MED CTR REFERRAL-PREGNANCY       There is no height or weight on file to calculate BMI.    >> Patient may proceed with recommendations for further testing as directed by the Maternal Fetal Medicine Specialist >>    >> If requesting Fetal Echo: MFM will determine appropriate location for exam due to indication.    >> If requesting Lung Maturity Amnio:  If results indicate fetal lung maturity, induction or C/S is recommended within 36 hours.  Please schedule accordingly.     Dear Patient:   Please be aware that coverage of these services is subject to the terms and limitations of your health insurance plan.  Call member services at your health plan with any benefit or coverage questions.      Please bring the following to your appointment:    >>  Any x-rays, CTs or MRIs which have been performed.  Contact the facility where they were done to arrange for  prior to your scheduled appointment.  Any new CT, MRI or other procedures ordered by your specialist must be performed at a [facility] facility or coordinated by your clinic's referral office.  >>  List of current medications   >>  This referral request   >>  Any documents/labs given to you for this referral                  Your next 10 appointments already scheduled     Aug 01, 2018  9:00 AM LAN Martinez with WINSTON Griffin CNP   [facility] ([facility])    [address] [place] [id number] [phone]            Aug 09, 2018 10:20 AM  CDT   US OB < 14 WEEKS SINGLE with RDUS1   [facility] [place]    [address] [place] [id number] [phone]           Please bring a list of your medicines (including vitamins, minerals and over-the-counter drugs). Also, tell your doctor about any allergies you may have. Wear comfortable clothes and leave your valuables at home.  Drink four 8-ounce glasses of fluid an hour before your exam. If you need to empty your bladder before your exam, try to release only a little urine. Then, drink another glass of fluid.  You may have up to two family members in the exam room. If you bring a small child, an adult must be there to care for him or her. No video or camera photography during the procedure.  Please call the Imaging Department at your exam site with any questions.            Aug 09, 2018 11:00 AM CDT   SHORT with WINSTON Main CNM   [facility] ([facility]    [address] [place] [id number] [phone]            Aug 22, 2018 10:00 AM CDT   New Prenatal with Liz Haider MD   [facility] ([facility])    [address] [place] [id number] [phone]              Future tests that were ordered for you today     Open Future Orders        Priority Expected Expires Ordered    US OB < 14 Weeks Single Routine  7/26/2019 7/26/2018            Who to contact     If you have questions or need follow up information about today's clinic visit or your schedule please contact [facility] directly at [phone].  Normal or non-critical lab and imaging results will be communicated to you by MyChart, letter or phone within 4 business days after the clinic has received the results. If you do not hear from us within 7 days, please contact the clinic through MyChart or phone. If you have a critical or abnormal lab result, we  "will notify you by phone as soon as possible.  Submit refill requests through [employer] or call your pharmacy and they will forward the refill request to us. Please allow 3 business days for your refill to be completed.          Additional Information About Your Visit        [email or web address] Information     [employer] gives you secure access to your electronic health record. If you see a primary care provider, you can also send messages to your care team and make appointments. If you have questions, please call your primary care clinic.  If you do not have a primary care provider, please call [phone] and they will assist you.        Care EveryWhere ID     This is your Care EveryWhere ID. This could be used by other organizations to access your [facility] medical records  [id number]        Your Vitals Were     Pulse Temperature Height Last Period Breastfeeding? BMI (Body Mass Index)    81 98  F (36.7  C) 5' 6\" (1.676 m) 06/10/2018 No 27.52 kg/m2       Blood Pressure from Last 3 Encounters:   07/26/18 126/84   07/12/18 124/76   06/14/18 128/80    Weight from Last 3 Encounters:   07/26/18 170 lb 8 oz (77.3 kg)   07/12/18 173 lb (78.5 kg)   06/14/18 173 lb 12.8 oz (78.8 kg)              We Performed the Following     ABO/Rh type and screen     CBC with platelets     Hepatitis B surface antigen     HIV Antigen Antibody Combo     MAT FETAL MED CTR REFERRAL-PREGNANCY     Rubella Antibody IgG Quantitative     Treponema Abs w Reflex to RPR and Titer     UA without Microscopic     Urine Culture Aerobic Bacterial          Today's Medication Changes          These changes are accurate as of 7/26/18 12:20 PM.  If you have any questions, ask your nurse or doctor.               Start taking these medicines.        Dose/Directions    doxylamine 25 MG Tabs tablet   Commonly known as:  UNISOM   Used for:  AMA (advanced maternal age) multigravida 35+        Dose:  12.5 mg   Take 0.5 tablets (12.5 mg) by mouth 4 times daily as needed "   Quantity:  100 each   Refills:  1       prenatal multivitamin plus iron 27-0.8 MG Tabs per tablet   Used for:  AMA (advanced maternal age) multigravida 35+        Dose:  1 tablet   Take 1 tablet by mouth daily   Quantity:  100 tablet   Refills:  3       pyridOXINE 25 MG tablet   Commonly known as:  vitamin B-6   Used for:  AMA (advanced maternal age) multigravida 35+        Dose:  25 mg   Take 1 tablet (25 mg) by mouth 4 times daily as needed   Quantity:  100 tablet   Refills:  1         These medicines have changed or have updated prescriptions.        Dose/Directions    amphetamine-dextroamphetamine 15 MG per tablet   Commonly known as:  ADDERALL   This may have changed:  Another medication with the same name was removed. Continue taking this medication, and follow the directions you see here.        TK 1.5 TB BY MOUTH IN THE AM AND 1 TB IN THE PM   Refills:  0         Stop taking these medicines if you haven't already. Please contact your care team if you have questions.     itraconazole 100 MG capsule   Commonly known as:  SPORANOX                Where to get your medicines      These medications were sent to [facility] [id number] [place] [address] AT [address] [address]     Phone:  [phone]     doxylamine 25 MG Tabs tablet    prenatal multivitamin plus iron 27-0.8 MG Tabs per tablet    pyridOXINE 25 MG tablet                Primary Care Provider Office Phone # Fax #    Valarie Medrano, WINSTON [facility] [phone]       [address]  [facility] [address]        Equal Access to Services     [facility] HospitalYASMINE AH: Hadii aad ku hadasho Soomaali, waaxda luqadaha, qaybta kaalmada adeegyada, waxay vikki villaseñor. So [facility] [phone].    ATENCIÓN: Si habla español, tiene a garcia disposición servicios gratuitos de asistencia lingüística. Llame al [phone].    We comply with applicable federal civil rights laws and [place]  laws. We do not discriminate on the basis of race, color, national origin, age, disability, sex, sexual orientation, or gender identity.            Thank you!     Thank you for choosing [facility]  for your care. Our goal is always to provide you with excellent care. Hearing back from our patients is one way we can continue to improve our services. Please take a few minutes to complete the written survey that you may receive in the mail after your visit with us. Thank you!             Your Updated Medication List - Protect others around you: Learn how to safely use, store and throw away your medicines at www.disposemymeds.org.          This list is accurate as of 7/26/18 12:20 PM.  Always use your most recent med list.                   Brand Name Dispense Instructions for use Diagnosis    amphetamine-dextroamphetamine 15 MG per tablet    ADDERALL     TK 1.5 TB BY MOUTH IN THE AM AND 1 TB IN THE PM        doxylamine 25 MG Tabs tablet    UNISOM    100 each    Take 0.5 tablets (12.5 mg) by mouth 4 times daily as needed    AMA (advanced maternal age) multigravida 35+       prenatal multivitamin plus iron 27-0.8 MG Tabs per tablet     100 tablet    Take 1 tablet by mouth daily    AMA (advanced maternal age) multigravida 35+       pyridOXINE 25 MG tablet    vitamin B-6    100 tablet    Take 1 tablet (25 mg) by mouth 4 times daily as needed    AMA (advanced maternal age) multigravida 35+

## 2018-07-27 LAB
BACTERIA SPEC CULT: NO GROWTH
HBV SURFACE AG SERPL QL IA: NONREACTIVE
HIV 1+2 AB+HIV1 P24 AG SERPL QL IA: NONREACTIVE
RUBV IGG SERPL IA-ACNC: 20 IU/ML
SPECIMEN SOURCE: NORMAL
T PALLIDUM AB SER QL: NONREACTIVE

## 2018-08-01 ENCOUNTER — OFFICE VISIT (OUTPATIENT)
Dept: FAMILY MEDICINE | Facility: CLINIC | Age: 38
End: 2018-08-01

## 2018-08-01 VITALS
HEART RATE: 77 BPM | DIASTOLIC BLOOD PRESSURE: 80 MMHG | WEIGHT: 171.4 LBS | TEMPERATURE: 99 F | BODY MASS INDEX: 27.66 KG/M2 | SYSTOLIC BLOOD PRESSURE: 126 MMHG | OXYGEN SATURATION: 97 %

## 2018-08-01 DIAGNOSIS — Z3A.01 LESS THAN 8 WEEKS GESTATION OF PREGNANCY: ICD-10-CM

## 2018-08-01 DIAGNOSIS — F90.2 ATTENTION DEFICIT HYPERACTIVITY DISORDER (ADHD), COMBINED TYPE: Primary | ICD-10-CM

## 2018-08-01 DIAGNOSIS — H69.93 DYSFUNCTION OF BOTH EUSTACHIAN TUBES: ICD-10-CM

## 2018-08-01 PROCEDURE — 99214 OFFICE O/P EST MOD 30 MIN: CPT | Performed by: NURSE PRACTITIONER

## 2018-08-01 RX ORDER — FLUTICASONE PROPIONATE 50 MCG
1-2 SPRAY, SUSPENSION (ML) NASAL DAILY
Qty: 3 BOTTLE | Refills: 11 | Status: ON HOLD | OUTPATIENT
Start: 2018-08-01 | End: 2019-02-17

## 2018-08-01 RX ORDER — DEXTROAMPHETAMINE SACCHARATE, AMPHETAMINE ASPARTATE, DEXTROAMPHETAMINE SULFATE AND AMPHETAMINE SULFATE 3.75; 3.75; 3.75; 3.75 MG/1; MG/1; MG/1; MG/1
TABLET ORAL
Qty: 75 TABLET | Refills: 0 | Status: SHIPPED | OUTPATIENT
Start: 2018-08-01 | End: 2018-10-12

## 2018-08-01 RX ORDER — DEXTROAMPHETAMINE SACCHARATE, AMPHETAMINE ASPARTATE, DEXTROAMPHETAMINE SULFATE AND AMPHETAMINE SULFATE 3.75; 3.75; 3.75; 3.75 MG/1; MG/1; MG/1; MG/1
TABLET ORAL
Qty: 75 TABLET | Refills: 0 | Status: SHIPPED | OUTPATIENT
Start: 2018-09-01 | End: 2018-10-12

## 2018-08-01 RX ORDER — LORATADINE 10 MG/1
10 TABLET ORAL DAILY
Qty: 90 TABLET | Refills: 3 | Status: ON HOLD | OUTPATIENT
Start: 2018-08-01 | End: 2019-02-17

## 2018-08-01 RX ORDER — OXYMETAZOLINE HYDROCHLORIDE 0.05 G/100ML
2-3 SPRAY NASAL 2 TIMES DAILY PRN
Qty: 1 BOTTLE | Refills: 0 | Status: ON HOLD | OUTPATIENT
Start: 2018-08-01 | End: 2019-02-17

## 2018-08-01 RX ORDER — DEXTROAMPHETAMINE SACCHARATE, AMPHETAMINE ASPARTATE, DEXTROAMPHETAMINE SULFATE AND AMPHETAMINE SULFATE 3.75; 3.75; 3.75; 3.75 MG/1; MG/1; MG/1; MG/1
TABLET ORAL
Qty: 75 TABLET | Refills: 0 | Status: SHIPPED | OUTPATIENT
Start: 2018-10-01 | End: 2018-10-12

## 2018-08-01 NOTE — NURSING NOTE
HEARING FREQUENCY    Right Ear:       500 Hz RESPONSE- on Level: 20 db (Conditioning sound)   1000 Hz: RESPONSE- on Level:   20 db    2000 Hz: RESPONSE- on Level:   20 db    4000 Hz: RESPONSE- on Level:   25 db     Left Ear:      4000 Hz: RESPONSE- on Level:   20 db    2000 Hz: RESPONSE- on Level:   20 db    1000 Hz: RESPONSE- on Level:   20 db     500 Hz: RESPONSE- on Level: 25 db    Right Ear:    500 Hz: RESPONSE- on Level: 25 db    Hearing Acuity: Pass    Hearing Assessment: normal    Yessenia Lux, CMA

## 2018-08-01 NOTE — PATIENT INSTRUCTIONS
Stop the claritin D (not because of pregnancy, but for rebound concern)  Instead take: claritin regular and flonase (nasal steroid)   In a week if no change, add afrin (decongestant nasal spray) for three days  Can also use a saline nasal spray for pregnancy congestion     If these are not working, we have the option to try a topical steroid cream in the outer ear    If hearing screening is unusual or if symptoms worsen we should have you see ENT    Please discuss adderall with MFM and have them send their notes to me as well as OB-GYN    Pap with OB-GYN when they recommend

## 2018-08-01 NOTE — MR AVS SNAPSHOT
After Visit Summary   8/1/2018    Traci Sloan    MRN: [id number]           Patient Information     Date Of Birth          1980        Visit Information        Provider Department      8/1/2018 9:00 AM Valarie Medrano APRN CNP [facility]        Today's Diagnoses     Attention deficit hyperactivity disorder (ADHD), combined type    -  1    Dysfunction of both eustachian tubes          Care Instructions    Stop the claritin D (not because of pregnancy, but for rebound concern)  Instead take: claritin regular and flonase (nasal steroid)   In a week if no change, add afrin (decongestant nasal spray) for three days  Can also use a saline nasal spray for pregnancy congestion     If these are not working, we have the option to try a topical steroid cream in the outer ear    If hearing screening is unusual or if symptoms worsen we should have you see ENT    Please discuss adderall with MFM and have them send their notes to me as well as OB-GYN    Pap with OB-GYN when they recommend          Follow-ups after your visit        Your next 10 appointments already scheduled     Aug 09, 2018 10:20 AM CDT   US OB < 14 WEEKS SINGLE with RDUS1   [facility] ([facility])    [address] [place] [address]  [address] [place] [id number] [phone]           Please bring a list of your medicines (including vitamins, minerals and over-the-counter drugs). Also, tell your doctor about any allergies you may have. Wear comfortable clothes and leave your valuables at home.  Drink four 8-ounce glasses of fluid an hour before your exam. If you need to empty your bladder before your exam, try to release only a little urine. Then, drink another glass of fluid.  You may have up to two family members in the exam room. If you bring a small child, an adult must be there to care for him or her. No video or camera photography during the procedure.  Please call the Imaging Department at  your exam site with any questions.            Aug 09, 2018 11:00 AM CDT   SHORT with WINSTON Main CNM   [facility] ([facility])    [address] [place] [id number] [phone]            Aug 22, 2018 10:00 AM CDT   New Prenatal with Liz Haider MD   [facility] ([facility])    [address] [place] [id number] [phone]              Who to contact     If you have questions or need follow up information about today's clinic visit or your schedule please contact [facility] directly at [phone].  Normal or non-critical lab and imaging results will be communicated to you by MyChart, letter or phone within 4 business days after the clinic has received the results. If you do not hear from us within 7 days, please contact the clinic through [employer] or phone. If you have a critical or abnormal lab result, we will notify you by phone as soon as possible.  Submit refill requests through [employer] or call your pharmacy and they will forward the refill request to us. Please allow 3 business days for your refill to be completed.          Additional Information About Your Visit        [employer] Information     [employer] gives you secure access to your electronic health record. If you see a primary care provider, you can also send messages to your care team and make appointments. If you have questions, please call your primary care clinic.  If you do not have a primary care provider, please call [phone] and they will assist you.        Care EveryWhere ID     This is your Care EveryWhere ID. This could be used by other organizations to access your [facility] medical records  [id number]        Your Vitals Were     Pulse Temperature Last Period Pulse Oximetry BMI (Body Mass Index)       77 99  F (37.2  C) (Oral) 06/10/2018 97% 27.66 kg/m2        Blood Pressure from Last 3  Encounters:   08/01/18 126/80   07/26/18 126/84   07/12/18 124/76    Weight from Last 3 Encounters:   08/01/18 171 lb 6.4 oz (77.7 kg)   07/26/18 170 lb 8 oz (77.3 kg)   07/12/18 173 lb (78.5 kg)              Today, you had the following     No orders found for display         Today's Medication Changes          These changes are accurate as of 8/1/18  9:46 AM.  If you have any questions, ask your nurse or doctor.               Start taking these medicines.        Dose/Directions    fluticasone 50 MCG/ACT spray   Commonly known as:  FLONASE   Used for:  Dysfunction of both eustachian tubes   Started by:  Valarie Medrano APRN CNP        Dose:  1-2 spray   Spray 1-2 sprays into both nostrils daily   Quantity:  3 Bottle   Refills:  11       loratadine 10 MG tablet   Commonly known as:  CLARITIN   Used for:  Dysfunction of both eustachian tubes   Started by:  Valarie Medrano APRN CNP        Dose:  10 mg   Take 1 tablet (10 mg) by mouth daily   Quantity:  90 tablet   Refills:  3       oxymetazoline 0.05 % spray   Commonly known as:  AFRIN NASAL SPRAY   Used for:  Dysfunction of both eustachian tubes   Started by:  Valarie Medrano APRN CNP        Dose:  2-3 spray   Spray 2-3 sprays into both nostrils 2 times daily as needed for congestion   Quantity:  1 Bottle   Refills:  0         These medicines have changed or have updated prescriptions.        Dose/Directions    * amphetamine-dextroamphetamine 15 MG per tablet   Commonly known as:  ADDERALL   This may have changed:  Another medication with the same name was added. Make sure you understand how and when to take each.   Changed by:  Valarie Medrano APRN CNP        TK 1.5 TB BY MOUTH IN THE AM AND 1 TB IN THE PM   Refills:  0       * amphetamine-dextroamphetamine 15 MG per tablet   Commonly known as:  ADDERALL   This may have changed:  You were already taking a medication with the same name, and this prescription was added. Make sure you understand how and when to  take each.   Used for:  Attention deficit hyperactivity disorder (ADHD), combined type   Changed by:  Valarie Medrano APRN CNP        TAKE 1.5 TAB BY MOUTH IN THE AM AND 1 TAB IN THE PM   Quantity:  75 tablet   Refills:  0       * amphetamine-dextroamphetamine 15 MG per tablet   Commonly known as:  ADDERALL   This may have changed:  You were already taking a medication with the same name, and this prescription was added. Make sure you understand how and when to take each.   Used for:  Attention deficit hyperactivity disorder (ADHD), combined type   Changed by:  Valarie Medrano APRN CNP        Start taking on:  9/1/2018   TAKE 1.5 TAB BY MOUTH IN THE AM AND 1 TAB IN THE PM   Quantity:  75 tablet   Refills:  0       * amphetamine-dextroamphetamine 15 MG per tablet   Commonly known as:  ADDERALL   This may have changed:  You were already taking a medication with the same name, and this prescription was added. Make sure you understand how and when to take each.   Used for:  Attention deficit hyperactivity disorder (ADHD), combined type   Changed by:  Valarie Medrano APRN CNP        Start taking on:  10/1/2018   TAKE 1.5 TAB BY MOUTH IN THE AM AND 1 TAB IN THE PM   Quantity:  75 tablet   Refills:  0       * Notice:  This list has 4 medication(s) that are the same as other medications prescribed for you. Read the directions carefully, and ask your doctor or other care provider to review them with you.         Where to get your medicines      These medications were sent to [facility] [address] [place] [address] AT [address] [address]     Phone:  [phone]     fluticasone 50 MCG/ACT spray    loratadine 10 MG tablet    oxymetazoline 0.05 % spray         Some of these will need a paper prescription and others can be bought over the counter.  Ask your nurse if you have questions.     Bring a paper prescription for each of these medications      amphetamine-dextroamphetamine 15 MG per tablet    amphetamine-dextroamphetamine 15 MG per tablet    amphetamine-dextroamphetamine 15 MG per tablet                Primary Care Provider Office Phone # Fax #    WINSTON Griffin [facility] [phone]       [address] [facility] [address]  [facility] [address]        Equal Access to Services     ESHA CURIEL : Hadii aad ku hadasho Soomaali, waaxda luqadaha, qaybta kaalmada adeegyada, waxay idiin hayaan adeeg kharash la'aan . So [facility] [phone].    ATENCIÓN: Si habla español, tiene a garcia disposición servicios gratuitos de asistencia lingüística. Manju al [phone].    We comply with applicable federal civil rights laws and [place] laws. We do not discriminate on the basis of race, color, national origin, age, disability, sex, sexual orientation, or gender identity.            Thank you!     Thank you for choosing [facility]  for your care. Our goal is always to provide you with excellent care. Hearing back from our patients is one way we can continue to improve our services. Please take a few minutes to complete the written survey that you may receive in the mail after your visit with us. Thank you!             Your Updated Medication List - Protect others around you: Learn how to safely use, store and throw away your medicines at www.disposemymeds.org.          This list is accurate as of 8/1/18  9:46 AM.  Always use your most recent med list.                   Brand Name Dispense Instructions for use Diagnosis    * amphetamine-dextroamphetamine 15 MG per tablet    ADDERALL     TK 1.5 TB BY MOUTH IN THE AM AND 1 TB IN THE PM        * amphetamine-dextroamphetamine 15 MG per tablet    ADDERALL    75 tablet    TAKE 1.5 TAB BY MOUTH IN THE AM AND 1 TAB IN THE PM    Attention deficit hyperactivity disorder (ADHD), combined type       * amphetamine-dextroamphetamine 15 MG per tablet   Start taking on:  9/1/2018    ADDERALL    75 tablet    TAKE 1.5 TAB  BY MOUTH IN THE AM AND 1 TAB IN THE PM    Attention deficit hyperactivity disorder (ADHD), combined type       * amphetamine-dextroamphetamine 15 MG per tablet   Start taking on:  10/1/2018    ADDERALL    75 tablet    TAKE 1.5 TAB BY MOUTH IN THE AM AND 1 TAB IN THE PM    Attention deficit hyperactivity disorder (ADHD), combined type       doxylamine 25 MG Tabs tablet    UNISOM    100 each    Take 0.5 tablets (12.5 mg) by mouth 4 times daily as needed    AMA (advanced maternal age) multigravida 35+       fluticasone 50 MCG/ACT spray    FLONASE    3 Bottle    Spray 1-2 sprays into both nostrils daily    Dysfunction of both eustachian tubes       loratadine 10 MG tablet    CLARITIN    90 tablet    Take 1 tablet (10 mg) by mouth daily    Dysfunction of both eustachian tubes       oxymetazoline 0.05 % spray    AFRIN NASAL SPRAY    1 Bottle    Spray 2-3 sprays into both nostrils 2 times daily as needed for congestion    Dysfunction of both eustachian tubes       prenatal multivitamin plus iron 27-0.8 MG Tabs per tablet     100 tablet    Take 1 tablet by mouth daily    AMA (advanced maternal age) multigravida 35+       pyridOXINE 25 MG tablet    vitamin B-6    100 tablet    Take 1 tablet (25 mg) by mouth 4 times daily as needed    AMA (advanced maternal age) multigravida 35+       * Notice:  This list has 4 medication(s) that are the same as other medications prescribed for you. Read the directions carefully, and ask your doctor or other care provider to review them with you.

## 2018-08-01 NOTE — PROGRESS NOTES
SUBJECTIVE:                                                    Traci Sloan is a 38 year old female who is here today for a medication check and ADHD follow up.      CURRENT CONCERNS:  None related to ADHD    Ears plugged since April - has been thinking it is her allergies  Both ears feel plugged  Left sounds like a crackly stereo  Has made her feel nauseous  Gotten a little better with Claritin-D which she has taken since last Friday    Pregnant 7-8 weeks  OB approved adderall use in first pregnancy through all trimesters  Will be seeing MFM due to age, late premature birth (36 weeks), and previous multiple pregnancy  Feeling overall good with some nausea, but she feels this is more her ears than her pregnancy     CURRENT PRESCRIPTIONS:    Adderall 22.5 mg in the morning and 15 mg in the afternoon     MEDICATION BENEFITS:  Controlled symptoms:  Hyperactivity - motor restlessness, Attention span, Distractability and Finishing tasks  Uncontrolled symptoms:  None     MEDICATION SIDE EFFECTS:   Has:  none  Denies:  none      Problem list and histories reviewed & adjusted, as indicated.  Additional history: as documented    Patient Active Problem List   Diagnosis     Polypharmacy     Gastroesophageal reflux disease     Irritable bowel syndrome     History of chronic urinary tract infection     Attention deficit hyperactivity disorder (ADHD), combined type     Other closed fracture of distal end of left fibula, initial encounter     Need for Tdap vaccination     BP Readings from Last 6 Encounters:   08/01/18 126/80   07/26/18 126/84   07/12/18 124/76   06/14/18 128/80   06/13/18 136/86   05/08/18 133/81     Current Outpatient Prescriptions   Medication     amphetamine-dextroamphetamine (ADDERALL) 15 MG per tablet     [START ON 9/1/2018] amphetamine-dextroamphetamine (ADDERALL) 15 MG per tablet     [START ON 10/1/2018] amphetamine-dextroamphetamine (ADDERALL) 15 MG per tablet     amphetamine-dextroamphetamine (ADDERALL)  15 MG per tablet     doxylamine (UNISOM) 25 MG TABS tablet     fluticasone (FLONASE) 50 MCG/ACT spray     loratadine (CLARITIN) 10 MG tablet     oxymetazoline (AFRIN NASAL SPRAY) 0.05 % spray     Prenatal Vit-Fe Fumarate-FA (PRENATAL MULTIVITAMIN PLUS IRON) 27-0.8 MG TABS per tablet     pyridOXINE (VITAMIN  B-6) 25 MG tablet     No current facility-administered medications for this visit.             ROS:  Constitutional, HEENT, cardiovascular, pulmonary, gi and gu systems are negative, except as otherwise noted.    OBJECTIVE:                                                    /80  Pulse 77  Temp 99  F (37.2  C) (Oral)  Wt 171 lb 6.4 oz (77.7 kg)  LMP 06/10/2018  SpO2 97%  BMI 27.66 kg/m2    Exam:  GENERAL: healthy, alert and no distress  EYES: Eyes grossly normal to inspection, PERRL and conjunctivae and sclerae normal  HENT: ear canals normal and TM's grey with clear fluid, nose and mouth without ulcers or lesions  NECK: no adenopathy, no asymmetry, masses, or scars and thyroid normal to palpation  RESP: lungs clear to auscultation - no rales, rhonchi or wheezes  CV: regular rate and rhythm, normal S1 S2, no S3 or S4, no murmur, click or rub, no peripheral edema and peripheral pulses strong  MS: no gross musculoskeletal defects noted, no edema  SKIN: no suspicious lesions or rashes  NEURO: Normal strength and tone, mentation intact and speech normal  PSYCH: mentation appears normal, affect normal/bright    MENTAL STATUS EXAM  Appearance: appropriate  Attitude: cooperative  Behavior: normal  Eye Contact: normal  Speech: normal  Orientation: oriented to person , place, time and situation  Mood:  happy  Affect: Mood Congruent  Thought Process: clear    Diagnostic Test Results:  Hearing      ASSESSMENT/PLAN:                                                      (F90.2) Attention deficit hyperactivity disorder (ADHD), combined type  (primary encounter diagnosis)  Comment:   Plan:  amphetamine-dextroamphetamine (ADDERALL) 15 MG         per tablet, amphetamine-dextroamphetamine         (ADDERALL) 15 MG per tablet,         amphetamine-dextroamphetamine (ADDERALL) 15 MG         per tablet   Will review use with MFM    (H69.83) Dysfunction of both eustachian tubes  Comment:   Plan: loratadine (CLARITIN) 10 MG tablet, fluticasone        (FLONASE) 50 MCG/ACT spray, oxymetazoline         (AFRIN NASAL SPRAY) 0.05 % spray   No infection or clear pathology other than mild amount of clear fluid.  Limit oral and nasal decongestant use due to rebound, but otherwise ok to use in pregnancy.  Try consistent claritin, flonase and saline use and let me know if no improvement.  Hearing screening today in clinic.    (Z3A.01) Less than 8 weeks gestation of pregnancy  Comment:   Plan: Adderall use acceptable to OB-GYN in previous pregnancy.  Not thought to be related to 36 week delivery.  Asked for MFM consultation and notes.    Patient Instructions   Stop the claritin D (not because of pregnancy, but for rebound concern)  Instead take: claritin regular and flonase (nasal steroid)   In a week if no change, add afrin (decongestant nasal spray) for three days  Can also use a saline nasal spray for pregnancy congestion     If these are not working, we have the option to try a topical steroid cream in the outer ear    If hearing screening is unusual or if symptoms worsen we should have you see ENT    Please discuss adderall with MFM and have them send their notes to me as well as OB-GYN    Pap with OB-GYN when they recommend      WINSTON [facility]          HEARING FREQUENCY    Right Ear:       500 Hz RESPONSE- on Level: 20 db (Conditioning sound)   1000 Hz: RESPONSE- on Level:   20 db    2000 Hz: RESPONSE- on Level:   20 db    4000 Hz: RESPONSE- on Level:   25 db     Left Ear:      4000 Hz: RESPONSE- on Level:   20 db    2000 Hz: RESPONSE- on Level:   20 db    1000 Hz: RESPONSE- on  Level:   20 db     500 Hz: RESPONSE- on Level: 25 db    Right Ear:    500 Hz: RESPONSE- on Level: 25 db    Hearing Acuity: Pass    Hearing Assessment: normal

## 2018-08-09 ENCOUNTER — RADIANT APPOINTMENT (OUTPATIENT)
Dept: ULTRASOUND IMAGING | Facility: CLINIC | Age: 38
End: 2018-08-09
Attending: OBSTETRICS & GYNECOLOGY

## 2018-08-09 ENCOUNTER — OFFICE VISIT (OUTPATIENT)
Dept: MIDWIFE SERVICES | Facility: CLINIC | Age: 38
End: 2018-08-09

## 2018-08-09 VITALS
WEIGHT: 172 LBS | TEMPERATURE: 98.8 F | DIASTOLIC BLOOD PRESSURE: 84 MMHG | BODY MASS INDEX: 27.76 KG/M2 | SYSTOLIC BLOOD PRESSURE: 126 MMHG | HEART RATE: 69 BPM

## 2018-08-09 DIAGNOSIS — O09.529 AMA (ADVANCED MATERNAL AGE) MULTIGRAVIDA 35+: ICD-10-CM

## 2018-08-09 DIAGNOSIS — Z34.80 PRENATAL CARE OF MULTIGRAVIDA, ANTEPARTUM: Primary | ICD-10-CM

## 2018-08-09 PROCEDURE — 99212 OFFICE O/P EST SF 10 MIN: CPT | Performed by: ADVANCED PRACTICE MIDWIFE

## 2018-08-09 PROCEDURE — 76815 OB US LIMITED FETUS(S): CPT | Performed by: OBSTETRICS & GYNECOLOGY

## 2018-08-09 NOTE — MR AVS SNAPSHOT
After Visit Summary   8/9/2018    Traci Sloan    MRN: [id number]           Patient Information     Date Of Birth          1980        Visit Information        Provider Department      8/9/2018 11:00 AM Richard Orlando APRN CNM [facility]        Today's Diagnoses     Prenatal care of multigravida, antepartum    -  1       Follow-ups after your visit        Your next 10 appointments already scheduled     Aug 22, 2018 10:00 AM CDT   New Prenatal with Liz Haider MD   [facility])    [address] [facility]  [address]  [facility] [address]   [phone]            Sep 04, 2018  1:30 PM CDT   Genetic Counseling with UR GEN COUNSELOR 1   [facility]etal [facility] - [facility])    [address]  [facility] [address]   [phone]            Sep 04, 2018  2:15 PM CDT   MFM NUCHAL TRANSLUCENCY with [id number]   [facility])    [address]  [facility] [address]   [phone]            Sep 04, 2018  2:45 PM CDT   Radiology MD with ESTER SHEARER MD   [facility] - [facility])    [address]  [facility] [address]   [phone]           Please arrive at the time given for your first appointment. This visit is used internally to schedule the physician's time during your ultrasound.              Who to contact     If you have questions or need follow up information about today's clinic visit or your schedule please contact [facility] directly at [phone].  Normal or non-critical lab and imaging results will be communicated to you by MyChart, letter or phone within 4 business days after the clinic has received the results. If you do not hear from us within 7 days, please contact the clinic  through [employer] or phone. If you have a critical or abnormal lab result, we will notify you by phone as soon as possible.  Submit refill requests through [employer] or call your pharmacy and they will forward the refill request to us. Please allow 3 business days for your refill to be completed.          Additional Information About Your Visit        [employer] Information     [employer] gives you secure access to your electronic health record. If you see a primary care provider, you can also send messages to your care team and make appointments. If you have questions, please call your primary care clinic.  If you do not have a primary care provider, please call [phone] and they will assist you.        Care EveryWhere ID     This is your Care EveryWhere ID. This could be used by other organizations to access your [facility] medical records  [id number]        Your Vitals Were     Pulse Temperature Last Period BMI (Body Mass Index)          69 98.8  F (37.1  C) (Oral) 06/10/2018 27.76 kg/m2         Blood Pressure from Last 3 Encounters:   08/09/18 126/84   08/01/18 126/80   07/26/18 126/84    Weight from Last 3 Encounters:   08/09/18 172 lb (78 kg)   08/01/18 171 lb 6.4 oz (77.7 kg)   07/26/18 170 lb 8 oz (77.3 kg)              Today, you had the following     No orders found for display       Primary Care Provider Office Phone # Fax #    Valarie WINSTON Wa[facility] [phone]       [address] [facility] [address]  [facility] [address]        Equal Access to Services     KATELYN CURIEL : Hadii aad ku hadasho Soomaali, waaxda luqadaha, qaybta kaalmada adeegyada, winifred bowen . So [facility] [phone].    ATENCIÓN: Si habla español, tiene a garcia disposición servicios gratuitos de asistencia lingüística. Llame al [phone].    We comply with applicable federal civil rights laws and [place] laws. We do not discriminate on the basis of race, color, national origin, age, disability, sex, sexual  orientation, or gender identity.            Thank you!     Thank you for choosing [facility]  for your care. Our goal is always to provide you with excellent care. Hearing back from our patients is one way we can continue to improve our services. Please take a few minutes to complete the written survey that you may receive in the mail after your visit with us. Thank you!             Your Updated Medication List - Protect others around you: Learn how to safely use, store and throw away your medicines at www.disposemymeds.org.          This list is accurate as of 8/9/18  2:40 PM.  Always use your most recent med list.                   Brand Name Dispense Instructions for use Diagnosis    * amphetamine-dextroamphetamine 15 MG per tablet    ADDERALL     TK 1.5 TB BY MOUTH IN THE AM AND 1 TB IN THE PM        * amphetamine-dextroamphetamine 15 MG per tablet    ADDERALL    75 tablet    TAKE 1.5 TAB BY MOUTH IN THE AM AND 1 TAB IN THE PM    Attention deficit hyperactivity disorder (ADHD), combined type       * amphetamine-dextroamphetamine 15 MG per tablet   Start taking on:  9/1/2018    ADDERALL    75 tablet    TAKE 1.5 TAB BY MOUTH IN THE AM AND 1 TAB IN THE PM    Attention deficit hyperactivity disorder (ADHD), combined type       * amphetamine-dextroamphetamine 15 MG per tablet   Start taking on:  10/1/2018    ADDERALL    75 tablet    TAKE 1.5 TAB BY MOUTH IN THE AM AND 1 TAB IN THE PM    Attention deficit hyperactivity disorder (ADHD), combined type       doxylamine 25 MG Tabs tablet    UNISOM    100 each    Take 0.5 tablets (12.5 mg) by mouth 4 times daily as needed    AMA (advanced maternal age) multigravida 35+       fluticasone 50 MCG/ACT spray    FLONASE    3 Bottle    Spray 1-2 sprays into both nostrils daily    Dysfunction of both eustachian tubes       loratadine 10 MG tablet    CLARITIN    90 tablet    Take 1 tablet (10 mg) by mouth daily    Dysfunction of both eustachian tubes        oxymetazoline 0.05 % spray    AFRIN NASAL SPRAY    1 Bottle    Spray 2-3 sprays into both nostrils 2 times daily as needed for congestion    Dysfunction of both eustachian tubes       prenatal multivitamin plus iron 27-0.8 MG Tabs per tablet     100 tablet    Take 1 tablet by mouth daily    AMA (advanced maternal age) multigravida 35+       pyridOXINE 25 MG tablet    vitamin B-6    100 tablet    Take 1 tablet (25 mg) by mouth 4 times daily as needed    AMA (advanced maternal age) multigravida 35+       * Notice:  This list has 4 medication(s) that are the same as other medications prescribed for you. Read the directions carefully, and ask your doctor or other care provider to review them with you.

## 2018-08-09 NOTE — PROGRESS NOTES
Had US for dating today and viable pregnancy at 9 3/7 with SEGUN of 3/11 vs 3/17 based on LMP 8/4/7.     Pt has both NOB and MFM appt for 1st trimester screen scheduled and no reason to change either.    Richard Orlando APRN, CNM

## 2018-08-22 ENCOUNTER — PRENATAL OFFICE VISIT (OUTPATIENT)
Dept: OBGYN | Facility: CLINIC | Age: 38
End: 2018-08-22

## 2018-08-22 ENCOUNTER — TELEPHONE (OUTPATIENT)
Dept: OBGYN | Facility: CLINIC | Age: 38
End: 2018-08-22

## 2018-08-22 VITALS
DIASTOLIC BLOOD PRESSURE: 86 MMHG | BODY MASS INDEX: 27.76 KG/M2 | TEMPERATURE: 98.8 F | HEART RATE: 73 BPM | WEIGHT: 172 LBS | SYSTOLIC BLOOD PRESSURE: 130 MMHG

## 2018-08-22 DIAGNOSIS — Z12.4 CERVICAL CANCER SCREENING: ICD-10-CM

## 2018-08-22 DIAGNOSIS — O09.891 HISTORY OF PRETERM DELIVERY, CURRENTLY PREGNANT IN FIRST TRIMESTER: Primary | ICD-10-CM

## 2018-08-22 PROBLEM — O09.899 HISTORY OF PRETERM DELIVERY, CURRENTLY PREGNANT: Status: ACTIVE | Noted: 2018-08-22

## 2018-08-22 PROCEDURE — 87624 HPV HI-RISK TYP POOLED RSLT: CPT | Performed by: OBSTETRICS & GYNECOLOGY

## 2018-08-22 PROCEDURE — G0124 SCREEN C/V THIN LAYER BY MD: HCPCS | Performed by: OBSTETRICS & GYNECOLOGY

## 2018-08-22 PROCEDURE — G0145 SCR C/V CYTO,THINLAYER,RESCR: HCPCS | Performed by: OBSTETRICS & GYNECOLOGY

## 2018-08-22 PROCEDURE — 99214 OFFICE O/P EST MOD 30 MIN: CPT | Performed by: OBSTETRICS & GYNECOLOGY

## 2018-08-22 RX ORDER — HYDROXYPROGESTERONE CAPROATE 250 MG/ML
250 INJECTION INTRAMUSCULAR
Qty: 5 ML | Refills: 4 | Status: ON HOLD | OUTPATIENT
Start: 2018-08-22 | End: 2019-02-17

## 2018-08-22 NOTE — Clinical Note
Traci Sloan has a h/o fo  delivery, will begin Neha shots at 16wks.  Please arrange/order for her.  Thanks

## 2018-08-22 NOTE — LETTER
September 18, 2018      Traci Sloan  [address] [facility] [address]    Dear ,      We are contacting you in writing because we have been unable to reach you by phone.     We have recently received your PAP smear results and they were not normal.  Your results came back as low grade squamous intraepithelial lesion (LGSIL).  Your HPV Human Papilloma Virus test was negative or normal.    Because of this, we need to do further testing. It is recommended that you schedule a colposcopy. Colposcopy is a way for your doctor to use a special magnifying device to look at your vulva, vagina, and cervix. If a problem is seen during colposcopy, a small sample of tissue may be collected for laboratory testing (biopsy). The exam and test will help determine the reason for your abnormal pap smear.    Please call [facility] at [phone] to schedule this procedure.  You can take an over the counter pain reliever (2 tablets of Acetaminophen) 1 hour before your colposcopy. Nothing in the vagina for 24 hours before your colposcopy (no sex, douches, vaginal medications or lubricants).     If you have additional questions regarding this result, please call the pap nurse Ilsa at [phone].    Sincerely,      Liz Haider MD./  Ilsa Strange RN-Pap Tracking

## 2018-08-22 NOTE — MR AVS SNAPSHOT
After Visit Summary   2018    Traci Sloan    MRN: [id number]           Patient Information     Date Of Birth          1980        Visit Information        Provider Department      2018 10:00 AM Liz Haider MD [facility]        Today's Diagnoses     History of  delivery, currently pregnant in first trimester    -  1       Follow-ups after your visit        Your next 10 appointments already scheduled     Sep 04, 2018  1:30 PM CDT   Genetic Counseling with UR GEN COUNSELOR 1   e[facility] Maternal Fetal Medicine - [facility])    [address]  [facility] [address]   [phone]            Sep 04, 2018  2:15 PM CDT   MFM NUCHAL TRANSLUCENCY with [id number]   [facility] M[facility]etal Medicine Ultrasound - [facility])    [address]  [facility] [address]   [phone]            Sep 04, 2018  2:45 PM CDT   Radiology MD with UR MANFRED STROUD   [facility] - [facility])    [address]  [facility] [address]   [phone]           Please arrive at the time given for your first appointment. This visit is used internally to schedule the physician's time during your ultrasound.              Who to contact     If you have questions or need follow up information about today's clinic visit or your schedule please contact [facility] directly at [phone].  Normal or non-critical lab and imaging results will be communicated to you by MyChart, letter or phone within 4 business days after the clinic has received the results. If you do not hear from us within 7 days, please contact the clinic through [employer] or phone. If you have a critical or abnormal lab result, we will notify you by phone as soon as possible.  Submit refill requests through [employer] or call your pharmacy and they  will forward the refill request to us. Please allow 3 business days for your refill to be completed.          Additional Information About Your Visit        [employer] Information     [employer] gives you secure access to your electronic health record. If you see a primary care provider, you can also send messages to your care team and make appointments. If you have questions, please call your primary care clinic.  If you do not have a primary care provider, please call [phone] and they will assist you.        Care EveryWhere ID     This is your Care EveryWhere ID. This could be used by other organizations to access your [facility] medical records  [id number]        Your Vitals Were     Pulse Temperature Last Period BMI (Body Mass Index)          73 98.8  F (37.1  C) (Oral) 06/10/2018 27.76 kg/m2         Blood Pressure from Last 3 Encounters:   08/22/18 130/86   08/09/18 126/84   08/01/18 126/80    Weight from Last 3 Encounters:   08/22/18 172 lb (78 kg)   08/09/18 172 lb (78 kg)   08/01/18 171 lb 6.4 oz (77.7 kg)              Today, you had the following     No orders found for display       Primary Care Provider Office Phone # Fax #    Valarie WINSTON Waite [facility] [phone]       [address] [facility] [address]  [facility] [address]        Equal Access to Services     ESHA CURIEL : Hadii aad ku hadasho Soomaali, waaxda luqadaha, qaybta kaalmada adeegyada, winifred villaseñor. So [facility] [phone].    ATENCIÓN: Si habla español, tiene a garcia disposición servicios gratuitos de asistencia lingüística. Llame al [phone].    We comply with applicable federal civil rights laws and [place] laws. We do not discriminate on the basis of race, color, national origin, age, disability, sex, sexual orientation, or gender identity.            Thank you!     Thank you for choosing [facility]  for your care. Our goal is always to provide you with excellent care. Hearing back from our  patients is one way we can continue to improve our services. Please take a few minutes to complete the written survey that you may receive in the mail after your visit with us. Thank you!             Your Updated Medication List - Protect others around you: Learn how to safely use, store and throw away your medicines at www.disposemymeds.org.          This list is accurate as of 8/22/18 10:27 AM.  Always use your most recent med list.                   Brand Name Dispense Instructions for use Diagnosis    * amphetamine-dextroamphetamine 15 MG per tablet    ADDERALL     TK 1.5 TB BY MOUTH IN THE AM AND 1 TB IN THE PM        * amphetamine-dextroamphetamine 15 MG per tablet    ADDERALL    75 tablet    TAKE 1.5 TAB BY MOUTH IN THE AM AND 1 TAB IN THE PM    Attention deficit hyperactivity disorder (ADHD), combined type       * amphetamine-dextroamphetamine 15 MG per tablet   Start taking on:  9/1/2018    ADDERALL    75 tablet    TAKE 1.5 TAB BY MOUTH IN THE AM AND 1 TAB IN THE PM    Attention deficit hyperactivity disorder (ADHD), combined type       * amphetamine-dextroamphetamine 15 MG per tablet   Start taking on:  10/1/2018    ADDERALL    75 tablet    TAKE 1.5 TAB BY MOUTH IN THE AM AND 1 TAB IN THE PM    Attention deficit hyperactivity disorder (ADHD), combined type       doxylamine 25 MG Tabs tablet    UNISOM    100 each    Take 0.5 tablets (12.5 mg) by mouth 4 times daily as needed    AMA (advanced maternal age) multigravida 35+       fluticasone 50 MCG/ACT spray    FLONASE    3 Bottle    Spray 1-2 sprays into both nostrils daily    Dysfunction of both eustachian tubes       loratadine 10 MG tablet    CLARITIN    90 tablet    Take 1 tablet (10 mg) by mouth daily    Dysfunction of both eustachian tubes       oxymetazoline 0.05 % spray    AFRIN NASAL SPRAY    1 Bottle    Spray 2-3 sprays into both nostrils 2 times daily as needed for congestion    Dysfunction of both eustachian tubes       prenatal multivitamin plus  iron 27-0.8 MG Tabs per tablet     100 tablet    Take 1 tablet by mouth daily    AMA (advanced maternal age) multigravida 35+       pyridOXINE 25 MG tablet    vitamin B-6    100 tablet    Take 1 tablet (25 mg) by mouth 4 times daily as needed    AMA (advanced maternal age) multigravida 35+       * Notice:  This list has 4 medication(s) that are the same as other medications prescribed for you. Read the directions carefully, and ask your doctor or other care provider to review them with you.

## 2018-08-22 NOTE — LETTER
[facility]  [address] [place] [address] [address]  [facility] [address] [phone]          October 9, 2018    Traci Sloan                                                                                                                     [address]  [facility] [address]        Dear ,      We are contacting you in writing by certified letter because we have been unable to reach you by phone and by letter and want to ensure that you have recieved these results and recommendations.     We have recently received your PAP smear results and they were not normal.  Your results came back as low grade squamous intraepithelial lesion (LGSIL).  Your HPV Human Papilloma Virus test was negative or normal.    Because of this, we need to do further testing. It is recommended that you schedule a colposcopy. Colposcopy is a way for your doctor to use a special magnifying device to look at your vulva, vagina, and cervix. If a problem is seen during colposcopy, a small sample of tissue may be collected for laboratory testing (biopsy). The exam and test will help determine the reason for your abnormal pap smear.    Please call [facility] at [phone] to schedule this procedure.  You can take an over the counter pain reliever (2 tablets of Acetaminophen) 1 hour before your colposcopy. Nothing in the vagina for 24 hours before your colposcopy (no sex, douches, vaginal medications or lubricants).     If you have additional questions regarding this result, please call the pap nurse Ilsa at [phone].    Sincerely,      Liz Haider MD./  Ilsa Strange RN-Pap Tracking

## 2018-08-22 NOTE — PROGRESS NOTES
CC: New Ob visit  HPI: Traci Cosme is a 38 year old  here for new Ob visit.  Patient's last menstrual period was 06/10/2018..  She is 11w2d by 9w 3d us, giving her an EDC of 3/11/19.  Her EDC was changed to us dating.  Her LMP was about 6 days off from us dating, but the us dating was exact on her conception date and cycle length.  She knows her conception date because she broke her fibula the next day and did not have sex after that prior to her positive upt.  The pregnancy was planned and she and her  are feeling excited.    This far the pregnancy has been uneventful other than mild nausea in the evenings.  Her previous pregnancy was uncomplicated until she had PPROM and then PTD at 36wks.  She had an  of 5lb 4oz female.    Past Medical History:   Diagnosis Date     ADHD (attention deficit hyperactivity disorder)      Anemia      Gastric reflux      IBS (irritable bowel syndrome)        Past Surgical History:   Procedure Laterality Date     CYSTOSCOPY, DILATE URETHRA, COMBINED  1999    for frequent UTI'S     KNEE SURGERY  10/1997       Obstetric History       T0      L1     SAB1   TAB0   Ectopic0   Multiple0   Live Births1       # Outcome Date GA Lbr Scar/2nd Weight Sex Delivery Anes PTL Lv   4 Current            3  16 36w0d 77:00 / 00:31 5 lb 4.3 oz (2.39 kg) F Vag-Spont EPI  ALKA      Name: RICK COSME      Apgar1:  9                Apgar5: 9   2 SAB 13 5w0d          1 AB 2010 5w0d                     Current Outpatient Prescriptions:      amphetamine-dextroamphetamine (ADDERALL) 15 MG per tablet, TAKE 1.5 TAB BY MOUTH IN THE AM AND 1 TAB IN THE PM, Disp: 75 tablet, Rfl: 0     [START ON 2018] amphetamine-dextroamphetamine (ADDERALL) 15 MG per tablet, TAKE 1.5 TAB BY MOUTH IN THE AM AND 1 TAB IN THE PM, Disp: 75 tablet, Rfl: 0     [START ON 10/1/2018] amphetamine-dextroamphetamine (ADDERALL) 15 MG per tablet, TAKE 1.5 TAB BY MOUTH IN THE AM AND 1 TAB  IN THE PM, Disp: 75 tablet, Rfl: 0     amphetamine-dextroamphetamine (ADDERALL) 15 MG per tablet, TK 1.5 TB BY MOUTH IN THE AM AND 1 TB IN THE PM, Disp: , Rfl: 0     doxylamine (UNISOM) 25 MG TABS tablet, Take 0.5 tablets (12.5 mg) by mouth 4 times daily as needed, Disp: 100 each, Rfl: 1     fluticasone (FLONASE) 50 MCG/ACT spray, Spray 1-2 sprays into both nostrils daily, Disp: 3 Bottle, Rfl: 11     loratadine (CLARITIN) 10 MG tablet, Take 1 tablet (10 mg) by mouth daily, Disp: 90 tablet, Rfl: 3     oxymetazoline (AFRIN NASAL SPRAY) 0.05 % spray, Spray 2-3 sprays into both nostrils 2 times daily as needed for congestion, Disp: 1 Bottle, Rfl: 0     Prenatal Vit-Fe Fumarate-FA (PRENATAL MULTIVITAMIN PLUS IRON) 27-0.8 MG TABS per tablet, Take 1 tablet by mouth daily, Disp: 100 tablet, Rfl: 3     pyridOXINE (VITAMIN  B-6) 25 MG tablet, Take 1 tablet (25 mg) by mouth 4 times daily as needed, Disp: 100 tablet, Rfl: 1    No Known Allergies    Family History   Problem Relation Age of Onset     Heart Failure Maternal Grandmother      Breast Cancer Maternal Grandmother      Colon Cancer Maternal Grandmother      Cerebrovascular Disease Maternal Grandfather      Diabetes Maternal Grandfather      Other Cancer Maternal Grandfather      Mental Illness Paternal Grandfather      Hypertension Sister        Past medical, social, surgical and family history were reviewed and updated in EPIC.    ROS: No TIA's or unusual headaches, no dysphagia.  No prolonged cough. No dyspnea or chest pain on exertion.  No abdominal pain, change in bowel habits, black or bloody stools.  No urinary tract symptoms.  No new or unusual musculoskeletal symptoms.  Normal menses, no abnormal vaginal bleeding, discharge or unexpected pelvic pain. No new breast lumps, breast pain or nipple discharge.    PE: /86 (BP Location: Right arm)  Pulse 73  Temp 98.8  F (37.1  C) (Oral)  Wt 172 lb (78 kg)  LMP 06/10/2018  BMI 27.76 kg/m2    Gen: Healthy  appearing female in no acute distress  Heart: RRR  Lungs: CTAB  Abd: +BS, SNT  Ex: No C/C/E, no suspicious rashes or lesions    Pelvic: Normal vulva and vagina with scant white d/c.  Cervix without lesions, no CMT.  Uterus approximately 12 week size, mobile, NT.  Adnexa NT, no masses.    A/P:  1) IUP at 11w2d, h/o PPROM and PTD at 36wks, AMA        PNL wnl, pap today        Reviewed anticipated course of prenatal care        Reviewed recommendations for weight gain, activity and diet        We discussed her previous pregnancy and that she has an increased risk of subsequent  birth.  We discussed use of 17-OHP beginning at 16wks with weekly injections through 36wks to help decrease risk of another  delivery.  We discussed it gives about a 30% reduction.  She would like to do the shots.       We discussed options for genetic screening and diagnosis including CVS/amnio, first trimester screen and quad screen.  We discussed a fetal survey will be performed around 18-20 weeks. She is scheduled for FTS, will plan level II       Discussed MD call schedule as well as role of residents and med students both in clinic and hospital.  She is ok with resident care        RTC 4 weeks    Liz Haider MD

## 2018-08-22 NOTE — TELEPHONE ENCOUNTER
Per JAMIE: Traci Sloan has a h/o fo  delivery, will begin Starr shots at 16wks.  Please arrange/order for her.  Thanks     Medication ordered and sent to [facility]. Left message to call clinic - patient will need to call pharmacy to set up account before medication will be shipped to clinic. Pharmacy number to call is [phone].  Carol Ann Bryant

## 2018-08-22 NOTE — LETTER
[facility]  [address] [place] [address] [address]  [facility] [address] [phone]    October 9, 2018    Traci Sloan                                                                                                                     [address]  [facility] [address]        Dear ,      We are contacting you in writing by certified letter because we have been unable to reach you by phone and by letter and want to ensure that you have recieved these results and recommendations.     We have recently received your PAP smear results and they were not normal.  Your results came back as low grade squamous intraepithelial lesion (LGSIL).  Your HPV Human Papilloma Virus test was negative or normal.    Because of this, we need to do further testing. It is recommended that you schedule a colposcopy. Colposcopy is a way for your doctor to use a special magnifying device to look at your vulva, vagina, and cervix. If a problem is seen during colposcopy, a small sample of tissue may be collected for laboratory testing (biopsy). The exam and test will help determine the reason for your abnormal pap smear.    Please call [facility] at [phone] to schedule this procedure.  You can take an over the counter pain reliever (2 tablets of Acetaminophen) 1 hour before your colposcopy. Nothing in the vagina for 24 hours before your colposcopy (no sex, douches, vaginal medications or lubricants).     If you have additional questions regarding this result, please call the pap nurse Ilsa at [phone].    Sincerely,      Liz Haider MD./  Ilsa Strange RN-Pap Tracking

## 2018-08-28 LAB
COPATH REPORT: ABNORMAL
PAP: ABNORMAL

## 2018-08-30 LAB
FINAL DIAGNOSIS: NORMAL
HPV HR 12 DNA CVX QL NAA+PROBE: NEGATIVE
HPV16 DNA SPEC QL NAA+PROBE: NEGATIVE
HPV18 DNA SPEC QL NAA+PROBE: NEGATIVE
SPECIMEN DESCRIPTION: NORMAL
SPECIMEN SOURCE CVX/VAG CYTO: NORMAL

## 2018-08-31 ENCOUNTER — PRE VISIT (OUTPATIENT)
Dept: MATERNAL FETAL MEDICINE | Facility: CLINIC | Age: 38
End: 2018-08-31

## 2018-08-31 ENCOUNTER — RESULT FOLLOW UP (OUTPATIENT)
Dept: FAMILY MEDICINE | Facility: CLINIC | Age: 38
End: 2018-08-31

## 2018-08-31 DIAGNOSIS — R87.612 PAPANICOLAOU SMEAR OF CERVIX WITH LOW GRADE SQUAMOUS INTRAEPITHELIAL LESION (LGSIL): ICD-10-CM

## 2018-08-31 NOTE — PROGRESS NOTES
8/22/18 LSIL, Neg HPV, pregnant. Plan [place] per Dr. Haider, by 11/22/18.  09/04/18: Msg left to call back. (sk)  09/10/18: Msg left to call back. (sk)  09/18/18: Result letter mailed to the pt.   10/10/18 Result Letter Sent Certified - [phone] [id number] (rl)  11/12/18 Per USPS tracking, certified letter delivered 11/05/18. Verification sent to [facility] for scanning into pts chart. ([facility])  11/23/18 3mo colp not done, chart updated for 6mo colp/pap. Routed to RN. ([facility]) 11/29/18: Fyi sent to the provider. (sk)  03/13/19 [place]/pap reminder letter sent. ([facility])  04/2/19: Ascus pap, Neg HR HPV result. Plan [place], due bef 07/2/19. (sk)  4/12/19 Left message to call back (LN)  04/16/19: Pt was advised. (sk)  05/1/19: [place] Bx Neg for Dysplasia. Plan cotest in 1 year. Results and recommendations released to the pt via [employer]. Pt viewed [employer] message.(sk)  4/17/20 Due to COVID restrictions - Routed to RN to review recommendations (rlm)  04/17/20: COVID 19 interim plan updated to: Plan unchanged. (sk)

## 2018-08-31 NOTE — TELEPHONE ENCOUNTER
Left message to call clinic. Also sent MyChart message to patient with pharmacy information.  Carol Ann Bryant

## 2018-08-31 NOTE — LETTER
March 13, 2019      Traci Sloan  [address] [facility] [address]    Dear ,      At [place] [facility], your health and wellness is our primary concern. That is why we are following up on an abnormal pap from 08/22/18, which was reported as LSIL. Your provider had recommended that you have a Colposcopy completed. Our records do not show that this has been done or scheduled.      It is important to complete the follow up that your provider has suggested for you to ensure that there are no worsening changes which may, over time, develop into cancer.      If you have chosen not to do the recommended colposcopy, please contact our office at [phone] to schedule an appointment for a repeat PAP smear and HPV test at your earliest convenience.    If you have completed the tests outside of [place], please have the results forwarded to our office. We will update the chart for your primary Physician to review before your next annual physical.     Thank you for choosing [place]!    Sincerely,      Liz Haider MD/[facility]

## 2018-09-04 ENCOUNTER — HOSPITAL ENCOUNTER (OUTPATIENT)
Dept: ULTRASOUND IMAGING | Facility: CLINIC | Age: 38
Discharge: HOME | End: 2018-09-04
Attending: OBSTETRICS & GYNECOLOGY | Admitting: OBSTETRICS & GYNECOLOGY

## 2018-09-04 ENCOUNTER — OFFICE VISIT (OUTPATIENT)
Dept: MATERNAL FETAL MEDICINE | Facility: CLINIC | Age: 38
End: 2018-09-04
Attending: OBSTETRICS & GYNECOLOGY

## 2018-09-04 DIAGNOSIS — O09.521 SUPERVISION OF ELDERLY MULTIGRAVIDA IN FIRST TRIMESTER: ICD-10-CM

## 2018-09-04 DIAGNOSIS — Z36.9 FIRST TRIMESTER SCREENING: ICD-10-CM

## 2018-09-04 DIAGNOSIS — O26.90 PREGNANCY RELATED CONDITION, ANTEPARTUM: ICD-10-CM

## 2018-09-04 DIAGNOSIS — O09.529 ANTEPARTUM MULTIGRAVIDA OF ADVANCED MATERNAL AGE: Primary | ICD-10-CM

## 2018-09-04 DIAGNOSIS — O09.521 SUPERVISION OF ELDERLY MULTIGRAVIDA IN FIRST TRIMESTER: Primary | ICD-10-CM

## 2018-09-04 PROCEDURE — 84704 HCG FREE BETACHAIN TEST: CPT | Performed by: OBSTETRICS & GYNECOLOGY

## 2018-09-04 PROCEDURE — 76813 OB US NUCHAL MEAS 1 GEST: CPT

## 2018-09-04 PROCEDURE — 36415 COLL VENOUS BLD VENIPUNCTURE: CPT | Performed by: OBSTETRICS & GYNECOLOGY

## 2018-09-04 PROCEDURE — 96040 ZZH GENETIC COUNSELING, EACH 30 MINUTES: CPT | Mod: ZF | Performed by: GENETIC COUNSELOR, MS

## 2018-09-04 PROCEDURE — 84163 PAPPA SERUM: CPT | Performed by: OBSTETRICS & GYNECOLOGY

## 2018-09-04 PROCEDURE — 82105 ALPHA-FETOPROTEIN SERUM: CPT | Performed by: OBSTETRICS & GYNECOLOGY

## 2018-09-04 NOTE — MR AVS SNAPSHOT
After Visit Summary   9/4/2018    Traci Sloan    MRN: [id number]           Patient Information     Date Of Birth          1980        Visit Information        Provider Department      9/4/2018 2:45 PM Yenni Hurtado MD [facility] Maternal Fetal Medicine [facility]        Today's Diagnoses     Antepartum multigravida of advanced maternal age    -  1       Follow-ups after your visit        Your next 10 appointments already scheduled     Oct 08, 2018 11:00 AM CDT   M US COMP with [id number]   [facility] Maternal Fetal Medicine Ultrasound - [facility])    [address]  [facility] [address] [phone]           Wear comfortable clothes and leave your valuables at home.            Oct 08, 2018 11:30 AM CDT   Radiology MD with UR MANFRED STROUD   [facility] Maternal Fetal Medicine - [facility])    [address]  [facility] [id number] [phone]           Please arrive at the time given for your first appointment. This visit is used internally to schedule the physician's time during your ultrasound.              Future tests that were ordered for you today     Open Future Orders        Priority Expected Expires Ordered    MFM US Comprehensive Single Routine  7/4/2019 9/4/2018            Who to contact     If you have questions or need follow up information about today's clinic visit or your schedule please contact [facility] MATERNAL FETAL MEDICINE [facility] directly at [phone].  Normal or non-critical lab and imaging results will be communicated to you by MyChart, letter or phone within 4 business days after the clinic has received the results. If you do not hear from us within 7 days, please contact the clinic through MyChart or phone. If you have a critical or abnormal lab result, we will notify you by phone as soon as possible.  Submit refill requests through imagine or call your pharmacy and  they will forward the refill request to us. Please allow 3 business days for your refill to be completed.          Additional Information About Your Visit        [employer] Information     [employer] gives you secure access to your electronic health record. If you see a primary care provider, you can also send messages to your care team and make appointments. If you have questions, please call your primary care clinic.  If you do not have a primary care provider, please call [phone] and they will assist you.        Care EveryWhere ID     This is your Care EveryWhere ID. This could be used by other organizations to access your [place] medical records  [id number]        Your Vitals Were     Last Period                   06/10/2018            Blood Pressure from Last 3 Encounters:   08/22/18 130/86   08/09/18 126/84   08/01/18 126/80    Weight from Last 3 Encounters:   08/22/18 78 kg (172 lb)   08/09/18 78 kg (172 lb)   08/01/18 77.7 kg (171 lb 6.4 oz)               Primary Care Provider Office Phone # Fax #    [facility] [phone]       [address]  [facility] [address]        Equal Access to Services     KATELYN CURIEL : Hadii aad fay joneso Socharlyali, waaxda luqadaha, qaybta kaalmada adeegyada, winifred villaseñor. So [facility] [phone].    ATENCIÓN: Si habla español, tiene a garcia disposición servicios gratuitos de asistencia lingüística. [facility] [phone].    We comply with applicable federal civil rights laws and [place] laws. We do not discriminate on the basis of race, color, national origin, age, disability, sex, sexual orientation, or gender identity.            Thank you!     Thank you for choosing [facility]  for your care. Our goal is always to provide you with excellent care. Hearing back from our patients is one way we can continue to improve our services. Please take a few minutes to complete the written  survey that you may receive in the mail after your visit with us. Thank you!             Your Updated Medication List - Protect others around you: Learn how to safely use, store and throw away your medicines at www.disposemymeds.org.          This list is accurate as of 18  3:26 PM.  Always use your most recent med list.                   Brand Name Dispense Instructions for use Diagnosis    * amphetamine-dextroamphetamine 15 MG per tablet    ADDERALL     TK 1.5 TB BY MOUTH IN THE AM AND 1 TB IN THE PM        * amphetamine-dextroamphetamine 15 MG per tablet    ADDERALL    75 tablet    TAKE 1.5 TAB BY MOUTH IN THE AM AND 1 TAB IN THE PM    Attention deficit hyperactivity disorder (ADHD), combined type       * amphetamine-dextroamphetamine 15 MG per tablet    ADDERALL    75 tablet    TAKE 1.5 TAB BY MOUTH IN THE AM AND 1 TAB IN THE PM    Attention deficit hyperactivity disorder (ADHD), combined type       * amphetamine-dextroamphetamine 15 MG per tablet   Start taking on:  10/1/2018    ADDERALL    75 tablet    TAKE 1.5 TAB BY MOUTH IN THE AM AND 1 TAB IN THE PM    Attention deficit hyperactivity disorder (ADHD), combined type       doxylamine 25 MG Tabs tablet    UNISOM    100 each    Take 0.5 tablets (12.5 mg) by mouth 4 times daily as needed    AMA (advanced maternal age) multigravida 35+       fluticasone 50 MCG/ACT spray    FLONASE    3 Bottle    Spray 1-2 sprays into both nostrils daily    Dysfunction of both eustachian tubes       HYDROXYprogesterone caproate in oil 250 mg/mL intramuscular injection    HERNANDO    5 mL    Inject 1 mL (250 mg) into the muscle every 7 days    History of  delivery, currently pregnant in first trimester       loratadine 10 MG tablet    CLARITIN    90 tablet    Take 1 tablet (10 mg) by mouth daily    Dysfunction of both eustachian tubes       oxymetazoline 0.05 % spray    AFRIN NASAL SPRAY    1 Bottle    Spray 2-3 sprays into both nostrils 2 times daily as needed for  congestion    Dysfunction of both eustachian tubes       prenatal multivitamin plus iron 27-0.8 MG Tabs per tablet     100 tablet    Take 1 tablet by mouth daily    AMA (advanced maternal age) multigravida 35+       pyridOXINE 25 MG tablet    vitamin B-6    100 tablet    Take 1 tablet (25 mg) by mouth 4 times daily as needed    AMA (advanced maternal age) multigravida 35+       * Notice:  This list has 4 medication(s) that are the same as other medications prescribed for you. Read the directions carefully, and ask your doctor or other care provider to review them with you.

## 2018-09-04 NOTE — PROGRESS NOTES
"Please see \"Imaging\" tab under Chart Review for full details.    Yenni Hurtado MD  Maternal Fetal Medicine    "

## 2018-09-04 NOTE — MR AVS SNAPSHOT
After Visit Summary   9/4/2018    Traci Sloan    MRN: [id number]           Patient Information     Date Of Birth          1980        Visit Information        Provider Department      9/4/2018 1:30 PM UR GEN COUNSELOR 1 [facility] Maternal Fetal Medicine [facility]        Today's Diagnoses     Supervision of elderly multigravida in first trimester    -  1    Pregnancy related condition, antepartum        First trimester screening           Follow-ups after your visit        Your next 10 appointments already scheduled     Oct 08, 2018 11:00 AM CDT   [facility] US COMP with [id number]   [facility] Maternal Fetal Medicine Ultrasound - [facility])    [address]  [facility] [address] [phone]           Wear comfortable clothes and leave your valuables at home.            Oct 08, 2018 11:30 AM CDT   Radiology MD with UR MANFRED STROUD   [facility] Maternal Fetal Medicine - [facility])    [address]  [facility] [address]   [phone]           Please arrive at the time given for your first appointment. This visit is used internally to schedule the physician's time during your ultrasound.              Future tests that were ordered for you today     Open Future Orders        Priority Expected Expires Ordered    [facility] US Comprehensive Single Routine  7/4/2019 9/4/2018            Who to contact     If you have questions or need follow up information about today's clinic visit or your schedule please contact [facility] MATERNAL FETAL MEDICINE [facility] directly at [phone].  Normal or non-critical lab and imaging results will be communicated to you by MyChart, letter or phone within 4 business days after the clinic has received the results. If you do not hear from us within 7 days, please contact the clinic through MyChart or phone. If you have a critical or abnormal lab result, we will notify you by  phone as soon as possible.  Submit refill requests through [email or web address] or call your pharmacy and they will forward the refill request to us. Please allow 3 business days for your refill to be completed.          Additional Information About Your Visit        [employer] Information     [email or web address] gives you secure access to your electronic health record. If you see a primary care provider, you can also send messages to your care team and make appointments. If you have questions, please call your primary care clinic.  If you do not have a primary care provider, please call [phone] and they will assist you.        Care EveryWhere ID     This is your Care EveryWhere ID. This could be used by other organizations to access your [place] medical records  [id number]        Your Vitals Were     Last Period                   06/10/2018            Blood Pressure from Last 3 Encounters:   08/22/18 130/86   08/09/18 126/84   08/01/18 126/80    Weight from Last 3 Encounters:   08/22/18 78 kg (172 lb)   08/09/18 78 kg (172 lb)   08/01/18 77.7 kg (171 lb 6.4 oz)              We Performed the Following     [facility] Genetic Counseling        Primary Care Provider Office Phone # Fax #    Valarie Medrano, [facility] [phone]       [address] [facility] [address]  [facility] [address]        Equal Access to Services     ESHA CURIEL : Hadii michelle ku hadasho Soomaali, waaxda luqadaha, qaybta kaalmada adeegyada, winifred florez haymónica bowen . So [facility] [phone].    ATENCIÓN: Si habla español, tiene a garcia disposición servicios gratuitos de asistencia lingüística. Llame al [phone].    We comply with applicable federal civil rights laws and [place] laws. We do not discriminate on the basis of race, color, national origin, age, disability, sex, sexual orientation, or gender identity.            Thank you!     Thank you for choosing [facility]  for your care. Our goal is always to provide you with  excellent care. Hearing back from our patients is one way we can continue to improve our services. Please take a few minutes to complete the written survey that you may receive in the mail after your visit with us. Thank you!             Your Updated Medication List - Protect others around you: Learn how to safely use, store and throw away your medicines at www.disposemymeds.org.          This list is accurate as of 18  3:46 PM.  Always use your most recent med list.                   Brand Name Dispense Instructions for use Diagnosis    * amphetamine-dextroamphetamine 15 MG per tablet    ADDERALL     TK 1.5 TB BY MOUTH IN THE AM AND 1 TB IN THE PM        * amphetamine-dextroamphetamine 15 MG per tablet    ADDERALL    75 tablet    TAKE 1.5 TAB BY MOUTH IN THE AM AND 1 TAB IN THE PM    Attention deficit hyperactivity disorder (ADHD), combined type       * amphetamine-dextroamphetamine 15 MG per tablet    ADDERALL    75 tablet    TAKE 1.5 TAB BY MOUTH IN THE AM AND 1 TAB IN THE PM    Attention deficit hyperactivity disorder (ADHD), combined type       * amphetamine-dextroamphetamine 15 MG per tablet   Start taking on:  10/1/2018    ADDERALL    75 tablet    TAKE 1.5 TAB BY MOUTH IN THE AM AND 1 TAB IN THE PM    Attention deficit hyperactivity disorder (ADHD), combined type       doxylamine 25 MG Tabs tablet    UNISOM    100 each    Take 0.5 tablets (12.5 mg) by mouth 4 times daily as needed    AMA (advanced maternal age) multigravida 35+       fluticasone 50 MCG/ACT spray    FLONASE    3 Bottle    Spray 1-2 sprays into both nostrils daily    Dysfunction of both eustachian tubes       HYDROXYprogesterone caproate in oil 250 mg/mL intramuscular injection    HERNANDO    5 mL    Inject 1 mL (250 mg) into the muscle every 7 days    History of  delivery, currently pregnant in first trimester       loratadine 10 MG tablet    CLARITIN    90 tablet    Take 1 tablet (10 mg) by mouth daily    Dysfunction of both  eustachian tubes       oxymetazoline 0.05 % spray    AFRIN NASAL SPRAY    1 Bottle    Spray 2-3 sprays into both nostrils 2 times daily as needed for congestion    Dysfunction of both eustachian tubes       prenatal multivitamin plus iron 27-0.8 MG Tabs per tablet     100 tablet    Take 1 tablet by mouth daily    AMA (advanced maternal age) multigravida 35+       pyridOXINE 25 MG tablet    vitamin B-6    100 tablet    Take 1 tablet (25 mg) by mouth 4 times daily as needed    AMA (advanced maternal age) multigravida 35+       * Notice:  This list has 4 medication(s) that are the same as other medications prescribed for you. Read the directions carefully, and ask your doctor or other care provider to review them with you.

## 2018-09-04 NOTE — PROGRESS NOTES
[facility]  Genetic Counseling Consult    Patient: Traci Sloan YOB: 1980   Date of Service: 18      Traci Sloan was seen at [facility] [place] for genetic consultation to discuss the options for screening and testing for fetal chromosome abnormalities.  The indication for genetic counseling is advanced maternal age.        Impression/Plan:   1.  A thorough discussion regarding first trimester screening and non-invasive prenatal screening was completed including the risks, benefits, and limitations of each option. Traci had an ultrasound and blood draw for first trimester screening.  Results are expected within 5-7 days, and will be available in EPIC.  We will contact her to discuss the results, and a copy will be forwarded to the office of the referring OB provider.    2.  Maternal serum AFP (single marker screen) is recommended after 15 weeks to screen for open neural tube defects. A quad screen should not be performed.    3.  An 18-20 week comprehensive ultrasound is standard of care for all women 35 or older at delivery.    Pregnancy History:   /Parity:    Age at Delivery: 38 year old  SEGUN: 3/11/2019, by Ultrasound  Gestational Age: 13w1d    Traci continues to take Adderall daily, which she also took throughout her previous pregnancy.   o Adderall is a Category C medication in all trimesters of pregnancy according to the [place] FDA. Based on this pregnancy category, this medication should be used only if the potential benefit justifies the potential risk to the fetus.    Traci plans to pursue 17-OHP injections due to her history of  delivery.    Traci veloz pregnancy history is significant for:  o 2010: Ab  o 2013: SAB at 5 weeks gestation  o 2016: 36+0, vaginal delivery, female    Medical History:   Traci veloz reported medical history is not expected to impact pregnancy management or risks to fetal  development.       Family History:   A three-generation pedigree was not obtained. Traci denies any family history of multiple miscarriages, stillbirths, birth defects, mental retardation, known genetic conditions, and consanguinity.       Risk Assessment for Chromosome Conditions:   We explained that the risk for fetal chromosome abnormalities increases with maternal age. We discussed specific features of common chromosome abnormalities, including Down syndrome, trisomy 13, trisomy 18, and sex chromosome trisomies.      - At age 38 at midtrimester, the risk to have a baby with Down syndrome is 1 in 216.     - At age 38 at midtrimester, the risk to have a baby with any chromosome abnormality is 1 in 105.     Testing Options:   We discussed the following options:   First trimester screening    First trimester ultrasound with nuchal translucency and nasal bone assessments, maternal plasma hCG, LILI-A, and AFP measurement    Screens for fetal trisomy 21, trisomy 13, and trisomy 18    Cannot screen for open neural tube defects; maternal serum AFP after 15 weeks is recommended     Non-invasive Prenatal Testing (NIPT)    Maternal plasma cell-free DNA testing; first trimester ultrasound with nuchal translucency and nasal bone assessment is recommended, when appropriate    Screens for fetal trisomy 21, trisomy 13, trisomy 18, and sex chromosome aneuploidy    Cannot screen for open neural tube defects; maternal serum AFP after 15 weeks is recommended      We reviewed the benefits and limitations of this testing.  Screening tests provide a risk assessment specific to the pregnancy for certain fetal chromosome abnormalities, but cannot definitively diagnose or exclude a fetal chromosome abnormality.  Follow-up genetic counseling and consideration of diagnostic testing is recommended with any abnormal screening result. Diagnostic tests carry inherent risks- including risk of miscarriage- that require careful consideration.   These tests can detect fetal chromosome abnormalities with greater than 99% certainty.  Results can be compromised by maternal cell contamination or mosaicism, and are limited by the resolution of cytogenetic G-banding technology.  There is no screening nor diagnostic test that can detect all forms of birth defects or mental disability.     It was a pleasure to be involved with Traci bowles. Face-to-face time of the meeting was 25 minutes.    Arianna Boyd MS, [facility]  Maternal Fetal Medicine  [facility]  Ph: [phone]  [email or web address]

## 2018-09-07 ENCOUNTER — TELEPHONE (OUTPATIENT)
Dept: MATERNAL FETAL MEDICINE | Facility: CLINIC | Age: 38
End: 2018-09-07

## 2018-09-07 NOTE — TELEPHONE ENCOUNTER
I left a message for Traci today regarding her normal first trimester screen results. Specifically, the chance this pregnancy has Down syndrome was reduced from her age related risk of 1 in 214 to less than 1 in 10,000. Similarly, the chance this pregnancy has trisomy 18/trisomy 13 was reduced from her age related risk of 1 in 407 to 1 in 1,595. This result significantly reduces the chance this pregnancy has Down syndrome, trisomy 18, or trisomy 13. Dr. Gabrielle Means has reviewed the hormone levels identified on this screen and reports that no action is needed with regards to these.     These results are reassuring, but there remains a small chance for a false positive or false negative result. This screen also does not address all chromosome abnormalities or all causes of birth defects and cognitive delay. As such, Traci will still have the option of the Innatal screen and/or diagnostic testing (CVS or amniocentesis) if she is interested or there is an indication later in the pregnancy. She also has the option of having a maternal serum AFP blood draw after 15 weeks to screen for ONTD; she is encouraged to discuss this option with her primary OB provider. Traci is also scheduled to return for a comprehensive ultrasound on 10/8/2018.     I encouraged her to contact me if she has any questions in the future. A copy of this note and her first trimester screen results will be forwarded to her primary OB provider.    Arianna Boyd MS, [facility]  Maternal Fetal Medicine  [facility]  Ph: [phone]  [email or web address]

## 2018-09-12 LAB — LAB SCANNED RESULT: NORMAL

## 2018-10-08 ENCOUNTER — HOSPITAL ENCOUNTER (OUTPATIENT)
Dept: ULTRASOUND IMAGING | Facility: CLINIC | Age: 38
Discharge: HOME | End: 2018-10-08
Attending: OBSTETRICS & GYNECOLOGY | Admitting: OBSTETRICS & GYNECOLOGY

## 2018-10-08 ENCOUNTER — OFFICE VISIT (OUTPATIENT)
Dept: MATERNAL FETAL MEDICINE | Facility: CLINIC | Age: 38
End: 2018-10-08
Attending: OBSTETRICS & GYNECOLOGY

## 2018-10-08 DIAGNOSIS — O09.522 ELDERLY MULTIGRAVIDA IN SECOND TRIMESTER: Primary | ICD-10-CM

## 2018-10-08 DIAGNOSIS — O35.9XX0 SUSPECTED FETAL ANOMALY, ANTEPARTUM, SINGLE OR UNSPECIFIED FETUS: ICD-10-CM

## 2018-10-08 DIAGNOSIS — O09.529 ANTEPARTUM MULTIGRAVIDA OF ADVANCED MATERNAL AGE: ICD-10-CM

## 2018-10-08 PROCEDURE — 76811 OB US DETAILED SNGL FETUS: CPT

## 2018-10-08 NOTE — MR AVS SNAPSHOT
After Visit Summary   10/8/2018    Traci Sloan    MRN: [id number]           Patient Information     Date Of Birth          1980        Visit Information        Provider Department      10/8/2018 11:30 AM Marga Perez DO [facility] Maternal Fetal [facility]        Today's Diagnoses     Elderly multigravida in second trimester    -  1    Suspected fetal anomaly, antepartum, single or unspecified fetus           Follow-ups after your visit        Your next 10 appointments already scheduled     Oct 29, 2018  1:30 PM CDT   MFM US COMPRE SINGLE F/U with [id number]   [facility] Maternal Fetal Medicine Ultrasound - [facility])    [address]  [facility] [id number] [phone]           Wear comfortable clothes and leave your valuables at home.            Oct 29, 2018  2:00 PM CDT   Radiology MD with UR MFM MD   [facility] Maternal Fetal Medicine - [facility])    [address]  [facility] [address]   [phone]           Please arrive at the time given for your first appointment. This visit is used internally to schedule the physician's time during your ultrasound.              Future tests that were ordered for you today     Open Future Orders        Priority Expected Expires Ordered    MFM US Comprehensive Single F/U Routine 10/29/2018 10/8/2019 10/8/2018            Who to contact     If you have questions or need follow up information about today's clinic visit or your schedule please contact [facility] directly at [phone].  Normal or non-critical lab and imaging results will be communicated to you by MyChart, letter or phone within 4 business days after the clinic has received the results. If you do not hear from us within 7 days, please contact the clinic through MyChart or phone. If you have a critical or abnormal lab result, we  will notify you by phone as soon as possible.  Submit refill requests through [employer] or call your pharmacy and they will forward the refill request to us. Please allow 3 business days for your refill to be completed.          Additional Information About Your Visit        [employer] Information     [employer] gives you secure access to your electronic health record. If you see a primary care provider, you can also send messages to your care team and make appointments. If you have questions, please call your primary care clinic.  If you do not have a primary care provider, please call [phone] and they will assist you.        Care EveryWhere ID     This is your Care EveryWhere ID. This could be used by other organizations to access your [place] medical records  [id number]        Your Vitals Were     Last Period                   06/10/2018            Blood Pressure from Last 3 Encounters:   08/22/18 130/86   08/09/18 126/84   08/01/18 126/80    Weight from Last 3 Encounters:   08/22/18 78 kg (172 lb)   08/09/18 78 kg (172 lb)   08/01/18 77.7 kg (171 lb 6.4 oz)               Primary Care Provider Office Phone # Fax #    Valarie JOSE MIGUEL Medrano, APRN [facility] [phone]       [address] [facility] [address]  [facility] [address]        Equal Access to Services     ESHA CURIEL : Hadii michelle jnoeso Soomaali, waaxda luqadaha, qaybta kaalmada adeegyada, winifred bowen . So [facility] [phone].    ATENCIÓN: Si habla español, tiene a garcia disposición servicios gratuitos de asistencia lingüística. Llame al [phone].    We comply with applicable federal civil rights laws and [place] laws. We do not discriminate on the basis of race, color, national origin, age, disability, sex, sexual orientation, or gender identity.            Thank you!     Thank you for choosing [facility]  for your care. Our goal is always to provide you with excellent care. Hearing back from our  patients is one way we can continue to improve our services. Please take a few minutes to complete the written survey that you may receive in the mail after your visit with us. Thank you!             Your Updated Medication List - Protect others around you: Learn how to safely use, store and throw away your medicines at www.disposemymeds.org.          This list is accurate as of 10/8/18  1:45 PM.  Always use your most recent med list.                   Brand Name Dispense Instructions for use Diagnosis    * amphetamine-dextroamphetamine 15 MG per tablet    ADDERALL     TK 1.5 TB BY MOUTH IN THE AM AND 1 TB IN THE PM        * amphetamine-dextroamphetamine 15 MG per tablet    ADDERALL    75 tablet    TAKE 1.5 TAB BY MOUTH IN THE AM AND 1 TAB IN THE PM    Attention deficit hyperactivity disorder (ADHD), combined type       * amphetamine-dextroamphetamine 15 MG per tablet    ADDERALL    75 tablet    TAKE 1.5 TAB BY MOUTH IN THE AM AND 1 TAB IN THE PM    Attention deficit hyperactivity disorder (ADHD), combined type       * amphetamine-dextroamphetamine 15 MG per tablet    ADDERALL    75 tablet    TAKE 1.5 TAB BY MOUTH IN THE AM AND 1 TAB IN THE PM    Attention deficit hyperactivity disorder (ADHD), combined type       doxylamine 25 MG Tabs tablet    UNISOM    100 each    Take 0.5 tablets (12.5 mg) by mouth 4 times daily as needed    AMA (advanced maternal age) multigravida 35+       fluticasone 50 MCG/ACT spray    FLONASE    3 Bottle    Spray 1-2 sprays into both nostrils daily    Dysfunction of both eustachian tubes       HYDROXYprogesterone caproate in oil 250 mg/mL intramuscular injection    HERNANDO    5 mL    Inject 1 mL (250 mg) into the muscle every 7 days    History of  delivery, currently pregnant in first trimester       loratadine 10 MG tablet    CLARITIN    90 tablet    Take 1 tablet (10 mg) by mouth daily    Dysfunction of both eustachian tubes       oxymetazoline 0.05 % spray    AFRIN NASAL SPRAY    1  Bottle    Spray 2-3 sprays into both nostrils 2 times daily as needed for congestion    Dysfunction of both eustachian tubes       prenatal multivitamin plus iron 27-0.8 MG Tabs per tablet     100 tablet    Take 1 tablet by mouth daily    AMA (advanced maternal age) multigravida 35+       pyridOXINE 25 MG tablet    vitamin B-6    100 tablet    Take 1 tablet (25 mg) by mouth 4 times daily as needed    AMA (advanced maternal age) multigravida 35+       * Notice:  This list has 4 medication(s) that are the same as other medications prescribed for you. Read the directions carefully, and ask your doctor or other care provider to review them with you.

## 2018-10-08 NOTE — PROGRESS NOTES
"Please see \"Imaging\" tab under \"Chart Review\" for details of today's US.    Marga Perez, DO    "

## 2018-10-12 ENCOUNTER — MYC REFILL (OUTPATIENT)
Dept: FAMILY MEDICINE | Facility: CLINIC | Age: 38
End: 2018-10-12

## 2018-10-12 DIAGNOSIS — F90.2 ATTENTION DEFICIT HYPERACTIVITY DISORDER (ADHD), COMBINED TYPE: ICD-10-CM

## 2018-10-12 NOTE — TELEPHONE ENCOUNTER
Yee,     Please review/sign or advise for refill of Adderall 15 mg tablet.     : last filled on 10/01/18 (30 day supply) for 75 tablets.    Luna Bran RN  [facility]

## 2018-10-12 NOTE — TELEPHONE ENCOUNTER
Message from [employer]:  Original authorizing provider: WINSTON Richards CNP    Traci Sloan would like a refill of the following medications:  amphetamine-dextroamphetamine (ADDERALL) 15 MG per tablet [WINSTON Richards CNP]  amphetamine-dextroamphetamine (ADDERALL) 15 MG per tablet [WINSTON Richards CNP]  amphetamine-dextroamphetamine (ADDERALL) 15 MG per tablet [WINSTON Richards CNP]    Preferred pharmacy: Other - Can't send to pharmacy, will  at clinic.    Comment:  Standard re-fiill request, see you in 3 months. ;) -Traci

## 2018-10-18 ENCOUNTER — MYC MEDICAL ADVICE (OUTPATIENT)
Dept: FAMILY MEDICINE | Facility: CLINIC | Age: 38
End: 2018-10-18

## 2018-10-18 RX ORDER — DEXTROAMPHETAMINE SACCHARATE, AMPHETAMINE ASPARTATE, DEXTROAMPHETAMINE SULFATE AND AMPHETAMINE SULFATE 3.75; 3.75; 3.75; 3.75 MG/1; MG/1; MG/1; MG/1
TABLET ORAL
Qty: 75 TABLET | Refills: 0 | Status: SHIPPED | OUTPATIENT
Start: 2018-10-18 | End: 2019-01-09

## 2018-10-18 NOTE — TELEPHONE ENCOUNTER
Patient wondering about Adderall Rx.[employer] communication.    Thanks! Fadumo Interiano RN

## 2018-10-29 ENCOUNTER — HOSPITAL ENCOUNTER (OUTPATIENT)
Dept: ULTRASOUND IMAGING | Facility: CLINIC | Age: 38
Discharge: HOME | End: 2018-10-29
Attending: OBSTETRICS & GYNECOLOGY | Admitting: OBSTETRICS & GYNECOLOGY

## 2018-10-29 ENCOUNTER — OFFICE VISIT (OUTPATIENT)
Dept: MATERNAL FETAL MEDICINE | Facility: CLINIC | Age: 38
End: 2018-10-29
Attending: OBSTETRICS & GYNECOLOGY

## 2018-10-29 DIAGNOSIS — O35.9XX0 SUSPECTED FETAL ANOMALY, ANTEPARTUM, SINGLE OR UNSPECIFIED FETUS: ICD-10-CM

## 2018-10-29 DIAGNOSIS — O09.522 ELDERLY MULTIGRAVIDA IN SECOND TRIMESTER: Primary | ICD-10-CM

## 2018-10-29 DIAGNOSIS — O09.521 SUPERVISION OF ELDERLY MULTIGRAVIDA IN FIRST TRIMESTER: ICD-10-CM

## 2018-10-29 PROCEDURE — 76816 OB US FOLLOW-UP PER FETUS: CPT

## 2018-10-29 NOTE — MR AVS SNAPSHOT
After Visit Summary   10/29/2018    Traci Sloan    MRN: [id number]           Patient Information     Date Of Birth          1980        Visit Information        Provider Department      10/29/2018 2:00 PM Marga Perez, DO [facility] Maternal Fetal Medicine [facility]        Today's Diagnoses     Elderly multigravida in second trimester    -  1    Suspected fetal anomaly, antepartum, single or unspecified fetus        Supervision of elderly multigravida in first trimester           Follow-ups after your visit        Who to contact     If you have questions or need follow up information about today's clinic visit or your schedule please contact [facility] MATERNAL FETAL MEDICINE [facility] directly at [phone].  Normal or non-critical lab and imaging results will be communicated to you by [employer], letter or phone within 4 business days after the clinic has received the results. If you do not hear from us within 7 days, please contact the clinic through [employer] or phone. If you have a critical or abnormal lab result, we will notify you by phone as soon as possible.  Submit refill requests through [employer] or call your pharmacy and they will forward the refill request to us. Please allow 3 business days for your refill to be completed.          Additional Information About Your Visit        MyChart Information     [employer] gives you secure access to your electronic health record. If you see a primary care provider, you can also send messages to your care team and make appointments. If you have questions, please call your primary care clinic.  If you do not have a primary care provider, please call [phone] and they will assist you.        Care EveryWhere ID     This is your Care EveryWhere ID. This could be used by other organizations to access your [place] medical records  [id number]        Your Vitals Were     Last Period                   06/10/2018            Blood Pressure from  Last 3 Encounters:   08/22/18 130/86   08/09/18 126/84   08/01/18 126/80    Weight from Last 3 Encounters:   08/22/18 78 kg (172 lb)   08/09/18 78 kg (172 lb)   08/01/18 77.7 kg (171 lb 6.4 oz)              Today, you had the following     No orders found for display       Primary Care Provider Office Phone # Fax #    WINSTON [facility] [phone]       [address] [facility] [address]  [facility] [address]        Equal Access to Services     [facility]: Hadii aad ku hadasho Soomaali, waaxda luqadaha, qaybta kaalmada adegustavoyalion, winifred bowen . So [facility] [phone].    ATENCIÓN: Si habla español, tiene a garcia disposición servicios gratuitos de asistencia lingüística. LlTri[facility] [phone].    We comply with applicable federal civil rights laws and [place] laws. We do not discriminate on the basis of race, color, national origin, age, disability, sex, sexual orientation, or gender identity.            Thank you!     Thank you for choosing [facility]  for your care. Our goal is always to provide you with excellent care. Hearing back from our patients is one way we can continue to improve our services. Please take a few minutes to complete the written survey that you may receive in the mail after your visit with us. Thank you!             Your Updated Medication List - Protect others around you: Learn how to safely use, store and throw away your medicines at www.disposemymeds.org.          This list is accurate as of 10/29/18  4:34 PM.  Always use your most recent med list.                   Brand Name Dispense Instructions for use Diagnosis    * amphetamine-dextroamphetamine 15 MG per tablet    ADDERALL    75 tablet    TAKE 1.5 TAB BY MOUTH IN THE AM AND 1 TAB IN THE PM    Attention deficit hyperactivity disorder (ADHD), combined type       * amphetamine-dextroamphetamine 15 MG per tablet    ADDERALL    75 tablet    TAKE 1.5 TAB BY MOUTH IN THE  AM AND 1 TAB IN THE PM    Attention deficit hyperactivity disorder (ADHD), combined type       * amphetamine-dextroamphetamine 15 MG per tablet    ADDERALL    75 tablet    TAKE 1.5 TAB BY MOUTH IN THE AM AND 1 TAB IN THE PM    Attention deficit hyperactivity disorder (ADHD), combined type       doxylamine 25 MG Tabs tablet    UNISOM    100 each    Take 0.5 tablets (12.5 mg) by mouth 4 times daily as needed    AMA (advanced maternal age) multigravida 35+       fluticasone 50 MCG/ACT spray    FLONASE    3 Bottle    Spray 1-2 sprays into both nostrils daily    Dysfunction of both eustachian tubes       HYDROXYprogesterone caproate in oil 250 mg/mL intramuscular injection    HERNANDO    5 mL    Inject 1 mL (250 mg) into the muscle every 7 days    History of  delivery, currently pregnant in first trimester       loratadine 10 MG tablet    CLARITIN    90 tablet    Take 1 tablet (10 mg) by mouth daily    Dysfunction of both eustachian tubes       oxymetazoline 0.05 % spray    AFRIN NASAL SPRAY    1 Bottle    Spray 2-3 sprays into both nostrils 2 times daily as needed for congestion    Dysfunction of both eustachian tubes       prenatal multivitamin plus iron 27-0.8 MG Tabs per tablet     100 tablet    Take 1 tablet by mouth daily    AMA (advanced maternal age) multigravida 35+       pyridOXINE 25 MG tablet    vitamin B-6    100 tablet    Take 1 tablet (25 mg) by mouth 4 times daily as needed    AMA (advanced maternal age) multigravida 35+       * Notice:  This list has 3 medication(s) that are the same as other medications prescribed for you. Read the directions carefully, and ask your doctor or other care provider to review them with you.

## 2018-12-04 ENCOUNTER — PRENATAL OFFICE VISIT (OUTPATIENT)
Dept: OBGYN | Facility: CLINIC | Age: 38
End: 2018-12-04

## 2018-12-04 VITALS
BODY MASS INDEX: 30.25 KG/M2 | DIASTOLIC BLOOD PRESSURE: 84 MMHG | HEART RATE: 85 BPM | WEIGHT: 187.4 LBS | SYSTOLIC BLOOD PRESSURE: 131 MMHG

## 2018-12-04 DIAGNOSIS — O09.899 HISTORY OF PRETERM DELIVERY, CURRENTLY PREGNANT: Primary | ICD-10-CM

## 2018-12-04 DIAGNOSIS — O09.899 HISTORY OF PRETERM DELIVERY, CURRENTLY PREGNANT: ICD-10-CM

## 2018-12-04 LAB
GLUCOSE 1H P 50 G GLC PO SERPL-MCNC: 118 MG/DL (ref 60–129)
HGB BLD-MCNC: 10.6 G/DL (ref 11.7–15.7)

## 2018-12-04 PROCEDURE — 99212 OFFICE O/P EST SF 10 MIN: CPT | Performed by: OBSTETRICS & GYNECOLOGY

## 2018-12-04 PROCEDURE — 36416 COLLJ CAPILLARY BLOOD SPEC: CPT | Performed by: OBSTETRICS & GYNECOLOGY

## 2018-12-04 PROCEDURE — 00000218 ZZHCL STATISTIC OBHBG - HEMOGLOBIN: Performed by: OBSTETRICS & GYNECOLOGY

## 2018-12-04 PROCEDURE — 82950 GLUCOSE TEST: CPT | Performed by: OBSTETRICS & GYNECOLOGY

## 2018-12-04 ASSESSMENT — PATIENT HEALTH QUESTIONNAIRE - PHQ9
5. POOR APPETITE OR OVEREATING: SEVERAL DAYS
SUM OF ALL RESPONSES TO PHQ QUESTIONS 1-9: 3

## 2018-12-04 ASSESSMENT — ANXIETY QUESTIONNAIRES
5. BEING SO RESTLESS THAT IT IS HARD TO SIT STILL: SEVERAL DAYS
3. WORRYING TOO MUCH ABOUT DIFFERENT THINGS: NOT AT ALL
GAD7 TOTAL SCORE: 3
6. BECOMING EASILY ANNOYED OR IRRITABLE: SEVERAL DAYS
1. FEELING NERVOUS, ANXIOUS, OR ON EDGE: NOT AT ALL
7. FEELING AFRAID AS IF SOMETHING AWFUL MIGHT HAPPEN: NOT AT ALL
2. NOT BEING ABLE TO STOP OR CONTROL WORRYING: NOT AT ALL

## 2018-12-04 NOTE — MR AVS SNAPSHOT
After Visit Summary   2018    Traci Sloan    MRN: [id number]           Patient Information     Date Of Birth          1980        Visit Information        Provider Department      2018 10:15 AM Liz Haider MD [facility] [place] [facility]        Today's Diagnoses     History of  delivery, currently pregnant    -  1       Follow-ups after your visit        Your next 10 appointments already scheduled     2019 11:45 AM CST   ESTABLISHED PRENATAL with Liz Haider MD   [facility] [place] [facility] ([facility])    [address] [place] [id number] [phone]              Who to contact     If you have questions or need follow up information about today's clinic visit or your schedule please contact [facility] directly at [phone].  Normal or non-critical lab and imaging results will be communicated to you by MyChart, letter or phone within 4 business days after the clinic has received the results. If you do not hear from us within 7 days, please contact the clinic through MyChart or phone. If you have a critical or abnormal lab result, we will notify you by phone as soon as possible.  Submit refill requests through [employer] or call your pharmacy and they will forward the refill request to us. Please allow 3 business days for your refill to be completed.          Additional Information About Your Visit        MyChart Information     [employer] gives you secure access to your electronic health record. If you see a primary care provider, you can also send messages to your care team and make appointments. If you have questions, please call your primary care clinic.  If you do not have a primary care provider, please call [phone] and they will assist you.        Care EveryWhere ID     This is your Care EveryWhere ID. This could be used by other organizations to access your [facility]  records  [id number]        Your Vitals Were     Pulse Last Period BMI (Body Mass Index)             85 06/10/2018 30.25 kg/m2          Blood Pressure from Last 3 Encounters:   12/04/18 131/84   08/22/18 130/86   08/09/18 126/84    Weight from Last 3 Encounters:   12/04/18 187 lb 6.4 oz (85 kg)   08/22/18 172 lb (78 kg)   08/09/18 172 lb (78 kg)               Primary Care Provider Office Phone # Fax #    Valarie Medrano, APRN [facility] [phone]       [address] [facility] [address]  [facility] [address]        Equal Access to Services     ESHA CURIEL : Kaushik joneso Sosayra, waheatherda chapincitoadaha, qagillianta kaalmada adegustavoyada, winifred bowen . So [facility] [phone].    ATENCIÓN: Si habla español, tiene a garcia disposición servicios gratuitos de asistencia lingüística. Llame al [phone].    We comply with applicable federal civil rights laws and [place] laws. We do not discriminate on the basis of race, color, national origin, age, disability, sex, sexual orientation, or gender identity.            Thank you!     Thank you for choosing [facility] [place] [place]  for your care. Our goal is always to provide you with excellent care. Hearing back from our patients is one way we can continue to improve our services. Please take a few minutes to complete the written survey that you may receive in the mail after your visit with us. Thank you!             Your Updated Medication List - Protect others around you: Learn how to safely use, store and throw away your medicines at www.disposemymeds.org.          This list is accurate as of 12/4/18 10:53 AM.  Always use your most recent med list.                   Brand Name Dispense Instructions for use Diagnosis    * amphetamine-dextroamphetamine 15 MG tablet    ADDERALL    75 tablet    TAKE 1.5 TAB BY MOUTH IN THE AM AND 1 TAB IN THE PM    Attention deficit hyperactivity disorder (ADHD), combined type       * amphetamine-dextroamphetamine 15 MG  tablet    ADDERALL    75 tablet    TAKE 1.5 TAB BY MOUTH IN THE AM AND 1 TAB IN THE PM    Attention deficit hyperactivity disorder (ADHD), combined type       * amphetamine-dextroamphetamine 15 MG tablet    ADDERALL    75 tablet    TAKE 1.5 TAB BY MOUTH IN THE AM AND 1 TAB IN THE PM    Attention deficit hyperactivity disorder (ADHD), combined type       doxylamine 25 MG Tabs tablet    UNISOM    100 each    Take 0.5 tablets (12.5 mg) by mouth 4 times daily as needed    AMA (advanced maternal age) multigravida 35+       fluticasone 50 MCG/ACT nasal spray    FLONASE    3 Bottle    Spray 1-2 sprays into both nostrils daily    Dysfunction of both eustachian tubes       HYDROXYprogesterone caproate 250 MG/ML injection    HERNANDO    5 mL    Inject 1 mL (250 mg) into the muscle every 7 days    History of  delivery, currently pregnant in first trimester       loratadine 10 MG tablet    CLARITIN    90 tablet    Take 1 tablet (10 mg) by mouth daily    Dysfunction of both eustachian tubes       oxymetazoline 0.05 % nasal spray    AFRIN NASAL SPRAY    1 Bottle    Spray 2-3 sprays into both nostrils 2 times daily as needed for congestion    Dysfunction of both eustachian tubes       prenatal multivitamin w/iron 27-0.8 MG tablet     100 tablet    Take 1 tablet by mouth daily    AMA (advanced maternal age) multigravida 35+       vitamin B6 25 MG tablet    pyridOXINE    100 tablet    Take 1 tablet (25 mg) by mouth 4 times daily as needed    AMA (advanced maternal age) multigravida 35+       * Notice:  This list has 3 medication(s) that are the same as other medications prescribed for you. Read the directions carefully, and ask your doctor or other care provider to review them with you.

## 2018-12-04 NOTE — PROGRESS NOTES
26w1d Has not been here since new Ob visit at 11+wks due to lack of insurance.  Has had us.  Not doing 17-hp and we discussed that starting now would not likely give benefit.  She verbalizes understanding.  Good fm.  Having a boy, discussed circ.  glucola today. tdap and flu shot next visit.  RTC 4 weeks  jlp

## 2018-12-05 ASSESSMENT — ANXIETY QUESTIONNAIRES: GAD7 TOTAL SCORE: 3

## 2018-12-29 ENCOUNTER — HOSPITAL ENCOUNTER (INPATIENT)
Facility: CLINIC | Age: 38
LOS: 1 days | Discharge: HOME | DRG: 833 | End: 2018-12-31
Attending: OBSTETRICS & GYNECOLOGY | Admitting: OBSTETRICS & GYNECOLOGY

## 2018-12-29 ENCOUNTER — APPOINTMENT (OUTPATIENT)
Dept: ULTRASOUND IMAGING | Facility: CLINIC | Age: 38
DRG: 833 | End: 2018-12-29

## 2018-12-29 DIAGNOSIS — R10.9 RIGHT FLANK PAIN: Primary | ICD-10-CM

## 2018-12-29 LAB
ALBUMIN UR-MCNC: NEGATIVE MG/DL
APPEARANCE UR: CLEAR
BASOPHILS # BLD AUTO: 0 10E9/L (ref 0–0.2)
BASOPHILS NFR BLD AUTO: 0.1 %
BILIRUB UR QL STRIP: NEGATIVE
COLOR UR AUTO: ABNORMAL
DIFFERENTIAL METHOD BLD: ABNORMAL
EOSINOPHIL # BLD AUTO: 0 10E9/L (ref 0–0.7)
EOSINOPHIL NFR BLD AUTO: 0.2 %
ERYTHROCYTE [DISTWIDTH] IN BLOOD BY AUTOMATED COUNT: 12.3 % (ref 10–15)
FIBRONECTIN FETAL VAG QL: NEGATIVE
GLUCOSE UR STRIP-MCNC: NEGATIVE MG/DL
HCT VFR BLD AUTO: 33.2 % (ref 35–47)
HGB BLD-MCNC: 10.9 G/DL (ref 11.7–15.7)
HGB UR QL STRIP: NEGATIVE
IMM GRANULOCYTES # BLD: 0 10E9/L (ref 0–0.4)
IMM GRANULOCYTES NFR BLD: 0.3 %
KETONES UR STRIP-MCNC: 5 MG/DL
LEUKOCYTE ESTERASE UR QL STRIP: NEGATIVE
LYMPHOCYTES # BLD AUTO: 1.8 10E9/L (ref 0.8–5.3)
LYMPHOCYTES NFR BLD AUTO: 11.1 %
MCH RBC QN AUTO: 28.7 PG (ref 26.5–33)
MCHC RBC AUTO-ENTMCNC: 32.8 G/DL (ref 31.5–36.5)
MCV RBC AUTO: 87 FL (ref 78–100)
MONOCYTES # BLD AUTO: 0.7 10E9/L (ref 0–1.3)
MONOCYTES NFR BLD AUTO: 4.5 %
MUCOUS THREADS #/AREA URNS LPF: PRESENT /LPF
NEUTROPHILS # BLD AUTO: 13.4 10E9/L (ref 1.6–8.3)
NEUTROPHILS NFR BLD AUTO: 83.8 %
NITRATE UR QL: NEGATIVE
NRBC # BLD AUTO: 0 10*3/UL
NRBC BLD AUTO-RTO: 0 /100
PH UR STRIP: 7 PH (ref 5–7)
PLATELET # BLD AUTO: 387 10E9/L (ref 150–450)
PROCALCITONIN SERPL-MCNC: <0.05 NG/ML
RBC # BLD AUTO: 3.8 10E12/L (ref 3.8–5.2)
RBC #/AREA URNS AUTO: <1 /HPF (ref 0–2)
SOURCE: ABNORMAL
SP GR UR STRIP: 1.01 (ref 1–1.03)
SPECIMEN SOURCE: NORMAL
SQUAMOUS #/AREA URNS AUTO: 1 /HPF (ref 0–1)
UROBILINOGEN UR STRIP-MCNC: NORMAL MG/DL (ref 0–2)
WBC # BLD AUTO: 16 10E9/L (ref 4–11)
WBC #/AREA URNS AUTO: 0 /HPF (ref 0–5)
WET PREP SPEC: NORMAL

## 2018-12-29 PROCEDURE — 25000128 H RX IP 250 OP 636: Performed by: OBSTETRICS & GYNECOLOGY

## 2018-12-29 PROCEDURE — 36415 COLL VENOUS BLD VENIPUNCTURE: CPT | Performed by: OBSTETRICS & GYNECOLOGY

## 2018-12-29 PROCEDURE — 76770 US EXAM ABDO BACK WALL COMP: CPT

## 2018-12-29 PROCEDURE — 84145 PROCALCITONIN (PCT): CPT | Performed by: OBSTETRICS & GYNECOLOGY

## 2018-12-29 PROCEDURE — 76857 US EXAM PELVIC LIMITED: CPT

## 2018-12-29 PROCEDURE — 81001 URINALYSIS AUTO W/SCOPE: CPT | Performed by: OBSTETRICS & GYNECOLOGY

## 2018-12-29 PROCEDURE — 87591 N.GONORRHOEAE DNA AMP PROB: CPT | Performed by: OBSTETRICS & GYNECOLOGY

## 2018-12-29 PROCEDURE — 87210 SMEAR WET MOUNT SALINE/INK: CPT | Performed by: OBSTETRICS & GYNECOLOGY

## 2018-12-29 PROCEDURE — 40000268 ZZH STATISTIC NO CHARGES

## 2018-12-29 PROCEDURE — 85027 COMPLETE CBC AUTOMATED: CPT | Performed by: OBSTETRICS & GYNECOLOGY

## 2018-12-29 PROCEDURE — 85004 AUTOMATED DIFF WBC COUNT: CPT | Performed by: OBSTETRICS & GYNECOLOGY

## 2018-12-29 PROCEDURE — 82731 ASSAY OF FETAL FIBRONECTIN: CPT | Performed by: OBSTETRICS & GYNECOLOGY

## 2018-12-29 RX ORDER — SODIUM CHLORIDE, SODIUM LACTATE, POTASSIUM CHLORIDE, CALCIUM CHLORIDE 600; 310; 30; 20 MG/100ML; MG/100ML; MG/100ML; MG/100ML
INJECTION, SOLUTION INTRAVENOUS
Status: DISCONTINUED
Start: 2018-12-29 | End: 2018-12-29 | Stop reason: HOSPADM

## 2018-12-29 RX ORDER — FENTANYL CITRATE 50 UG/ML
50 INJECTION, SOLUTION INTRAMUSCULAR; INTRAVENOUS
Status: DISCONTINUED | OUTPATIENT
Start: 2018-12-29 | End: 2018-12-30

## 2018-12-29 RX ORDER — NALOXONE HYDROCHLORIDE 0.4 MG/ML
.1-.4 INJECTION, SOLUTION INTRAMUSCULAR; INTRAVENOUS; SUBCUTANEOUS
Status: DISCONTINUED | OUTPATIENT
Start: 2018-12-29 | End: 2018-12-31 | Stop reason: HOSPADM

## 2018-12-29 RX ORDER — HYDROMORPHONE HYDROCHLORIDE 1 MG/ML
.2-.5 SOLUTION ORAL
Status: DISCONTINUED | OUTPATIENT
Start: 2018-12-29 | End: 2018-12-29

## 2018-12-29 RX ORDER — TAMSULOSIN HYDROCHLORIDE 0.4 MG/1
0.4 CAPSULE ORAL DAILY
Status: DISCONTINUED | OUTPATIENT
Start: 2018-12-29 | End: 2018-12-31

## 2018-12-29 RX ORDER — HYDROMORPHONE HYDROCHLORIDE 1 MG/ML
.3-.5 INJECTION, SOLUTION INTRAMUSCULAR; INTRAVENOUS; SUBCUTANEOUS
Status: DISCONTINUED | OUTPATIENT
Start: 2018-12-29 | End: 2018-12-31 | Stop reason: HOSPADM

## 2018-12-29 RX ADMIN — FENTANYL CITRATE 50 MCG: 50 INJECTION, SOLUTION INTRAMUSCULAR; INTRAVENOUS at 18:40

## 2018-12-29 RX ADMIN — FENTANYL CITRATE 50 MCG: 50 INJECTION, SOLUTION INTRAMUSCULAR; INTRAVENOUS at 19:52

## 2018-12-29 RX ADMIN — FENTANYL CITRATE 50 MCG: 50 INJECTION, SOLUTION INTRAMUSCULAR; INTRAVENOUS at 17:01

## 2018-12-29 RX ADMIN — HYDROMORPHONE HYDROCHLORIDE 0.3 MG: 1 INJECTION, SOLUTION INTRAMUSCULAR; INTRAVENOUS; SUBCUTANEOUS at 23:44

## 2018-12-29 RX ADMIN — HYDROMORPHONE HYDROCHLORIDE 0.3 MG: 1 INJECTION, SOLUTION INTRAMUSCULAR; INTRAVENOUS; SUBCUTANEOUS at 21:40

## 2018-12-29 RX ADMIN — HYDROMORPHONE HYDROCHLORIDE 0.3 MG: 1 INJECTION, SOLUTION INTRAMUSCULAR; INTRAVENOUS; SUBCUTANEOUS at 22:44

## 2018-12-29 ASSESSMENT — MIFFLIN-ST. JEOR: SCORE: 1556.54

## 2018-12-30 PROBLEM — R10.9 ABDOMINAL PAIN: Status: ACTIVE | Noted: 2018-12-30

## 2018-12-30 LAB
ALBUMIN UR-MCNC: NEGATIVE MG/DL
ALP SERPL-CCNC: 92 U/L (ref 40–150)
ALT SERPL W P-5'-P-CCNC: 8 U/L (ref 0–50)
APPEARANCE UR: CLEAR
AST SERPL W P-5'-P-CCNC: 11 U/L (ref 0–45)
BASOPHILS # BLD AUTO: 0 10E9/L (ref 0–0.2)
BASOPHILS NFR BLD AUTO: 0.1 %
BILIRUB UR QL STRIP: NEGATIVE
COLOR UR AUTO: NORMAL
DIFFERENTIAL METHOD BLD: NORMAL
EOSINOPHIL # BLD AUTO: 0.1 10E9/L (ref 0–0.7)
EOSINOPHIL NFR BLD AUTO: 0.8 %
GLUCOSE UR STRIP-MCNC: NEGATIVE MG/DL
HGB UR QL STRIP: NEGATIVE
IMM GRANULOCYTES # BLD: 0 10E9/L (ref 0–0.4)
IMM GRANULOCYTES NFR BLD: 0.2 %
KETONES UR STRIP-MCNC: NEGATIVE MG/DL
LEUKOCYTE ESTERASE UR QL STRIP: NEGATIVE
LYMPHOCYTES # BLD AUTO: 1.2 10E9/L (ref 0.8–5.3)
LYMPHOCYTES NFR BLD AUTO: 13.8 %
MONOCYTES # BLD AUTO: 0.8 10E9/L (ref 0–1.3)
MONOCYTES NFR BLD AUTO: 9.8 %
N GONORRHOEA DNA SPEC QL NAA+PROBE: NEGATIVE
NEUTROPHILS # BLD AUTO: 6.4 10E9/L (ref 1.6–8.3)
NEUTROPHILS NFR BLD AUTO: 75.3 %
NITRATE UR QL: NEGATIVE
NRBC # BLD AUTO: 0 10*3/UL
NRBC BLD AUTO-RTO: 0 /100
PH UR STRIP: 7 PH (ref 5–7)
PROCALCITONIN SERPL-MCNC: <0.05 NG/ML
SOURCE: NORMAL
SP GR UR STRIP: 1.01 (ref 1–1.03)
SPECIMEN SOURCE: NORMAL
UROBILINOGEN UR STRIP-MCNC: NORMAL MG/DL (ref 0–2)
WBC # BLD AUTO: 8.6 10E9/L (ref 4–11)

## 2018-12-30 PROCEDURE — 84145 PROCALCITONIN (PCT): CPT | Performed by: OBSTETRICS & GYNECOLOGY

## 2018-12-30 PROCEDURE — 81003 URINALYSIS AUTO W/O SCOPE: CPT | Performed by: OBSTETRICS & GYNECOLOGY

## 2018-12-30 PROCEDURE — 85048 AUTOMATED LEUKOCYTE COUNT: CPT | Performed by: OBSTETRICS & GYNECOLOGY

## 2018-12-30 PROCEDURE — 12000030 ZZH R&B OB INTERMEDIATE UMMC

## 2018-12-30 PROCEDURE — 84460 ALANINE AMINO (ALT) (SGPT): CPT | Performed by: OBSTETRICS & GYNECOLOGY

## 2018-12-30 PROCEDURE — 85004 AUTOMATED DIFF WBC COUNT: CPT | Performed by: OBSTETRICS & GYNECOLOGY

## 2018-12-30 PROCEDURE — 36415 COLL VENOUS BLD VENIPUNCTURE: CPT | Performed by: OBSTETRICS & GYNECOLOGY

## 2018-12-30 PROCEDURE — 84075 ASSAY ALKALINE PHOSPHATASE: CPT | Performed by: OBSTETRICS & GYNECOLOGY

## 2018-12-30 PROCEDURE — 84450 TRANSFERASE (AST) (SGOT): CPT | Performed by: OBSTETRICS & GYNECOLOGY

## 2018-12-30 PROCEDURE — 25000128 H RX IP 250 OP 636: Performed by: OBSTETRICS & GYNECOLOGY

## 2018-12-30 PROCEDURE — 25000132 ZZH RX MED GY IP 250 OP 250 PS 637: Performed by: OBSTETRICS & GYNECOLOGY

## 2018-12-30 PROCEDURE — 99221 1ST HOSP IP/OBS SF/LOW 40: CPT | Mod: GC | Performed by: OBSTETRICS & GYNECOLOGY

## 2018-12-30 RX ORDER — ACETAMINOPHEN 325 MG/1
650 TABLET ORAL EVERY 4 HOURS PRN
Status: DISCONTINUED | OUTPATIENT
Start: 2018-12-30 | End: 2018-12-31 | Stop reason: HOSPADM

## 2018-12-30 RX ORDER — AMOXICILLIN 250 MG
1 CAPSULE ORAL 2 TIMES DAILY
Status: DISCONTINUED | OUTPATIENT
Start: 2018-12-30 | End: 2018-12-31 | Stop reason: HOSPADM

## 2018-12-30 RX ORDER — ACETAMINOPHEN 325 MG/1
325 TABLET ORAL EVERY 4 HOURS PRN
Status: DISCONTINUED | OUTPATIENT
Start: 2018-12-30 | End: 2018-12-30

## 2018-12-30 RX ORDER — ONDANSETRON 2 MG/ML
4 INJECTION INTRAMUSCULAR; INTRAVENOUS EVERY 6 HOURS PRN
Status: DISCONTINUED | OUTPATIENT
Start: 2018-12-30 | End: 2018-12-31 | Stop reason: HOSPADM

## 2018-12-30 RX ORDER — SODIUM CHLORIDE, SODIUM LACTATE, POTASSIUM CHLORIDE, CALCIUM CHLORIDE 600; 310; 30; 20 MG/100ML; MG/100ML; MG/100ML; MG/100ML
INJECTION, SOLUTION INTRAVENOUS CONTINUOUS
Status: DISCONTINUED | OUTPATIENT
Start: 2018-12-30 | End: 2018-12-31 | Stop reason: HOSPADM

## 2018-12-30 RX ORDER — LIDOCAINE 40 MG/G
CREAM TOPICAL
Status: DISCONTINUED | OUTPATIENT
Start: 2018-12-30 | End: 2018-12-31 | Stop reason: HOSPADM

## 2018-12-30 RX ORDER — OXYCODONE HYDROCHLORIDE 5 MG/1
5-10 TABLET ORAL EVERY 4 HOURS PRN
Status: DISCONTINUED | OUTPATIENT
Start: 2018-12-30 | End: 2018-12-31

## 2018-12-30 RX ORDER — HYDROXYZINE HYDROCHLORIDE 50 MG/1
50-100 TABLET, FILM COATED ORAL 3 TIMES DAILY PRN
Status: DISCONTINUED | OUTPATIENT
Start: 2018-12-30 | End: 2018-12-31 | Stop reason: HOSPADM

## 2018-12-30 RX ORDER — ACETAMINOPHEN 650 MG/1
650 SUPPOSITORY RECTAL EVERY 4 HOURS PRN
Status: DISCONTINUED | OUTPATIENT
Start: 2018-12-30 | End: 2018-12-30

## 2018-12-30 RX ORDER — AMOXICILLIN 250 MG
2 CAPSULE ORAL 2 TIMES DAILY
Status: DISCONTINUED | OUTPATIENT
Start: 2018-12-30 | End: 2018-12-31 | Stop reason: HOSPADM

## 2018-12-30 RX ORDER — ACETAMINOPHEN 650 MG/1
650 SUPPOSITORY RECTAL EVERY 4 HOURS PRN
Status: DISCONTINUED | OUTPATIENT
Start: 2018-12-30 | End: 2018-12-31 | Stop reason: HOSPADM

## 2018-12-30 RX ADMIN — OXYCODONE HYDROCHLORIDE 10 MG: 5 TABLET ORAL at 13:48

## 2018-12-30 RX ADMIN — TAMSULOSIN HYDROCHLORIDE 0.4 MG: 0.4 CAPSULE ORAL at 00:09

## 2018-12-30 RX ADMIN — SENNOSIDES AND DOCUSATE SODIUM 1 TABLET: 8.6; 5 TABLET ORAL at 21:47

## 2018-12-30 RX ADMIN — SODIUM CHLORIDE, POTASSIUM CHLORIDE, SODIUM LACTATE AND CALCIUM CHLORIDE: 600; 310; 30; 20 INJECTION, SOLUTION INTRAVENOUS at 05:14

## 2018-12-30 RX ADMIN — ACETAMINOPHEN 650 MG: 325 TABLET, FILM COATED ORAL at 09:22

## 2018-12-30 RX ADMIN — HYDROXYZINE HYDROCHLORIDE 100 MG: 50 TABLET ORAL at 04:04

## 2018-12-30 RX ADMIN — SENNOSIDES AND DOCUSATE SODIUM 2 TABLET: 8.6; 5 TABLET ORAL at 08:33

## 2018-12-30 RX ADMIN — HYDROMORPHONE HYDROCHLORIDE 0.5 MG: 1 INJECTION, SOLUTION INTRAMUSCULAR; INTRAVENOUS; SUBCUTANEOUS at 01:19

## 2018-12-30 RX ADMIN — OXYCODONE HYDROCHLORIDE 5 MG: 5 TABLET ORAL at 22:04

## 2018-12-30 RX ADMIN — ACETAMINOPHEN 650 MG: 325 TABLET, FILM COATED ORAL at 21:47

## 2018-12-30 RX ADMIN — OXYCODONE HYDROCHLORIDE 10 MG: 5 TABLET ORAL at 09:54

## 2018-12-30 RX ADMIN — OXYCODONE HYDROCHLORIDE 5 MG: 5 TABLET ORAL at 17:51

## 2018-12-30 RX ADMIN — OXYCODONE HYDROCHLORIDE 10 MG: 5 TABLET ORAL at 02:24

## 2018-12-30 RX ADMIN — ACETAMINOPHEN 650 MG: 325 TABLET, FILM COATED ORAL at 13:03

## 2018-12-30 RX ADMIN — OXYCODONE HYDROCHLORIDE 10 MG: 5 TABLET ORAL at 06:24

## 2018-12-30 RX ADMIN — ACETAMINOPHEN 650 MG: 325 TABLET, FILM COATED ORAL at 16:34

## 2018-12-30 NOTE — PROGRESS NOTES
"[facility] Antepartum progress note    S: The patient still having pain. It is constant . The oral medication is providing relief better than IV was yesterday but needs it every 4 hours. She is hungry but is not eating much. She denies contractions, LOF, vaginal bleeding. No dysuria.     No SOB, CP, nausea/vomiting.     O:   Patient Vitals for the past 24 hrs:   BP Temp Temp src Resp Height Weight   18 1444 130/66 97.6  F (36.4  C) Oral 18 -- --   18 1200 -- 98.2  F (36.8  C) Oral 18 -- --   18 0837 122/59 97.9  F (36.6  C) Oral 18 -- --   18 0405 113/56 -- -- -- -- --   18 0400 -- 97.9  F (36.6  C) Oral 20 -- --   18 0010 131/77 -- -- 16 -- --   18 2344 -- 97.8  F (36.6  C) Oral -- -- --   18 2235 130/80 -- -- -- -- --   18 1955 128/71 98  F (36.7  C) Oral 18 -- --   18 1727 147/78 99  F (37.2  C) Oral -- -- --   18 1630 154/86 -- -- -- 1.676 m (5' 5.98\") 85 kg (187 lb 6.4 oz)        Gen: NAD  Abd: some RLQ tenderness. Mildly distended. No rebound or guarding    FHT: cat 1, + accels, no decels   [place]: no ctx    WBC 17.8 > 8.6   Procalcitonin normal    UA NL    Abdominal US :   IMPRESSION:   1. The appendix is borderline dilated, noncompressible, and with mild  surrounding hyperemia that can be seen with early appendicitis.  Patient tender in this region during exam.  Further management would  need to be based on clinical grounds.  Close clinical follow-up  recommended to guide further manaement.  2. Normal ultrasound of the right ovary.  The left ovary was not  evaluated.  If pain and concern for ovarian torsion persists,  particularly if localized to the left.  Assessment of left ovary is  Advised    Renal US:   IMPRESSION:  Normal renal ultrasound without evidence of a renal calculus.      A/P Traci Gamez a 38 year old  at 29w6d admitted for abdominal pain. Unclear what the cause of this is.. Appendix noncompressable on US but not entirely " consistent with appendicitis. No fever, nausea, or vomiting and Leukocytosis has resolved, which all is inconsistent with a dx of appendicitis. A kidney stone is also possible, especially with the patient's hx, though there is no evidence of this on imaging or UA. The patient is not in PTL (closed cervix, neg FFN) and is clinically improving, Will repeat CBC in am but could likely go home. Will try to wean PO narcotics today. discontinue tamsulosin.     Alicia Mitchell PGY-3  OB/Gyn  12/30/2018, 2:47 PM    Physician Attestation   I, Haley Price, saw this patient with the resident and agree with the resident/fellow's findings and plan of care as documented in the note.      I personally reviewed vital signs, medications, labs, imaging and fetal heart tones and toco.    Key findings: patient appears comfortable in bed but still experiencing pain. Pain is reduced significantly from yesterday,but not resolved.   Fetal status is reassuring. Etiology of pain is not clear but WBC is now normal and no clear evidence of infection, stone or appendicitis today.    discussed need to wean off narcotics with plan to possible discharge home tomorrow.        Haley Price MD  Date of Service (when I saw the patient): 12/30/18

## 2018-12-30 NOTE — PROVIDER NOTIFICATION
12/30/18 0027   Provider Notification   Provider Name/Title Dr. Gregory   Method of Notification Electronic Page   Notification Reason Pain;Patient Request     Patient is requesting pain medication. What would you like me to give since she has had dilauded IV q1hr x3? Pt's mother would like to talk with you when you have time.

## 2018-12-30 NOTE — PLAN OF CARE
"Patient states that her pain is constant with occasional spikes in the pain that come and go. Most of her pain is in her lower right abdomen raidiating to her right side. This afternoon we have been using roxicodone, tylenol, and heat packs. Patient describes the pain meds as \"taking the edge off the pain, but the pain is still there.\"  "

## 2018-12-30 NOTE — H&P
[facility]  History and Physical    CC: Abdominal pain    HPI: Traci Sloan  is a 38 year old  who presents at 29w5d by 9w3d US for right flank and abdominal pain in a pregnancy complicated by Hx of  delivery (See below for details).     She did describe significant abdominal pain.  This began this morning when she woke up from sleep because of it at 4:00 in the morning the pain is a sharp pain it has not improved it radiates into the groin she has had pain like this before that is consistent with previous kidney stone pain.  She denies any fevers chills nausea vomiting she is able to tolerate regular diet and keep food down.  She notes rare contractions, denies VB, LOF.    Complications of pregnancy:  1. History of  delivery at 35 weeks  2. ADHD on adderall  3. AMA - normal first trimester screening    OB Hx:  Abx1, SABx1,  of 25 week fetus    Review Of Systems  Eyes: No vision changes  Ears/Nose/Throat: No rhinorrhea, throat soreness  Respiratory: No shortness of breath, dyspnea on exertion, cough, or hemoptysis  Cardiovascular: negative for, chest pain and exertional chest pain or pressure  Gastrointestinal: negative for, nausea, vomiting, constipation and diarrhea  Genitourinary: negative for and dysuria  Musculoskeletal: negative for, back pain and joint pain  Neurologic: negative for headaches  Hematologic/Lymphatic/Immunologic: negative for, anemia and bleeding disorder  Endocrine: negative for, thyroid disorder and diabetes    PMH:  History of chronic urinary tract infections she reports having some kind of surgery that helped improve this  No recent urinary tract infections  Attention deficit disorder  Irritable bowel syndrome gastric reflux  PSH:   History of urethral dilation    Meds:   Current Facility-Administered Medications   Medication     HYDROmorphone (PF) (DILAUDID) injection 0.3-0.5 mg     hydrOXYzine (ATARAX) tablet  mg     lactated ringers  "infusion     lidocaine (LMX4) cream     lidocaine 1 % 1 mL     naloxone (NARCAN) injection 0.1-0.4 mg     No Tdap Needed - Assessment: Patient does not need Tdap vaccine     ondansetron (ZOFRAN) injection 4 mg     oxyCODONE (ROXICODONE) tablet 5-10 mg     senna-docusate (SENOKOT-S/PERICOLACE) 8.6-50 MG per tablet 1 tablet    Or     senna-docusate (SENOKOT-S/PERICOLACE) 8.6-50 MG per tablet 2 tablet     sodium chloride (PF) 0.9% PF flush 3 mL     sodium chloride (PF) 0.9% PF flush 3 mL     tamsulosin (FLOMAX) capsule 0.4 mg        Allergies:   No Known Allergies    Physical Exam:  /77   Temp 97.8  F (36.6  C) (Oral)   Resp 16   Ht 1.676 m (5' 5.98\")   Wt 85 kg (187 lb 6.4 oz)   LMP 06/10/2018   BMI 30.26 kg/m      Constitutional:Appears uncomfortable cannot find a good position sitting in the chair.  Nontoxic appearing  Head: Normocephalic. No masses, lesions, tenderness or abnormalities  Cardiovascular: negative, RRR. No murmurs, clicks gallops or rub  Respiratory: negative. Good diaphragmatic excursion. Lungs clear  Gastrointestinal: Tender to palpation over right flank and back mid axillary line radiates into her groin.  No rebound no guarding.    Musculoskeletal: extremities normal- no gross deformities noted and normal muscle tone  Psychiatric: mentation appears normal and affect normal/bright    Labs: WBC 17.8 with neutrophilia  Procal <0.5  Renal US without evidence of stone or pyelonephritis  Pelvic US pending    FHT: Baseline 155 Bpm, moderate variability, + accels, - decels  [place]: irritability. Occasional contractions    A/P:  38 year old  at 29w5d here with abdominal pain of unclear cause.    Abdominal pain:  - cervix is closed and negative FFN is reassuring that this is not labor  - given lack of systemic symptoms, this is unlikely to be a widespread infection. US with 4 mm, small appearing appendix. Lack of nausea, vomiting is consistent with this NOT being appendicitis. A " "procalcitonin that is negative is reassuring but not 100%.  - possibility of kidney stone remains, particularly given patients radiation of pain to groin.   - There may be a component of IBS to this pain, but this is not typical of IBS   - will start Tamsulosin to help pass possible kidney stone   - UA without RBC argues against Kidney stone, but given her appearance, this remains on the differential   - will repeat UA and get urine culture   - given patient's elevated WBC will keep admitted, follow up WBC, procalcitonin in AM, if elevated or if she develops constitutional symptoms plan MRI imaging of abdomen.    - oxycodone, dilaudid for pain, will also start acetaminophen, stool softeners  - see above for additional work up plan    - Hx of  delivery - no sign of imminent delivery at this time.    Patient Discussed with     Anticipated stay >2 days    Anju Gregory MD 4:05 AM 2018     I, Sammie Hoang MD, personally saw and evaluated this patient.  I discussed the patient with the resident and care team, and agree with the assessment and plan of care as documented in the resident's note of 18.  I personally reviewed vital signs, medications, lab, and exam.     Key Findings:  , 29w5d, with severe abdominal pain, requiring narcotics. Elevated WBC.  U/A negative, FFN negative.  Preliminary ultrasound report with appendix \"borderline dilated, mild hyperemia\", final report pending.    PLAN: Admit, treatment as above, repeat laboratory as above.    Sammie Hoang MD   Date of Service (when I saw the patient) 18                         "

## 2018-12-30 NOTE — PROVIDER NOTIFICATION
12/30/18 0028   Provider Notification   Provider Name/Title Dr. Gregory    Method of Notification Phone   Notification Reason Pain     Provider in a delivery- will put in order when she is able.

## 2018-12-30 NOTE — PROVIDER NOTIFICATION
12/30/18 0102   Provider Notification   Provider Name/Title Dr. Gregory   Method of Notification Electronic Page   Request Evaluate in Person   Notification Reason Pain     Patient is requesting pain medication. Please let me know what I can give to her.

## 2018-12-30 NOTE — PLAN OF CARE
D: Patient presents to labor and delivery with abdominal pain that she is very uncomfortable. She states it start a hour ago, and it is mainly on her right flank. She states she has had a kidney stone before and wondering if it is kidney stone. Monitors applied and her abdomin feels tight. Dr. Gregory called to room to assess for pre-term labor. FFN, wet prep, Genprobe collected and sent to lab. Cervix checked and was found to be closed. P: Continue to monitor.

## 2018-12-30 NOTE — PLAN OF CARE
VSS. Afebrile. Denies bleeding, LOF, contractions. Patient continues to have R lower abdominal and flank pain. Dilauded IV and Roxycodone PO given which are taking the edge off but not taking her pain away. Flomax being given incase it is kidney stone. IV fluids running. Awaiting results from imaging. Urine and blood work collected this morning. Resting in bed overnight. Eating a little bit and drinking fluids.  at home with daughter. Put baby on monitor this morning after dose of mony per patient request. Continue to monitor.

## 2018-12-30 NOTE — PROVIDER NOTIFICATION
12/30/18 0330   Provider Notification   Provider Name/Title Dr. Gregory   Method of Notification In Department   Notification Reason Patient Request;Status Update     Patient is requesting vistaril.  Patient's urine appears concentrated. Order to be placed for UA/UC.

## 2018-12-30 NOTE — PROVIDER NOTIFICATION
12/29/18 2711   Provider Notification   Provider Name/Title Dr. Gregory   Method of Notification Phone   Notification Reason Status Update     Verbal order for TID monitoring, q4hr VS, patient can eat at this time. Can give one more dose of IV dilauded and then notify so we can switch to oral medications. Give flomax at this time.

## 2018-12-31 ENCOUNTER — HOSPITAL ENCOUNTER (OUTPATIENT)
Facility: CLINIC | Age: 38
End: 2018-12-31
Admitting: OBSTETRICS & GYNECOLOGY
Payer: COMMERCIAL

## 2018-12-31 VITALS
BODY MASS INDEX: 30.12 KG/M2 | HEART RATE: 81 BPM | WEIGHT: 187.4 LBS | TEMPERATURE: 97.9 F | SYSTOLIC BLOOD PRESSURE: 135 MMHG | RESPIRATION RATE: 18 BRPM | DIASTOLIC BLOOD PRESSURE: 79 MMHG | HEIGHT: 66 IN

## 2018-12-31 LAB
ERYTHROCYTE [DISTWIDTH] IN BLOOD BY AUTOMATED COUNT: 12.6 % (ref 10–15)
HCT VFR BLD AUTO: 30.9 % (ref 35–47)
HGB BLD-MCNC: 9.9 G/DL (ref 11.7–15.7)
MCH RBC QN AUTO: 28.3 PG (ref 26.5–33)
MCHC RBC AUTO-ENTMCNC: 32 G/DL (ref 31.5–36.5)
MCV RBC AUTO: 88 FL (ref 78–100)
PLATELET # BLD AUTO: 346 10E9/L (ref 150–450)
RBC # BLD AUTO: 3.5 10E12/L (ref 3.8–5.2)
WBC # BLD AUTO: 6.3 10E9/L (ref 4–11)

## 2018-12-31 PROCEDURE — 85027 COMPLETE CBC AUTOMATED: CPT | Performed by: STUDENT IN AN ORGANIZED HEALTH CARE EDUCATION/TRAINING PROGRAM

## 2018-12-31 PROCEDURE — 99238 HOSP IP/OBS DSCHRG MGMT 30/<: CPT | Mod: GC | Performed by: OBSTETRICS & GYNECOLOGY

## 2018-12-31 PROCEDURE — 25000132 ZZH RX MED GY IP 250 OP 250 PS 637: Performed by: STUDENT IN AN ORGANIZED HEALTH CARE EDUCATION/TRAINING PROGRAM

## 2018-12-31 PROCEDURE — 36415 COLL VENOUS BLD VENIPUNCTURE: CPT | Performed by: STUDENT IN AN ORGANIZED HEALTH CARE EDUCATION/TRAINING PROGRAM

## 2018-12-31 PROCEDURE — 25000132 ZZH RX MED GY IP 250 OP 250 PS 637: Performed by: OBSTETRICS & GYNECOLOGY

## 2018-12-31 RX ORDER — OXYCODONE HYDROCHLORIDE 5 MG/1
5 TABLET ORAL EVERY 6 HOURS PRN
Status: DISCONTINUED | OUTPATIENT
Start: 2018-12-31 | End: 2018-12-31 | Stop reason: HOSPADM

## 2018-12-31 RX ORDER — OXYCODONE HYDROCHLORIDE 5 MG/1
5 TABLET ORAL EVERY 6 HOURS PRN
Qty: 8 TABLET | Refills: 0 | Status: ON HOLD | OUTPATIENT
Start: 2018-12-31 | End: 2019-02-08

## 2018-12-31 RX ADMIN — ACETAMINOPHEN 650 MG: 325 TABLET, FILM COATED ORAL at 06:53

## 2018-12-31 RX ADMIN — ACETAMINOPHEN 650 MG: 325 TABLET, FILM COATED ORAL at 02:13

## 2018-12-31 RX ADMIN — OXYCODONE HYDROCHLORIDE 5 MG: 5 TABLET ORAL at 13:10

## 2018-12-31 RX ADMIN — ACETAMINOPHEN 650 MG: 325 TABLET, FILM COATED ORAL at 11:03

## 2018-12-31 RX ADMIN — OXYCODONE HYDROCHLORIDE 5 MG: 5 TABLET ORAL at 02:13

## 2018-12-31 RX ADMIN — OXYCODONE HYDROCHLORIDE 5 MG: 5 TABLET ORAL at 06:53

## 2018-12-31 RX ADMIN — ACETAMINOPHEN 650 MG: 325 TABLET, FILM COATED ORAL at 14:57

## 2018-12-31 RX ADMIN — SENNOSIDES AND DOCUSATE SODIUM 1 TABLET: 8.6; 5 TABLET ORAL at 08:22

## 2018-12-31 NOTE — PROVIDER NOTIFICATION
12/31/18 1637   Provider Notification   Provider Name/Title Dr. Norris   Method of Notification In Department   Request Evaluate in Person   Notification Reason Status Update   Ok to remove patient from monitors and discharge home.

## 2018-12-31 NOTE — PLAN OF CARE
Pain well controlled with q4 med scheduled. Narcotic has been changed to q6 to determine if narcotics are still necessary. Pt reports negligible pain approx 90 min after last dose of tylenol and roxicet. Will call if pain increases prior to dosing. Pt reports some constipation which has been ongoing this pregnancy. Doesn't take meds at home but remedies with prune juice, hydration. Prune juice given and one stool softener. Eager for MDs to round for plan. Eager for discharge tho glad that she had not planned to work over holidays so didn't have to take time off. Pt to call with needs.

## 2018-12-31 NOTE — PLAN OF CARE
"Patient reports decrease in RLQ pain, states \"just feels sore from where I was feeling the pain before.\" Dr. Norris at bedside to discuss discharge home with roxicodone and a follow-up appointment scheduled with Dr. Haider on Wednesday 1/2. Patient in agreement with plan. Monitors applied for reactive strip. Discharge instructions discussed and questions encouraged. Parents at bedside for support. Discharged ambulatory at 1735.   "

## 2018-12-31 NOTE — DISCHARGE SUMMARY
"[facility] Discharge Summary    Traci Sloan MRN# [id number]   Age: 38 year old YOB: 1980     Date of Admission:  2018  Date of Discharge:  2018   Admitting Physician:  Sammie Hoang MD  Discharge Physician:  Dolores Norris MD    Admit Dx:   - Intrauterine pregnancy at 29w5d   - History of  delivery at 35 weeks  - ADHD on adderall  - AMA - normal first trimester screening  - Abdominal pain     Discharge Dx:  - Same   - IUP at 30w0d     Procedures:  - none    Admit HPI:  Traci Sloan  is a 38 year old  who presents at 29w5d by 9w3d US for right flank and abdominal pain in a pregnancy complicated by Hx of  delivery.     She did describe significant abdominal pain.  This began this morning when she woke up from sleep because of it at 4:00 in the morning the pain is a sharp pain it has not improved it radiates into the groin she has had pain like this before that is consistent with previous kidney stone pain.  She denies any fevers chills nausea vomiting she is able to tolerate regular diet and keep food down.  She notes rare contractions, denies VB, LOF.\"    Please see her admit H&P for full details of her PMH, PSH, Meds, Allergies and exam on admit.    Hospital course:   The patient was admitted for observation. CT and US findings below. Her leukocytosis resolved with no interventions and her pain improved. She was afebrile. She had no blood in her UA or stone on US. She was transitioned to PO pain medications. She was monitored overnight a second night. On HD #3 her pain was improved. FHT was cat I and no ctx were noted.     Discharge Medications:  Discharge Medication List as of 2018  4:24 PM      START taking these medications    Details   oxyCODONE (ROXICODONE) 5 MG tablet Take 1 tablet (5 mg) by mouth every 6 hours as needed for moderate to severe pain, Disp-8 tablet, R-0, Local Print         CONTINUE these medications which have NOT " CHANGED    Details   !! amphetamine-dextroamphetamine (ADDERALL) 15 MG per tablet TAKE 1.5 TAB BY MOUTH IN THE AM AND 1 TAB IN THE PM, Disp-75 tablet, R-0, Local Print      !! amphetamine-dextroamphetamine (ADDERALL) 15 MG per tablet TAKE 1.5 TAB BY MOUTH IN THE AM AND 1 TAB IN THE PM, Disp-75 tablet, R-0, Local Print      !! amphetamine-dextroamphetamine (ADDERALL) 15 MG per tablet TAKE 1.5 TAB BY MOUTH IN THE AM AND 1 TAB IN THE PM, Disp-75 tablet, R-0, Local Print      doxylamine (UNISOM) 25 MG TABS tablet Take 0.5 tablets (12.5 mg) by mouth 4 times daily as needed, Disp-100 each, R-1, E-Prescribe      fluticasone (FLONASE) 50 MCG/ACT spray Spray 1-2 sprays into both nostrils daily, Disp-3 Bottle, R-11, E-Prescribe      HYDROXYprogesterone caproate in oil 250 mg/mL (HERNANDO) intramuscular injection Inject 1 mL (250 mg) into the muscle every 7 days, Disp-5 mL, R-4, E-PrescribePlease mail medication to [facility]: [address] [place] [address]      loratadine (CLARITIN) 10 MG tablet Take 1 tablet (10 mg) by mouth daily, Disp-90 tablet, R-3, E-Prescribe      oxymetazoline (AFRIN NASAL SPRAY) 0.05 % spray Spray 2-3 sprays into both nostrils 2 times daily as needed for congestion, Disp-1 Bottle, R-0, E-Prescribe      Prenatal Vit-Fe Fumarate-FA (PRENATAL MULTIVITAMIN PLUS IRON) 27-0.8 MG TABS per tablet Take 1 tablet by mouth daily, Disp-100 tablet, R-3, E-Prescribe      pyridOXINE (VITAMIN  B-6) 25 MG tablet Take 1 tablet (25 mg) by mouth 4 times daily as needed, Disp-100 tablet, R-1, E-Prescribe       !! - Potential duplicate medications found. Please discuss with provider.            Discharge/Disposition:  Traci Sloan was discharged to home in stable condition with the following instructions/medications:  Call or return to triage with painful contractions that are increasing in frequency, leaking of fluid, decreased fetal movements, or vaginal bleeding. Also notify your  provider if you have a fever greater than 100.4.         Shobha Marx MD, MHS  Ob/Gyn PGY3  12/31/2018

## 2018-12-31 NOTE — PLAN OF CARE
Data: Contraction pattern absent . Fetal assessment Appropriate for Gestational Age. Patient had Tylenol and 1 Oxycodone every 4 hours overnight for ongoing RLQ pain, although patient reports pain is much better than it was on admission.  Interventions: Continue uterine/fetal assessment every shift. Activity level:Regular activity, and preventive measures including Positioning and Frequent voiding. Encourage active range of motion and frequent position changes.  Plan: Observe for and notify care provider of indications of worsening pain or signs of fetal/maternal compromise.

## 2018-12-31 NOTE — DISCHARGE INSTRUCTIONS
Discharge Instruction for Undelivered Patients      You were seen for: Lower right quadrant pain  We Consulted: [facility] MDs     Diet:   Drink 8 to 12 glasses of liquids (milk, juice, water) every day.     Activity:  Count fetal kicks everyday (see handout)  Call your doctor or nurse midwife if your baby is moving less than usual.     Call your provider if you notice:  Swelling in your face or increased swelling in your hands or legs.  Headaches that are not relieved by Tylenol (acetaminophen).  Changes in your vision (blurring: seeing spots or stars.)  Nausea (sick to your stomach) and vomiting (throwing up).   Weight gain of 5 pounds or more per week.  Heartburn that doesn't go away.  Signs of bladder infection: pain when you urinate (use the toilet), need to go more often and more urgently.  The bag of lazo (rupture of membranes) breaks, or you notice leaking in your underwear.  Bright red blood in your underwear.  Abdominal (lower belly) or stomach pain.  For first baby: Contractions (tightening) less than 5 minutes apart for one hour or more.  Second (plus) baby: Contractions (tightening) less than 10 minutes apart and getting stronger.  *If less than 34 weeks: Contractions (tightenings) more than 6 times in one hour.  Increase or change in vaginal discharge (note the color and amount)    Follow-up:  As scheduled in the clinic

## 2019-01-01 NOTE — PROGRESS NOTES
"     ENRIQUE STROUD ANTEPARTUM PROGRESS NOTE:   2018   9:18 PM          SUBJECTIVE:   Pain is improving.  Nothing like what she had on admission, now describes it as a more \"residual soreness.\"  Took oxycodone 5mg 7 hours apart today and did fine.  Is also taking tylenol.  No urinary symptoms, fevers, chills, nausea, vomiting or loose stools.    Comfortable with discharge today and has appointment with Dr. Haider in 2 days.           OBJECTIVE:   There were no vitals filed for this visit.    NST:  Fetal Heart Rate Tracin, mod variability, + accels.  Category 1, Reactive    Tocometer: No contractions      Lungs:   no increased work of breathing   Cardiovascular:   normal apical pulses    Abdomen:  Soft, non-tender.  Mild tenderness to palpation of right side/flank  Musculoskeletal:   no lower extremity pitting edema present   Spec/SVE:  deferred        Recent Results (from the past 24 hour(s))   CBC with platelets    Collection Time: 18  6:31 AM   Result Value Ref Range    WBC 6.3 4.0 - 11.0 10e9/L    RBC Count 3.50 (L) 3.8 - 5.2 10e12/L    Hemoglobin 9.9 (L) 11.7 - 15.7 g/dL    Hematocrit 30.9 (L) 35.0 - 47.0 %    MCV 88 78 - 100 fl    MCH 28.3 26.5 - 33.0 pg    MCHC 32.0 31.5 - 36.5 g/dL    RDW 12.6 10.0 - 15.0 %    Platelet Count 346 150 - 450 10e9/L                ASSESSMENT/PLAN:    38 year old  30w0d who is currently hospitalized for right sided pain.  Negative work-up, pain improved, afebrile.    Will discharge home.  Given rx for oxycodone 5mg, 8 tabs.  Discussed weaning as much as she's able; staying on top of tylenol and only using oxycodone prn was recommended.  She is in agreement with this plan.  Has appointment with Dr. Haider in 2 days, which she plans to attend.      Dolores Norris MD.    "

## 2019-01-02 ENCOUNTER — PRENATAL OFFICE VISIT (OUTPATIENT)
Dept: OBGYN | Facility: CLINIC | Age: 39
End: 2019-01-02
Payer: COMMERCIAL

## 2019-01-02 VITALS
BODY MASS INDEX: 30.55 KG/M2 | SYSTOLIC BLOOD PRESSURE: 134 MMHG | DIASTOLIC BLOOD PRESSURE: 84 MMHG | WEIGHT: 189.2 LBS | HEART RATE: 96 BPM

## 2019-01-02 DIAGNOSIS — O09.899 HISTORY OF PRETERM DELIVERY, CURRENTLY PREGNANT: Primary | ICD-10-CM

## 2019-01-02 PROCEDURE — 99207 ZZC PRENATAL VISIT: CPT | Performed by: OBSTETRICS & GYNECOLOGY

## 2019-01-02 NOTE — PROGRESS NOTES
30w2d Hospitalized for 3 days with likely kidney stone.  Sent home with narcotics and severe constipation with really painful hard stool yesterday.  [place] like she got everything out, but is now wondering if some of her symptoms of stone were maybe constipation.  She does have history of that.  Discussed stoppage of narcs (is out anyway) since pain improved and getting on daily bowel regimen.  Will restart prune juice and begin miralax. Discussed good fm. Tdap next visit.  RTC 2 weeks  pearl

## 2019-01-08 NOTE — PROGRESS NOTES
SUBJECTIVE:                                                    Traci Sloan is a 38 year old female who is here today for a medication check and ADHD follow up.      Daughter is 2.5 years  Pregnant 31 weeks pregnant    CURRENT CONCERNS:  none     CURRENT PRESCRIPTIONS:    Adderall 15 mg daily   Would like to switch back to Adderall XL 30 mg and IR 10 mg now that insurance is better  Will go back to IR when baby is born and nursing     MEDICATION BENEFITS:  Controlled symptoms:  Hyperactivity - motor restlessness, Attention span, Distractability, Finishing tasks, Impulse control and Frustration tolerance  Uncontrolled symptoms:  None     MEDICATION SIDE EFFECTS:   Has:  none  Denies:  none    Questions/Concerns: talk about insurance issues and switching back, a mole in back.    Mole      Duration: a couple months     Description  Location: right side mid back   Itching: when it starts to heal a little bit.     Intensity:  mild    Accompanying signs and symptoms: irritation     History (similar episodes/previous evaluation): Yes, when was about 17 years old    Precipitating or alleviating factors:  New exposures:  None  Recent travel: no      Therapies tried and outcome: peroxide.         Problem list and histories reviewed & adjusted, as indicated.  Additional history: as documented    Patient Active Problem List   Diagnosis     Polypharmacy     Gastroesophageal reflux disease     Irritable bowel syndrome     History of chronic urinary tract infection     Attention deficit hyperactivity disorder (ADHD), combined type     Other closed fracture of distal end of left fibula, initial encounter     Need for Tdap vaccination     History of  delivery, currently pregnant     Papanicolaou smear of cervix with low grade squamous intraepithelial lesion (LGSIL)     Abdominal pain     BP Readings from Last 6 Encounters:   19 140/88   19 134/84   18 135/79   18 131/84   18 130/86   18  "126/84     Current Outpatient Medications   Medication     [START ON 2/9/2019] amphetamine-dextroamphetamine (ADDERALL XR) 30 MG 24 hr capsule     amphetamine-dextroamphetamine (ADDERALL XR) 30 MG 24 hr capsule     [START ON 2/9/2019] amphetamine-dextroamphetamine (ADDERALL) 10 MG tablet     amphetamine-dextroamphetamine (ADDERALL) 10 MG tablet     doxylamine (UNISOM) 25 MG TABS tablet     fluticasone (FLONASE) 50 MCG/ACT spray     HYDROXYprogesterone caproate in oil 250 mg/mL (HERNANDO) intramuscular injection     loratadine (CLARITIN) 10 MG tablet     oxymetazoline (AFRIN NASAL SPRAY) 0.05 % spray     Prenatal Vit-Fe Fumarate-FA (PRENATAL MULTIVITAMIN PLUS IRON) 27-0.8 MG TABS per tablet     pyridOXINE (VITAMIN  B-6) 25 MG tablet     No current facility-administered medications for this visit.             ROS:  Constitutional, HEENT, cardiovascular, pulmonary, gi and gu systems are negative, except as otherwise noted.    OBJECTIVE:                                                    /88   Pulse 100   Temp 98.2  F (36.8  C) (Oral)   Ht 1.73 m (5' 8.11\")   Wt 89 kg (196 lb 1.6 oz)   LMP 06/10/2018   SpO2 98%   BMI 29.72 kg/m      Exam:  GENERAL: healthy, alert and no distress  EYES: Eyes grossly normal to inspection, PERRL and conjunctivae and sclerae normal  HENT: ear canals and TM's normal, nose and mouth without ulcers or lesions  NECK: no adenopathy, no asymmetry, masses, or scars and thyroid normal to palpation  RESP: lungs clear to auscultation - no rales, rhonchi or wheezes  CV: regular rate and rhythm, normal S1 S2, no S3 or S4, no murmur, click or rub, no peripheral edema and peripheral pulses strong  ABDOMEN: soft, nontender and gravid  SKIN: lower right back has a 1 cm x 8 mm fleshy nodular lesion with flat surrounding erythema, the inferior aspect of the nodule has a small tear and granulomatous tissue and whitish thick discharge, the lesion is non-tender; there is also a subcutaneous mass just " adjacent laterally to the surface lesion and possibly extends to under the lesion  NEURO: Normal strength and tone, mentation intact and speech normal  PSYCH: mentation appears normal, affect normal/bright    MENTAL STATUS EXAM  Appearance: appropriate  Attitude: cooperative  Behavior: normal  Eye Contact: normal  Speech: normal  Orientation: oriented to person , place, time and situation  Mood:  happy  Affect: Mood Congruent  Thought Process: clear      Diagnostic Test Results:  none      ASSESSMENT/PLAN:                                                        (F90.2) Attention deficit hyperactivity disorder (ADHD), combined type  (primary encounter diagnosis)  Comment:   Plan: amphetamine-dextroamphetamine (ADDERALL XR) 30         MG 24 hr capsule, amphetamine-dextroamphetamine        (ADDERALL) 10 MG tablet,         amphetamine-dextroamphetamine (ADDERALL) 10 MG         tablet, amphetamine-dextroamphetamine (ADDERALL        XR) 30 MG 24 hr capsule   Ok change to Xr plus afternoon IR until birth.  When breastfeeding, prefer return to IR to time around breastfeeding.  Patient in agreement.  Two months supply given should cover through remainder of pregnancy unless she goes over dates and then can get refill by phone for up to 3 weeks.     (D22.9) Change in mole  Comment:   Plan: lesion is clearly irritated and has recently been torn from the skin.  Recommend removal and pathology    See Patient Instructions    WINSTON Richards [facility] ([facility]

## 2019-01-09 ENCOUNTER — TELEPHONE (OUTPATIENT)
Dept: FAMILY MEDICINE | Facility: CLINIC | Age: 39
End: 2019-01-09

## 2019-01-09 ENCOUNTER — OFFICE VISIT (OUTPATIENT)
Dept: FAMILY MEDICINE | Facility: CLINIC | Age: 39
End: 2019-01-09
Payer: COMMERCIAL

## 2019-01-09 ENCOUNTER — MYC MEDICAL ADVICE (OUTPATIENT)
Dept: FAMILY MEDICINE | Facility: CLINIC | Age: 39
End: 2019-01-09

## 2019-01-09 VITALS
TEMPERATURE: 98.2 F | DIASTOLIC BLOOD PRESSURE: 88 MMHG | WEIGHT: 196.1 LBS | OXYGEN SATURATION: 98 % | HEIGHT: 68 IN | HEART RATE: 100 BPM | SYSTOLIC BLOOD PRESSURE: 140 MMHG | BODY MASS INDEX: 29.72 KG/M2

## 2019-01-09 DIAGNOSIS — F90.2 ATTENTION DEFICIT HYPERACTIVITY DISORDER (ADHD), COMBINED TYPE: Primary | ICD-10-CM

## 2019-01-09 DIAGNOSIS — D22.9 CHANGE IN MOLE: ICD-10-CM

## 2019-01-09 PROCEDURE — 99214 OFFICE O/P EST MOD 30 MIN: CPT | Performed by: NURSE PRACTITIONER

## 2019-01-09 RX ORDER — DEXTROAMPHETAMINE SACCHARATE, AMPHETAMINE ASPARTATE MONOHYDRATE, DEXTROAMPHETAMINE SULFATE AND AMPHETAMINE SULFATE 7.5; 7.5; 7.5; 7.5 MG/1; MG/1; MG/1; MG/1
30 CAPSULE, EXTENDED RELEASE ORAL DAILY
Qty: 30 CAPSULE | Refills: 0 | Status: ON HOLD | OUTPATIENT
Start: 2019-02-09 | End: 2019-02-17

## 2019-01-09 RX ORDER — DEXTROAMPHETAMINE SACCHARATE, AMPHETAMINE ASPARTATE, DEXTROAMPHETAMINE SULFATE AND AMPHETAMINE SULFATE 2.5; 2.5; 2.5; 2.5 MG/1; MG/1; MG/1; MG/1
10 TABLET ORAL DAILY
Qty: 30 TABLET | Refills: 0 | Status: ON HOLD | OUTPATIENT
Start: 2019-01-09 | End: 2019-02-17

## 2019-01-09 RX ORDER — DEXTROAMPHETAMINE SACCHARATE, AMPHETAMINE ASPARTATE MONOHYDRATE, DEXTROAMPHETAMINE SULFATE AND AMPHETAMINE SULFATE 7.5; 7.5; 7.5; 7.5 MG/1; MG/1; MG/1; MG/1
30 CAPSULE, EXTENDED RELEASE ORAL DAILY
Qty: 30 CAPSULE | Refills: 0 | Status: ON HOLD | OUTPATIENT
Start: 2019-01-09 | End: 2019-02-17

## 2019-01-09 RX ORDER — DEXTROAMPHETAMINE SACCHARATE, AMPHETAMINE ASPARTATE, DEXTROAMPHETAMINE SULFATE AND AMPHETAMINE SULFATE 2.5; 2.5; 2.5; 2.5 MG/1; MG/1; MG/1; MG/1
10 TABLET ORAL DAILY
Qty: 30 TABLET | Refills: 0 | Status: ON HOLD | OUTPATIENT
Start: 2019-02-09 | End: 2019-02-17

## 2019-01-09 ASSESSMENT — MIFFLIN-ST. JEOR: SCORE: 1629.75

## 2019-01-09 NOTE — PATIENT INSTRUCTIONS
Switch to adderall xr 30 mg plus adderall IR 10 mg daily until baby born then we will switch back to all IR to schedule around nursing    Return for mole removal

## 2019-01-09 NOTE — TELEPHONE ENCOUNTER
underwent surgery 2/1/2016  recovering. Patient Prior Authorization Retail Medication Request    Medication/Dose: amphetamine-dextroamphetamine (ADDERALL XR) 30 MG 24 hr capsule  ICD code (if different than what is on RX):    Previously Tried and Failed:    Rationale:      Insurance Name:  [phone]  Insurance ID:  [id number]      Pharmacy Information (if different than what is on RX)  [facility]  Phone:  [phone]  Fax: [phone]    Prior Authorization Retail Medication Request    amphetamine-dextroamphetamine (ADDERALL) 10 MG tablet  ICD code (if different than what is on RX):    Previously Tried and Failed:    Rationale:      Insurance Name: [phone]  Insurance ID:  [id number]      Pharmacy Information (if different than what is on RX)  [facility]  Phone:  [phone]  Fax: [phone]   Ritchie Terry MD  Cc   Dr. Jax Yoder

## 2019-01-10 NOTE — TELEPHONE ENCOUNTER
"Routing to PA team:    Message sent from patient via [employer]: \"I spoke with the insurance company earlier today.  They said they hadn't received anything yet.  I'm thinking that this is an issue on their end and not yours.  The previous information they gave me, in regards for the best way for your office to contact them was slightly incorrect.    If you choose to call their customer service # [phone], select menu option # 3, not # 4 as I was previously told.    I do understand that it can take some time for the pre-auth to go through, which makes sense.  I'm just checking up on them since they are a new insurance company for me. I have been through this process with previous insurance companies and have found that following up with them, seems to move things a long.\"    Writer responded to message stating that the PA request is currently being processed, and we will notify her once we have a response.    Luna Bran RN  [facility]  "

## 2019-01-12 ENCOUNTER — HOSPITAL ENCOUNTER (INPATIENT)
Facility: CLINIC | Age: 39
LOS: 2 days | Discharge: HOME | DRG: 832 | End: 2019-01-14
Attending: OBSTETRICS & GYNECOLOGY | Admitting: OBSTETRICS & GYNECOLOGY
Payer: COMMERCIAL

## 2019-01-12 DIAGNOSIS — B96.89 BACTERIAL VAGINOSIS: Primary | ICD-10-CM

## 2019-01-12 DIAGNOSIS — N76.0 BACTERIAL VAGINOSIS: Primary | ICD-10-CM

## 2019-01-12 PROBLEM — O46.90 VAGINAL BLEEDING IN PREGNANCY: Status: ACTIVE | Noted: 2019-01-12

## 2019-01-12 PROBLEM — Z36.89 ENCOUNTER FOR TRIAGE IN PREGNANT PATIENT: Status: ACTIVE | Noted: 2019-01-12

## 2019-01-12 LAB
ABO + RH BLD: NORMAL
ABO + RH BLD: NORMAL
ALBUMIN UR-MCNC: NEGATIVE MG/DL
ALT SERPL W P-5'-P-CCNC: 9 U/L (ref 0–50)
APPEARANCE UR: CLEAR
AST SERPL W P-5'-P-CCNC: 14 U/L (ref 0–45)
BILIRUB UR QL STRIP: NEGATIVE
BLD GP AB SCN SERPL QL: NORMAL
BLOOD BANK CMNT PATIENT-IMP: NORMAL
COLOR UR AUTO: YELLOW
CREAT SERPL-MCNC: 0.43 MG/DL (ref 0.52–1.04)
CREAT UR-MCNC: 30 MG/DL
ERYTHROCYTE [DISTWIDTH] IN BLOOD BY AUTOMATED COUNT: 12.3 % (ref 10–15)
GFR SERPL CREATININE-BSD FRML MDRD: >90 ML/MIN/{1.73_M2}
GLUCOSE UR STRIP-MCNC: NEGATIVE MG/DL
HCT VFR BLD AUTO: 31.4 % (ref 35–47)
HGB BLD-MCNC: 10.2 G/DL (ref 11.7–15.7)
HGB UR QL STRIP: ABNORMAL
KETONES UR STRIP-MCNC: NEGATIVE MG/DL
LEUKOCYTE ESTERASE UR QL STRIP: NEGATIVE
MCH RBC QN AUTO: 27.9 PG (ref 26.5–33)
MCHC RBC AUTO-ENTMCNC: 32.5 G/DL (ref 31.5–36.5)
MCV RBC AUTO: 86 FL (ref 78–100)
MUCOUS THREADS #/AREA URNS LPF: PRESENT /LPF
NITRATE UR QL: NEGATIVE
PH UR STRIP: 6.5 PH (ref 5–7)
PLATELET # BLD AUTO: 375 10E9/L (ref 150–450)
PROT UR-MCNC: 0.05 G/L
PROT/CREAT 24H UR: 0.17 G/G CR (ref 0–0.2)
RBC # BLD AUTO: 3.66 10E12/L (ref 3.8–5.2)
RBC #/AREA URNS AUTO: 2 /HPF (ref 0–2)
SOURCE: ABNORMAL
SP GR UR STRIP: 1.01 (ref 1–1.03)
SPECIMEN EXP DATE BLD: NORMAL
SPECIMEN SOURCE: ABNORMAL
SQUAMOUS #/AREA URNS AUTO: <1 /HPF (ref 0–1)
UROBILINOGEN UR STRIP-MCNC: 0 MG/DL (ref 0–2)
WBC # BLD AUTO: 8.1 10E9/L (ref 4–11)
WBC #/AREA URNS AUTO: 1 /HPF (ref 0–5)
WET PREP SPEC: ABNORMAL

## 2019-01-12 PROCEDURE — 80307 DRUG TEST PRSMV CHEM ANLYZR: CPT | Performed by: STUDENT IN AN ORGANIZED HEALTH CARE EDUCATION/TRAINING PROGRAM

## 2019-01-12 PROCEDURE — 84460 ALANINE AMINO (ALT) (SGPT): CPT | Performed by: STUDENT IN AN ORGANIZED HEALTH CARE EDUCATION/TRAINING PROGRAM

## 2019-01-12 PROCEDURE — 25000128 H RX IP 250 OP 636

## 2019-01-12 PROCEDURE — 87591 N.GONORRHOEAE DNA AMP PROB: CPT | Performed by: STUDENT IN AN ORGANIZED HEALTH CARE EDUCATION/TRAINING PROGRAM

## 2019-01-12 PROCEDURE — 82565 ASSAY OF CREATININE: CPT | Performed by: STUDENT IN AN ORGANIZED HEALTH CARE EDUCATION/TRAINING PROGRAM

## 2019-01-12 PROCEDURE — 25000132 ZZH RX MED GY IP 250 OP 250 PS 637: Performed by: STUDENT IN AN ORGANIZED HEALTH CARE EDUCATION/TRAINING PROGRAM

## 2019-01-12 PROCEDURE — 87210 SMEAR WET MOUNT SALINE/INK: CPT | Performed by: STUDENT IN AN ORGANIZED HEALTH CARE EDUCATION/TRAINING PROGRAM

## 2019-01-12 PROCEDURE — 84450 TRANSFERASE (AST) (SGOT): CPT | Performed by: STUDENT IN AN ORGANIZED HEALTH CARE EDUCATION/TRAINING PROGRAM

## 2019-01-12 PROCEDURE — 25000128 H RX IP 250 OP 636: Performed by: STUDENT IN AN ORGANIZED HEALTH CARE EDUCATION/TRAINING PROGRAM

## 2019-01-12 PROCEDURE — 12000001 ZZH R&B MED SURG/OB UMMC

## 2019-01-12 PROCEDURE — 84156 ASSAY OF PROTEIN URINE: CPT | Performed by: STUDENT IN AN ORGANIZED HEALTH CARE EDUCATION/TRAINING PROGRAM

## 2019-01-12 PROCEDURE — 99221 1ST HOSP IP/OBS SF/LOW 40: CPT | Mod: GC | Performed by: OBSTETRICS & GYNECOLOGY

## 2019-01-12 PROCEDURE — 87491 CHLMYD TRACH DNA AMP PROBE: CPT | Performed by: STUDENT IN AN ORGANIZED HEALTH CARE EDUCATION/TRAINING PROGRAM

## 2019-01-12 PROCEDURE — G0463 HOSPITAL OUTPT CLINIC VISIT: HCPCS

## 2019-01-12 PROCEDURE — 85027 COMPLETE CBC AUTOMATED: CPT | Performed by: STUDENT IN AN ORGANIZED HEALTH CARE EDUCATION/TRAINING PROGRAM

## 2019-01-12 PROCEDURE — 86850 RBC ANTIBODY SCREEN: CPT | Performed by: STUDENT IN AN ORGANIZED HEALTH CARE EDUCATION/TRAINING PROGRAM

## 2019-01-12 PROCEDURE — 86900 BLOOD TYPING SEROLOGIC ABO: CPT | Performed by: STUDENT IN AN ORGANIZED HEALTH CARE EDUCATION/TRAINING PROGRAM

## 2019-01-12 PROCEDURE — 87653 STREP B DNA AMP PROBE: CPT | Performed by: STUDENT IN AN ORGANIZED HEALTH CARE EDUCATION/TRAINING PROGRAM

## 2019-01-12 PROCEDURE — 81001 URINALYSIS AUTO W/SCOPE: CPT | Performed by: STUDENT IN AN ORGANIZED HEALTH CARE EDUCATION/TRAINING PROGRAM

## 2019-01-12 PROCEDURE — 86901 BLOOD TYPING SEROLOGIC RH(D): CPT | Performed by: STUDENT IN AN ORGANIZED HEALTH CARE EDUCATION/TRAINING PROGRAM

## 2019-01-12 RX ORDER — BETAMETHASONE SODIUM PHOSPHATE AND BETAMETHASONE ACETATE 3; 3 MG/ML; MG/ML
12 INJECTION, SUSPENSION INTRA-ARTICULAR; INTRALESIONAL; INTRAMUSCULAR; SOFT TISSUE EVERY 24 HOURS
Status: COMPLETED | OUTPATIENT
Start: 2019-01-12 | End: 2019-01-13

## 2019-01-12 RX ORDER — CALCIUM GLUCONATE 94 MG/ML
1 INJECTION, SOLUTION INTRAVENOUS
Status: DISCONTINUED | OUTPATIENT
Start: 2019-01-12 | End: 2019-01-14 | Stop reason: HOSPADM

## 2019-01-12 RX ORDER — SODIUM CHLORIDE, SODIUM LACTATE, POTASSIUM CHLORIDE, CALCIUM CHLORIDE 600; 310; 30; 20 MG/100ML; MG/100ML; MG/100ML; MG/100ML
INJECTION, SOLUTION INTRAVENOUS
Status: COMPLETED
Start: 2019-01-12 | End: 2019-01-12

## 2019-01-12 RX ORDER — AMOXICILLIN 250 MG
1 CAPSULE ORAL
Status: DISCONTINUED | OUTPATIENT
Start: 2019-01-12 | End: 2019-01-13

## 2019-01-12 RX ORDER — LIDOCAINE 40 MG/G
CREAM TOPICAL
Status: DISCONTINUED | OUTPATIENT
Start: 2019-01-12 | End: 2019-01-14 | Stop reason: HOSPADM

## 2019-01-12 RX ORDER — DEXTROAMPHETAMINE SACCHARATE, AMPHETAMINE ASPARTATE, DEXTROAMPHETAMINE SULFATE AND AMPHETAMINE SULFATE 2.5; 2.5; 2.5; 2.5 MG/1; MG/1; MG/1; MG/1
10 TABLET ORAL EVERY MORNING
Status: DISCONTINUED | OUTPATIENT
Start: 2019-01-13 | End: 2019-01-14 | Stop reason: HOSPADM

## 2019-01-12 RX ORDER — ACETAMINOPHEN 325 MG/1
650 TABLET ORAL EVERY 6 HOURS PRN
Status: DISCONTINUED | OUTPATIENT
Start: 2019-01-12 | End: 2019-01-14 | Stop reason: HOSPADM

## 2019-01-12 RX ORDER — DEXTROAMPHETAMINE SACCHARATE, AMPHETAMINE ASPARTATE MONOHYDRATE, DEXTROAMPHETAMINE SULFATE AND AMPHETAMINE SULFATE 7.5; 7.5; 7.5; 7.5 MG/1; MG/1; MG/1; MG/1
30 CAPSULE, EXTENDED RELEASE ORAL EVERY MORNING
Status: DISCONTINUED | OUTPATIENT
Start: 2019-01-13 | End: 2019-01-14 | Stop reason: HOSPADM

## 2019-01-12 RX ORDER — SODIUM CHLORIDE, SODIUM LACTATE, POTASSIUM CHLORIDE, CALCIUM CHLORIDE 600; 310; 30; 20 MG/100ML; MG/100ML; MG/100ML; MG/100ML
10-125 INJECTION, SOLUTION INTRAVENOUS CONTINUOUS
Status: DISCONTINUED | OUTPATIENT
Start: 2019-01-12 | End: 2019-01-13

## 2019-01-12 RX ORDER — MAGNESIUM SULFATE IN WATER 40 MG/ML
2 INJECTION, SOLUTION INTRAVENOUS CONTINUOUS
Status: DISCONTINUED | OUTPATIENT
Start: 2019-01-12 | End: 2019-01-13

## 2019-01-12 RX ORDER — MAGNESIUM SULFATE HEPTAHYDRATE 40 MG/ML
4 INJECTION, SOLUTION INTRAVENOUS ONCE
Status: COMPLETED | OUTPATIENT
Start: 2019-01-12 | End: 2019-01-12

## 2019-01-12 RX ORDER — METRONIDAZOLE 500 MG/1
500 TABLET ORAL EVERY 12 HOURS SCHEDULED
Status: DISCONTINUED | OUTPATIENT
Start: 2019-01-12 | End: 2019-01-14 | Stop reason: HOSPADM

## 2019-01-12 RX ADMIN — METRONIDAZOLE 500 MG: 500 TABLET ORAL at 20:19

## 2019-01-12 RX ADMIN — SODIUM CHLORIDE, SODIUM LACTATE, POTASSIUM CHLORIDE, CALCIUM CHLORIDE 75 ML/HR: 600; 310; 30; 20 INJECTION, SOLUTION INTRAVENOUS at 11:49

## 2019-01-12 RX ADMIN — MAGNESIUM SULFATE IN WATER 2 G/HR: 40 INJECTION, SOLUTION INTRAVENOUS at 12:26

## 2019-01-12 RX ADMIN — SODIUM CHLORIDE, POTASSIUM CHLORIDE, SODIUM LACTATE AND CALCIUM CHLORIDE 75 ML/HR: 600; 310; 30; 20 INJECTION, SOLUTION INTRAVENOUS at 11:49

## 2019-01-12 RX ADMIN — MAGNESIUM SULFATE HEPTAHYDRATE 4 G: 40 INJECTION, SOLUTION INTRAVENOUS at 11:52

## 2019-01-12 RX ADMIN — SENNOSIDES AND DOCUSATE SODIUM 1 TABLET: 8.6; 5 TABLET ORAL at 16:55

## 2019-01-12 RX ADMIN — MAGNESIUM SULFATE IN WATER 2 G/HR: 40 INJECTION, SOLUTION INTRAVENOUS at 22:43

## 2019-01-12 RX ADMIN — ACETAMINOPHEN 650 MG: 325 TABLET, FILM COATED ORAL at 21:49

## 2019-01-12 RX ADMIN — BETAMETHASONE SODIUM PHOSPHATE AND BETAMETHASONE ACETATE 12 MG: 3; 3 INJECTION, SUSPENSION INTRA-ARTICULAR; INTRALESIONAL; INTRAMUSCULAR at 11:59

## 2019-01-12 ASSESSMENT — MIFFLIN-ST. JEOR
SCORE: 1609.41
SCORE: 1629.75

## 2019-01-12 NOTE — PLAN OF CARE
VSS. B/Ps intermittently mild. Denies current pre-eclampsia symptome. Continues to have vaginal bleeding. Uterus irritable on toco- Traci denies cramping, tightness, and contractions since moving to room 458. FHTs AGA. Goal to get through betamethasone window. 2g continuous magnesium infusing for fetal neuro-protection. NICU consult placed- NNP aware.  at bedside and supportive.

## 2019-01-12 NOTE — PROVIDER NOTIFICATION
01/12/19 1025   Provider Notification   Provider Name/Title Dr Ashraf   Method of Notification In Department   Plan to admit Patient due to bleeding for further observation, magnesium for neuro protection, betamethasone course, and labs (see orders). NICU consult placed. Close observation of blood pressures and s/s of preeclampsia due to mildly elevated B/Ps and Pt reports a quick instance of seeing stars in her vision x1 yesterday while in the shower. Pt denies any other pre-e symptoms. Ok for clear liquids. Pt in agreement with plan and transferred to room 458.

## 2019-01-12 NOTE — PROVIDER NOTIFICATION
01/12/19 1102   Provider Notification   Provider Name/Title [facility]   Method of Notification At Bedside   Notification Reason Other (Comment)  (BSUS)   Baby cephalic by BSUS

## 2019-01-12 NOTE — PLAN OF CARE
Data: Patient presented to [facility] at 0926.   Reason for maternal/fetal assessment per patient is Vaginal Bleeding  .  Patient is a . Prenatal record reviewed.      Obstetric History       T0      L1     SAB1   TAB0   Ectopic0   Multiple0   Live Births1       # Outcome Date GA Lbr Scar/2nd Weight Sex Delivery Anes PTL Lv   4 Current            3  16 36w0d 77:00 / 00:31 2.39 kg (5 lb 4.3 oz) F Vag-Spont EPI  ALKA      Name: RICK COSME      Apgar1:  9                Apgar5: 9   2 SAB 13 5w0d          1 AB 2010 5w0d             . Medical history:   Past Medical History:   Diagnosis Date     Abnormal Pap smear of cervix 2018    LSIL, Neg HPV, pregnant     ADHD (attention deficit hyperactivity disorder)      Anemia      Gastric reflux      IBS (irritable bowel syndrome)    . Gestational Age 31w5d. VSS. Fetal movement present. Patient denies cramping, backache, vaginal discharge, pelvic pressure, GI problems, edema, headache, visual disturbances, epigastric or URQ pain, abdominal pain, rupture of membranes. Support persons are present.  Action: Verbal consent for EFM. Triage assessment completed. EFM applied for fetal assessment with vaginal bleeding. Uterine assessment done- see doc flow. Fetal assessment: Presumed adequate fetal oxygenation documented (see flow record).   Response: Dr. Ashraf and Dr Alvarado informed of arrival w/vaginal bleeding. Plan per provider is admit to labor- betamethasone and magnesium for nuero protection, monitor for s/s of preeclampsia. Patient verbalized agreement with plan. Patient transferred to room 458 ambulatory, oriented to room and call light.

## 2019-01-12 NOTE — H&P
[facility]  OB History and Physical      Traci Sloan MRN# [id number]   Age: 38 year old YOB: 1980     CC:  Vaginal bleeding    HPI:  Ms. Traci Sloan is a 38 year old  at 31w5d by 9w3d US who presents with vaginal bleeding. She was lying down at home a few hours ago when she noticed a large gush of dark red blood. She denies passing any clots and has not had any continued bleeding. This is her first episode of bleeding this pregnancy. She denies any abdominal trauma or recent intercourse. Immediately after the bleeding, she noticed some mild suprapubic cramping, which is intermittent and irregular. She denies any painful contractions. She denies any abnormal vaginal discharge or vulvar irritation. She also denies loss of fluid, fevers, chills, nausea, or vomiting. + normal fetal movement.    Of note, she does have a history of  delivery at 36w and states that her current symptoms feel similar to her presentation before her last delivery. During that admission, she was monitored for some time before her labor started, but she states that she delivered quickly after the onset of contractions.    Pregnancy Complications:  1. ADHD: on Adderall, symptoms well controlled  2. History of  delivery at 36w: on 17-OH progesterone  3. AMA: normal NIPT  4. LSIL/HPV negative Pap: plan for colposcopy after delivery    Prenatal Labs:   Lab Results   Component Value Date    ABO O 2018    RH Pos 2018    AS Neg 2018    HEPBANG Nonreactive 2018    CHPCRT  2016     Negative   Negative for C. trachomatis rRNA by transcription mediated amplification.   A negative result by transcription mediated amplification does not preclude the   presence of C. trachomatis infection because results are dependent on proper   and adequate collection, absence of inhibitors, and sufficient rRNA to be   detected.      GCPCRT Negative 2018    TREPAB Negative 2016  "   HGB 9.9 (L) 2018       OB History  Obstetric History       T0      L1     SAB1   TAB0   Ectopic0   Multiple0   Live Births1       # Outcome Date GA Lbr Scar/2nd Weight Sex Delivery Anes PTL Lv   4 Current            3  16 36w0d 77:00 / 00:31 2.39 kg (5 lb 4.3 oz) F Vag-Spont EPI  ALKA      Name: RICK COSME      Apgar1:  9                Apgar5: 9   2 SAB 13 5w0d          1 AB 2010 5w0d                 PMHx:   Past Medical History:   Diagnosis Date     Abnormal Pap smear of cervix 2018    LSIL, Neg HPV, pregnant     ADHD (attention deficit hyperactivity disorder)      Anemia      Gastric reflux      IBS (irritable bowel syndrome)      PSHx:   Past Surgical History:   Procedure Laterality Date     CYSTOSCOPY, DILATE URETHRA, COMBINED  1999    for frequent UTI'S     KNEE SURGERY  10/1997     Meds:   No current outpatient medications on file.      Allergies:  No Known Allergies     FmHx:   Family History   Problem Relation Age of Onset     Heart Failure Maternal Grandmother      Breast Cancer Maternal Grandmother      Colon Cancer Maternal Grandmother      Cerebrovascular Disease Maternal Grandfather      Diabetes Maternal Grandfather      Other Cancer Maternal Grandfather      Mental Illness Paternal Grandfather      Hypertension Sister      SocHx: She denies any tobacco, alcohol, or other drug use during this pregnancy.    ROS:   Complete 10-point ROS negative except as noted in HPI. She denies headache, blurry vision, chest pain, shortness of breath, RUQ pain, nausea, vomiting, dysuria, hematuria or extremity edema.    PE:  VS:    .Vital signs:  Temp: 98.4  F (36.9  C) Temp src: Oral BP: 126/77     Resp: 18       Height: 172.7 cm (5' 8\") Weight: 87.1 kg (192 lb)  Estimated body mass index is 29.19 kg/m  as calculated from the following:    Height as of this encounter: 1.727 m (5' 8\").    Weight as of this encounter: 87.1 kg (192 lb).          "   Gen: Well-appearing, NAD, comfortable  CV: RRR, no mrg  Pulm: CTAB, no wheezes or crackles  Abd: Soft, gravid, point tenderness in RLQ but otherwise non-tender. Fundus above umbilicus, non-tender  Ext: Trace LE edema b/l    SSE: ~10cc dark red blood in vault. Cervix visually closed, no active bleeding from os and no additional bleeding with Valsalva    Pres:  Cephalic by BSUS  EFW:  3lb by Leopold's  Memb: Intact              FHT: Baseline 145, moderate variability, accelerations present, rare variable decelerations with moriah to 130 bpm   [employer]: 0 contractions in 10 minutes, uterine irritability present      Assessment  Ms. Traci Sloan is a 38 year old , at 31w5d by 9w3d US who presents with vaginal bleeding.    Plan  Admit to L&D for monitoring    Vaginal bleeding:  - Concern for placental abruption given quantity of bleeding, no placenta or vasa previa seen on US. No abdominal pain or fundal tenderness, fetal heart tracing reactive and reassuring. No contractions seen on toco, low concern for  labor at this time  - GC/CT, wet prep, UDS, UA/UC, and GBS obtained  - BMZ #1 given, plan for dose #2 in 24 hours  - Mg prophylaxis for CP  - NICU consult to prepare for possible  delivery  - Patient consented for  section in case of developing fetal distress  - Continue to monitor pad weights    Elevated BP:  - Intermittent mild range BPs in triage  - HELLP labs and UPC obtained  - Continue to monitor vitals    FWB: Category II FHT, reactive and overall reassuring.  Continue EFM and toco  PNC: Rh positive, Rubella immune, , Placenta anterior with no previa  Fen/GI: Clear liquid diet, IVF    The patient was discussed with Dr. Alvarado who is in agreement with the treatment plan.    Vale Gallegos MD  Ob/Gyn Resident, PGY-1  19 10:39 AM     Physician Attestation   I, Theodora Alvarado, personally examined and evaluated this patient.  I discussed the patient with the  resident and  care team, and agree with the assessment and plan of care as documented in the history and physical note.     I personally reviewed vital signs and labs and electronic fetal monitor.    Key findings: elevated BP in mild range, normal HELLP labs.   Theodora Alvarado MD  Date of Service (when I saw the patient): 01/12/19

## 2019-01-12 NOTE — PROVIDER NOTIFICATION
01/12/19 1311   Provider Notification   Provider Name/Title Dr Alvarado   Method of Notification In Department   Notification Reason Status Update   FHTs AGA. Uterus quiet on toco. Pt reported feeling some spasms in triage but, those have subsided. Has intermittently mild range B/Ps, but denies any current symptoms of pre eclampsia. Labs sent- some pending, resulted labs WNL.

## 2019-01-12 NOTE — PROVIDER NOTIFICATION
01/12/19 1435   Provider Notification   Provider Name/Title DR Taylor   Method of Notification In Department   Notification Reason Bleeding   DR Taylor updated that Pt had another episode of bleeding when up to the bathroom. EBL 10-20 ml red blood. FHTs AGA. Uterus quiet. Plan to start to do pad weights. Goal to get through betamethasone window.

## 2019-01-13 LAB
C TRACH DNA SPEC QL NAA+PROBE: NEGATIVE
GP B STREP DNA SPEC QL NAA+PROBE: NEGATIVE
N GONORRHOEA DNA SPEC QL NAA+PROBE: NEGATIVE
SPECIMEN SOURCE: NORMAL

## 2019-01-13 PROCEDURE — 25000132 ZZH RX MED GY IP 250 OP 250 PS 637: Performed by: STUDENT IN AN ORGANIZED HEALTH CARE EDUCATION/TRAINING PROGRAM

## 2019-01-13 PROCEDURE — 25000128 H RX IP 250 OP 636: Performed by: STUDENT IN AN ORGANIZED HEALTH CARE EDUCATION/TRAINING PROGRAM

## 2019-01-13 PROCEDURE — 99231 SBSQ HOSP IP/OBS SF/LOW 25: CPT | Mod: GC | Performed by: OBSTETRICS & GYNECOLOGY

## 2019-01-13 PROCEDURE — 12000001 ZZH R&B MED SURG/OB UMMC

## 2019-01-13 PROCEDURE — 99232 SBSQ HOSP IP/OBS MODERATE 35: CPT | Performed by: CLINICAL NURSE SPECIALIST

## 2019-01-13 RX ORDER — CALCIUM CARBONATE 500 MG/1
500-1000 TABLET, CHEWABLE ORAL 3 TIMES DAILY PRN
Status: DISCONTINUED | OUTPATIENT
Start: 2019-01-13 | End: 2019-01-14 | Stop reason: HOSPADM

## 2019-01-13 RX ORDER — METRONIDAZOLE 500 MG/1
500 TABLET ORAL EVERY 12 HOURS
Qty: 9 TABLET | Refills: 0 | Status: ON HOLD | OUTPATIENT
Start: 2019-01-14 | End: 2019-02-17

## 2019-01-13 RX ORDER — AMOXICILLIN 250 MG
1-2 CAPSULE ORAL 2 TIMES DAILY
Status: DISCONTINUED | OUTPATIENT
Start: 2019-01-13 | End: 2019-01-14 | Stop reason: HOSPADM

## 2019-01-13 RX ORDER — HYDROXYZINE HYDROCHLORIDE 50 MG/1
50-100 TABLET, FILM COATED ORAL
Status: DISCONTINUED | OUTPATIENT
Start: 2019-01-13 | End: 2019-01-14 | Stop reason: HOSPADM

## 2019-01-13 RX ADMIN — DEXTROAMPHETAMINE SACCHARATE, AMPHETAMINE ASPARTATE, DEXTROAMPHETAMINE SULFATE, AND AMPHETAMINE SULFATE 30 MG: 7.5; 7.5; 7.5; 7.5 CAPSULE, EXTENDED RELEASE ORAL at 08:17

## 2019-01-13 RX ADMIN — DEXTROAMPHETAMINE SACCHARATE, AMPHETAMINE ASPARTATE, DEXTROAMPHETAMINE SULFATE AND AMPHETAMINE SULFATE 10 MG: 2.5; 2.5; 2.5; 2.5 TABLET ORAL at 08:17

## 2019-01-13 RX ADMIN — SENNOSIDES AND DOCUSATE SODIUM 2 TABLET: 8.6; 5 TABLET ORAL at 19:50

## 2019-01-13 RX ADMIN — BETAMETHASONE SODIUM PHOSPHATE AND BETAMETHASONE ACETATE 12 MG: 3; 3 INJECTION, SUSPENSION INTRA-ARTICULAR; INTRALESIONAL; INTRAMUSCULAR at 11:57

## 2019-01-13 RX ADMIN — ACETAMINOPHEN 650 MG: 325 TABLET, FILM COATED ORAL at 05:05

## 2019-01-13 RX ADMIN — SENNOSIDES AND DOCUSATE SODIUM 1 TABLET: 8.6; 5 TABLET ORAL at 13:46

## 2019-01-13 RX ADMIN — METRONIDAZOLE 500 MG: 500 TABLET ORAL at 19:51

## 2019-01-13 RX ADMIN — METRONIDAZOLE 500 MG: 500 TABLET ORAL at 08:17

## 2019-01-13 RX ADMIN — HYDROXYZINE HYDROCHLORIDE 100 MG: 50 TABLET ORAL at 23:03

## 2019-01-13 RX ADMIN — HYDROXYZINE HYDROCHLORIDE 100 MG: 50 TABLET ORAL at 01:25

## 2019-01-13 RX ADMIN — OMEPRAZOLE 20 MG: 20 CAPSULE, DELAYED RELEASE ORAL at 21:20

## 2019-01-13 RX ADMIN — CALCIUM CARBONATE (ANTACID) CHEW TAB 500 MG 1000 MG: 500 CHEW TAB at 00:11

## 2019-01-13 NOTE — PLAN OF CARE
Pt stable, VS WDL. Pt denies leaking of fluid, states no bleeding since 1500; denies leaking of fluid, contractions. FHR appropriate for gestational age, no contractions per toco.

## 2019-01-13 NOTE — PLAN OF CARE
Pt doing well this afternoon. VSS, denies ctx or cramping, no LOF, no vaginal bleeding so far this shift. Tolerating magnesium sulfate well, lung sounds clear, reflexes +2, no clonus. FHT reactive. No further concerns. Report given to Anahi SEGOVIA RN.

## 2019-01-13 NOTE — PLAN OF CARE
Afebrile, VSS. Pt denies bldg, LOF, ctx. Having some mild neck pain from lying in bed, relieved by Tylenol and hot packs. EFM as charted. Will get 2nd BMZ today @ noon. S/P Mg for NP. Continue to monitor, contact MD with questions/concerns.

## 2019-01-13 NOTE — PROVIDER NOTIFICATION
01/12/19 1920   Provider Notification   Provider Name/Title Dr. Taylor   Method of Notification Electronic Page   Request Evaluate - Remote   Notification Reason Patient Request   Pt has not had any bleeding this shift. Pt requesting to be taken off clear liquid diet and moved to a more comfortable bed in Antepartum. Plan per provider to allow pt to have regular diet and transfer to Ante. Antepartum unit notified of transfer, will transfer when RN available.

## 2019-01-13 NOTE — PROVIDER NOTIFICATION
01/12/19 2333   Provider Notification   Provider Name/Title Dr. Taylor   Method of Notification Electronic Page   Request Evaluate - Remote   Notification Reason Other (Comment)  (order evaluation)     Wondering if you could take a look at EFM orders after Mg is off @ 0000, VS orders, diet order, and other ante orders. No bldg, pt stable.

## 2019-01-13 NOTE — PLAN OF CARE
Patient stable with no further bleeding since 1500 on 1/12/19. VSS; EFM as charted. Denies pain, contractions, bleeding, loss of fluid. S/p betamethasone. Continue monitoring.

## 2019-01-13 NOTE — DISCHARGE SUMMARY
[facility] Discharge Summary    Traci Sloan MRN# [id number]   Age: 38 year old YOB: 1980     Date of Admission:  2019  Date of Discharge::  2019  Admitting Physician:  Theodora Alvarado MD  Discharge Physician:  Sammie Hoang MD            Admission Diagnoses:   - IUP @ 31+5wk  - Vaginal bleeding  - ADHD  - H/o  birth at 36wks  - AMA  - LSIL pap          Discharge Diagnosis:   - IUP @ 31+5wk  - Vaginal bleeding, resolved  - ADHD  - H/o  birth at 36wks  - AMA  - LSIL pap  - Bacterial vaginosis         Procedures:   Procedure(s): - Fetal and uterine monitoring  - Betamethasone course -           Medications Prior to Admission:     Medications Prior to Admission   Medication Sig Dispense Refill Last Dose     amphetamine-dextroamphetamine (ADDERALL XR) 30 MG 24 hr capsule Take 1 capsule (30 mg) by mouth daily 30 capsule 0 2019 at Unknown time     [START ON 2019] amphetamine-dextroamphetamine (ADDERALL) 10 MG tablet Take 1 tablet (10 mg) by mouth daily 30 tablet 0 2019 at Unknown time     [START ON 2019] amphetamine-dextroamphetamine (ADDERALL XR) 30 MG 24 hr capsule Take 1 capsule (30 mg) by mouth daily 30 capsule 0      amphetamine-dextroamphetamine (ADDERALL) 10 MG tablet Take 1 tablet (10 mg) by mouth daily 30 tablet 0      doxylamine (UNISOM) 25 MG TABS tablet Take 0.5 tablets (12.5 mg) by mouth 4 times daily as needed (Patient not taking: Reported on 2019) 100 each 1 Not Taking     fluticasone (FLONASE) 50 MCG/ACT spray Spray 1-2 sprays into both nostrils daily (Patient not taking: Reported on 2019) 3 Bottle 11 Not Taking     HYDROXYprogesterone caproate in oil 250 mg/mL (HERNANDO) intramuscular injection Inject 1 mL (250 mg) into the muscle every 7 days (Patient not taking: Reported on 2019) 5 mL 4 Not Taking     loratadine (CLARITIN) 10 MG tablet Take 1 tablet (10 mg) by mouth daily (Patient not taking: Reported on 2019) 90 tablet 3  Not Taking     oxymetazoline (AFRIN NASAL SPRAY) 0.05 % spray Spray 2-3 sprays into both nostrils 2 times daily as needed for congestion (Patient not taking: Reported on 1/9/2019) 1 Bottle 0 Not Taking     Prenatal Vit-Fe Fumarate-FA (PRENATAL MULTIVITAMIN PLUS IRON) 27-0.8 MG TABS per tablet Take 1 tablet by mouth daily (Patient not taking: Reported on 1/9/2019) 100 tablet 3 Not Taking     pyridOXINE (VITAMIN  B-6) 25 MG tablet Take 1 tablet (25 mg) by mouth 4 times daily as needed (Patient not taking: Reported on 1/9/2019) 100 tablet 1 Not Taking             Discharge Medications:        Review of your medicines      START taking      Dose / Directions   metroNIDAZOLE 500 MG tablet  Commonly known as:  FLAGYL  Indication:  bacterial vaginosis  Used for:  Bacterial vaginosis      Dose:  500 mg  Take 1 tablet (500 mg) by mouth every 12 hours for 4 days  Quantity:  9 tablet  Refills:  0        CONTINUE these medicines which have NOT CHANGED      Dose / Directions   * amphetamine-dextroamphetamine 10 MG tablet  Commonly known as:  ADDERALL  Used for:  Attention deficit hyperactivity disorder (ADHD), combined type      Dose:  10 mg  Take 1 tablet (10 mg) by mouth daily  Quantity:  30 tablet  Refills:  0     * amphetamine-dextroamphetamine 30 MG 24 hr capsule  Commonly known as:  ADDERALL XR  Used for:  Attention deficit hyperactivity disorder (ADHD), combined type      Dose:  30 mg  Take 1 capsule (30 mg) by mouth daily  Quantity:  30 capsule  Refills:  0     * amphetamine-dextroamphetamine 30 MG 24 hr capsule  Commonly known as:  ADDERALL XR  Used for:  Attention deficit hyperactivity disorder (ADHD), combined type      Dose:  30 mg  Start taking on:  2/9/2019  Take 1 capsule (30 mg) by mouth daily  Quantity:  30 capsule  Refills:  0     * amphetamine-dextroamphetamine 10 MG tablet  Commonly known as:  ADDERALL  Used for:  Attention deficit hyperactivity disorder (ADHD), combined type      Dose:  10 mg  Start taking  on:  2019  Take 1 tablet (10 mg) by mouth daily  Quantity:  30 tablet  Refills:  0     doxylamine 25 MG Tabs tablet  Commonly known as:  UNISOM  Used for:  AMA (advanced maternal age) multigravida 35+      Dose:  12.5 mg  Take 0.5 tablets (12.5 mg) by mouth 4 times daily as needed  Quantity:  100 each  Refills:  1     fluticasone 50 MCG/ACT nasal spray  Commonly known as:  FLONASE  Used for:  Dysfunction of both eustachian tubes      Dose:  1-2 spray  Spray 1-2 sprays into both nostrils daily  Quantity:  3 Bottle  Refills:  11     HYDROXYprogesterone caproate 250 MG/ML injection  Commonly known as:  HERNANDO  Used for:  History of  delivery, currently pregnant in first trimester      Dose:  250 mg  Inject 1 mL (250 mg) into the muscle every 7 days  Quantity:  5 mL  Refills:  4     loratadine 10 MG tablet  Commonly known as:  CLARITIN  Used for:  Dysfunction of both eustachian tubes      Dose:  10 mg  Take 1 tablet (10 mg) by mouth daily  Quantity:  90 tablet  Refills:  3     oxymetazoline 0.05 % nasal spray  Commonly known as:  AFRIN NASAL SPRAY  Used for:  Dysfunction of both eustachian tubes      Dose:  2-3 spray  Spray 2-3 sprays into both nostrils 2 times daily as needed for congestion  Quantity:  1 Bottle  Refills:  0     prenatal multivitamin w/iron 27-0.8 MG tablet  Used for:  AMA (advanced maternal age) multigravida 35+      Dose:  1 tablet  Take 1 tablet by mouth daily  Quantity:  100 tablet  Refills:  3     vitamin B6 25 MG tablet  Commonly known as:  pyridOXINE  Used for:  AMA (advanced maternal age) multigravida 35+      Dose:  25 mg  Take 1 tablet (25 mg) by mouth 4 times daily as needed  Quantity:  100 tablet  Refills:  1         * This list has 4 medication(s) that are the same as other medications prescribed for you. Read the directions carefully, and ask your doctor or other care provider to review them with you.               Where to get your medicines      These medications were sent to  [facility] [place], [place] - [address]  [address], [facility] [address]    Phone:  [phone]     metroNIDAZOLE 500 MG tablet            Consultations:   NICU          Brief History of Illness:   Ms. Traci Sloan is a 38 year old  at 31w5d by 9w3d US who presents with vaginal bleeding. She was lying down at home a few hours ago when she noticed a large gush of dark red blood. She denies passing any clots and has not had any continued bleeding. This is her first episode of bleeding this pregnancy. She denies any abdominal trauma or recent intercourse. Immediately after the bleeding, she noticed some mild suprapubic cramping, which is intermittent and irregular. She denies any painful contractions. She denies any abnormal vaginal discharge or vulvar irritation. She also denies loss of fluid, fevers, chills, nausea, or vomiting. + normal fetal movement.     Of note, she does have a history of  delivery at 36w and states that her current symptoms feel similar to her presentation before her last delivery. During that admission, she was monitored for some time before her labor started, but she states that she delivered quickly after the onset of contractions.         Antepartum Course:   Upon admission Ms Sloan was given a course of betamethasone from -. She received 12 hours of magnesium for fetal neuroprotection. Her infectious work-up was only notable for bacterial vaginosis for which she was started on metronidazole. GBS was checked which was negative. Her bleeding resolved by HD#2. During her initial admission she was noted to have elevated blood pressure less than 4 hours apart (not diagnostic for gestational hypertension). HELLP labs were normal and her blood pressure was normotensive since then. She had no more vaginal bleeding and was deemed stable for discharge HD#3. Prior to discharge she had a growth ultrasound which demonstrated normal growth and  was reassuring, EFw 53%ile, MVP 6.1cm.       Discharge Instructions and Follow-Up:   Discharge diet: Regular   Discharge activity: Activity as tolerated   Discharge follow-up: Follow-up with primary OB in 1-2 weeks           Discharge Disposition:   Discharged to home  Condition: Good      Sofía Taylor MD PhD  Ob/Gyn PGY-3  1/14/2019 8:57 AM    I, Sammie Hoang MD, personally saw and evaluated this patient.  I discussed the patient with the resident and care team, and agree with the assessment and plan of care as documented in the resident's note of 01/16/19.  I personally reviewed vital signs, medications, lab, and exam.     Key Findings:  Vaginal bleeding resolved.  Normotensive.  Betamethasone completed.  Reassuring fetal testing.      PLAN:  Discharge home.     Sammie Hoang MD   Date of Service (when I saw the patient) 01/14/19   '

## 2019-01-13 NOTE — CONSULTS
Neonatology Antepartum Counseling Consult      I was asked to provide antepartum counseling for Traci Sloan at the request of Theodora Alvarado MD secondary to vaginal bleeding, and  labor. Ms. Sloan is currently 31 6/7 weeks and has a hx significant for  delivery at 36 weeks, on 17 - OH progesterone. Betamethasone was administered on  and . Ms. Sloan, accompanied by her , was counseled on the expected hospital course, potential risks, and outcomes associated with an infant born at approximately 31 weeks gestation. The counseling included:  initial delivery room stabilization, respiratory course, lung development, hyperbilirubinemia, hemodynamic support, infection (including NEC), nutrition, growth and development, and long term outcomes. Please feel free to call with any additional questions or concerns.          Whit MONTERO, CNP 2019 11:38 AM   Nurse Practitioner Service    Intensive Care Unit  [facility]      Floor Time (min): 10  Face to Face Time (min): 15  Total Time (minutes): 25  More than 50% of my time was spent in direct, face to face, antepartum counseling with the above patient.

## 2019-01-13 NOTE — PROGRESS NOTES
OB ANTEPARTUM PROGRESS NOTE    Subjective: No contractions, no further bleeding. No leaking fluid, normal fetal movement.    Objective:  Patient Vitals for the past 24 hrs:   BP Temp Temp src Pulse Resp   19 1200 134/74 98.1  F (36.7  C) Oral -- 18   19 0820 136/74 99.1  F (37.3  C) Axillary -- 18   19 0449 117/62 98.1  F (36.7  C) Oral 74 18   19 2358 118/65 97.5  F (36.4  C) Oral 76 18   19 2030 135/76 97.9  F (36.6  C) Oral -- 18   19 1935 126/77 98.4  F (36.9  C) Oral -- 18   19 1555 125/77 -- -- -- --   19 1525 132/71 97.9  F (36.6  C) Oral -- 16   19 1455 118/65 -- -- -- --   19 1435 123/67 -- -- -- 18   19 1355 128/70 -- -- -- 16     Gen: sitting in bed, NAD    FHT: baseline 135-140, moderate variability, + accels, no decels  [place]: rare ctx    Assessment and Plan:  Ms. Traci Sloan is a 38 year old , at 31w6d by 9w3d US, who is admitted for episode of vaginal bleeding, now improved.     Vaginal bleeding: Possible abruption though has not continued to bleed so stable.  - GC/CT, UA (blood), and GBS negative  - UDS pending  - wet prep w/ + clue cells - D#2 PO flagyl  - S/p BMZ x2 (-, 1200), s/p mag ppx x12hrs  - S/p NICU consult  - Patient consented for  section in case of developing fetal distress  - Continue to monitor pad weights     Elevated BP:  - Intermittent mild range BPs in triage, not greater than 4hrs apart and none since  - HELLP labs and UPC wnl  - Continue to monitor     FWB:    Category I FHT, reactive and reassuring. TID monitoring  PNC:     Rh positive, Rubella immune, , Placenta anterior with no previa    Dispo: Home tomorrow if stable    Patient seen and plan discussed with Dr Burrsi.    Dayanara Matos MD  19 9:38 AM    Physician Attestation   I, AUNG BURRIS, saw this patient with the resident and agree with the resident/fellow's findings and plan of care as documented in the note.       I personally reviewed vital signs, labs and NST.    Key findings: 34 y/o  at 31w6d admit after episode of vaginal bleeding, no further bleeding noted. S/p betamethasone now x2 and undergoing treatment for BV. Will monitor until tomorrow and if no more bleeding then plan for discharge home.  Consider growth ultrasound since has not had one since 10/29/18 for tomorrow am.    AUNG GUNDERSON MD  Date of Service (when I saw the patient): 19

## 2019-01-14 ENCOUNTER — TELEPHONE (OUTPATIENT)
Dept: FAMILY MEDICINE | Facility: CLINIC | Age: 39
End: 2019-01-14

## 2019-01-14 ENCOUNTER — HOSPITAL ENCOUNTER (INPATIENT)
Dept: ULTRASOUND IMAGING | Facility: CLINIC | Age: 39
DRG: 832 | End: 2019-01-14
Payer: COMMERCIAL

## 2019-01-14 VITALS
RESPIRATION RATE: 18 BRPM | WEIGHT: 192 LBS | HEART RATE: 70 BPM | HEIGHT: 68 IN | BODY MASS INDEX: 29.1 KG/M2 | SYSTOLIC BLOOD PRESSURE: 140 MMHG | DIASTOLIC BLOOD PRESSURE: 84 MMHG | TEMPERATURE: 98.1 F

## 2019-01-14 PROCEDURE — 25000132 ZZH RX MED GY IP 250 OP 250 PS 637: Performed by: STUDENT IN AN ORGANIZED HEALTH CARE EDUCATION/TRAINING PROGRAM

## 2019-01-14 PROCEDURE — 76816 OB US FOLLOW-UP PER FETUS: CPT

## 2019-01-14 PROCEDURE — 25000128 H RX IP 250 OP 636: Performed by: STUDENT IN AN ORGANIZED HEALTH CARE EDUCATION/TRAINING PROGRAM

## 2019-01-14 PROCEDURE — 90686 IIV4 VACC NO PRSV 0.5 ML IM: CPT | Performed by: STUDENT IN AN ORGANIZED HEALTH CARE EDUCATION/TRAINING PROGRAM

## 2019-01-14 PROCEDURE — 99238 HOSP IP/OBS DSCHRG MGMT 30/<: CPT | Mod: GC | Performed by: OBSTETRICS & GYNECOLOGY

## 2019-01-14 RX ADMIN — OMEPRAZOLE 20 MG: 20 CAPSULE, DELAYED RELEASE ORAL at 07:57

## 2019-01-14 RX ADMIN — DEXTROAMPHETAMINE SACCHARATE, AMPHETAMINE ASPARTATE, DEXTROAMPHETAMINE SULFATE AND AMPHETAMINE SULFATE 10 MG: 2.5; 2.5; 2.5; 2.5 TABLET ORAL at 07:57

## 2019-01-14 RX ADMIN — DEXTROAMPHETAMINE SACCHARATE, AMPHETAMINE ASPARTATE, DEXTROAMPHETAMINE SULFATE, AND AMPHETAMINE SULFATE 30 MG: 7.5; 7.5; 7.5; 7.5 CAPSULE, EXTENDED RELEASE ORAL at 07:57

## 2019-01-14 RX ADMIN — METRONIDAZOLE 500 MG: 500 TABLET ORAL at 07:57

## 2019-01-14 RX ADMIN — SENNOSIDES AND DOCUSATE SODIUM 2 TABLET: 8.6; 5 TABLET ORAL at 07:57

## 2019-01-14 RX ADMIN — INFLUENZA A VIRUS A/MICHIGAN/45/2015 X-275 (H1N1) ANTIGEN (FORMALDEHYDE INACTIVATED), INFLUENZA A VIRUS A/SINGAPORE/INFIMH-16-0019/2016 IVR-186 (H3N2) ANTIGEN (FORMALDEHYDE INACTIVATED), INFLUENZA B VIRUS B/PHUKET/3073/2013 ANTIGEN (FORMALDEHYDE INACTIVATED), AND INFLUENZA B VIRUS B/MARYLAND/15/2016 BX-69A ANTIGEN (FORMALDEHYDE INACTIVATED) 0.5 ML: 15; 15; 15; 15 INJECTION, SUSPENSION INTRAMUSCULAR at 09:55

## 2019-01-14 NOTE — PROVIDER NOTIFICATION
01/13/19 2103   Provider Notification   Provider Name/Title Dr. ALLEN Bryan   Method of Notification Electronic Page   Notification Reason Patient Request     Patient requesting something for acid reflex. Wants something other than Tums- takes Prilosec at home. Thanks.

## 2019-01-14 NOTE — TELEPHONE ENCOUNTER
Central Prior Authorization Team   Phone: [phone]      PA Initiation    Medication: amphetamine-dextroamphetamine (ADDERALL XR) 30 MG 24 hr capsule  Insurance Company: [employer] - Phone [phone] Fax [phone]  Pharmacy Filling the Rx: [facility] [address] [place], [place] - [address] [facility] AT [address]  Filling Pharmacy Phone: [phone]  Filling Pharmacy Fax:    Start Date: 1/14/2019

## 2019-01-14 NOTE — PLAN OF CARE
Patient's VSS. Afebrile. Has not had bleeding since 3pm yesterday, just brown discharge noticed when wiping. Denies feeling contractions or LOF. Evening EFM done. PIV saline locked. Patient had  and daughter here to visit this evening. Continue to monitor.

## 2019-01-14 NOTE — PLAN OF CARE
Afebrile, VSS. Pt denies pain, LOF, ctx. No bldg overnight. EFM as charted. Plan to discharge today pending any changes in pt condition. Continue to monitor, contact MD with questions/concerns.

## 2019-01-14 NOTE — DISCHARGE INSTRUCTIONS
Discharge Instruction for Undelivered Patients      You were seen for: Bleeding Assessment  We Consulted: Dr. Hoang and Dr. Taylor  You had (Test or Medicine):labs, fetal monitoring, ultrasound     Diet:   Drink 8 to 12 glasses of liquids (milk, juice, water) every day.  You may eat meals and snacks.     Activity:  Count fetal kicks everyday (see handout)     Call your provider if you notice:  Swelling in your face or increased swelling in your hands or legs.  Headaches that are not relieved by Tylenol (acetaminophen).  Changes in your vision (blurring: seeing spots or stars.)  Nausea (sick to your stomach) and vomiting (throwing up).   Weight gain of 5 pounds or more per week.  Heartburn that doesn't go away.  Signs of bladder infection: pain when you urinate (use the toilet), need to go more often and more urgently.  The bag of lazo (rupture of membranes) breaks, or you notice leaking in your underwear.  Bright red blood in your underwear.  Abdominal (lower belly) or stomach pain.  Second (plus) baby: Contractions (tightening) less than 10 minutes apart and getting stronger.  *If less than 34 weeks: Contractions (tightenings) more than 6 times in one hour.  Increase or change in vaginal discharge (note the color and amount)      Follow-up:  As scheduled in the clinic

## 2019-01-14 NOTE — PLAN OF CARE
Patient stable and continues to have no bleeding. EFM as charted; VSS. Plan to discharge home with remaining flagyl doses. Activity restrictions discussed with providers. Patient agreeable.

## 2019-01-14 NOTE — PROGRESS NOTES
OB ANTEPARTUM PROGRESS NOTE    Subjective: No contractions, no further bleeding. No leaking fluid, normal fetal movement. She is eager to go home.     Objective:  Patient Vitals for the past 24 hrs:   BP Temp Temp src Pulse Resp   19 0343 132/79 98.7  F (37.1  C) Oral 70 16   19 2346 129/75 98.5  F (36.9  C) Oral 78 16   19 1955 135/86 98.8  F (37.1  C) Oral -- 16   19 1200 134/74 98.1  F (36.7  C) Oral -- 18   19 0820 136/74 99.1  F (37.3  C) Axillary -- 18     Gen: sitting in bed, NAD    FHT: baseline 130, moderate variability, + accels, no decels  [place]: rare ctx    Assessment and Plan:  Ms. Traci Sloan is a 38 year old , at 32w0d by 9w3d US, who is admitted for episode of vaginal bleeding, now improved.     Vaginal bleeding: Possible abruption though has not continued to bleed so stable.  - GC/CT, UA (blood), and GBS negative  - UDS pending  - wet prep w/ + clue cells - D#2 PO flagyl  - S/p BMZ x2 (-, 1200), s/p mag ppx x12hrs  - S/p NICU consult  - Patient consented for  section in case of developing fetal distress  - Continue to monitor pad weights     Elevated BP:  - Intermittent mild range BPs in triage, not greater than 4hrs apart and none since  - HELLP labs and UPC wnl  - Continue to monitor     FWB:    Category I FHT, reactive and reassuring. TID monitoring  PNC:     Rh positive, Rubella immune, , Placenta anterior with no previa    Dispo: Home today    Patient seen and plan discussed with Dr Hoang.    Sofía Taylor MD PhD  Ob/Gyn PGY-3  2019 8:55 AM    I, Sammie Hoang MD, personally saw and evaluated this patient.  I discussed the patient with the resident and care team, and agree with the assessment and plan of care as documented in the resident's note of 19.  I personally reviewed vital signs, medications, lab, and exam.     Key Findings:  No vaginal bleeding, no regular uterine contractions.  FHTs reassuring.  Growth ultrasound  today 4# 5oz, 53rd percentile.  MVP 6.1.  Abdomen non-tender, fundus non-tender.       PLAN:  Discharge home.  She will RTC in 2 days, call prn.  She will complete the course of oral Flagyl.      Sammie Hoang MD   Date of Service (when I saw the patient) 01/14/19   '

## 2019-01-14 NOTE — TELEPHONE ENCOUNTER
Central Prior Authorization Team   Phone: [phone]      PA Initiation    Medication: amphetamine-dextroamphetamine (ADDERALL) 10 MG tablet  Insurance Company: [employer] - Phone [phone] Fax [phone]  Pharmacy Filling the Rx: [facility] [id number] [place] [address] AT [address]  Filling Pharmacy Phone: [phone]  Filling Pharmacy Fax: [phone]  Start Date: 1/14/2019

## 2019-01-15 NOTE — TELEPHONE ENCOUNTER
Prior Authorization Approval    Authorization Effective Date: 1/15/2019  Authorization Expiration Date: 1/15/2021  Medication: amphetamine-dextroamphetamine (ADDERALL) 10 MG tablet  Approved Dose/Quantity:    Reference #: [id number]   Insurance Company: [employer] - Phone [phone] Fax [phone]  Expected CoPay:       CoPay Card Available:      Foundation Assistance Needed:    Which Pharmacy is filling the prescription (Not needed for infusion/clinic administered): [facility] [address] [place] [address] AT [facility] [address] [facility]  Pharmacy Notified: Yes  Patient Notified: Yes

## 2019-01-15 NOTE — PROGRESS NOTES
SUBJECTIVE:   Traci Sloan is a 38 year old female who presents to clinic today for the following health issues:    Mole      Duration: whole life     Description  Location: lower right side mid back  Itching: when it starts to heal a little bit.     Intensity:  Mild, varies to moderate     Accompanying signs and symptoms: irritation     History (similar episodes/previous evaluation): Yes, when was about 17 years old    Precipitating or alleviating factors:  New exposures:  None  Recent travel: no      Therapies tried and outcome: peroxide         Problem list and histories reviewed & adjusted, as indicated.  Additional history: as documented      Reviewed and updated as needed this visit by clinical staff       Reviewed and updated as needed this visit by Provider         ROS:    ROS:5 point ROS including CONST, HEENT, Respiratory, CV, and GI other than that noted in the HPI,  is negative       OBJECTIVE:     /90   Pulse 96   Temp 98.5  F (36.9  C) (Oral)   Wt 88.9 kg (195 lb 14.4 oz)   LMP 06/10/2018   SpO2 98%   BMI 29.79 kg/m    Body mass index is 29.79 kg/m .  Right side lower back:  fleshy, septated 8 mm mole with approx 6 mm base; there is flat erythema surrounding the mole, no warmth or tenderness, along inferior portion of the mole the skin appear tense and has a pearly streak, just lateral to the base of the mole and seemingly extending under the mole is an subcutaneous firm, non-tender cyst approx 5 mm x 10 mm.    Obtained informed consent for patient for mole removal and possibility of overlapping subcutaneous cyst.  Cleaned area with alcohol swab.  Injected lidocaine with epinephrine to tissue surrounding mole to numbness.  During injection, non-bloody, non-purulent sebaceous material started to come from an opening the base of the mole.  I compressed the area to get out as much material as possible before moving on to mole removal.  Mole was held with tweezers and shaved close to base  with 10 blade scalpel.  The mole was removed in two parts and immediately placed into formulin within the space of one minute.  The area was cauterized, covered with petroleum jelly and a large bandage    Diagnostic Test Results:  Pathology pending    ASSESSMENT/PLAN:       (D22.9) Atypical mole  (primary encounter diagnosis)  Comment:   Plan: Surgical pathology exam, DESTRUCT PREMALIGNANT         LESION, FIRST   Keep area clean and dry.  Return if pain, redness, swelling.     (L72.3) Sebaceous cyst  Comment:   Plan: Essentially had I and D via syringe puncture.  Patient tolerated drainage and material was neither bloody no purulent.  Did discuss that it can return.  Have it looked at if the area swells due to the concurrent mole removal in the same area.    See Patient Instructions    WINSTON [facility]

## 2019-01-16 ENCOUNTER — OFFICE VISIT (OUTPATIENT)
Dept: FAMILY MEDICINE | Facility: CLINIC | Age: 39
End: 2019-01-16
Payer: COMMERCIAL

## 2019-01-16 ENCOUNTER — PRENATAL OFFICE VISIT (OUTPATIENT)
Dept: OBGYN | Facility: CLINIC | Age: 39
End: 2019-01-16
Payer: COMMERCIAL

## 2019-01-16 VITALS
BODY MASS INDEX: 29.79 KG/M2 | TEMPERATURE: 98.5 F | OXYGEN SATURATION: 98 % | SYSTOLIC BLOOD PRESSURE: 150 MMHG | WEIGHT: 195.9 LBS | DIASTOLIC BLOOD PRESSURE: 90 MMHG | HEART RATE: 96 BPM

## 2019-01-16 VITALS
WEIGHT: 195.9 LBS | DIASTOLIC BLOOD PRESSURE: 82 MMHG | BODY MASS INDEX: 29.79 KG/M2 | TEMPERATURE: 98.5 F | SYSTOLIC BLOOD PRESSURE: 136 MMHG | HEART RATE: 96 BPM

## 2019-01-16 DIAGNOSIS — O09.899 HISTORY OF PRETERM DELIVERY, CURRENTLY PREGNANT: Primary | ICD-10-CM

## 2019-01-16 DIAGNOSIS — L72.3 SEBACEOUS CYST: ICD-10-CM

## 2019-01-16 DIAGNOSIS — D22.9 ATYPICAL MOLE: Primary | ICD-10-CM

## 2019-01-16 LAB
AMPHET UR CFM-MCNC: 4431 NG/ML
AMPHETAMINES UR QL SCN>1000 NG/ML: NORMAL
BARBITURATES UR QL SCN: NEGATIVE
BENZODIAZ UR QL SCN: NEGATIVE
BZE UR QL SCN>300 NG/ML: NEGATIVE
CARBOXYTHC UR QL SCN: NEGATIVE
CREAT UR-MCNC: 38 MG/DL
ETHANOL UR QL: NEGATIVE
METHADONE UR QL SCN: NEGATIVE
OPIATES UR QL SCN: NEGATIVE
OXYCODONE UR QL SCN: NEGATIVE
PCP CTO UR SCN-MCNC: NEGATIVE NG/ML

## 2019-01-16 PROCEDURE — 11301 SHAVE SKIN LESION 0.6-1.0 CM: CPT | Performed by: NURSE PRACTITIONER

## 2019-01-16 PROCEDURE — 99207 ZZC PRENATAL VISIT: CPT | Performed by: OBSTETRICS & GYNECOLOGY

## 2019-01-16 PROCEDURE — 88305 TISSUE EXAM BY PATHOLOGIST: CPT | Performed by: NURSE PRACTITIONER

## 2019-01-16 NOTE — TELEPHONE ENCOUNTER
Prior Authorization Approval    Authorization Effective Date: 1/15/2019  Authorization Expiration Date: 1/15/2021  Medication: amphetamine-dextroamphetamine (ADDERALL XR) 30 MG 24 hr capsule  Approved Dose/Quantity:    Reference #:     Insurance Company: [employer] - Phone [phone] Fax [phone]  Expected CoPay:       CoPay Card Available:      Foundation Assistance Needed:    Which Pharmacy is filling the prescription (Not needed for infusion/clinic administered): [facility] [address] [place] [address] AT [facility] [address] [facility]  Pharmacy Notified: Yes  Patient Notified: Yes    Patient has already picked up the medication.

## 2019-01-16 NOTE — LETTER
[facility]  [address] [place] [address] [address]  [facility] [id number] [phone]      January 16, 2019      Regarding:  Traci Sloan  YOB: 1980  [address]  [facility] [address]    To Whom it May Concern:     Traci is currently pregnant with an expected due date of 3/11/19.  The pregnancy has had some minor complications recently and I have advised her to cut back on certain activities for the remainder of the pregnancy.  I have advised her to avoid lifting more than 10lbs, extended standing for greater than 4 hours at a time, or pushing/pulling heavy objects.    Please feel free to contact me with any questions.  Thank you.        Sincerely,            Liz Haider MD

## 2019-01-16 NOTE — Clinical Note
Ovi - based on my description of the procedure, should I put an I and D associated with the sebaceous cyst?  MARIBETH Hernandez

## 2019-01-16 NOTE — PROGRESS NOTES
32w2d feeling really stressed out, just rushed here from work and has mole removal to follow this visit. Initial /90, repeat 136/82. She denies HA, scotomata, RUQ pain.  She was hospitalized for 2 days last week following episode of bleeding.  Received BMTZ and MgSo4.  No bleeding since and no pain or cramping.  Good fm.  Discussed s/sx of PTL and when to call.  Letter give for work to avoid heavy lifting, etc.  RTC 2 weeks  jlp

## 2019-01-18 LAB — COPATH REPORT: NORMAL

## 2019-01-18 NOTE — RESULT ENCOUNTER NOTE
Traci,    The path on your mole was normal.  If you have any questions, please feel free to contact the clinic.    MARIBETH Hernandez

## 2019-01-29 ENCOUNTER — PRENATAL OFFICE VISIT (OUTPATIENT)
Dept: OBGYN | Facility: CLINIC | Age: 39
End: 2019-01-29
Payer: COMMERCIAL

## 2019-01-29 VITALS
HEART RATE: 84 BPM | SYSTOLIC BLOOD PRESSURE: 138 MMHG | OXYGEN SATURATION: 99 % | HEIGHT: 68 IN | BODY MASS INDEX: 30.46 KG/M2 | WEIGHT: 201 LBS | DIASTOLIC BLOOD PRESSURE: 88 MMHG

## 2019-01-29 DIAGNOSIS — Z34.83 ENCOUNTER FOR SUPERVISION OF OTHER NORMAL PREGNANCY IN THIRD TRIMESTER: Primary | ICD-10-CM

## 2019-01-29 PROCEDURE — 99207 ZZC PRENATAL VISIT: CPT | Performed by: OBSTETRICS & GYNECOLOGY

## 2019-01-29 ASSESSMENT — MIFFLIN-ST. JEOR: SCORE: 1651.98

## 2019-01-29 NOTE — PROGRESS NOTES
34w1d feeling ok, getting more uncomfortable and occasional BH ctx.  S/sx of labor discussed.  Good fm.  Had gi bug last week, better now.  BP high normal, h/o ghtn last pregnancy, will RTC weekly.  FH low normal, may get growth after next visit.  pearl

## 2019-02-08 ENCOUNTER — PRENATAL OFFICE VISIT (OUTPATIENT)
Dept: OBGYN | Facility: CLINIC | Age: 39
End: 2019-02-08
Payer: COMMERCIAL

## 2019-02-08 ENCOUNTER — HOSPITAL ENCOUNTER (OUTPATIENT)
Facility: CLINIC | Age: 39
Discharge: HOME | End: 2019-02-08
Attending: OBSTETRICS & GYNECOLOGY | Admitting: OBSTETRICS & GYNECOLOGY
Payer: COMMERCIAL

## 2019-02-08 VITALS — TEMPERATURE: 98 F | DIASTOLIC BLOOD PRESSURE: 92 MMHG | RESPIRATION RATE: 18 BRPM | SYSTOLIC BLOOD PRESSURE: 151 MMHG

## 2019-02-08 VITALS
SYSTOLIC BLOOD PRESSURE: 153 MMHG | HEART RATE: 106 BPM | WEIGHT: 205.8 LBS | BODY MASS INDEX: 31.19 KG/M2 | DIASTOLIC BLOOD PRESSURE: 92 MMHG

## 2019-02-08 DIAGNOSIS — O16.9 HYPERTENSION DURING PREGNANCY, ANTEPARTUM, UNSPECIFIED HYPERTENSION IN PREGNANCY TYPE: Primary | ICD-10-CM

## 2019-02-08 LAB
ABO + RH BLD: NORMAL
ABO + RH BLD: NORMAL
ALT SERPL W P-5'-P-CCNC: 17 U/L (ref 0–50)
AST SERPL W P-5'-P-CCNC: 12 U/L (ref 0–45)
BLD GP AB SCN SERPL QL: NORMAL
BLOOD BANK CMNT PATIENT-IMP: NORMAL
CREAT SERPL-MCNC: 0.5 MG/DL (ref 0.52–1.04)
CREAT UR-MCNC: 77 MG/DL
ERYTHROCYTE [DISTWIDTH] IN BLOOD BY AUTOMATED COUNT: 12.6 % (ref 10–15)
GFR SERPL CREATININE-BSD FRML MDRD: >90 ML/MIN/{1.73_M2}
HCT VFR BLD AUTO: 30.5 % (ref 35–47)
HGB BLD-MCNC: 9.8 G/DL (ref 11.7–15.7)
MCH RBC QN AUTO: 27 PG (ref 26.5–33)
MCHC RBC AUTO-ENTMCNC: 32.1 G/DL (ref 31.5–36.5)
MCV RBC AUTO: 84 FL (ref 78–100)
PLATELET # BLD AUTO: 408 10E9/L (ref 150–450)
PROT UR-MCNC: 0.11 G/L
PROT/CREAT 24H UR: 0.15 G/G CR (ref 0–0.2)
RBC # BLD AUTO: 3.63 10E12/L (ref 3.8–5.2)
SPECIMEN EXP DATE BLD: NORMAL
URATE SERPL-MCNC: 4.1 MG/DL (ref 2.6–6)
WBC # BLD AUTO: 7 10E9/L (ref 4–11)

## 2019-02-08 PROCEDURE — 86850 RBC ANTIBODY SCREEN: CPT | Performed by: OBSTETRICS & GYNECOLOGY

## 2019-02-08 PROCEDURE — 84156 ASSAY OF PROTEIN URINE: CPT | Performed by: OBSTETRICS & GYNECOLOGY

## 2019-02-08 PROCEDURE — 86901 BLOOD TYPING SEROLOGIC RH(D): CPT | Performed by: OBSTETRICS & GYNECOLOGY

## 2019-02-08 PROCEDURE — 85027 COMPLETE CBC AUTOMATED: CPT | Performed by: OBSTETRICS & GYNECOLOGY

## 2019-02-08 PROCEDURE — 82565 ASSAY OF CREATININE: CPT | Performed by: OBSTETRICS & GYNECOLOGY

## 2019-02-08 PROCEDURE — 59025 FETAL NON-STRESS TEST: CPT | Mod: 26 | Performed by: OBSTETRICS & GYNECOLOGY

## 2019-02-08 PROCEDURE — G0463 HOSPITAL OUTPT CLINIC VISIT: HCPCS | Mod: 25

## 2019-02-08 PROCEDURE — 99207 ZZC PRENATAL VISIT: CPT | Performed by: OBSTETRICS & GYNECOLOGY

## 2019-02-08 PROCEDURE — 84460 ALANINE AMINO (ALT) (SGPT): CPT | Performed by: OBSTETRICS & GYNECOLOGY

## 2019-02-08 PROCEDURE — 99213 OFFICE O/P EST LOW 20 MIN: CPT | Mod: 25 | Performed by: OBSTETRICS & GYNECOLOGY

## 2019-02-08 PROCEDURE — 86900 BLOOD TYPING SEROLOGIC ABO: CPT | Performed by: OBSTETRICS & GYNECOLOGY

## 2019-02-08 PROCEDURE — 84550 ASSAY OF BLOOD/URIC ACID: CPT | Performed by: OBSTETRICS & GYNECOLOGY

## 2019-02-08 PROCEDURE — 36415 COLL VENOUS BLD VENIPUNCTURE: CPT | Performed by: OBSTETRICS & GYNECOLOGY

## 2019-02-08 PROCEDURE — 84450 TRANSFERASE (AST) (SGOT): CPT | Performed by: OBSTETRICS & GYNECOLOGY

## 2019-02-08 PROCEDURE — 59025 FETAL NON-STRESS TEST: CPT

## 2019-02-08 RX ORDER — ACETAMINOPHEN 325 MG/1
650 TABLET ORAL EVERY 4 HOURS PRN
Status: CANCELLED | OUTPATIENT
Start: 2019-02-08

## 2019-02-08 RX ORDER — HYDROXYZINE HYDROCHLORIDE 10 MG/1
50 TABLET, FILM COATED ORAL EVERY 6 HOURS PRN
Status: CANCELLED | OUTPATIENT
Start: 2019-02-08

## 2019-02-08 RX ORDER — ONDANSETRON 2 MG/ML
4 INJECTION INTRAMUSCULAR; INTRAVENOUS EVERY 6 HOURS PRN
Status: CANCELLED | OUTPATIENT
Start: 2019-02-08

## 2019-02-08 RX ORDER — ALUMINA, MAGNESIA, AND SIMETHICONE 2400; 2400; 240 MG/30ML; MG/30ML; MG/30ML
30 SUSPENSION ORAL
Status: DISCONTINUED | OUTPATIENT
Start: 2019-02-08 | End: 2019-02-08 | Stop reason: HOSPADM

## 2019-02-08 RX ORDER — HYDROXYZINE HYDROCHLORIDE 50 MG/1
50 TABLET, FILM COATED ORAL EVERY 6 HOURS PRN
Status: DISCONTINUED | OUTPATIENT
Start: 2019-02-08 | End: 2019-02-08 | Stop reason: HOSPADM

## 2019-02-08 RX ORDER — ACETAMINOPHEN 325 MG/1
650 TABLET ORAL EVERY 4 HOURS PRN
Status: DISCONTINUED | OUTPATIENT
Start: 2019-02-08 | End: 2019-02-08 | Stop reason: HOSPADM

## 2019-02-08 RX ORDER — ONDANSETRON 2 MG/ML
4 INJECTION INTRAMUSCULAR; INTRAVENOUS EVERY 6 HOURS PRN
Status: DISCONTINUED | OUTPATIENT
Start: 2019-02-08 | End: 2019-02-08 | Stop reason: HOSPADM

## 2019-02-08 RX ORDER — ALUMINA, MAGNESIA, AND SIMETHICONE 2400; 2400; 240 MG/30ML; MG/30ML; MG/30ML
30 SUSPENSION ORAL
Status: CANCELLED | OUTPATIENT
Start: 2019-02-08

## 2019-02-08 NOTE — PROGRESS NOTES
35w4d just not feeling great since yesterday.  No obvious headache, but overall not good.  BP elevated, 153/92, 154/92.  Will send to L&D for preeclampsia eval.  Discussed that if everything is normal, would follow closely.  If gest htn or mild preeclampsia, plan for growth and weekly BPP until delivery.  We discussed delivery now or prior to 37wks if severe.  Will update on call.  pearl

## 2019-02-08 NOTE — PROGRESS NOTES
Data: [facility] at 0940.   Reason for maternal/fetal assessment per patient is pre-eclampsia eval. Patient is a . Prenatal record reviewed.      Obstetric History       T0      L1     SAB1   TAB0   Ectopic0   Multiple0   Live Births1       # Outcome Date GA Lbr Scar/2nd Weight Sex Delivery Anes PTL Lv   4 Current            3  16 36w0d 77:00 / 00:31 2.39 kg (5 lb 4.3 oz) F Vag-Spont EPI  ALKA      Name: RICK COSME      Apgar1:  9                Apgar5: 9   2 SAB 13 5w0d          1 AB 2010 5w0d                Medical History:   Past Medical History:   Diagnosis Date     Abnormal Pap smear of cervix 2018    LSIL, Neg HPV, pregnant     ADHD (attention deficit hyperactivity disorder)      Anemia      Gastric reflux      IBS (irritable bowel syndrome)    . Gestational Age 35w4d. VSS. Cervix: not examined.  Fetal movement present. Patient denies cramping, backache, vaginal discharge, pelvic pressure, UTI symptoms, GI problems, bloody show, vaginal bleeding, edema, headache, visual disturbances, epigastric or URQ pain, abdominal pain, rupture of membranes. Support persons not present.  Action: Verbal consent for EFM. Triage assessment completed. EFM applied for NST. Uterine assessment toco. Fetal assessment: Presumed adequate fetal oxygenation documented (see flow record). Patient education pamphlets given on fetal movement counts and discontinue instructions. Patient instructed to report change in fetal movement, vaginal leaking of fluid or bleeding, abdominal pain, or any concerns related to the pregnancy to her nurse/physician.   Response: Dr. Ruvalcaba informed of pt arrival. Plan per provider is labs WNL, discontinue to home and return to clinic 2x/week. Patient verbalized understanding of education and verbalized agreement with plan. Discharged ambulatory at 1118.

## 2019-02-08 NOTE — PROGRESS NOTES
"Obstetrics Triage Note  CC: Preeclampsia rule out    HPI:  Traci Sloan is a 38 year old  at 35w4d by 9w3d US who presents for rule out preeclampsia.  She states that over the past 24 hours she has been feeling generally \"off\" with a mild headache increased swelling in her hands and feet last night. She was seen in clinic this morning and noted to have elevated BP in the 150's/90's and was sent to L&D for preeclampsia rule out. She denies vision change, c/p, sob, RUQ pain, n/v, ctx, vaginal bleeding, or LOF. Normal fetal movement.    Pregnancy complicated by:  - H/o gHTN  - H/o PTD at 36 weeks, no 17-OHP  - Admitted with episode of vaginal bleeding of unknown etiology at 31w5d s/p BMZ course  - LSIL pap, HPV neg. Needs colpo postpartum  - ADHD    ROS:  Negative except as mentioned in HPI.    PMH:  Past Medical History:   Diagnosis Date     Abnormal Pap smear of cervix 2018    LSIL, Neg HPV, pregnant     ADHD (attention deficit hyperactivity disorder)      Anemia      Gastric reflux      IBS (irritable bowel syndrome)        PSHx:  Past Surgical History:   Procedure Laterality Date     CYSTOSCOPY, DILATE URETHRA, COMBINED  1999    for frequent UTI'S     KNEE SURGERY  10/1997       Medications:  Current Facility-Administered Medications   Medication     acetaminophen (TYLENOL) tablet 650 mg     alum & mag hydroxide-simethicone (MYLANTA ES/MAALOX  ES) suspension 30 mL     hydrOXYzine (ATARAX) tablet 50 mg     NO Rho (D) immune globulin (RhoGam) needed - mother Rh POSITIVE     ondansetron (ZOFRAN) injection 4 mg       Allergies:   No Known Allergies    Physical Exam:   Vitals:    19 1025 19 1045 19 1055 19 1110   BP: 151/87 145/83 147/86 (!) 151/92   Resp:   18 18   Temp:       TempSrc:          Gen: resting comfortably, in NAD  CV: Regular rate, well perfused  Pulm: No increased work of breathing  Abd: soft, gravid, non-tender, non-distended  Extremities: 1+ edema  Neuro: 3+ " patellar reflex RLE, 2+ LLE. 1+ brachioradialis     NST:  FHT: , moderate variability, 15x15 accelerations, no decelerations  [place]: Quiet    Labs:  Hgb 9.8  Plts 408  Cr 0.50  AST 12  ALT 17  UPC 0.15    Assessment/Plan:   Traci Sloan is a 38 year old  at 35w4d by 9w3d US who presents for rule out preeclampsia.  Patient with single non-sustained severe range BP on admit, subsequently mild range (140's-150's/90's). No symptoms of preeclampsia aside from very mild headache. HELLP labs wnl, UPC <0.3. Patient does not yet meet criteria for hypertensive disorder of pregnancy given BP's <4 hours apart, although will likely meet criteria for gHTN. Discussed close follow up in clinic next week for BP monitoring. Patient has an appt on Tuesday. Recommended she make another appt on Friday as well. Discussed signs and symptoms to return to care.     FWB: Category I, reactive.     Patient seen and discussed with Dr. Burris.     Dispo: to home with follow up in the next 3 days. Discussed labor warning signs and indication to return to care.    Celina Ruvalcaba MD  Ob/Gyn PGY-2  19 11:05 AM       Physician Attestation   I, AUNG BURRIS, saw this patient with the resident and agree with the resident/fellow's findings and plan of care as documented in the note.      I personally reviewed vital signs and labs.    Key findings: NST reactive and labs are all within normal limits.  Blood pressures in the mild range while she was in triage.  Has appointment Tuesday and will do twice weekly appointments and plan for induction of labor at 37 weeks if not indicated prior to that.  Preeclampsia signs and symptoms were reviewed and the patient can repeat these back and will call if something occurs prior to appointment.    AUNG BRURIS MD  Date of Service (when I saw the patient): 19

## 2019-02-08 NOTE — DISCHARGE INSTRUCTIONS
Discharge Instruction for Undelivered Patients      You were seen for: pre-e assessment   We Consulted: Dr. Ruvalcaba  You had (Test or Medicine):labs     Diet:   You may eat meals and snacks.     Activity:  Call your doctor or nurse midwife if your baby is moving less than usual.     Call your provider if you notice:  Swelling in your face or increased swelling in your hands or legs.  Headaches that are not relieved by Tylenol (acetaminophen).  Changes in your vision (blurring: seeing spots or stars.)  Nausea (sick to your stomach) and vomiting (throwing up).   Weight gain of 5 pounds or more per week.  Heartburn that doesn't go away.  Signs of bladder infection: pain when you urinate (use the toilet), need to go more often and more urgently.  The bag of lazo (rupture of membranes) breaks, or you notice leaking in your underwear.  Bright red blood in your underwear.  Abdominal (lower belly) or stomach pain.  For first baby: Contractions (tightening) less than 5 minutes apart for one hour or more.  Second (plus) baby: Contractions (tightening) less than 10 minutes apart and getting stronger.  *If less than 34 weeks: Contractions (tightenings) more than 6 times in one hour.  Increase or change in vaginal discharge (note the color and amount)    Follow-up:  As scheduled in the clinic

## 2019-02-09 ENCOUNTER — HOSPITAL ENCOUNTER (INPATIENT)
Facility: CLINIC | Age: 39
LOS: 1 days | Discharge: HOME | DRG: 833 | End: 2019-02-10
Attending: OBSTETRICS & GYNECOLOGY | Admitting: OBSTETRICS & GYNECOLOGY
Payer: COMMERCIAL

## 2019-02-09 PROBLEM — O13.9 GESTATIONAL HYPERTENSION: Status: ACTIVE | Noted: 2019-02-09

## 2019-02-09 LAB
ALT SERPL W P-5'-P-CCNC: 12 U/L (ref 0–50)
AST SERPL W P-5'-P-CCNC: 11 U/L (ref 0–45)
CREAT SERPL-MCNC: 0.58 MG/DL (ref 0.52–1.04)
ERYTHROCYTE [DISTWIDTH] IN BLOOD BY AUTOMATED COUNT: 12.7 % (ref 10–15)
GFR SERPL CREATININE-BSD FRML MDRD: >90 ML/MIN/{1.73_M2}
HCT VFR BLD AUTO: 32.6 % (ref 35–47)
HGB BLD-MCNC: 10.2 G/DL (ref 11.7–15.7)
MCH RBC QN AUTO: 26.3 PG (ref 26.5–33)
MCHC RBC AUTO-ENTMCNC: 31.3 G/DL (ref 31.5–36.5)
MCV RBC AUTO: 84 FL (ref 78–100)
PLATELET # BLD AUTO: 422 10E9/L (ref 150–450)
PROT UR-MCNC: <0.05 G/L
PROT/CREAT 24H UR: NORMAL G/G CR (ref 0–0.2)
RBC # BLD AUTO: 3.88 10E12/L (ref 3.8–5.2)
TROPONIN I SERPL-MCNC: <0.015 UG/L (ref 0–0.04)
WBC # BLD AUTO: 12.9 10E9/L (ref 4–11)

## 2019-02-09 PROCEDURE — 84460 ALANINE AMINO (ALT) (SGPT): CPT | Performed by: OBSTETRICS & GYNECOLOGY

## 2019-02-09 PROCEDURE — 86901 BLOOD TYPING SEROLOGIC RH(D): CPT | Performed by: OBSTETRICS & GYNECOLOGY

## 2019-02-09 PROCEDURE — 84484 ASSAY OF TROPONIN QUANT: CPT | Performed by: OBSTETRICS & GYNECOLOGY

## 2019-02-09 PROCEDURE — 25000125 ZZHC RX 250: Performed by: OBSTETRICS & GYNECOLOGY

## 2019-02-09 PROCEDURE — 84156 ASSAY OF PROTEIN URINE: CPT | Performed by: OBSTETRICS & GYNECOLOGY

## 2019-02-09 PROCEDURE — 93010 ELECTROCARDIOGRAM REPORT: CPT | Performed by: INTERNAL MEDICINE

## 2019-02-09 PROCEDURE — 12000001 ZZH R&B MED SURG/OB UMMC

## 2019-02-09 PROCEDURE — 85027 COMPLETE CBC AUTOMATED: CPT | Performed by: OBSTETRICS & GYNECOLOGY

## 2019-02-09 PROCEDURE — 82565 ASSAY OF CREATININE: CPT | Performed by: OBSTETRICS & GYNECOLOGY

## 2019-02-09 PROCEDURE — 93005 ELECTROCARDIOGRAM TRACING: CPT

## 2019-02-09 PROCEDURE — 86850 RBC ANTIBODY SCREEN: CPT | Performed by: OBSTETRICS & GYNECOLOGY

## 2019-02-09 PROCEDURE — 25000132 ZZH RX MED GY IP 250 OP 250 PS 637: Performed by: OBSTETRICS & GYNECOLOGY

## 2019-02-09 PROCEDURE — 84450 TRANSFERASE (AST) (SGOT): CPT | Performed by: OBSTETRICS & GYNECOLOGY

## 2019-02-09 PROCEDURE — 86900 BLOOD TYPING SEROLOGIC ABO: CPT | Performed by: OBSTETRICS & GYNECOLOGY

## 2019-02-09 RX ORDER — BISACODYL 10 MG
10 SUPPOSITORY, RECTAL RECTAL DAILY PRN
Status: DISCONTINUED | OUTPATIENT
Start: 2019-02-09 | End: 2019-02-10 | Stop reason: HOSPADM

## 2019-02-09 RX ORDER — AMOXICILLIN 250 MG
2 CAPSULE ORAL 2 TIMES DAILY
Status: DISCONTINUED | OUTPATIENT
Start: 2019-02-10 | End: 2019-02-10 | Stop reason: HOSPADM

## 2019-02-09 RX ORDER — PRENATAL VIT/IRON FUM/FOLIC AC 27MG-0.8MG
1 TABLET ORAL DAILY
Status: DISCONTINUED | OUTPATIENT
Start: 2019-02-10 | End: 2019-02-10 | Stop reason: HOSPADM

## 2019-02-09 RX ORDER — ONDANSETRON 2 MG/ML
4 INJECTION INTRAMUSCULAR; INTRAVENOUS EVERY 6 HOURS PRN
Status: DISCONTINUED | OUTPATIENT
Start: 2019-02-09 | End: 2019-02-10 | Stop reason: HOSPADM

## 2019-02-09 RX ORDER — ACETAMINOPHEN 325 MG/1
975 TABLET ORAL EVERY 4 HOURS PRN
Status: DISCONTINUED | OUTPATIENT
Start: 2019-02-09 | End: 2019-02-10 | Stop reason: HOSPADM

## 2019-02-09 RX ORDER — AMOXICILLIN 250 MG
1 CAPSULE ORAL 2 TIMES DAILY
Status: DISCONTINUED | OUTPATIENT
Start: 2019-02-10 | End: 2019-02-10 | Stop reason: HOSPADM

## 2019-02-09 RX ORDER — ACETAMINOPHEN 325 MG/1
325-650 TABLET ORAL ONCE
Status: ON HOLD | COMMUNITY
End: 2019-02-17

## 2019-02-09 RX ORDER — POLYETHYLENE GLYCOL 3350 17 G/17G
17 POWDER, FOR SOLUTION ORAL DAILY
Status: DISCONTINUED | OUTPATIENT
Start: 2019-02-10 | End: 2019-02-10 | Stop reason: HOSPADM

## 2019-02-09 RX ADMIN — LIDOCAINE HYDROCHLORIDE 30 ML: 20 SOLUTION ORAL; TOPICAL at 22:02

## 2019-02-09 ASSESSMENT — MIFFLIN-ST. JEOR: SCORE: 1645.69

## 2019-02-10 VITALS
HEIGHT: 68 IN | DIASTOLIC BLOOD PRESSURE: 93 MMHG | HEART RATE: 90 BPM | SYSTOLIC BLOOD PRESSURE: 138 MMHG | RESPIRATION RATE: 18 BRPM | TEMPERATURE: 98.1 F | BODY MASS INDEX: 30.31 KG/M2 | WEIGHT: 200 LBS

## 2019-02-10 PROCEDURE — G0463 HOSPITAL OUTPT CLINIC VISIT: HCPCS

## 2019-02-10 PROCEDURE — 25000132 ZZH RX MED GY IP 250 OP 250 PS 637: Performed by: OBSTETRICS & GYNECOLOGY

## 2019-02-10 PROCEDURE — 59025 FETAL NON-STRESS TEST: CPT | Mod: 26 | Performed by: OBSTETRICS & GYNECOLOGY

## 2019-02-10 PROCEDURE — 25000128 H RX IP 250 OP 636: Performed by: OBSTETRICS & GYNECOLOGY

## 2019-02-10 PROCEDURE — 99238 HOSP IP/OBS DSCHRG MGMT 30/<: CPT | Mod: 25 | Performed by: OBSTETRICS & GYNECOLOGY

## 2019-02-10 RX ORDER — HYDROXYZINE HYDROCHLORIDE 50 MG/1
100 TABLET, FILM COATED ORAL 3 TIMES DAILY PRN
Status: DISCONTINUED | OUTPATIENT
Start: 2019-02-10 | End: 2019-02-10 | Stop reason: HOSPADM

## 2019-02-10 RX ORDER — BETAMETHASONE SODIUM PHOSPHATE AND BETAMETHASONE ACETATE 3; 3 MG/ML; MG/ML
12 INJECTION, SUSPENSION INTRA-ARTICULAR; INTRALESIONAL; INTRAMUSCULAR; SOFT TISSUE ONCE
Status: COMPLETED | OUTPATIENT
Start: 2019-02-10 | End: 2019-02-10

## 2019-02-10 RX ADMIN — HYDROXYZINE HYDROCHLORIDE 100 MG: 50 TABLET, FILM COATED ORAL at 01:32

## 2019-02-10 RX ADMIN — SENNOSIDES AND DOCUSATE SODIUM 2 TABLET: 8.6; 5 TABLET ORAL at 01:33

## 2019-02-10 RX ADMIN — BETAMETHASONE SODIUM PHOSPHATE AND BETAMETHASONE ACETATE 12 MG: 3; 3 INJECTION, SUSPENSION INTRA-ARTICULAR; INTRALESIONAL; INTRAMUSCULAR at 08:51

## 2019-02-10 RX ADMIN — ACETAMINOPHEN 975 MG: 325 TABLET, FILM COATED ORAL at 06:42

## 2019-02-10 ASSESSMENT — COLUMBIA-SUICIDE SEVERITY RATING SCALE - C-SSRS
6. HAVE YOU EVER DONE ANYTHING, STARTED TO DO ANYTHING, OR PREPARED TO DO ANYTHING TO END YOUR LIFE?: NO
1. IN THE PAST MONTH, HAVE YOU WISHED YOU WERE DEAD OR WISHED YOU COULD GO TO SLEEP AND NOT WAKE UP?: NO
2. HAVE YOU ACTUALLY HAD ANY THOUGHTS OF KILLING YOURSELF IN THE PAST MONTH?: NO

## 2019-02-10 NOTE — PROVIDER NOTIFICATION
"   02/09/19 2245   Provider Notification   Provider Name/Title Dr. Gregory   Method of Notification Electronic Page   Request Evaluate - Remote   Notification Reason Status Update   Notified of BP and pt report that \"pain feels more like gas now\"  "

## 2019-02-10 NOTE — PROGRESS NOTES
Subjective:   Feeling okay, just some head pressure like feeling. Not a HA. Knows she is getting preeclampsia and thinks elevated pressures from last night were related to some bowel pain she was having.         Objective:  Patient Vitals for the past 8 hrs:   BP Temp Temp src Resp   02/10/19 0750 (!) 138/93 98.1  F (36.7  C) Oral 18   02/10/19 0700 133/79 -- -- --   02/10/19 0530 139/73 98.1  F (36.7  C) Oral --   02/10/19 0400 133/76 -- -- --   02/10/19 0330 138/75 -- -- --   02/10/19 0300 139/81 -- -- --   02/10/19 0200 145/87 -- -- --     Lab Results   Component Value Date    WBC 12.9 (H) 02/09/2019    HGB 10.2 (L) 02/09/2019    HCT 32.6 (L) 02/09/2019     02/09/2019    ALT 12 02/09/2019    AST 11 02/09/2019     01/19/2016    BUN 7 01/19/2016    CO2 24 01/19/2016    INR 0.98 09/24/2016     EFM:   140 baseline, moderate variablility, + accels, no decels, Category I, NST reactive  Tocometer: external monitor and none    Assessment:/Plan:   Labs normal and has GHTN. No further severe range BP's noted. Will give one dose of betamethasone prior to discharge home and has appt on Tuesday to follow up. Preeclampsia signs and sx discussed--will call if any develop. Knows she can come in tomorrow for repeat beta vs Tuesday.   Plan twice a week appts and weekly labs until 37 wks for delivery. Questions answered.    AUNG GUNDERSON

## 2019-02-10 NOTE — PLAN OF CARE
Data: Blood pressures within parameters. Signs and symptoms of pre-eclampsia present; mild headache and edema. Fetal assessment Reactive.  Interventions: Monitor blood pressures and indicators of pre-eclampsia every hour. Continue uterine/fetal assessment continuously. Activity level Bed rest with bathroom privileges. Preventive measures include Frequent voiding. Encourage active range of motion and frequent position changes.  Plan: Continue expectant management. Observe for and notify care provider of indicators of worsening pre-eclampsia or signs of fetal/maternal compromise.

## 2019-02-10 NOTE — H&P
[facility]  History and Physical    CC: Rule out severe preeclampsia    HPI: Traci Sloan  is a 38 year old  who presents at 35w5d  by LMP c/w a 9w3d US for not feeling well in a pregnancy complicated by gestational hypertension, episode of bleeding in pregnancy requiring admission, bmtz and MgSo4 on 19,  (See below for details).     She is feeling off today, as if something is not right. She denies shortness of breath, chest pain, nausea or vomiting. She notes a mild headache that she typically would not take any medication for. She notes rare contractions, denies VB, LOF.    Complications of pregnancy:  1. Gestational hypertension   - plan for twice weekly visits for evaluation of blood pressures.  2. Hx of  delivery at 36 weeks, no klaus  3. Intermittent episodes of vaginal bleeding, s/p BMZ  4. LSIL pap, HPV neg - pp colpo  5. ADHD - on adderall  6. Anemia - 10.2, will plan to recommend iron supplementation.     US review:  10/29/18: Anatomy US @21w0d - normal anatomy - anterior placenta,   19: Follow up US @ 32w1d - cephalic presentation, MVP 6.1 cm    Lab review:  Rh positive, Antibody Negative, Rubella Immune, GC/Chlam negative, HIV NR, treponema Neg ,     OB Hx:  PTD x1, , spontaneous but patient had gestational hypertension    Review Of Systems  Eyes: No vision changes  Ears/Nose/Throat: No rhinorrhea, throat soreness  Respiratory: No shortness of breath, dyspnea on exertion, cough, or hemoptysis  Cardiovascular: + for some left sided upper abdominal discomfort negative for exertional chest pain or pressure  Gastrointestinal: negative for, nausea, vomiting, constipation and diarrhea  Genitourinary: negative for and dysuria  Musculoskeletal: negative for, back pain and joint pain  Neurologic: negative for headaches  Hematologic/Lymphatic/Immunologic: negative for, anemia and bleeding disorder  Endocrine: negative for, thyroid disorder and  "diabetes    PMH:  IBS  Constipation  ADHD  Abnormal PAP smear of cervix  Gastric reflux    PSH:   Knee surgery, cysto, urethral dilation    Social Hx:  No drug, etoh, smoking hx    Meds:   Adderall, fluticasone, unisome, oxymetazoline, claritin    Allergies:  No drug allergies    Physical Exam:  /82   Pulse 90   Temp 98.7  F (37.1  C) (Oral)   Resp 18   Ht 1.727 m (5' 8\")   Wt 90.7 kg (200 lb)   LMP 06/10/2018   BMI 30.41 kg/m     Vitals:    19 2118 19 2120 19 2130 19 2140   BP: 158/86 152/81 151/85 148/82   BP Location:       Pulse:       Resp:       Temp:       TempSrc:       Weight:       Height:         Constitutional: healthy, alert and no distress  Head: Normocephalic. No masses, lesions, tenderness or abnormalities  Cardiovascular: negative, RRR. No murmurs, clicks gallops or rub  Respiratory: negative. Good diaphragmatic excursion. Lungs clear  Gastrointestinal: Abdomen soft, non-tender. Gravid  Musculoskeletal: extremities normal- no gross deformities noted and normal muscle tone  Psychiatric: mentation appears normal and affect normal/bright  BSUS: fetus is cephalic  FHT: Baseline 135 Bpm, moderate variability, + accels, - decels  [place]: 2 in 20 min        A/P:  38 year old  at 35w5d here with vague symptoms and elevated blood pressures, with one severe range blood pressure. Given recent history will continue to monitor blood pressures. Discussed the impending diagnosis of preeclampsia with severe features (needs one additional severe range blood pressure), and the implication for delivery, seizure ppx with magnesium sulfate and stroke prevention with blood pressure management.   - HELLP labs WNL   - given chest pain, Trop and EKG checked and WNL   - type and screen ordered    Patient Discussed with     Anticipated stay <2 days.    Anju Gregory MD 2/10/2019 1:27 AM     Physician Attestation   I, Theodora Alvarado, personally examined and evaluated " this patient.  I discussed the patient with Dr. Gregory, and agree with the assessment and plan of care as documented in the note of 2/10/19.     I personally reviewed vital signs and labs.    Key findings: pregnancy induced hypertension, rule out pre-eclampsia with severe features.  Observation and evaluation given one severe range BP (need 2 4 hours apart to diagnose the above).    Theodora Alvarado MD  Date of Service (when I saw the patient): 02/10/19

## 2019-02-10 NOTE — DISCHARGE INSTRUCTIONS
Discharge Instruction for Undelivered Patients      You were seen for: rule out preeclempsia  We Consulted: Dr. Turpin  You had (Test or Medicine):Betamethesine      Diet:   Drink 8 to 12 glasses of liquids (milk, juice, water) every day.     Activity:  Call your doctor or nurse midwife if your baby is moving less than usual.     Call your provider if you notice:  Swelling in your face or increased swelling in your hands or legs.  Headaches that are not relieved by Tylenol (acetaminophen).  Changes in your vision (blurring: seeing spots or stars.)  Nausea (sick to your stomach) and vomiting (throwing up).   Weight gain of 5 pounds or more per week.  Heartburn that doesn't go away.  Signs of bladder infection: pain when you urinate (use the toilet), need to go more often and more urgently.  The bag of lazo (rupture of membranes) breaks, or you notice leaking in your underwear.  Bright red blood in your underwear.  Abdominal (lower belly) or stomach pain.  For first baby: Contractions (tightening) less than 5 minutes apart for one hour or more.  Second (plus) baby: Contractions (tightening) less than 10 minutes apart and getting stronger.  *If less than 34 weeks: Contractions (tightenings) more than 6 times in one hour.  Increase or change in vaginal discharge (note the color and amount)      Follow-up:  Make an appointment to be seen on tomorrow for second dose of Beta

## 2019-02-10 NOTE — PLAN OF CARE
Patient's vitals are stable. Patient received first dose of Beta. Discharging to home and getting the second dose of Beta at clinic tomorrow February 11 th. FHR and uterine activity see flow sheet.

## 2019-02-10 NOTE — DISCHARGE SUMMARY
"[facility] Discharge Summary    Traci Sloan MRN# [id number]   Age: 38 year old YOB: 1980     Date of Admission:  2019  Date of Discharge:  02/10/19   Admitting Physician:  Theodora Alvarado MD  Discharge Physician:  Courtney Burris    Admit Dx:   - Intrauterine pregnancy at 35w6d   - ADHD: on Adderall, symptoms well controlled  - History of  delivery at 36w: on 17-OH progesterone  - AMA: normal NIPT  - LSIL/HPV negative Pap: plan for colposcopy after delivery      Discharge Dx:  - Same     Procedures:  - BP monitoring   - betamethasone course #1    Admit HPI:  \"Ms. Traci Sloan is a 38 year old  at 31w5d by 9w3d US who presents with vaginal bleeding. She was lying down at home a few hours ago when she noticed a large gush of dark red blood. She denies passing any clots and has not had any continued bleeding. This is her first episode of bleeding this pregnancy. She denies any abdominal trauma or recent intercourse. Immediately after the bleeding, she noticed some mild suprapubic cramping, which is intermittent and irregular. She denies any painful contractions. She denies any abnormal vaginal discharge or vulvar irritation. She also denies loss of fluid, fevers, chills, nausea, or vomiting. + normal fetal movement.     Of note, she does have a history of  delivery at 36w and states that her current symptoms feel similar to her presentation before her last delivery. During that admission, she was monitored for some time before her labor started, but she states that she delivered quickly after the onset of contractions.\"    Please see her admit H&P for full details of her PMH, PSH, Meds, Allergies and exam on admit.    Hospital Course:  A/P Traci Sloanis a 38 year old  at 35w6d admitted for monitoring overnight to rule out preeclampsia with severe features. She has gestational hypertension and did have two severe range blood pressures but " neither were sustained and they were within an hour of each other so she does not meet criteria and BPs have been normal overnight. She had normal preeclampsia labs.      Chest pain: resolved, trop/EKG normal limits     Discharge Medications:     Review of your medicines      UNREVIEWED medicines. Ask your doctor about these medicines      Dose / Directions   * amphetamine-dextroamphetamine 10 MG tablet  Commonly known as:  ADDERALL  Used for:  Attention deficit hyperactivity disorder (ADHD), combined type  Ask about: Should I take this medication?      Dose:  10 mg  Take 1 tablet (10 mg) by mouth daily  Quantity:  30 tablet  Refills:  0     * amphetamine-dextroamphetamine 30 MG 24 hr capsule  Commonly known as:  ADDERALL XR  Used for:  Attention deficit hyperactivity disorder (ADHD), combined type  Ask about: Should I take this medication?      Dose:  30 mg  Take 1 capsule (30 mg) by mouth daily  Quantity:  30 capsule  Refills:  0     metroNIDAZOLE 500 MG tablet  Commonly known as:  FLAGYL  Indication:  bacterial vaginosis  Used for:  Bacterial vaginosis  Ask about: Should I take this medication?      Dose:  500 mg  Take 1 tablet (500 mg) by mouth every 12 hours for 4 days  Quantity:  9 tablet  Refills:  0         * This list has 2 medication(s) that are the same as other medications prescribed for you. Read the directions carefully, and ask your doctor or other care provider to review them with you.            CONTINUE these medicines which have NOT CHANGED      Dose / Directions   acetaminophen 325 MG tablet  Commonly known as:  TYLENOL      Dose:  325-650 mg  Take 325-650 mg by mouth once  Refills:  0     * amphetamine-dextroamphetamine 30 MG 24 hr capsule  Commonly known as:  ADDERALL XR  Used for:  Attention deficit hyperactivity disorder (ADHD), combined type      Dose:  30 mg  Take 1 capsule (30 mg) by mouth daily  Quantity:  30 capsule  Refills:  0     * amphetamine-dextroamphetamine 10 MG tablet  Commonly  known as:  ADDERALL  Used for:  Attention deficit hyperactivity disorder (ADHD), combined type      Dose:  10 mg  Take 1 tablet (10 mg) by mouth daily  Quantity:  30 tablet  Refills:  0     doxylamine 25 MG Tabs tablet  Commonly known as:  UNISOM  Used for:  AMA (advanced maternal age) multigravida 35+      Dose:  12.5 mg  Take 0.5 tablets (12.5 mg) by mouth 4 times daily as needed  Quantity:  100 each  Refills:  1     fluticasone 50 MCG/ACT nasal spray  Commonly known as:  FLONASE  Used for:  Dysfunction of both eustachian tubes      Dose:  1-2 spray  Spray 1-2 sprays into both nostrils daily  Quantity:  3 Bottle  Refills:  11     HYDROXYprogesterone caproate 250 MG/ML injection  Commonly known as:  HERNANDO  Used for:  History of  delivery, currently pregnant in first trimester      Dose:  250 mg  Inject 1 mL (250 mg) into the muscle every 7 days  Quantity:  5 mL  Refills:  4     loratadine 10 MG tablet  Commonly known as:  CLARITIN  Used for:  Dysfunction of both eustachian tubes      Dose:  10 mg  Take 1 tablet (10 mg) by mouth daily  Quantity:  90 tablet  Refills:  3     oxymetazoline 0.05 % nasal spray  Commonly known as:  AFRIN NASAL SPRAY  Used for:  Dysfunction of both eustachian tubes      Dose:  2-3 spray  Spray 2-3 sprays into both nostrils 2 times daily as needed for congestion  Quantity:  1 Bottle  Refills:  0     prenatal multivitamin w/iron 27-0.8 MG tablet  Used for:  AMA (advanced maternal age) multigravida 35+      Dose:  1 tablet  Take 1 tablet by mouth daily  Quantity:  100 tablet  Refills:  3     vitamin B6 25 MG tablet  Commonly known as:  pyridOXINE  Used for:  AMA (advanced maternal age) multigravida 35+      Dose:  25 mg  Take 1 tablet (25 mg) by mouth 4 times daily as needed  Quantity:  100 tablet  Refills:  1         * This list has 2 medication(s) that are the same as other medications prescribed for you. Read the directions carefully, and ask your doctor or other care provider to  review them with you.                  Discharge/Disposition:  Traci Sloan was discharged to home in stable condition. We reviewed labor and preeclampsia precautions.     Alicia Mitchell MD   PGY-3 Ob/Gyn

## 2019-02-10 NOTE — PROGRESS NOTES
"Antepartum progress note    S: Patient is feeling better this morning. No longer having gas pain. Denies vision changes, HA, SOB, or abdominal pain. No LOF, VB, or contractions.     O:   Patient Vitals for the past 24 hrs:   BP Temp Temp src Pulse Resp Height Weight   02/10/19 0750 (!) 138/93 98.1  F (36.7  C) Oral -- 18 -- --   02/10/19 0700 133/79 -- -- -- -- -- --   02/10/19 0530 139/73 98.1  F (36.7  C) Oral -- -- -- --   02/10/19 0400 133/76 -- -- -- -- -- --   02/10/19 0330 138/75 -- -- -- -- -- --   02/10/19 0300 139/81 -- -- -- -- -- --   02/10/19 0200 145/87 -- -- -- -- -- --   02/10/19 0100 136/82 98.2  F (36.8  C) Oral -- -- -- --   02/10/19 0030 142/75 -- -- -- -- -- --   02/10/19 0010 146/77 -- -- -- -- -- --   02/10/19 0000 164/90 -- -- -- -- -- --   195 157/87 -- -- -- -- -- --   19 (!) 160/91 -- -- -- -- -- --   19 (!) 153/94 -- -- -- -- -- --   19 (!) 152/96 -- -- -- -- -- --   19 (!) 154/95 -- -- -- -- -- --   19 148/82 -- -- -- -- -- --   19 151/85 -- -- -- -- -- --   19 152/81 -- -- -- -- -- --   19 158/86 -- -- -- -- -- --   19 (!) 171/91 -- -- -- -- -- --   19 (!) 165/93 98.7  F (37.1  C) Oral 90 18 1.727 m (5' 8\") 90.7 kg (200 lb)        Gen: NAD   CV: rrr  Pulm: no increased work of breathing  Abd: gravid, nontender    FHT: , moderate variability, accels present, no decels  [place]: quiet     Preeclampsia labs  Recent Labs   Lab 19  1018   CR 0.58 0.50*    408   AST 11 12   ALT 12 17   UPC normal   No lab results found in last 7 days.    A/P Traci Gamez a 38 year old  at 35w6d admitted for monitoring overnight to rule out preeclampsia with severe features. She has gestational hypertension and did have two severe range blood pressures but neither were sustained and they were within an hour of each other so she does not meet criteria " and BPs have been normal overnight. She had normal preeclampsia labs.     She was given a dose of betamethasone this morning (Although per chart review she had received a course earlier in pregnancy). Per Dr. Burris the patient can follow up in clinic tomorrow for the second dose.     Chest pain: resolved, trop/EKG normal limits     Fetal well being  - Category 1 overnight     Aliciase Mitchell PGY-3  OB/Gyn  2/10/2019, 8:53 AM      Courtney Burris MD

## 2019-02-10 NOTE — PROVIDER NOTIFICATION
"   02/10/19 0645   [R] PHQ-2     Over the past two weeks, how often have you been bothered by any of the following problems?   [R] Little interest or pleasure in doing things 0   [R] Feeling down, depressed or hopeless 1   PHQ-2 Total Score 1   Pt reports history of depression in adolescence and says she has wondered if she might be feeling depressed, but attributes it to \"just being this pregnant in winter.\" She denies loss of interest in activites,  But just feels \"off\"  "

## 2019-02-10 NOTE — PLAN OF CARE
"Data: Patient presented to [facility] at .   Reason for maternal/fetal assessment per patient is Rule Out Pre-eclampsia  .  Patient is a . Prenatal record reviewed.      Obstetric History       T0      L1     SAB1   TAB0   Ectopic0   Multiple0   Live Births1       # Outcome Date GA Lbr Scar/2nd Weight Sex Delivery Anes PTL Lv   4 Current            3  16 36w0d 77:00 / 00:31 2.39 kg (5 lb 4.3 oz) F Vag-Spont EPI  ALKA      Name: RICK COSME      Apgar1:  9                Apgar5: 9   2 SAB 13 5w0d          1 AB 2010 5w0d             . Medical history:   Past Medical History:   Diagnosis Date    Abnormal Pap smear of cervix 2018    LSIL, Neg HPV, pregnant    ADHD (attention deficit hyperactivity disorder)     Anemia     Gastric reflux     IBS (irritable bowel syndrome)    . Gestational Age 35w5d. Fetal movement present. Patient denies cramping, backache, vaginal discharge, pelvic pressure, UTI symptoms, GI problems, bloody show, vaginal bleeding, visual disturbances, epigastric or URQ pain, abdominal pain, rupture of membranes. Elevated BPs. PT reports left-sided rib pain radiating into the left arm and reports feeling \"off.\" Support person is present.  Action: Verbal consent for EFM. Triage assessment completed. Fetal assessment: Presumed adequate fetal oxygenation documented (see flow record).   Response: Dr. Gregory informed of arrival and vitals. Plan per provider is admission for blood pressure monitoring with potential for induction. Patient verbalized agreement with plan. Patient transferred to room 479 ambulatory, oriented to room and call light.   "

## 2019-02-10 NOTE — PROVIDER NOTIFICATION
02/10/19 0230   Fetal Assessment   Fetal Movement active   patient sitting up and eating, fetal movement auscultated and palpated. Provider aware.

## 2019-02-11 ENCOUNTER — ALLIED HEALTH/NURSE VISIT (OUTPATIENT)
Dept: NURSING | Facility: CLINIC | Age: 39
End: 2019-02-11
Payer: COMMERCIAL

## 2019-02-11 ENCOUNTER — HOSPITAL ENCOUNTER (OUTPATIENT)
Facility: CLINIC | Age: 39
Discharge: HOME | End: 2019-02-11
Attending: OBSTETRICS & GYNECOLOGY | Admitting: OBSTETRICS & GYNECOLOGY
Payer: COMMERCIAL

## 2019-02-11 VITALS
RESPIRATION RATE: 18 BRPM | DIASTOLIC BLOOD PRESSURE: 91 MMHG | SYSTOLIC BLOOD PRESSURE: 155 MMHG | TEMPERATURE: 98.4 F | HEART RATE: 93 BPM

## 2019-02-11 DIAGNOSIS — O46.90 VAGINAL BLEEDING IN PREGNANCY: Primary | ICD-10-CM

## 2019-02-11 LAB
ABO + RH BLD: NORMAL
ABO + RH BLD: NORMAL
ALT SERPL W P-5'-P-CCNC: 12 U/L (ref 0–50)
AST SERPL W P-5'-P-CCNC: 9 U/L (ref 0–45)
BLD GP AB SCN SERPL QL: NORMAL
BLOOD BANK CMNT PATIENT-IMP: NORMAL
CREAT SERPL-MCNC: 0.5 MG/DL (ref 0.52–1.04)
CREAT UR-MCNC: 184 MG/DL
ERYTHROCYTE [DISTWIDTH] IN BLOOD BY AUTOMATED COUNT: 12.7 % (ref 10–15)
GFR SERPL CREATININE-BSD FRML MDRD: >90 ML/MIN/{1.73_M2}
HCT VFR BLD AUTO: 30.5 % (ref 35–47)
HGB BLD-MCNC: 9.8 G/DL (ref 11.7–15.7)
INTERPRETATION ECG - MUSE: NORMAL
MCH RBC QN AUTO: 26.9 PG (ref 26.5–33)
MCHC RBC AUTO-ENTMCNC: 32.1 G/DL (ref 31.5–36.5)
MCV RBC AUTO: 84 FL (ref 78–100)
PLATELET # BLD AUTO: 374 10E9/L (ref 150–450)
PROT UR-MCNC: 0.24 G/L
PROT/CREAT 24H UR: 0.13 G/G CR (ref 0–0.2)
RBC # BLD AUTO: 3.64 10E12/L (ref 3.8–5.2)
SPECIMEN EXP DATE BLD: NORMAL
URATE SERPL-MCNC: 4.6 MG/DL (ref 2.6–6)
WBC # BLD AUTO: 9.3 10E9/L (ref 4–11)

## 2019-02-11 PROCEDURE — 84460 ALANINE AMINO (ALT) (SGPT): CPT | Performed by: OBSTETRICS & GYNECOLOGY

## 2019-02-11 PROCEDURE — 84450 TRANSFERASE (AST) (SGOT): CPT | Performed by: OBSTETRICS & GYNECOLOGY

## 2019-02-11 PROCEDURE — 85027 COMPLETE CBC AUTOMATED: CPT | Performed by: OBSTETRICS & GYNECOLOGY

## 2019-02-11 PROCEDURE — 96372 THER/PROPH/DIAG INJ SC/IM: CPT

## 2019-02-11 PROCEDURE — G0463 HOSPITAL OUTPT CLINIC VISIT: HCPCS | Mod: 25

## 2019-02-11 PROCEDURE — 86900 BLOOD TYPING SEROLOGIC ABO: CPT | Performed by: OBSTETRICS & GYNECOLOGY

## 2019-02-11 PROCEDURE — 59025 FETAL NON-STRESS TEST: CPT

## 2019-02-11 PROCEDURE — 82565 ASSAY OF CREATININE: CPT | Performed by: OBSTETRICS & GYNECOLOGY

## 2019-02-11 PROCEDURE — 84156 ASSAY OF PROTEIN URINE: CPT | Performed by: OBSTETRICS & GYNECOLOGY

## 2019-02-11 PROCEDURE — 99213 OFFICE O/P EST LOW 20 MIN: CPT | Mod: 25 | Performed by: OBSTETRICS & GYNECOLOGY

## 2019-02-11 PROCEDURE — 84550 ASSAY OF BLOOD/URIC ACID: CPT | Performed by: OBSTETRICS & GYNECOLOGY

## 2019-02-11 PROCEDURE — 86850 RBC ANTIBODY SCREEN: CPT | Performed by: OBSTETRICS & GYNECOLOGY

## 2019-02-11 PROCEDURE — 36415 COLL VENOUS BLD VENIPUNCTURE: CPT | Performed by: OBSTETRICS & GYNECOLOGY

## 2019-02-11 PROCEDURE — 59025 FETAL NON-STRESS TEST: CPT | Mod: 26 | Performed by: OBSTETRICS & GYNECOLOGY

## 2019-02-11 PROCEDURE — 87653 STREP B DNA AMP PROBE: CPT | Performed by: OBSTETRICS & GYNECOLOGY

## 2019-02-11 PROCEDURE — 86901 BLOOD TYPING SEROLOGIC RH(D): CPT | Performed by: OBSTETRICS & GYNECOLOGY

## 2019-02-11 RX ORDER — ALUMINA, MAGNESIA, AND SIMETHICONE 2400; 2400; 240 MG/30ML; MG/30ML; MG/30ML
30 SUSPENSION ORAL
Status: DISCONTINUED | OUTPATIENT
Start: 2019-02-11 | End: 2019-02-11 | Stop reason: HOSPADM

## 2019-02-11 RX ORDER — ACETAMINOPHEN 325 MG/1
650 TABLET ORAL EVERY 4 HOURS PRN
Status: DISCONTINUED | OUTPATIENT
Start: 2019-02-11 | End: 2019-02-11 | Stop reason: HOSPADM

## 2019-02-11 RX ORDER — OMEPRAZOLE 20 MG/1
20 TABLET, DELAYED RELEASE ORAL DAILY
Status: ON HOLD | COMMUNITY
End: 2019-02-17

## 2019-02-11 RX ORDER — HYDROXYZINE HYDROCHLORIDE 50 MG/1
50 TABLET, FILM COATED ORAL EVERY 6 HOURS PRN
Status: DISCONTINUED | OUTPATIENT
Start: 2019-02-11 | End: 2019-02-11 | Stop reason: HOSPADM

## 2019-02-11 RX ORDER — BETAMETHASONE SODIUM PHOSPHATE AND BETAMETHASONE ACETATE 3; 3 MG/ML; MG/ML
12 INJECTION, SUSPENSION INTRA-ARTICULAR; INTRALESIONAL; INTRAMUSCULAR; SOFT TISSUE ONCE
Status: COMPLETED | OUTPATIENT
Start: 2019-02-11 | End: 2019-02-11

## 2019-02-11 RX ORDER — ONDANSETRON 2 MG/ML
4 INJECTION INTRAMUSCULAR; INTRAVENOUS EVERY 6 HOURS PRN
Status: DISCONTINUED | OUTPATIENT
Start: 2019-02-11 | End: 2019-02-11 | Stop reason: HOSPADM

## 2019-02-11 RX ADMIN — BETAMETHASONE SODIUM PHOSPHATE AND BETAMETHASONE ACETATE 12 MG: 3; 3 INJECTION, SUSPENSION INTRA-ARTICULAR; INTRALESIONAL; INTRAMUSCULAR; SOFT TISSUE at 09:55

## 2019-02-11 NOTE — H&P
"HPI: Traci Sloan is a 38 year old  who was sent from clinic for preeclampsia eval.  She was diagnosed with gestational HTN on  and was in clinic this am for a second BMTZ shot.  She had an RN only visit and BP was found to be 160/100 on initial reading.  She reports feeling really good, denies HA, scotomata or RUQ pain.    She was seen for pre-e eval on  and discharge home with dx of gest HTN.  She came in on  with complaints of LUG pain that felt like \"indigestion\".  She had 2 non-sustained severe range BP right upon arrival and then settled into the mildly elevated range.  Given the severe range BP, decision was made to monitor overnight.  She did not have any additional severe range BPs and was discharge home yesterday morning after receiving her first dose of BMTZ.    ROS:  C: NEGATIVE for fever, chills, change in weight  I: NEGATIVE for worrisome rashes, moles or lesions  E: NEGATIVE for vision changes or irritation  E/M: NEGATIVE for ear, mouth and throat problems  R: NEGATIVE for significant cough or SOB  CV: NEGATIVE for chest pain, palpitations or peripheral edema  GI: NEGATIVE for nausea, abdominal pain, heartburn, or change in bowel habits  : NEGATIVE for frequency, dysuria, hematuria, vaginal discharge  M: NEGATIVE for significant arthralgias or myalgia  N: NEGATIVE for weakness, dizziness or paresthesias  E: NEGATIVE for temperature intolerance, skin/hair changes  P: NEGATIVE for changes in mood or affect    Past Medical History:   Diagnosis Date     Abnormal Pap smear of cervix 2018    LSIL, Neg HPV, pregnant     ADHD (attention deficit hyperactivity disorder)      Anemia      Gastric reflux      IBS (irritable bowel syndrome)      Past Surgical History:   Procedure Laterality Date     CYSTOSCOPY, DILATE URETHRA, COMBINED  1999    for frequent UTI'S     KNEE SURGERY  10/1997     Obstetric History       T0      L1     SAB1   TAB0   Ectopic0   Multiple0   Live " Births1       # Outcome Date GA Lbr Scar/2nd Weight Sex Delivery Anes PTL Lv   4 Current            3  16 36w0d 77:00 / 00:31 2.39 kg (5 lb 4.3 oz) F Vag-Spont EPI  ALKA      Name: RICK COSME      Apgar1:  9                Apgar5: 9   2 SAB 13 5w0d          1 AB 2010 5w0d                No Known Allergies      Current Facility-Administered Medications:      acetaminophen (TYLENOL) tablet 650 mg, 650 mg, Oral, Q4H PRN, Liz Haider MD     alum & mag hydroxide-simethicone (MYLANTA ES/MAALOX  ES) suspension 30 mL, 30 mL, Oral, Once PRN, Liz Haider MD     hydrOXYzine (ATARAX) tablet 50 mg, 50 mg, Oral, Q6H PRN, Liz Haider MD     NO Rho (D) immune globulin (RhoGam) needed - mother Rh POSITIVE, , Does not apply, Continuous PRN, Liz Haider MD     ondansetron (ZOFRAN) injection 4 mg, 4 mg, Intravenous, Q6H PRN, Liz Haider MD    Social History     Socioeconomic History     Marital status: Single     Spouse name: Not on file     Number of children: Not on file     Years of education: Not on file     Highest education level: Not on file   Social Needs     Financial resource strain: Not on file     Food insecurity - worry: Not on file     Food insecurity - inability: Not on file     Transportation needs - medical: Not on file     Transportation needs - non-medical: Not on file   Occupational History     Not on file   Tobacco Use     Smoking status: Former Smoker     Last attempt to quit: 10/25/2015     Years since quitting: 3.3     Smokeless tobacco: Never Used   Substance and Sexual Activity     Alcohol use: No     Alcohol/week: 0.0 oz     Comment: occ     Drug use: No     Sexual activity: Yes     Partners: Male   Other Topics Concern     Parent/sibling w/ CABG, MI or angioplasty before 65F 55M? No   Social History Narrative    Caffeine intake/servings daily - 1    Calcium intake/servings daily - 3    Exercise 5 times weekly - describe ; bikes    Sunscreen used  - Yes    Seatbelts used - Yes    Guns stored in the home - No    Self Breast Exam - No    Pap test up to date -  No    Eye exam up to date -  No    Dental exam up to date -  No    DEXA scan up to date -  No    Flex Sig/Colonoscopy up to date -  No    Mammography up to date -  No    Immunizations reviewed and up to date - Yes    Abuse: Current or Past (Physical, Sexual or Emotional) - No    Do you feel safe in your environment - Yes    Do you cope well with stress - Yes    Do you suffer from insomnia - No             Family History   Problem Relation Age of Onset     Heart Failure Maternal Grandmother      Breast Cancer Maternal Grandmother      Colon Cancer Maternal Grandmother      Cerebrovascular Disease Maternal Grandfather      Diabetes Maternal Grandfather      Other Cancer Maternal Grandfather      Mental Illness Paternal Grandfather      Hypertension Sister      Past medical, surgical, social and family history were reviewed and updated in EPIC.    PE: BP (!) 159/102   Pulse 93   Temp 98.4  F (36.9  C) (Oral)   Resp 18   LMP 06/10/2018   Breastfeeding? No     Gen: lying comfortably in bed, NAD  Heart: RRR  Lungs: CTAB  Abd: Gravid, SNT  Ex: no calf tend.  DTRs +1 patellar.  No clonus    NST: 140, +accels, -decels, mod brandie, reactive  [place]: no ctx    A/P: IUP at 36w0d with gestational HTN   FWBR, ca t 1 tracing   Serial BPs   Will check HELLP labs and UPC now.                If Bps remain in the midly elevated range and labs wnl, will plan for discharge home and follow-up in clinic as scheduled.  If she has a severe range BP, would plan for delivery now.    JAZMIN CHRISTIANSON MD

## 2019-02-11 NOTE — PROVIDER NOTIFICATION
This note also relates to the following rows which could not be included:  BP - Cannot attach notes to unvalidated device data    Pt arrived from clinic for elevated BP's. Placed on monitors, FHR 140s, no contractions. /96, afebrile. Denies HA, visual disturbances, URQ pain. Dr Haider notified of patient arrival. Monitoring per protocol.

## 2019-02-11 NOTE — PLAN OF CARE
Data: [facility].  Reason for maternal/fetal assessment per patient is Rule Out Pre-eclampsia  . Patient is a . Prenatal record reviewed.      Obstetric History       T0      L1     SAB1   TAB0   Ectopic0   Multiple0   Live Births1       # Outcome Date GA Lbr Scar/2nd Weight Sex Delivery Anes PTL Lv   4 Current            3  16 36w0d 77:00 / 00:31 2.39 kg (5 lb 4.3 oz) F Vag-Spont EPI  ALKA      Name: RICK COSME      Apgar1:  9                Apgar5: 9   2 SAB 13 5w0d          1 AB 2010 5w0d                Medical History:   Past Medical History:   Diagnosis Date     Abnormal Pap smear of cervix 2018    LSIL, Neg HPV, pregnant     ADHD (attention deficit hyperactivity disorder)      Anemia      Gastric reflux      IBS (irritable bowel syndrome)    . Gestational Age 36w0d. VSS. Cervix: not examined.  Fetal movement present. Patient denies cramping, backache, vaginal discharge, pelvic pressure, UTI symptoms, GI problems, bloody show, vaginal bleeding,  headache, visual disturbances, epigastric or URQ pain, abdominal pain, rupture of membranes. Complains of edema. Support persons not present. Labs sent.  Action: Verbal consent for EFM. Triage assessment completed. EFM applied for NST. Uterine assessment occasional contraction, which patient states feel like cramps. Fetal assessment: Presumed adequate fetal oxygenation documented (see flow record). Patient instructed to report change in fetal movement, vaginal leaking of fluid or bleeding, abdominal pain, or any concerns related to the pregnancy to her nurse/physician.   Response:  Plan per provider is discharge to home. Patient verbalized understanding of education and verbalized agreement with plan. Discharged ambulatory at 1315.

## 2019-02-11 NOTE — DISCHARGE INSTRUCTIONS
Discharge Instruction for Undelivered Patients      You were seen for: bp checks  We Consulted: Dr Haider  You had (Test or Medicine):BP checks fetal monitoring     Diet:   Drink 8 to 12 glasses of liquids (milk, juice, water) every day.  You may eat meals and snacks.     Activity:  Call your doctor or nurse midwife if your baby is moving less than usual.     Call your provider if you notice:  Swelling in your face or increased swelling in your hands or legs.  Headaches that are not relieved by Tylenol (acetaminophen).  Changes in your vision (blurring: seeing spots or stars.)  Nausea (sick to your stomach) and vomiting (throwing up).   Weight gain of 5 pounds or more per week.  Heartburn that doesn't go away.  Signs of bladder infection: pain when you urinate (use the toilet), need to go more often and more urgently.  The bag of lazo (rupture of membranes) breaks, or you notice leaking in your underwear.  Bright red blood in your underwear.  Abdominal (lower belly) or stomach pain.  For first baby: Contractions (tightening) less than 5 minutes apart for one hour or more.  Second (plus) baby: Contractions (tightening) less than 10 minutes apart and getting stronger.  *If less than 34 weeks: Contractions (tightenings) more than 6 times in one hour.  Increase or change in vaginal discharge (note the color and amount)  Other:       Follow-up:  As scheduled in the clinic

## 2019-02-11 NOTE — PROGRESS NOTES
S; still feeling good, no HA, scotomata, RUQ pain.    O: BP (!) 155/91   Pulse 93   Temp 98.4  F (36.9  C) (Oral)   Resp 18   LMP 06/10/2018   Breastfeeding? No     BP: 150/90's    FHT: 140, +accels, -decels, mod brandie, reactive  [place]: no ctx    Component      Latest Ref Rng & Units 2/11/2019   WBC      4.0 - 11.0 10e9/L 9.3   RBC Count      3.8 - 5.2 10e12/L 3.64 (L)   Hemoglobin      11.7 - 15.7 g/dL 9.8 (L)   Hematocrit      35.0 - 47.0 % 30.5 (L)   MCV      78 - 100 fl 84   MCH      26.5 - 33.0 pg 26.9   MCHC      31.5 - 36.5 g/dL 32.1   RDW      10.0 - 15.0 % 12.7   Platelet Count      150 - 450 10e9/L 374   ABO       O   RH(D)       Pos   Antibody Screen       Neg   Test Valid Only At       [facility]   Specimen Expires       02/14/2019   Creatinine      0.52 - 1.04 mg/dL 0.50 (L)   GFR Estimate      >60 mL/min/1.73:m2 >90   GFR Estimate If Black      >60 mL/min/1.73:m2 >90   Protein Random Urine      g/L 0.24   Protein Total Urine g/gr Creatinine      0 - 0.2 g/g Cr 0.13   AST      0 - 45 U/L 9   ALT      0 - 50 U/L 12   Uric Acid      2.6 - 6.0 mg/dL 4.6   Creatinine Urine      mg/dL 184       A/P: IUP at 36w0d with gestational HTN   FWBR, cat 1 tracing   Labs wnl, urine protein trending up but she does not meet criteria for preeclampsia or severe range HTN.  I explained she is likely moving in that direction, so we will need to monitor her very closely over the next week.   She is scheduled for BPP on wed 2/13, will schedule office visit same time otherwise will see me tomorrow morning for BP check.  She is scheduled for IOL at 37wks, but aware she may need to be delivered before then.   We discussed s/sx of preeclampsia and when to call or come in.  Questions answered.    JAZMIN CHRISTIANSON MD

## 2019-02-12 ENCOUNTER — MYC MEDICAL ADVICE (OUTPATIENT)
Dept: FAMILY MEDICINE | Facility: CLINIC | Age: 39
End: 2019-02-12

## 2019-02-12 DIAGNOSIS — F90.2 ATTENTION DEFICIT HYPERACTIVITY DISORDER (ADHD), COMBINED TYPE: ICD-10-CM

## 2019-02-12 LAB
GP B STREP DNA SPEC QL NAA+PROBE: NEGATIVE
SPECIMEN SOURCE: NORMAL

## 2019-02-13 ENCOUNTER — PRENATAL OFFICE VISIT (OUTPATIENT)
Dept: OBGYN | Facility: CLINIC | Age: 39
End: 2019-02-13
Payer: COMMERCIAL

## 2019-02-13 ENCOUNTER — HOSPITAL ENCOUNTER (INPATIENT)
Facility: CLINIC | Age: 39
LOS: 6 days | Discharge: HOME HEALTH | End: 2019-02-19
Attending: OBSTETRICS & GYNECOLOGY | Admitting: OBSTETRICS & GYNECOLOGY
Payer: COMMERCIAL

## 2019-02-13 VITALS
BODY MASS INDEX: 31.09 KG/M2 | WEIGHT: 204.5 LBS | SYSTOLIC BLOOD PRESSURE: 180 MMHG | DIASTOLIC BLOOD PRESSURE: 102 MMHG

## 2019-02-13 DIAGNOSIS — F90.2 ATTENTION DEFICIT HYPERACTIVITY DISORDER (ADHD), COMBINED TYPE: ICD-10-CM

## 2019-02-13 DIAGNOSIS — O13.3 GESTATIONAL HTN, THIRD TRIMESTER: Primary | ICD-10-CM

## 2019-02-13 DIAGNOSIS — O14.93 PRE-ECLAMPSIA IN THIRD TRIMESTER: ICD-10-CM

## 2019-02-13 PROBLEM — O14.90 PREECLAMPSIA: Status: ACTIVE | Noted: 2019-02-13

## 2019-02-13 LAB
ALT SERPL W P-5'-P-CCNC: 16 U/L (ref 0–50)
AST SERPL W P-5'-P-CCNC: 10 U/L (ref 0–45)
CREAT SERPL-MCNC: 0.57 MG/DL (ref 0.52–1.04)
CREAT UR-MCNC: 20 MG/DL
ERYTHROCYTE [DISTWIDTH] IN BLOOD BY AUTOMATED COUNT: 12.9 % (ref 10–15)
GFR SERPL CREATININE-BSD FRML MDRD: >90 ML/MIN/{1.73_M2}
HCT VFR BLD AUTO: 30.4 % (ref 35–47)
HGB BLD-MCNC: 9.8 G/DL (ref 11.7–15.7)
MCH RBC QN AUTO: 27.1 PG (ref 26.5–33)
MCHC RBC AUTO-ENTMCNC: 32.2 G/DL (ref 31.5–36.5)
MCV RBC AUTO: 84 FL (ref 78–100)
PLATELET # BLD AUTO: 382 10E9/L (ref 150–450)
PROT UR-MCNC: <0.05 G/L
PROT/CREAT 24H UR: NORMAL G/G CR (ref 0–0.2)
RBC # BLD AUTO: 3.62 10E12/L (ref 3.8–5.2)
WBC # BLD AUTO: 9.1 10E9/L (ref 4–11)

## 2019-02-13 PROCEDURE — 25000128 H RX IP 250 OP 636: Performed by: STUDENT IN AN ORGANIZED HEALTH CARE EDUCATION/TRAINING PROGRAM

## 2019-02-13 PROCEDURE — 12000001 ZZH R&B MED SURG/OB UMMC

## 2019-02-13 PROCEDURE — 25800030 ZZH RX IP 258 OP 636

## 2019-02-13 PROCEDURE — 25000132 ZZH RX MED GY IP 250 OP 250 PS 637: Performed by: STUDENT IN AN ORGANIZED HEALTH CARE EDUCATION/TRAINING PROGRAM

## 2019-02-13 PROCEDURE — 84460 ALANINE AMINO (ALT) (SGPT): CPT | Performed by: STUDENT IN AN ORGANIZED HEALTH CARE EDUCATION/TRAINING PROGRAM

## 2019-02-13 PROCEDURE — 86900 BLOOD TYPING SEROLOGIC ABO: CPT | Performed by: STUDENT IN AN ORGANIZED HEALTH CARE EDUCATION/TRAINING PROGRAM

## 2019-02-13 PROCEDURE — 86901 BLOOD TYPING SEROLOGIC RH(D): CPT | Performed by: STUDENT IN AN ORGANIZED HEALTH CARE EDUCATION/TRAINING PROGRAM

## 2019-02-13 PROCEDURE — 84156 ASSAY OF PROTEIN URINE: CPT | Performed by: STUDENT IN AN ORGANIZED HEALTH CARE EDUCATION/TRAINING PROGRAM

## 2019-02-13 PROCEDURE — 86850 RBC ANTIBODY SCREEN: CPT | Performed by: STUDENT IN AN ORGANIZED HEALTH CARE EDUCATION/TRAINING PROGRAM

## 2019-02-13 PROCEDURE — 59425 ANTEPARTUM CARE ONLY: CPT | Performed by: OBSTETRICS & GYNECOLOGY

## 2019-02-13 PROCEDURE — 99207 ZZC PRENATAL VISIT: CPT | Performed by: OBSTETRICS & GYNECOLOGY

## 2019-02-13 PROCEDURE — 86923 COMPATIBILITY TEST ELECTRIC: CPT | Performed by: STUDENT IN AN ORGANIZED HEALTH CARE EDUCATION/TRAINING PROGRAM

## 2019-02-13 PROCEDURE — 85027 COMPLETE CBC AUTOMATED: CPT | Performed by: STUDENT IN AN ORGANIZED HEALTH CARE EDUCATION/TRAINING PROGRAM

## 2019-02-13 PROCEDURE — 36415 COLL VENOUS BLD VENIPUNCTURE: CPT | Performed by: STUDENT IN AN ORGANIZED HEALTH CARE EDUCATION/TRAINING PROGRAM

## 2019-02-13 PROCEDURE — 84450 TRANSFERASE (AST) (SGOT): CPT | Performed by: STUDENT IN AN ORGANIZED HEALTH CARE EDUCATION/TRAINING PROGRAM

## 2019-02-13 PROCEDURE — 82565 ASSAY OF CREATININE: CPT | Performed by: STUDENT IN AN ORGANIZED HEALTH CARE EDUCATION/TRAINING PROGRAM

## 2019-02-13 PROCEDURE — 3E0P7VZ INTRODUCTION OF HORMONE INTO FEMALE REPRODUCTIVE, VIA NATURAL OR ARTIFICIAL OPENING: ICD-10-PCS | Performed by: OBSTETRICS & GYNECOLOGY

## 2019-02-13 RX ORDER — LABETALOL 20 MG/4 ML (5 MG/ML) INTRAVENOUS SYRINGE
40 EVERY 10 MIN PRN
Status: DISCONTINUED | OUTPATIENT
Start: 2019-02-13 | End: 2019-02-14

## 2019-02-13 RX ORDER — OXYTOCIN 10 [USP'U]/ML
10 INJECTION, SOLUTION INTRAMUSCULAR; INTRAVENOUS
Status: DISCONTINUED | OUTPATIENT
Start: 2019-02-13 | End: 2019-02-15

## 2019-02-13 RX ORDER — NALOXONE HYDROCHLORIDE 0.4 MG/ML
.1-.4 INJECTION, SOLUTION INTRAMUSCULAR; INTRAVENOUS; SUBCUTANEOUS
Status: DISCONTINUED | OUTPATIENT
Start: 2019-02-13 | End: 2019-02-15

## 2019-02-13 RX ORDER — MAGNESIUM SULFATE HEPTAHYDRATE 40 MG/ML
4 INJECTION, SOLUTION INTRAVENOUS ONCE
Status: DISCONTINUED | OUTPATIENT
Start: 2019-02-13 | End: 2019-02-16

## 2019-02-13 RX ORDER — SODIUM CHLORIDE, SODIUM LACTATE, POTASSIUM CHLORIDE, CALCIUM CHLORIDE 600; 310; 30; 20 MG/100ML; MG/100ML; MG/100ML; MG/100ML
10-125 INJECTION, SOLUTION INTRAVENOUS CONTINUOUS
Status: DISCONTINUED | OUTPATIENT
Start: 2019-02-13 | End: 2019-02-17

## 2019-02-13 RX ORDER — LABETALOL 20 MG/4 ML (5 MG/ML) INTRAVENOUS SYRINGE
40 EVERY 10 MIN PRN
Status: DISCONTINUED | OUTPATIENT
Start: 2019-02-13 | End: 2019-02-19 | Stop reason: HOSPADM

## 2019-02-13 RX ORDER — MAGNESIUM SULFATE HEPTAHYDRATE 40 MG/ML
4 INJECTION, SOLUTION INTRAVENOUS ONCE
Status: COMPLETED | OUTPATIENT
Start: 2019-02-13 | End: 2019-02-13

## 2019-02-13 RX ORDER — OXYCODONE AND ACETAMINOPHEN 5; 325 MG/1; MG/1
1 TABLET ORAL
Status: DISCONTINUED | OUTPATIENT
Start: 2019-02-13 | End: 2019-02-15

## 2019-02-13 RX ORDER — MISOPROSTOL 100 UG/1
25 TABLET ORAL EVERY 4 HOURS PRN
Status: DISCONTINUED | OUTPATIENT
Start: 2019-02-13 | End: 2019-02-15

## 2019-02-13 RX ORDER — MAGNESIUM SULFATE HEPTAHYDRATE 500 MG/ML
4 INJECTION, SOLUTION INTRAMUSCULAR; INTRAVENOUS
Status: DISCONTINUED | OUTPATIENT
Start: 2019-02-13 | End: 2019-02-17

## 2019-02-13 RX ORDER — MAGNESIUM SULFATE HEPTAHYDRATE 40 MG/ML
4 INJECTION, SOLUTION INTRAVENOUS
Status: DISCONTINUED | OUTPATIENT
Start: 2019-02-13 | End: 2019-02-17

## 2019-02-13 RX ORDER — METOCLOPRAMIDE HYDROCHLORIDE 5 MG/ML
10 INJECTION INTRAMUSCULAR; INTRAVENOUS EVERY 6 HOURS PRN
Status: DISCONTINUED | OUTPATIENT
Start: 2019-02-13 | End: 2019-02-15

## 2019-02-13 RX ORDER — CARBOPROST TROMETHAMINE 250 UG/ML
250 INJECTION, SOLUTION INTRAMUSCULAR
Status: DISCONTINUED | OUTPATIENT
Start: 2019-02-13 | End: 2019-02-15

## 2019-02-13 RX ORDER — LABETALOL 20 MG/4 ML (5 MG/ML) INTRAVENOUS SYRINGE
20 EVERY 10 MIN PRN
Status: DISCONTINUED | OUTPATIENT
Start: 2019-02-13 | End: 2019-02-19 | Stop reason: HOSPADM

## 2019-02-13 RX ORDER — NIFEDIPINE 10 MG/1
10 CAPSULE ORAL
Status: DISCONTINUED | OUTPATIENT
Start: 2019-02-13 | End: 2019-02-17

## 2019-02-13 RX ORDER — SODIUM CHLORIDE, SODIUM LACTATE, POTASSIUM CHLORIDE, CALCIUM CHLORIDE 600; 310; 30; 20 MG/100ML; MG/100ML; MG/100ML; MG/100ML
INJECTION, SOLUTION INTRAVENOUS
Status: DISCONTINUED
Start: 2019-02-13 | End: 2019-02-14 | Stop reason: HOSPADM

## 2019-02-13 RX ORDER — MAGNESIUM SULFATE IN WATER 40 MG/ML
1 INJECTION, SOLUTION INTRAVENOUS CONTINUOUS
Status: DISPENSED | OUTPATIENT
Start: 2019-02-13 | End: 2019-02-16

## 2019-02-13 RX ORDER — LABETALOL 20 MG/4 ML (5 MG/ML) INTRAVENOUS SYRINGE
80 EVERY 10 MIN PRN
Status: DISCONTINUED | OUTPATIENT
Start: 2019-02-13 | End: 2019-02-14

## 2019-02-13 RX ORDER — SODIUM CHLORIDE, SODIUM LACTATE, POTASSIUM CHLORIDE, CALCIUM CHLORIDE 600; 310; 30; 20 MG/100ML; MG/100ML; MG/100ML; MG/100ML
INJECTION, SOLUTION INTRAVENOUS
Status: COMPLETED
Start: 2019-02-13 | End: 2019-02-13

## 2019-02-13 RX ORDER — ACETAMINOPHEN 325 MG/1
650 TABLET ORAL EVERY 4 HOURS PRN
Status: DISCONTINUED | OUTPATIENT
Start: 2019-02-13 | End: 2019-02-15

## 2019-02-13 RX ORDER — IBUPROFEN 800 MG/1
800 TABLET, FILM COATED ORAL
Status: DISCONTINUED | OUTPATIENT
Start: 2019-02-13 | End: 2019-02-15

## 2019-02-13 RX ORDER — LIDOCAINE 40 MG/G
CREAM TOPICAL
Status: DISCONTINUED | OUTPATIENT
Start: 2019-02-13 | End: 2019-02-19 | Stop reason: HOSPADM

## 2019-02-13 RX ORDER — LABETALOL 20 MG/4 ML (5 MG/ML) INTRAVENOUS SYRINGE
20 EVERY 10 MIN PRN
Status: DISCONTINUED | OUTPATIENT
Start: 2019-02-13 | End: 2019-02-14

## 2019-02-13 RX ORDER — LORAZEPAM 2 MG/ML
2 INJECTION INTRAMUSCULAR
Status: DISCONTINUED | OUTPATIENT
Start: 2019-02-13 | End: 2019-02-17

## 2019-02-13 RX ORDER — DEXTROAMPHETAMINE SACCHARATE, AMPHETAMINE ASPARTATE, DEXTROAMPHETAMINE SULFATE AND AMPHETAMINE SULFATE 3.75; 3.75; 3.75; 3.75 MG/1; MG/1; MG/1; MG/1
TABLET ORAL
Qty: 75 TABLET | Refills: 0 | Status: SHIPPED | OUTPATIENT
Start: 2019-02-18 | End: 2019-03-15

## 2019-02-13 RX ORDER — OXYTOCIN/0.9 % SODIUM CHLORIDE 30/500 ML
100-340 PLASTIC BAG, INJECTION (ML) INTRAVENOUS CONTINUOUS PRN
Status: DISCONTINUED | OUTPATIENT
Start: 2019-02-13 | End: 2019-02-15

## 2019-02-13 RX ORDER — MAGNESIUM SULFATE IN WATER 40 MG/ML
2 INJECTION, SOLUTION INTRAVENOUS CONTINUOUS
Status: DISPENSED | OUTPATIENT
Start: 2019-02-13 | End: 2019-02-16

## 2019-02-13 RX ORDER — LABETALOL 20 MG/4 ML (5 MG/ML) INTRAVENOUS SYRINGE
Status: DISCONTINUED
Start: 2019-02-13 | End: 2019-02-13 | Stop reason: WASHOUT

## 2019-02-13 RX ORDER — SODIUM CHLORIDE, SODIUM LACTATE, POTASSIUM CHLORIDE, CALCIUM CHLORIDE 600; 310; 30; 20 MG/100ML; MG/100ML; MG/100ML; MG/100ML
INJECTION, SOLUTION INTRAVENOUS CONTINUOUS
Status: DISCONTINUED | OUTPATIENT
Start: 2019-02-13 | End: 2019-02-15

## 2019-02-13 RX ORDER — ONDANSETRON 2 MG/ML
4 INJECTION INTRAMUSCULAR; INTRAVENOUS EVERY 6 HOURS PRN
Status: DISCONTINUED | OUTPATIENT
Start: 2019-02-13 | End: 2019-02-15

## 2019-02-13 RX ORDER — CALCIUM GLUCONATE 94 MG/ML
1 INJECTION, SOLUTION INTRAVENOUS
Status: DISCONTINUED | OUTPATIENT
Start: 2019-02-13 | End: 2019-02-17

## 2019-02-13 RX ORDER — LABETALOL 20 MG/4 ML (5 MG/ML) INTRAVENOUS SYRINGE
80 EVERY 10 MIN PRN
Status: DISCONTINUED | OUTPATIENT
Start: 2019-02-13 | End: 2019-02-19 | Stop reason: HOSPADM

## 2019-02-13 RX ORDER — TERBUTALINE SULFATE 1 MG/ML
0.25 INJECTION, SOLUTION SUBCUTANEOUS
Status: DISCONTINUED | OUTPATIENT
Start: 2019-02-13 | End: 2019-02-15

## 2019-02-13 RX ADMIN — SODIUM CHLORIDE, POTASSIUM CHLORIDE, SODIUM LACTATE AND CALCIUM CHLORIDE 75 ML/HR: 600; 310; 30; 20 INJECTION, SOLUTION INTRAVENOUS at 10:50

## 2019-02-13 RX ADMIN — ACETAMINOPHEN 650 MG: 325 TABLET, FILM COATED ORAL at 13:20

## 2019-02-13 RX ADMIN — Medication 25 MCG: at 18:41

## 2019-02-13 RX ADMIN — NIFEDIPINE 10 MG: 10 CAPSULE ORAL at 09:06

## 2019-02-13 RX ADMIN — ACETAMINOPHEN 650 MG: 325 TABLET, FILM COATED ORAL at 17:38

## 2019-02-13 RX ADMIN — Medication 25 MCG: at 13:21

## 2019-02-13 RX ADMIN — MAGNESIUM SULFATE IN WATER 2 G/HR: 40 INJECTION, SOLUTION INTRAVENOUS at 22:33

## 2019-02-13 RX ADMIN — MAGNESIUM SULFATE IN WATER 2 G/HR: 40 INJECTION, SOLUTION INTRAVENOUS at 12:25

## 2019-02-13 RX ADMIN — MAGNESIUM SULFATE IN WATER 4 G: 40 INJECTION, SOLUTION INTRAVENOUS at 10:57

## 2019-02-13 ASSESSMENT — PATIENT HEALTH QUESTIONNAIRE - PHQ9: SUM OF ALL RESPONSES TO PHQ QUESTIONS 1-9: 3

## 2019-02-13 ASSESSMENT — MIFFLIN-ST. JEOR: SCORE: 1645.69

## 2019-02-13 NOTE — PROGRESS NOTES
36w2d feeling ok overall, mostly tired.  Denies HA, scotomata, RUQ pain.  BPs 180/102, repeat 182/104.  To L&D for likely IOL for severe range BPs.  On call MD garcia.  pearl

## 2019-02-13 NOTE — PLAN OF CARE
Pt arrived from clinic. First /92. G3 Dr Taylor notified. Oral Procardia 10mg oral given at 0910 while attempting to place IV. BP has since been below 160, except for an isolated BP of 183/86. BP's continue to be closely monitored.

## 2019-02-13 NOTE — TELEPHONE ENCOUNTER
Spoke with pt, she will have script picked up at reception,       Lexy Luciano RN   [facility]

## 2019-02-13 NOTE — TELEPHONE ENCOUNTER
Rx is at my desk.  Patient was sent to hospital for induction from the clinic.  I sent MyChart asking if  can come  rx.  MARIBETH Hernandez

## 2019-02-13 NOTE — TELEPHONE ENCOUNTER
Will await notification from pharmacy once pt goes to fill script regarding whether PA needed or not.    Luna Bran RN  [facility]

## 2019-02-13 NOTE — PROVIDER NOTIFICATION
02/13/19 1210   Provider Notification   Provider Name/Title Dr Boles   Method of Notification Phone   Notification Reason Status Update   Patient reporting significant pain in arm that had the Mag Sulfate loading dose. Pain seemed to increase for her after the loading dose was done, before the continous had been started. IV stopped. Arm raised. Ice applied. Arm and IV site appear asymptomatic. The area is not raised or red. Dr Boles called and notified. IV started in R arm and continuous dose started. Patient has no complaints currently.

## 2019-02-13 NOTE — TELEPHONE ENCOUNTER
Yee,    Please see mychart message from patient.    Cued script for Adderall 15 mg tab if okay for start date 02/18/19.    :  Adderall 10 mg tablet last filled on 02/09/19 for #30 and Adderall XR 30 mg tablet last filled on 02/19/19 for #30  Per chart review, pt planning to switch back to IR once breastfeeding.    Luna Bran RN  [facility]

## 2019-02-13 NOTE — PLAN OF CARE
Patient admitted for Pre-e with Severe features. Started Mag Sulfate 4g loading dose.  Patient complaining of pain in arm, IV site looks good, Heat applied. Continuing to have pain. Pharmacy called- loading dose given slower over an hour instead of 30 min. Dr Boles notified of change in rate.

## 2019-02-13 NOTE — H&P
L&D History and Physical   2019  Traci Cosme  [id number]      HPI: Traci Cosme is a 38 year old  at 36w2d by LMP c/w 9w3d US who presents from clinic for severe range blood pressure. BP was noted to have BP of 189/92 that was sustained over 15 minutes. She was sent to L&D for evaluation for preeclampsia.     She states that she is feeling overall well today.  She denies headache, vision changes, chest pain, shortness of breath, fever, chills, RUQ pain, nausea, vomiting or other systemic complaints. She denies vaginal bleeding or loss of fluid and is feeling normal fetal movement. Rare contractions.     Pregnancy complicated by:  - Preeclampsia with severe features (BP's) diagnosed today. Pt previously with gHTN diagnosed at 35 weeks s/p rescue BMZ (2/10-)  - H/o gHTN  - H/o PTD at 36 weeks, no 17-OHP  - Admitted with episode of vaginal bleeding of unknown etiology at 31w5d s/p BMZ course (-)  - LSIL pap, HPV neg. Needs colpo postpartum  - ADHD    OBHX:   Obstetric History       T0      L1     SAB1   TAB0   Ectopic0   Multiple0   Live Births1       # Outcome Date GA Lbr Scar/2nd Weight Sex Delivery Anes PTL Lv   4 Current            3  16 36w0d 77:00 / 00:31 2.39 kg (5 lb 4.3 oz) F Vag-Spont EPI  ALKA      Name: RICK COSME      Apgar1:  9                Apgar5: 9   2 SAB 13 5w0d          1 AB 2010 5w0d                 Past Medical History:   Diagnosis Date     Abnormal Pap smear of cervix 2018    LSIL, Neg HPV, pregnant     ADHD (attention deficit hyperactivity disorder)      Anemia      Gastric reflux      IBS (irritable bowel syndrome)        Past Surgical History:   Procedure Laterality Date     CYSTOSCOPY, DILATE URETHRA, COMBINED  1999    for frequent UTI'S     KNEE SURGERY  10/1997       Medications:     No current facility-administered medications on file prior to encounter.   Current Outpatient Medications on File Prior to  Encounter:  acetaminophen (TYLENOL) 325 MG tablet Take 325-650 mg by mouth once   amphetamine-dextroamphetamine (ADDERALL XR) 30 MG 24 hr capsule Take 1 capsule (30 mg) by mouth daily   amphetamine-dextroamphetamine (ADDERALL) 10 MG tablet Take 1 tablet (10 mg) by mouth daily   omeprazole (PRILOSEC OTC) 20 MG EC tablet Take 20 mg by mouth daily   [] amphetamine-dextroamphetamine (ADDERALL XR) 30 MG 24 hr capsule Take 1 capsule (30 mg) by mouth daily   [] amphetamine-dextroamphetamine (ADDERALL) 10 MG tablet Take 1 tablet (10 mg) by mouth daily   [START ON 2019] amphetamine-dextroamphetamine (ADDERALL) 15 MG tablet TAKE 1.5 TAB BY MOUTH IN THE AM AND 1 TAB IN THE PM   doxylamine (UNISOM) 25 MG TABS tablet Take 0.5 tablets (12.5 mg) by mouth 4 times daily as needed   fluticasone (FLONASE) 50 MCG/ACT spray Spray 1-2 sprays into both nostrils daily (Patient not taking: Reported on 2019)   HYDROXYprogesterone caproate in oil 250 mg/mL (HERNANDO) intramuscular injection Inject 1 mL (250 mg) into the muscle every 7 days (Patient not taking: Reported on 2019)   loratadine (CLARITIN) 10 MG tablet Take 1 tablet (10 mg) by mouth daily   [] metroNIDAZOLE (FLAGYL) 500 MG tablet Take 1 tablet (500 mg) by mouth every 12 hours for 4 days   oxymetazoline (AFRIN NASAL SPRAY) 0.05 % spray Spray 2-3 sprays into both nostrils 2 times daily as needed for congestion (Patient not taking: Reported on 2019)   Prenatal Vit-Fe Fumarate-FA (PRENATAL MULTIVITAMIN PLUS IRON) 27-0.8 MG TABS per tablet Take 1 tablet by mouth daily (Patient not taking: Reported on 2019)   pyridOXINE (VITAMIN  B-6) 25 MG tablet Take 1 tablet (25 mg) by mouth 4 times daily as needed (Patient not taking: Reported on 2019)       No Known Allergies    Family History   Problem Relation Age of Onset     Heart Failure Maternal Grandmother      Breast Cancer Maternal Grandmother      Colon Cancer Maternal Grandmother       "Cerebrovascular Disease Maternal Grandfather      Diabetes Maternal Grandfather      Other Cancer Maternal Grandfather      Mental Illness Paternal Grandfather      Hypertension Sister        SocialHX:   Social History     Tobacco Use     Smoking status: Former Smoker     Last attempt to quit: 10/25/2015     Years since quitting: 3.3     Smokeless tobacco: Never Used   Substance Use Topics     Alcohol use: No     Alcohol/week: 0.0 oz     Comment: occ     Drug use: No       ROS: 10-point ROS negative except as indicated in HPI.    Physical Exam:  Vitals:    02/13/19 0910 02/13/19 0918 02/13/19 0920 02/13/19 0934   BP: (!) 156/92  (!) 183/96 151/88   Resp:  18  18   Temp:  98.3  F (36.8  C)     TempSrc:  Oral     Weight:  90.7 kg (200 lb)     Height:  1.727 m (5' 8\")       General: alert, oriented female, resting in bed in NAD  CV: regular rate and rhythm, no murmurs  Lungs: clear bilaterally, no crackles or wheezes. Non-labored breathing  Abdomen: soft, gravid, non-tender, EFW 6.5#.  Extremities: bilateral lower extremities non-tender with trace edema  Neuro: 1+ patellar reflexes, no clonus    SVE: FT/long/high  Membranes: intact    Presentation: Cephalic by BSUS    FHT: baseline 150, moderate variability, 15x15 accelerations, no decelerations  [place]: q10 min ctx    Last ultrasound  DATING  ---------------------------------------------------------------------------------------------------------                                           Date                                Details                                                                                      Gest. age                      SEGUN  LMP                                  6/10/2018                                                                                                                         31 w + 1 d                     3/17/2019  Prior assessment               8/9/2018                          GA: 9 w + 3 d                                        "                                      32 w + 0 d                     3/11/2019  U/S                                   1/14/2019                         based upon AC, BPD, Femur, HC                                                33 w + 1 d                     3/3/2019  Assigned dating                  Dating performed on 01/14/2019, based on the prior assessment (on 08/9/2018)                    32 w + 0 d                     3/11/2019        GENERAL EVALUATION  ---------------------------------------------------------------------------------------------------------  Cardiac activity present.  bpm.  Fetal movements present.  Presentation cephalic.  Placenta Placental site: anterior, no previa.  Umbilical cord 3 vessel cord.  Amniotic fluid Amount of AF: normal. MVP 6.1 cm.        FETAL BIOMETRY  ---------------------------------------------------------------------------------------------------------  Main Fetal Biometry:  BPD                                        83.7                    mm                         33w 5d                Hadlock  OFD                                        105.8                  mm                         31w 3d                 Nicolaides  HC                                          305.3                  mm                          34w 0d                Hadlock  AC                                          275.1                  mm                          31w 4d                Hadlock  Femur                                      63.8                   mm                          33w 0d                Hadlock  Humerus                                  58.0                    mm                         33w 4d                Dale  Fetal Weight Calculation:  EFW                                       1,970                  g                                     53%         Jaswant  EFW (lb,oz)                             4 lb 5                  oz  EFW by                                         Hadlock (BPD-HC-AC-FL)        FETAL ANATOMY  ---------------------------------------------------------------------------------------------------------  The following structures appear normal:  Head / Neck                         Cranium. Head size. Head shape. Lateral ventricles. Midline falx. Thalami.  Heart / Thorax                      4-chamber view. RVOT view. LVOT view.  Abdomen                             Stomach: Stomach size and situs appear normal. Kidneys. Bladder: Bladder appears normal in size and shape. Genitals.  Spine                                  Cervical spine. Thoracic spine. Lumbar spine. Sacral spine.     The following structures were documented previously:  Head / Neck                         Cavum septi pellucidi. Cisterna magna.  Face                                   Profile.  Heart / Thorax                      Aortic arch view. Ductal arch view.  Abdomen                             Abdominal wall.     Gender: male.        MATERNAL STRUCTURES  ---------------------------------------------------------------------------------------------------------  Cervix                                  Not examined  Right Ovary                          Not examined  Left Ovary                            Not examined    Prenatal Labs:   Lab Results   Component Value Date    ABO PENDING 02/13/2019    RH Pos 02/11/2019    AS PENDING 02/13/2019    HEPBANG Nonreactive 07/26/2018    CHPCRT Negative 01/12/2019    GCPCRT Negative 01/12/2019    TREPAB Negative 09/24/2016    HGB 9.8 (L) 02/13/2019   Rubella immune    GBS Status:   Lab Results   Component Value Date    GBS Negative 02/11/2019       Lab Results   Component Value Date    PAP LSIL 08/22/2018       Labs:   Results for orders placed or performed during the hospital encounter of 02/13/19 (from the past 24 hour(s))   CBC with platelets   Result Value Ref Range    WBC 9.1 4.0 - 11.0 10e9/L    RBC Count 3.62 (L) 3.8 - 5.2 10e12/L    Hemoglobin  9.8 (L) 11.7 - 15.7 g/dL    Hematocrit 30.4 (L) 35.0 - 47.0 %    MCV 84 78 - 100 fl    MCH 27.1 26.5 - 33.0 pg    MCHC 32.2 31.5 - 36.5 g/dL    RDW 12.9 10.0 - 15.0 %    Platelet Count 382 150 - 450 10e9/L   AST   Result Value Ref Range    AST 10 0 - 45 U/L   ALT   Result Value Ref Range    ALT 16 0 - 50 U/L   Creatinine   Result Value Ref Range    Creatinine 0.57 0.52 - 1.04 mg/dL    GFR Estimate >90 >60 mL/min/[1.73_m2]    GFR Estimate If Black >90 >60 mL/min/[1.73_m2]   ABO/Rh type and screen   Result Value Ref Range    ABO PENDING     Antibody Screen PENDING     Test Valid Only At          [facility]    Specimen Expires 2019    Protein  random urine with Creat Ratio   Result Value Ref Range    Protein Random Urine <0.05 g/L    Protein Total Urine g/gr Creatinine Unable to calculate due to low value 0 - 0.2 g/g Cr   Creatinine urine calculation only   Result Value Ref Range    Creatinine Urine 20 mg/dL       Assessment: Traci Sloan is a 38 year old  at 36w2d by LMP c/w 9w3d US who presents from clinic for severe range blood pressure, admitted for IOL due to preeclampsia with severe features based on sustained severe range blood pressures.     Plan:    IOL:  - PV Misoprostol for cervical ripening followed Cook catheter vs Pitocin  - GBS neg; antibiotics not indicated  - Plans epidural for pain in labor    Preeclampsia with severe features:  - Based on sustained severe range BP's requiring anti-hypertensive treatment. S/p PO Nifedipine at 0906, BP's now mild range. Continue to monitor and treat severe range prn.   - Will initiate magnesium 4g load with maintenance at 2g/hr for seizure ppx, plan to continue until 24 hours postpartum  - HELLP labs wnl, UPC <0.3. Repeat prn.   - Will proceed with IOL as above    FWB:  Category I, reactive. Cephalic by BSUS. EFW 6.5#. S/p BMZ course -, rescue course on 2/10-. Will have NICU present at delivery.      PNC:  Rh positive, rubella immune, GCT passed, placenta anterior    Patient seen and care plan discussed under supervision of Dr. Boles.    Celina Ruvalcaba MD  Ob/Gyn PGY-2  19 10:38 AM         Physician Attestation   I, Dolores Boles, personally examined and evaluated this patient.  I discussed the patient with the medical student and/or resident and care team, and agree with the assessment and plan of care as documented in the note of 2019  [date].      I personally reviewed vital signs, medications, labs and exam.    Key findings: 38 year old  at 36w2d with pre-eclampsia with severe features by BP criteria. Agree with magnesium sulfate for seizure prophylaxis. Cervical ripening with vaginal misoprostol. Anticipate .  Dolores Boles MD  Date of Service (when I saw the patient): 19

## 2019-02-13 NOTE — PLAN OF CARE
Dr Ruvalcaba ordered misoprostol started for cervical ripening.  Discussed  with patient, patient given time to ask questions, patient agreed with plan. Will closely monitor uterine contractions and FHT's.  Notify provider of progressing labor, needs for pain medication, or maternal/fetal distress.

## 2019-02-14 ENCOUNTER — ANESTHESIA (OUTPATIENT)
Dept: OBGYN | Facility: CLINIC | Age: 39
End: 2019-02-14
Payer: COMMERCIAL

## 2019-02-14 ENCOUNTER — ANESTHESIA EVENT (OUTPATIENT)
Dept: OBGYN | Facility: CLINIC | Age: 39
End: 2019-02-14
Payer: COMMERCIAL

## 2019-02-14 PROCEDURE — 25000132 ZZH RX MED GY IP 250 OP 250 PS 637: Performed by: STUDENT IN AN ORGANIZED HEALTH CARE EDUCATION/TRAINING PROGRAM

## 2019-02-14 PROCEDURE — 25000125 ZZHC RX 250: Performed by: STUDENT IN AN ORGANIZED HEALTH CARE EDUCATION/TRAINING PROGRAM

## 2019-02-14 PROCEDURE — 25000128 H RX IP 250 OP 636: Performed by: STUDENT IN AN ORGANIZED HEALTH CARE EDUCATION/TRAINING PROGRAM

## 2019-02-14 PROCEDURE — 12000001 ZZH R&B MED SURG/OB UMMC

## 2019-02-14 PROCEDURE — 25000132 ZZH RX MED GY IP 250 OP 250 PS 637: Performed by: OBSTETRICS & GYNECOLOGY

## 2019-02-14 PROCEDURE — 25800030 ZZH RX IP 258 OP 636: Performed by: STUDENT IN AN ORGANIZED HEALTH CARE EDUCATION/TRAINING PROGRAM

## 2019-02-14 PROCEDURE — 25800030 ZZH RX IP 258 OP 636

## 2019-02-14 PROCEDURE — 36415 COLL VENOUS BLD VENIPUNCTURE: CPT | Performed by: STUDENT IN AN ORGANIZED HEALTH CARE EDUCATION/TRAINING PROGRAM

## 2019-02-14 PROCEDURE — 59200 INSERT CERVICAL DILATOR: CPT | Performed by: OBSTETRICS & GYNECOLOGY

## 2019-02-14 PROCEDURE — 0064U ANTB TP TOTAL&RPR IA QUAL: CPT | Performed by: STUDENT IN AN ORGANIZED HEALTH CARE EDUCATION/TRAINING PROGRAM

## 2019-02-14 RX ORDER — NALOXONE HYDROCHLORIDE 0.4 MG/ML
.1-.4 INJECTION, SOLUTION INTRAMUSCULAR; INTRAVENOUS; SUBCUTANEOUS
Status: DISCONTINUED | OUTPATIENT
Start: 2019-02-14 | End: 2019-02-15

## 2019-02-14 RX ORDER — AMOXICILLIN 250 MG
2 CAPSULE ORAL DAILY PRN
Status: DISCONTINUED | OUTPATIENT
Start: 2019-02-14 | End: 2019-02-19 | Stop reason: HOSPADM

## 2019-02-14 RX ORDER — EPHEDRINE SULFATE 50 MG/ML
5 INJECTION, SOLUTION INTRAMUSCULAR; INTRAVENOUS; SUBCUTANEOUS
Status: DISCONTINUED | OUTPATIENT
Start: 2019-02-14 | End: 2019-02-15

## 2019-02-14 RX ORDER — CITRIC ACID/SODIUM CITRATE 334-500MG
30 SOLUTION, ORAL ORAL ONCE
Status: DISCONTINUED | OUTPATIENT
Start: 2019-02-14 | End: 2019-02-15

## 2019-02-14 RX ORDER — HYDROXYZINE HYDROCHLORIDE 50 MG/1
50-100 TABLET, FILM COATED ORAL
Status: DISCONTINUED | OUTPATIENT
Start: 2019-02-14 | End: 2019-02-19 | Stop reason: HOSPADM

## 2019-02-14 RX ORDER — DIPHENHYDRAMINE HCL 25 MG
25 CAPSULE ORAL ONCE
Status: COMPLETED | OUTPATIENT
Start: 2019-02-14 | End: 2019-02-14

## 2019-02-14 RX ORDER — LORATADINE 10 MG/1
10 TABLET ORAL DAILY
Status: DISCONTINUED | OUTPATIENT
Start: 2019-02-14 | End: 2019-02-19 | Stop reason: HOSPADM

## 2019-02-14 RX ORDER — FENTANYL CITRATE 50 UG/ML
100 INJECTION, SOLUTION INTRAMUSCULAR; INTRAVENOUS
Status: DISCONTINUED | OUTPATIENT
Start: 2019-02-14 | End: 2019-02-15

## 2019-02-14 RX ORDER — SODIUM CHLORIDE, SODIUM LACTATE, POTASSIUM CHLORIDE, CALCIUM CHLORIDE 600; 310; 30; 20 MG/100ML; MG/100ML; MG/100ML; MG/100ML
INJECTION, SOLUTION INTRAVENOUS
Status: COMPLETED
Start: 2019-02-14 | End: 2019-02-14

## 2019-02-14 RX ORDER — NALBUPHINE HYDROCHLORIDE 10 MG/ML
2.5-5 INJECTION, SOLUTION INTRAMUSCULAR; INTRAVENOUS; SUBCUTANEOUS EVERY 6 HOURS PRN
Status: DISCONTINUED | OUTPATIENT
Start: 2019-02-14 | End: 2019-02-15

## 2019-02-14 RX ORDER — LIDOCAINE 40 MG/G
CREAM TOPICAL
Status: DISCONTINUED | OUTPATIENT
Start: 2019-02-14 | End: 2019-02-15

## 2019-02-14 RX ORDER — OXYTOCIN/0.9 % SODIUM CHLORIDE 30/500 ML
1-24 PLASTIC BAG, INJECTION (ML) INTRAVENOUS CONTINUOUS
Status: DISCONTINUED | OUTPATIENT
Start: 2019-02-14 | End: 2019-02-15

## 2019-02-14 RX ORDER — SODIUM CHLORIDE, SODIUM LACTATE, POTASSIUM CHLORIDE, CALCIUM CHLORIDE 600; 310; 30; 20 MG/100ML; MG/100ML; MG/100ML; MG/100ML
INJECTION, SOLUTION INTRAVENOUS
Status: COMPLETED
Start: 2019-02-14 | End: 2019-02-15

## 2019-02-14 RX ORDER — AMOXICILLIN 250 MG
1 CAPSULE ORAL DAILY PRN
Status: DISCONTINUED | OUTPATIENT
Start: 2019-02-14 | End: 2019-02-14

## 2019-02-14 RX ORDER — OXYMETAZOLINE HYDROCHLORIDE 0.05 G/100ML
2 SPRAY NASAL 2 TIMES DAILY
Status: DISCONTINUED | OUTPATIENT
Start: 2019-02-14 | End: 2019-02-19 | Stop reason: HOSPADM

## 2019-02-14 RX ORDER — HYDROXYZINE HYDROCHLORIDE 50 MG/1
50-100 TABLET, FILM COATED ORAL EVERY 6 HOURS PRN
Status: DISCONTINUED | OUTPATIENT
Start: 2019-02-14 | End: 2019-02-15

## 2019-02-14 RX ADMIN — HYDROXYZINE HYDROCHLORIDE 100 MG: 50 TABLET, FILM COATED ORAL at 16:15

## 2019-02-14 RX ADMIN — ACETAMINOPHEN 650 MG: 325 TABLET, FILM COATED ORAL at 13:23

## 2019-02-14 RX ADMIN — FENTANYL CITRATE 100 MCG: 50 INJECTION, SOLUTION INTRAMUSCULAR; INTRAVENOUS at 17:40

## 2019-02-14 RX ADMIN — SODIUM CHLORIDE, POTASSIUM CHLORIDE, SODIUM LACTATE AND CALCIUM CHLORIDE 75 ML/HR: 600; 310; 30; 20 INJECTION, SOLUTION INTRAVENOUS at 05:56

## 2019-02-14 RX ADMIN — Medication 25 MCG: at 02:29

## 2019-02-14 RX ADMIN — HYDROXYZINE HYDROCHLORIDE 100 MG: 50 TABLET ORAL at 23:33

## 2019-02-14 RX ADMIN — FENTANYL CITRATE 100 MCG: 50 INJECTION, SOLUTION INTRAMUSCULAR; INTRAVENOUS at 04:54

## 2019-02-14 RX ADMIN — ACETAMINOPHEN 650 MG: 325 TABLET, FILM COATED ORAL at 17:35

## 2019-02-14 RX ADMIN — MAGNESIUM SULFATE IN WATER 2 G/HR: 40 INJECTION, SOLUTION INTRAVENOUS at 18:25

## 2019-02-14 RX ADMIN — SODIUM CHLORIDE, POTASSIUM CHLORIDE, SODIUM LACTATE AND CALCIUM CHLORIDE 250 ML: 600; 310; 30; 20 INJECTION, SOLUTION INTRAVENOUS at 05:34

## 2019-02-14 RX ADMIN — LORATADINE 10 MG: 10 TABLET ORAL at 13:23

## 2019-02-14 RX ADMIN — DIPHENHYDRAMINE HYDROCHLORIDE 25 MG: 25 CAPSULE ORAL at 02:41

## 2019-02-14 RX ADMIN — OXYTOCIN-SODIUM CHLORIDE 0.9% IV SOLN 30 UNIT/500ML 2 MILLI-UNITS/MIN: 30-0.9/5 SOLUTION at 17:34

## 2019-02-14 RX ADMIN — FENTANYL CITRATE 100 MCG: 50 INJECTION, SOLUTION INTRAMUSCULAR; INTRAVENOUS at 21:27

## 2019-02-14 RX ADMIN — ACETAMINOPHEN 650 MG: 325 TABLET, FILM COATED ORAL at 21:32

## 2019-02-14 RX ADMIN — SODIUM CHLORIDE, POTASSIUM CHLORIDE, SODIUM LACTATE AND CALCIUM CHLORIDE 250 ML: 600; 310; 30; 20 INJECTION, SOLUTION INTRAVENOUS at 01:55

## 2019-02-14 RX ADMIN — ACETAMINOPHEN 650 MG: 325 TABLET, FILM COATED ORAL at 07:58

## 2019-02-14 RX ADMIN — MAGNESIUM SULFATE IN WATER 2 G/HR: 40 INJECTION, SOLUTION INTRAVENOUS at 08:28

## 2019-02-14 RX ADMIN — FENTANYL CITRATE 100 MCG: 50 INJECTION, SOLUTION INTRAMUSCULAR; INTRAVENOUS at 06:20

## 2019-02-14 RX ADMIN — FENTANYL CITRATE 100 MCG: 50 INJECTION, SOLUTION INTRAMUSCULAR; INTRAVENOUS at 10:13

## 2019-02-14 RX ADMIN — FENTANYL CITRATE 100 MCG: 50 INJECTION, SOLUTION INTRAMUSCULAR; INTRAVENOUS at 19:44

## 2019-02-14 RX ADMIN — SENNOSIDES AND DOCUSATE SODIUM 2 TABLET: 8.6; 5 TABLET ORAL at 21:22

## 2019-02-14 RX ADMIN — FENTANYL CITRATE 100 MCG: 50 INJECTION, SOLUTION INTRAMUSCULAR; INTRAVENOUS at 23:37

## 2019-02-14 RX ADMIN — HYDROXYZINE HYDROCHLORIDE 100 MG: 50 TABLET ORAL at 00:16

## 2019-02-14 RX ADMIN — SENNOSIDES AND DOCUSATE SODIUM 1 TABLET: 8.6; 5 TABLET ORAL at 03:34

## 2019-02-14 RX ADMIN — FENTANYL CITRATE 100 MCG: 50 INJECTION, SOLUTION INTRAMUSCULAR; INTRAVENOUS at 02:42

## 2019-02-14 RX ADMIN — SODIUM CHLORIDE, POTASSIUM CHLORIDE, SODIUM LACTATE AND CALCIUM CHLORIDE 75 ML/HR: 600; 310; 30; 20 INJECTION, SOLUTION INTRAVENOUS at 18:29

## 2019-02-14 RX ADMIN — FENTANYL CITRATE 100 MCG: 50 INJECTION, SOLUTION INTRAMUSCULAR; INTRAVENOUS at 13:23

## 2019-02-14 RX ADMIN — OXYMETAZOLINE HYDROCHLORIDE 2 SPRAY: 5 SPRAY NASAL at 13:24

## 2019-02-14 NOTE — PROVIDER NOTIFICATION
02/14/19 0143   Provider Notification   Provider Name/Title Dr Bryan   Method of Notification Electronic Page   Request Evaluate - Remote   Notification Reason Decels;Fetal Baseline Change;Variability Change;Other (Comment)   FHT's 160's, variables, with minimal variablity. Pt requesting morphine at this time.

## 2019-02-14 NOTE — PROGRESS NOTES
[facility] PROGRESS NOTE:   2019 6:42 PM         SUBJECTIVE:   Patient complains of mild cramping.    Contractions:  q 5-6 minutes  Leakage of fluid:  No  Vaginal bleeding:  No  Pain controlled:  Yes    Denies headache, visual changes, RUQ/epigastric pain.  Left arm pain resolved.         OBJECTIVE:     Vitals:    19 1530 19 1622 19 1737 19 1740   BP: 136/81 141/88  145/87   Resp:   18    Temp:   98.5  F (36.9  C)    TempSrc:   Oral    Weight:       Height:             NST:  Fetal Heart Rate Tracin bpm baseline, mod variability, + accels, no decels  Category 1    Tocometer: q 5-6 minutes    Gen: alert, oriented, no distress  Resp: normal respiratory effort  CV: normal pulses  Abdomen:  Gravid, nontender  Cervix:   Dilation: 1   Effacement: 30%   Station:-4   Consistency: average   Position: Posterior         LABS:     Recent Results (from the past 12 hour(s))   CBC with platelets    Collection Time: 19  9:40 AM   Result Value Ref Range    WBC 9.1 4.0 - 11.0 10e9/L    RBC Count 3.62 (L) 3.8 - 5.2 10e12/L    Hemoglobin 9.8 (L) 11.7 - 15.7 g/dL    Hematocrit 30.4 (L) 35.0 - 47.0 %    MCV 84 78 - 100 fl    MCH 27.1 26.5 - 33.0 pg    MCHC 32.2 31.5 - 36.5 g/dL    RDW 12.9 10.0 - 15.0 %    Platelet Count 382 150 - 450 10e9/L   AST    Collection Time: 19  9:40 AM   Result Value Ref Range    AST 10 0 - 45 U/L   ALT    Collection Time: 19  9:40 AM   Result Value Ref Range    ALT 16 0 - 50 U/L   Creatinine    Collection Time: 19  9:40 AM   Result Value Ref Range    Creatinine 0.57 0.52 - 1.04 mg/dL    GFR Estimate >90 >60 mL/min/[1.73_m2]    GFR Estimate If Black >90 >60 mL/min/[1.73_m2]   ABO/Rh type and screen    Collection Time: 19  9:40 AM   Result Value Ref Range    ABO O     RH(D) Pos     Antibody Screen Neg     Test Valid Only At          [facility]    Specimen Expires 2019     Protein  random urine with Creat Ratio    Collection Time: 19  9:43 AM   Result Value Ref Range    Protein Random Urine <0.05 g/L    Protein Total Urine g/gr Creatinine Unable to calculate due to low value 0 - 0.2 g/g Cr   Creatinine urine calculation only    Collection Time: 19  9:43 AM   Result Value Ref Range    Creatinine Urine 20 mg/dL              ASSESSMENT / PLAN:   38 year old  at 36w2d admitted for IOL for gestational htn with severe range BPs.    Pre-eclampsia: continue magnesium sulfate for seizure proph. Has not required additional antihypertensives since admission  Labor: cervical ripening with vaginal misoprostol. Second dose placed at 1840.  Fetal well being: Category 1 tracing. S/p BMZ for fetal lung maturity.  GBS: neg  Pain: planning epidural  Anticipate .    Dolores Boles MD

## 2019-02-14 NOTE — PROGRESS NOTES
Labor Progress Note    S: Moderate contractions. Denies headache, visual changes, RUQ/epigastric pain.    Vitals:    19 2000 19 2111 19 2236 19 0040   BP: 137/84 138/79 136/78 150/79   Resp:  18  18   Temp:  98.4  F (36.9  C)  98  F (36.7  C)   TempSrc:  Oral  Oral   Weight:       Height:         Fetal Heart Rate Tracing: 160 bpm baseline, mod-minimal variability, n accels, occasional late/variable decels  Category 2    Tocometer: q 5-6 minutes    Gen: alert, oriented, no distress  Resp: normal respiratory effort  CV: normal pulses  Abdomen:  Gravid, nontender  Ext: Reflexes +1 b/l   Cervix: 50/-4         ASSESSMENT / PLAN:   38 year old  at 36w3d admitted for IOL pre-eclampsia with severe features.    # Pre-eclampsia  - continue magnesium sulfate for seizure proph  - BP: mild range BP, has not required any immediate acting antihypertensives other than initial nifedipine    # IOL  - s/p PV miso x3. Attempted cook placement without success d/t posterior location of cervix  - GBS neg  - Planning epidural    # FWB  - Category 2, improved w/ fluid bolus and O2  - S/p BMZ for fetal lung maturity.    Wicho Bryan MD  OB/GYN Resident, PGY-3  2019              room air

## 2019-02-14 NOTE — PLAN OF CARE
Patient's vitals are stable. FHR and uterine activity see flow sheet. Voiding adequately. Cook Catheter placed by the provider this morning. Magnesium sulfate running. Continue with plan of care.

## 2019-02-14 NOTE — PROGRESS NOTES
Labor Progress Note    S:   Moderately uncomfortable with Cook catheter, improved with fentanyl    Vitals:    19 0625 19 0640 19 0758 19 0759   BP: 119/70 133/70  134/87   Resp:    Temp:   98.5  F (36.9  C)    TempSrc:   Oral    Weight:       Height:         Fetal Heart Rate Tracin bpm baseline, mod-minimal variability, no accels, no decels  Tocometer: q 3-4 minutes    Gen: alert, oriented, no distress  Resp: normal respiratory effort  CV: normal pulses  Abdomen:  Gravid, nontender  Ext: Reflexes +1 b/l   Cervix: Deferred, cook catheter in place         ASSESSMENT / PLAN:   38 year old  at 36w3d admitted for IOL pre-eclampsia with severe features.    # Pre-eclampsia  - continue magnesium sulfate for seizure proph  - BP: mild range BP, has not required any immediate acting antihypertensives other than initial nifedipine    # IOL  - s/p PV miso x3. Cook in place with 80 mL in uterine and vaginal balloons  - GBS neg  - Planning epidural    # FWB  - Category 1, non-reactive. Continue to monitor.   - S/p BMZ for fetal lung maturity.    Celina Ruvalcaba MD  Ob/Gyn PGY-2  19 11:51 AM

## 2019-02-14 NOTE — PROVIDER NOTIFICATION
02/14/19 0151   Provider Notification   Provider Name/Title Dr Boles   Method of Notification At Bedside   Dr PING Bryan at bedside with Dr. Boles. Cook cath placement attempted. Vag Miso inserted by Dr Bryan. Continue with plan of care.

## 2019-02-14 NOTE — ANESTHESIA PREPROCEDURE EVALUATION
Anesthesia Pre-Procedure Evaluation    Patient: Traci Sloan   MRN:     [id number] Gender:   female   Age:    38 year old :      1980        Preoperative Diagnosis: * No surgery found *        Past Medical History:   Diagnosis Date     Abnormal Pap smear of cervix 2018    LSIL, Neg HPV, pregnant     ADHD (attention deficit hyperactivity disorder)      Anemia      Gastric reflux      IBS (irritable bowel syndrome)       Past Surgical History:   Procedure Laterality Date     CYSTOSCOPY, DILATE URETHRA, COMBINED  1999    for frequent UTI'S     KNEE SURGERY  10/1997          Anesthesia Evaluation       history and physical reviewed . Pt has had prior anesthetic. Type: General and Regional    No history of anesthetic complications          ROS/MED HX    ENT/Pulmonary:  - neg pulmonary ROS     Neurologic:  - neg neurologic ROS     Cardiovascular: Comment: H/p PreEclampsia without significant features    - neg cardiovascular ROS   (+) hypertension----. : . . . :. .       METS/Exercise Tolerance:     Hematologic:  - neg hematologic  ROS       Musculoskeletal:  - neg musculoskeletal ROS       GI/Hepatic:  - neg GI/hepatic ROS   (+) GERD       Renal/Genitourinary:  - ROS Renal section negative       Endo:  - neg endo ROS       Psychiatric:  - neg psychiatric ROS       Infectious Disease:  - neg infectious disease ROS       Malignancy:      - no malignancy   Other:    - neg other ROS                 [facility] AN PHYSICAL EXAM    Lab Results   Component Value Date    WBC 9.1 2019    HGB 9.8 (L) 2019    HCT 30.4 (L) 2019     2019    SED 3 2016     2016    POTASSIUM 3.3 (L) 2016    CHLORIDE 110 (H) 2016    CO2 24 2016    BUN 7 2016    CR 0.57 2019    GLC 93 2016    BRANDI 6.3 (L) 2016    ALBUMIN 3.8 2016    PROTTOTAL 7.5 2016    ALT 16 2019    AST 10 2019    ALKPHOS 92 2018    BILITOTAL 0.4  "01/18/2016    LIPASE 184 01/18/2016    PTT 29 09/24/2016    INR 0.98 09/24/2016    FIBR 511 (H) 09/24/2016    HCG neg 01/18/2016    HCGS Negative 01/18/2016       Preop Vitals  BP Readings from Last 3 Encounters:   02/14/19 142/84   02/13/19 (!) 180/102   02/11/19 (!) 155/91    Pulse Readings from Last 3 Encounters:   02/11/19 93   02/09/19 90   02/08/19 106      Resp Readings from Last 3 Encounters:   02/14/19 18   02/11/19 18   02/10/19 18    SpO2 Readings from Last 3 Encounters:   01/29/19 99%   01/16/19 98%   01/09/19 98%      Temp Readings from Last 1 Encounters:   02/14/19 36.7  C (98  F) (Oral)    Ht Readings from Last 1 Encounters:   02/13/19 1.727 m (5' 8\")      Wt Readings from Last 1 Encounters:   02/13/19 90.7 kg (200 lb)    Estimated body mass index is 30.41 kg/m  as calculated from the following:    Height as of this encounter: 1.727 m (5' 8\").    Weight as of this encounter: 90.7 kg (200 lb).     LDA:  Peripheral IV 02/13/19 Distal;Right;Posterior Lower forearm (Active)   Site Assessment WDL 2/14/2019  7:59 AM   Line Status Infusing 2/14/2019  7:59 AM   Phlebitis Scale 0-->no symptoms 2/14/2019  7:59 AM   Infiltration Scale 0 2/14/2019  7:59 AM   Number of days: 1            JOSÉ LUISG F[facility] AN PLAN NO PONV RULE    neg OB JADEN Vela MD  "

## 2019-02-14 NOTE — PLAN OF CARE
VSS, no critical blood pressures, afebrile. Pt continues to c/o headache, states it is better than previously. Denies all other pre-eclampsia symptoms. Denies magnesium toxicity symptoms. Denies leaking or bleeding. Pt pleasant with cares states that she was able to have a few short naps during night and feels much better. EFM per flowsheet. Continue with plan of care.

## 2019-02-14 NOTE — PLAN OF CARE
Pt VSS. Afebrile. Receiving vaginal misoprostol for IOL. Denies vision changes and RUQ pain. Receiving continuous IV magnesium sulfate infusion. Headache present, unrelieved by PO tylenol. Reflexes 2+ bilaterally with no clonus present. See flowsheets for FHR and toco interpretations. Report to Clarisa MONTOYA RN. Continue with current plan of care.

## 2019-02-15 LAB
ERYTHROCYTE [DISTWIDTH] IN BLOOD BY AUTOMATED COUNT: 13.3 % (ref 10–15)
HCT VFR BLD AUTO: 30.3 % (ref 35–47)
HGB BLD-MCNC: 9.7 G/DL (ref 11.7–15.7)
MCH RBC QN AUTO: 26.8 PG (ref 26.5–33)
MCHC RBC AUTO-ENTMCNC: 32 G/DL (ref 31.5–36.5)
MCV RBC AUTO: 84 FL (ref 78–100)
PLATELET # BLD AUTO: 356 10E9/L (ref 150–450)
RBC # BLD AUTO: 3.62 10E12/L (ref 3.8–5.2)
T PALLIDUM AB SER QL: NONREACTIVE
WBC # BLD AUTO: 9.7 10E9/L (ref 4–11)

## 2019-02-15 PROCEDURE — 25000125 ZZHC RX 250

## 2019-02-15 PROCEDURE — 12000001 ZZH R&B MED SURG/OB UMMC

## 2019-02-15 PROCEDURE — 3E0R3BZ INTRODUCTION OF ANESTHETIC AGENT INTO SPINAL CANAL, PERCUTANEOUS APPROACH: ICD-10-PCS | Performed by: ANESTHESIOLOGY

## 2019-02-15 PROCEDURE — 36415 COLL VENOUS BLD VENIPUNCTURE: CPT

## 2019-02-15 PROCEDURE — 25000132 ZZH RX MED GY IP 250 OP 250 PS 637: Performed by: STUDENT IN AN ORGANIZED HEALTH CARE EDUCATION/TRAINING PROGRAM

## 2019-02-15 PROCEDURE — 25000128 H RX IP 250 OP 636

## 2019-02-15 PROCEDURE — 88307 TISSUE EXAM BY PATHOLOGIST: CPT | Mod: 26 | Performed by: STUDENT IN AN ORGANIZED HEALTH CARE EDUCATION/TRAINING PROGRAM

## 2019-02-15 PROCEDURE — 72200001 ZZH LABOR CARE VAGINAL DELIVERY SINGLE

## 2019-02-15 PROCEDURE — 0UQMXZZ REPAIR VULVA, EXTERNAL APPROACH: ICD-10-PCS | Performed by: OBSTETRICS & GYNECOLOGY

## 2019-02-15 PROCEDURE — 25000132 ZZH RX MED GY IP 250 OP 250 PS 637: Performed by: OBSTETRICS & GYNECOLOGY

## 2019-02-15 PROCEDURE — 25800030 ZZH RX IP 258 OP 636

## 2019-02-15 PROCEDURE — 25000128 H RX IP 250 OP 636: Performed by: STUDENT IN AN ORGANIZED HEALTH CARE EDUCATION/TRAINING PROGRAM

## 2019-02-15 PROCEDURE — 25000125 ZZHC RX 250: Performed by: STUDENT IN AN ORGANIZED HEALTH CARE EDUCATION/TRAINING PROGRAM

## 2019-02-15 PROCEDURE — 88307 TISSUE EXAM BY PATHOLOGIST: CPT | Performed by: STUDENT IN AN ORGANIZED HEALTH CARE EDUCATION/TRAINING PROGRAM

## 2019-02-15 PROCEDURE — 59410 OBSTETRICAL CARE: CPT | Mod: GC | Performed by: OBSTETRICS & GYNECOLOGY

## 2019-02-15 PROCEDURE — 00HU33Z INSERTION OF INFUSION DEVICE INTO SPINAL CANAL, PERCUTANEOUS APPROACH: ICD-10-PCS | Performed by: ANESTHESIOLOGY

## 2019-02-15 PROCEDURE — 85027 COMPLETE CBC AUTOMATED: CPT

## 2019-02-15 PROCEDURE — 10907ZC DRAINAGE OF AMNIOTIC FLUID, THERAPEUTIC FROM PRODUCTS OF CONCEPTION, VIA NATURAL OR ARTIFICIAL OPENING: ICD-10-PCS | Performed by: OBSTETRICS & GYNECOLOGY

## 2019-02-15 RX ORDER — OXYTOCIN 10 [USP'U]/ML
INJECTION, SOLUTION INTRAMUSCULAR; INTRAVENOUS
Status: DISCONTINUED
Start: 2019-02-15 | End: 2019-02-15 | Stop reason: WASHOUT

## 2019-02-15 RX ORDER — IBUPROFEN 800 MG/1
800 TABLET, FILM COATED ORAL EVERY 6 HOURS PRN
Status: DISCONTINUED | OUTPATIENT
Start: 2019-02-15 | End: 2019-02-19 | Stop reason: HOSPADM

## 2019-02-15 RX ORDER — NALOXONE HYDROCHLORIDE 0.4 MG/ML
.1-.4 INJECTION, SOLUTION INTRAMUSCULAR; INTRAVENOUS; SUBCUTANEOUS
Status: DISCONTINUED | OUTPATIENT
Start: 2019-02-15 | End: 2019-02-19 | Stop reason: HOSPADM

## 2019-02-15 RX ORDER — OXYTOCIN 10 [USP'U]/ML
10 INJECTION, SOLUTION INTRAMUSCULAR; INTRAVENOUS
Status: DISCONTINUED | OUTPATIENT
Start: 2019-02-15 | End: 2019-02-19 | Stop reason: HOSPADM

## 2019-02-15 RX ORDER — AMOXICILLIN 250 MG
1 CAPSULE ORAL 2 TIMES DAILY
Status: DISCONTINUED | OUTPATIENT
Start: 2019-02-15 | End: 2019-02-19 | Stop reason: HOSPADM

## 2019-02-15 RX ORDER — FENTANYL CITRATE 50 UG/ML
INJECTION, SOLUTION INTRAMUSCULAR; INTRAVENOUS PRN
Status: DISCONTINUED | OUTPATIENT
Start: 2019-02-15 | End: 2019-02-17 | Stop reason: HOSPADM

## 2019-02-15 RX ORDER — OXYCODONE HYDROCHLORIDE 5 MG/1
5 TABLET ORAL
Status: COMPLETED | OUTPATIENT
Start: 2019-02-15 | End: 2019-02-15

## 2019-02-15 RX ORDER — LIDOCAINE HCL/EPINEPHRINE/PF 2%-1:200K
VIAL (ML) INJECTION PRN
Status: DISCONTINUED | OUTPATIENT
Start: 2019-02-15 | End: 2019-02-17 | Stop reason: HOSPADM

## 2019-02-15 RX ORDER — MISOPROSTOL 200 UG/1
TABLET ORAL
Status: DISCONTINUED
Start: 2019-02-15 | End: 2019-02-15 | Stop reason: HOSPADM

## 2019-02-15 RX ORDER — OXYTOCIN/0.9 % SODIUM CHLORIDE 30/500 ML
100 PLASTIC BAG, INJECTION (ML) INTRAVENOUS CONTINUOUS
Status: DISCONTINUED | OUTPATIENT
Start: 2019-02-15 | End: 2019-02-19 | Stop reason: HOSPADM

## 2019-02-15 RX ORDER — ACETAMINOPHEN 325 MG/1
650 TABLET ORAL EVERY 4 HOURS PRN
Status: DISCONTINUED | OUTPATIENT
Start: 2019-02-15 | End: 2019-02-19 | Stop reason: HOSPADM

## 2019-02-15 RX ORDER — AMOXICILLIN 250 MG
2 CAPSULE ORAL 2 TIMES DAILY
Status: DISCONTINUED | OUTPATIENT
Start: 2019-02-15 | End: 2019-02-19 | Stop reason: HOSPADM

## 2019-02-15 RX ORDER — SODIUM CHLORIDE, SODIUM LACTATE, POTASSIUM CHLORIDE, CALCIUM CHLORIDE 600; 310; 30; 20 MG/100ML; MG/100ML; MG/100ML; MG/100ML
INJECTION, SOLUTION INTRAVENOUS
Status: COMPLETED
Start: 2019-02-15 | End: 2019-02-15

## 2019-02-15 RX ORDER — HYDROCORTISONE 2.5 %
CREAM (GRAM) TOPICAL 3 TIMES DAILY PRN
Status: DISCONTINUED | OUTPATIENT
Start: 2019-02-15 | End: 2019-02-19 | Stop reason: HOSPADM

## 2019-02-15 RX ORDER — OXYTOCIN/0.9 % SODIUM CHLORIDE 30/500 ML
340 PLASTIC BAG, INJECTION (ML) INTRAVENOUS CONTINUOUS PRN
Status: DISCONTINUED | OUTPATIENT
Start: 2019-02-15 | End: 2019-02-19 | Stop reason: HOSPADM

## 2019-02-15 RX ORDER — LANOLIN 100 %
OINTMENT (GRAM) TOPICAL
Status: DISCONTINUED | OUTPATIENT
Start: 2019-02-15 | End: 2019-02-19 | Stop reason: HOSPADM

## 2019-02-15 RX ORDER — LIDOCAINE HYDROCHLORIDE 10 MG/ML
INJECTION, SOLUTION EPIDURAL; INFILTRATION; INTRACAUDAL; PERINEURAL
Status: DISCONTINUED
Start: 2019-02-15 | End: 2019-02-15 | Stop reason: HOSPADM

## 2019-02-15 RX ORDER — MISOPROSTOL 200 UG/1
800 TABLET ORAL
Status: DISCONTINUED | OUTPATIENT
Start: 2019-02-15 | End: 2019-02-19 | Stop reason: HOSPADM

## 2019-02-15 RX ORDER — BISACODYL 10 MG
10 SUPPOSITORY, RECTAL RECTAL DAILY PRN
Status: DISCONTINUED | OUTPATIENT
Start: 2019-02-17 | End: 2019-02-19 | Stop reason: HOSPADM

## 2019-02-15 RX ORDER — FENTANYL/BUPIVACAINE/NS/PF 2-1250MCG
PLASTIC BAG, INJECTION (ML) INJECTION CONTINUOUS PRN
Status: DISCONTINUED | OUTPATIENT
Start: 2019-02-15 | End: 2019-02-17 | Stop reason: HOSPADM

## 2019-02-15 RX ORDER — OXYTOCIN/0.9 % SODIUM CHLORIDE 30/500 ML
PLASTIC BAG, INJECTION (ML) INTRAVENOUS
Status: DISCONTINUED
Start: 2019-02-15 | End: 2019-02-15 | Stop reason: HOSPADM

## 2019-02-15 RX ADMIN — LIDOCAINE HYDROCHLORIDE,EPINEPHRINE BITARTRATE 3 ML: 20; .005 INJECTION, SOLUTION EPIDURAL; INFILTRATION; INTRACAUDAL; PERINEURAL at 12:20

## 2019-02-15 RX ADMIN — ACETAMINOPHEN 650 MG: 325 TABLET, FILM COATED ORAL at 05:41

## 2019-02-15 RX ADMIN — IBUPROFEN 800 MG: 800 TABLET, FILM COATED ORAL at 20:45

## 2019-02-15 RX ADMIN — ACETAMINOPHEN 650 MG: 325 TABLET, FILM COATED ORAL at 13:18

## 2019-02-15 RX ADMIN — Medication 8 ML/HR: at 12:40

## 2019-02-15 RX ADMIN — SODIUM CHLORIDE, POTASSIUM CHLORIDE, SODIUM LACTATE AND CALCIUM CHLORIDE 50 ML/HR: 600; 310; 30; 20 INJECTION, SOLUTION INTRAVENOUS at 18:59

## 2019-02-15 RX ADMIN — LORATADINE 10 MG: 10 TABLET ORAL at 09:03

## 2019-02-15 RX ADMIN — SODIUM CHLORIDE, POTASSIUM CHLORIDE, SODIUM LACTATE AND CALCIUM CHLORIDE 50 ML/HR: 600; 310; 30; 20 INJECTION, SOLUTION INTRAVENOUS at 07:51

## 2019-02-15 RX ADMIN — SODIUM CHLORIDE, POTASSIUM CHLORIDE, SODIUM LACTATE AND CALCIUM CHLORIDE 250 ML: 600; 310; 30; 20 INJECTION, SOLUTION INTRAVENOUS at 13:02

## 2019-02-15 RX ADMIN — LIDOCAINE HYDROCHLORIDE 10 ML: 10 INJECTION, SOLUTION EPIDURAL; INFILTRATION; INTRACAUDAL; PERINEURAL at 19:39

## 2019-02-15 RX ADMIN — SENNOSIDES AND DOCUSATE SODIUM 2 TABLET: 8.6; 5 TABLET ORAL at 09:03

## 2019-02-15 RX ADMIN — FENTANYL CITRATE 100 MCG: 50 INJECTION, SOLUTION INTRAMUSCULAR; INTRAVENOUS at 05:36

## 2019-02-15 RX ADMIN — OXYMETAZOLINE HYDROCHLORIDE 2 SPRAY: 5 SPRAY NASAL at 21:00

## 2019-02-15 RX ADMIN — LABETALOL 20 MG/4 ML (5 MG/ML) INTRAVENOUS SYRINGE 20 MG: at 21:24

## 2019-02-15 RX ADMIN — FENTANYL CITRATE 100 MCG: 50 INJECTION, SOLUTION INTRAMUSCULAR; INTRAVENOUS at 07:43

## 2019-02-15 RX ADMIN — MAGNESIUM SULFATE IN WATER 2 G/HR: 40 INJECTION, SOLUTION INTRAVENOUS at 16:17

## 2019-02-15 RX ADMIN — Medication: at 12:45

## 2019-02-15 RX ADMIN — Medication: at 17:50

## 2019-02-15 RX ADMIN — Medication 10 ML: at 17:50

## 2019-02-15 RX ADMIN — FENTANYL CITRATE 100 MCG: 50 INJECTION, SOLUTION INTRAMUSCULAR; INTRAVENOUS at 03:54

## 2019-02-15 RX ADMIN — FENTANYL CITRATE 20 MCG: 50 INJECTION, SOLUTION INTRAMUSCULAR; INTRAVENOUS at 17:50

## 2019-02-15 RX ADMIN — Medication 8 ML/HR: at 12:30

## 2019-02-15 RX ADMIN — ACETAMINOPHEN 650 MG: 325 TABLET, FILM COATED ORAL at 21:29

## 2019-02-15 RX ADMIN — MAGNESIUM SULFATE IN WATER 2 G/HR: 40 INJECTION, SOLUTION INTRAVENOUS at 05:35

## 2019-02-15 RX ADMIN — FENTANYL CITRATE 100 MCG: 50 INJECTION, SOLUTION INTRAMUSCULAR; INTRAVENOUS at 01:33

## 2019-02-15 RX ADMIN — OXYCODONE HYDROCHLORIDE 5 MG: 5 TABLET ORAL at 23:35

## 2019-02-15 RX ADMIN — SENNOSIDES AND DOCUSATE SODIUM 2 TABLET: 8.6; 5 TABLET ORAL at 21:29

## 2019-02-15 NOTE — PROGRESS NOTES
Labor Progress Note:     Subjective:  Resting, feels contractions but states they are not very painful.      Objective:  Vitals:    19 2343 02/15/19 0339 02/15/19 0732 02/15/19 0830   BP:  123/83 145/90 140/84   Resp:   16   Temp:  98  F (36.7  C) 97.9  F (36.6  C) 98.6  F (37  C)   TempSrc:  Oral Oral Oral   SpO2: 97%  99% 97%   Weight:       Height:            Gen: Appears uncomfortable  SVE: Deferred  Presenting fetal part very high, confirmed cephalic on BSUS overnight.      FHT: Baseline 150, moderate variability, no accels, no decels  [place]: q2-5 mins, irregular pattern     A/P:  38 year old  at 36w3d admitted for IOL pre-eclampsia with severe features. Latent labor, making slow progress.     # Pre-eclampsia  - continue magnesium sulfate for seizure proph  - BP: mild range BP, has not required any immediate acting antihypertensives other than initial nifedipine     # IOL  - s/p PV miso x3 and cook catheter. Pitocin currently at 22 loren-units/min. Will increase to 24 and assess for AROM when more uncomfortable.   - GBS neg  - Planning epidural     # FWB  - Category I, non-reactive.  Continue to monitor.   - S/p BMZ for fetal lung maturity.     Celina Ruvalcaba MD  Ob/Gyn PGY-2  02/15/19 9:04 AM      I was present for this evaluation with Dr Ruvalcaba.   Patient is stable, BP's mild.  Minimal progress on high dose of pitocin.   Will continue to increase pitocin and AROM when head is lower.   Expect .   Haley Price MD

## 2019-02-15 NOTE — PROVIDER NOTIFICATION
02/15/19 0040   Provider Notification   Provider Name/Title Dr. ALLEN Bryan   Method of Notification At Bedside   Request Evaluate in Person   Notification Reason Other (Comment)  (BS ultrasound)

## 2019-02-15 NOTE — PROVIDER NOTIFICATION
02/15/19 0035   Provider Notification   Provider Name/Title Dr. ALLEN Bryan   Method of Notification At Bedside   Request Evaluate in Person   Notification Reason SVE

## 2019-02-15 NOTE — PROVIDER NOTIFICATION
02/15/19 1249   Provider Notification   Provider Name/Title Dr Price   Method of Notification Electronic Page   Request Evaluate in Person   Notification Reason Labor Status   Epidural placed at 1220. Ctxs q3-4 minutes on 24 mu/min Pitocin. Minimal variability and intermittent late decels past 30 min. Ready for eval.

## 2019-02-15 NOTE — PROVIDER NOTIFICATION
02/15/19 0445   Provider Notification   Provider Name/Title Dr. Norris   Method of Notification At Bedside   Request Evaluate in Person   Notification Reason SVE

## 2019-02-15 NOTE — PLAN OF CARE
Continues with pitocin induction, currently 22mu/min, contractions palpate mild q1.5-6 minutes. Pt breathing through them and requesting IV fentanyl q2 hours (last dose 0745), states she is planning epidural when contractions become more intense (consent signed yesterday). Up ad bedside sitting on birth ball now, denies dizziness. Magnesium sulfate at 2g/hr, no signs of toxicity. No severe range BP this AM. Mild frontal headache present but slight improvement with tylenol dosing. Will continue to monitor closely.

## 2019-02-15 NOTE — PROVIDER NOTIFICATION
02/15/19 1545   Provider Notification   Provider Name/Title Dr Price   Method of Notification In Department   Notification Reason Labor Status   Pt reporting contraction intensity has increased since AROM at 1415. Difficulty tracing on toco, readjusted monitor, contractions palpate moderate q 2-3 minutes. Pitocin continues at 24mu/min. Left lateral with peanut ball. FHR reactive (see doc flow sheets).

## 2019-02-15 NOTE — PLAN OF CARE
Patient's vitals are stable. Complains about headache and has been taking tylenol. Voiding adequately. Oxytocin running. Pt has been taking Fentanyl for pain control. Desires epidural and will request when needed. FHR and uterine activity see flow sheeet. Anticipate .

## 2019-02-15 NOTE — PROVIDER NOTIFICATION
02/15/19 0904   Provider Notification   Provider Name/Title Dr Ruvalcaba   Method of Notification In Department   Notification Reason Status Update   Notified Dr Ruvalcaba that pt now has max dose pitocin infusing. Discussed pain plan, pt has had total of 13 doses IV fentanyl. Currently 2 hours since last dose, will give no additional doses, pt to request epidural placement as next step for pain control. Pt calmly breathing through contractions while sitting on birthball. FHR has minimal to moderate variability, normal baseline, no accels, no decels.

## 2019-02-15 NOTE — PROGRESS NOTES
[facility] PROGRESS NOTE:   2019 9:50 PM         SUBJECTIVE:   Patient reports contractions are ok.  Coping well  Contractions:  q 1-7 min  Leaking fluid:  no  Bleeding:  no  Pain:  Well managed with fentanyl, but would want epidural eventually           OBJECTIVE:     Vitals:    19 1650 19 1710 19 1920 19   BP: 151/80 141/87 131/85    Resp:   18    Temp:    98.7  F (37.1  C)   TempSrc:       SpO2: 100% 100% 96%    Weight:       Height:             NST:  FHT:  130/mod/+ accels, no decels  Cat:   1  [place]:  q 1-7 min  SVE:  Deferred at this time         LABS:   No results found for this or any previous visit (from the past 12 hour(s)).           ASSESSMENT / PLAN:   38 year old  at 36w3d admitted for IOL for severe gestational hypertension.    Labor: s/p vaginal miso x 3 and cook catheter.  Cont increasing pitocin per protocol  Fetal well being: Category 1 tracing recently.  GBS: neg  Pain: cont fentanyl for now.  Epidural at patient request.  She is aware anesthesia has requested updated platelet level prior to epidural placement    Dolores Norris MD

## 2019-02-15 NOTE — PROGRESS NOTES
Labor Progress Note    Subjective:  Resting, feeling more comfortable after IV fentanyl    Objective:  Vitals:    19 2333 19 2338 19 2341 19 2343   BP:   142/85    Resp:   18    Temp:   98.1  F (36.7  C)    TempSrc:   Oral    SpO2: 99% 100%  97%   Weight:       Height:         Gen: Appears uncomfortable  SVE: 4/50/-4. Unable to feel fetal part. BSUS confirms cephalic presentation.    FHT: Baseline 140, moderate variability, no accels, occasional late/variable decels  [place]: q3-7 min ctx, irregular    A/P:  38 year old  at 36w3d admitted for IOL pre-eclampsia with severe features. Latent labor, making slow progress.    # Pre-eclampsia  - continue magnesium sulfate for seizure proph  - BP: mild range BP, has not required any immediate acting antihypertensives other than initial nifedipine     # IOL  - s/p PV miso x3 and cook catheter. Pit started at 1734.  - GBS neg  - Planning epidural     # FWB  - Category 2 secondary to occasional late/variable decels. Will continue resuscitative measures. Continue to monitor.   - S/p BMZ for fetal lung maturity.    Wicho Bryan MD  OB/GYN Resident, PGY-3  2/15/2019

## 2019-02-15 NOTE — PROGRESS NOTES
Labor Progress Note:     Subjective:  Feeling more uncomfortable with contractions     Objective:  Vitals:    02/15/19 1545 02/15/19 1600 02/15/19 1646 02/15/19 1706   BP:  145/83     Resp:  18     Temp:  98.6  F (37  C)     TempSrc:  Oral     SpO2: 99% 99% 100% 98%   Weight:       Height:            Gen: Appears uncomfortable  SVE: 5/70/-3, leaking clear fluid     FHT: Baseline 130, moderate variability, no accels, no decels  [place]: q2-4 min ctx     A/P:  38 year old  at 36w3d admitted for IOL pre-eclampsia with severe features. Latent labor, making slow progress.     # Pre-eclampsia  - continue magnesium sulfate for seizure proph  - BP: mild range BP, has not required any immediate acting antihypertensives other than initial nifedipine     # IOL  - s/p PV miso x3 and cook catheter. Pitocin currently at 24 loren-units/min. S/p AROM for clear fluid, getting more uncomfortable now  - GBS neg  - Planning epidural     # FWB  - Category I, non-reactive.  Continue to monitor.   - S/p BMZ for fetal lung maturity.     Celina Ruvalcaba MD  Ob/Gyn PGY-2

## 2019-02-15 NOTE — PROGRESS NOTES
[facility] PROGRESS NOTE:   February 15, 2019 4:49 AM         SUBJECTIVE:   Patient reports comfortable, tolerating contractions  Contractions:  irregular  Leaking fluid:  no  Bleeding:  no  Pain:  Coping well           OBJECTIVE:     Vitals:    19 2338 19 2341 19 2343 02/15/19 0339   BP:  142/85  123/83   Resp:  18  16   Temp:  98.1  F (36.7  C)  98  F (36.7  C)   TempSrc:  Oral  Oral   SpO2: 100%  97%    Weight:       Height:             NST:  FHT:  130/min to mod/+ accels, no decels recently  Cat:   2  [place]:  q 1.5-5 min  SVE:  Presenting part VERY high.  Unable to AROM.  Resident confirmed cephalic presentation on BSUS at ~0030         LABS:   No results found for this or any previous visit (from the past 12 hour(s)).           ASSESSMENT / PLAN:   38 year old  at 36w4d admitted for IOL for severe gestational hypertension.     Labor: s/p vaginal miso x 3 and cook catheter.  Cont increasing pitocin per protocol.  AROM when able.   Fetal well being: Category 1 tracing recently.  GBS: neg  Pain: cont fentanyl for now.  Epidural at patient request.  She is aware anesthesia has requested updated platelet level prior to epidural placement     Dolores Norris MD

## 2019-02-15 NOTE — PLAN OF CARE
SVE by Dr Ruvalcaba at 1710, cervical change to 5/70/-3. Leaking scant clear fluid. Contractions q2-4 minutes on 24mu/min pitocin. Pt breathing through each contraction, feeling pressure in lower abdomen. Using PCEA button, basal rate is 8mls, will contact anesthesia now for possible dose adjustment/bolus.

## 2019-02-15 NOTE — PLAN OF CARE
Patient requesting fentanyl Q 2 hours for pain.  Patient offered epidural for pain, patient declined.  Patient states fentanyl is decreasing her pain.  Pitocin at 20 mu.  Magnesium sulfate at 2 grams/hour.  Reactive NST noted.  Contractions palpate mild.  Positive fetal movement per patient.  VSS,  BP's WNL.  DTR's 1+, no clonus present.  Patient complaining of headache, tylenol given.  Patient states the tylenol helped her headache.  Patient denies visual disturbances and epigastric pain.  Patient voiding without difficulty and is independent with self cares.  Plan of care reviewed with patient, understanding verbalized.  Continue to monitor and assess.

## 2019-02-15 NOTE — PROVIDER NOTIFICATION
02/15/19 1140   Provider Notification   Provider Name/Title Dr Hobbs   Method of Notification Phone   Notification Reason Pain   Pt ready for epidural placement.

## 2019-02-15 NOTE — ANESTHESIA PROCEDURE NOTES
Epidural Procedure Note    Staff:     Anesthesiologist:  Trinity Mauro MD    Resident/CRNA:  Narayan Vela MD    Procedure performed by resident/CRNA in the presence of a teaching physician    Location: OB and floor     Procedure start time:  2/15/2019 12:15 PM     Procedure end time:  2/15/2019 12:30 PM   Pre-procedure checklist:   patient identified, IV checked, site marked, risks and benefits discussed, informed consent, monitors and equipment checked, pre-op evaluation, at physician/surgeon's request and post-op pain management      Correct Patient: Yes      Correct Position: Yes      Correct Site: Yes      Correct Procedure: Yes      Correct Laterality:  Yes    Site Marked:  Yes  Procedure:     Procedure:  Epidural catheter    ASA:  3    Position:  Sitting    Sterile Prep: chloraprep      Insertion site:  L2-3    Local skin infiltration:  2% lidocaine    amount (mL):  6    Approach:  Midline    Needle gauge (G):  20    Needle Length (in):  3.5    Block Needle Type:  Touhy    Injection Technique:  LORT saline    HONG at (cm):  5    Attempts:  2 (1st attempt by CA-1, 2nd Attempt Dr. Mauro)    Redirects:  1    Catheter gauge (G):  20    Catheter threaded easily: Yes      Threaded to cm at skin:  10    Threaded in epidural space (cm):  5    Paresthesias:  No    Aspiration negative for Heme or CSF: Yes      Test dose (mL):  3     Local anesthetic:  Lidocaine 1.5% w/ 1:200,000 epinephrine    Test dose time:  12:20    Test dose negative for signs of intravascular, subdural or intrathecal injection: Yes    Assessment/Narrative:      Procedure performed by Dr. Mauro

## 2019-02-15 NOTE — PROGRESS NOTES
Patient doing well after vaginal misoprostol.  [place]ir[place] fairly regularly.    SVE 1/30/-3, cephalic.      Discussed with patient that I would recommend Vivas balloon again, and I do feel I would be successful with placement.  She was agreeable.    I personally placed Vivas cervical ripening balloon.  60cc placed in both uterine and vaginal balloons with an additional 20cc placed in each balloon (for a total of 80/80) after the patient was tolerating it a little better.    Dolores Norris MD

## 2019-02-16 LAB
ABO + RH BLD: NORMAL
ABO + RH BLD: NORMAL
BLD GP AB SCN SERPL QL: NORMAL
BLD PROD TYP BPU: NORMAL
BLOOD BANK CMNT PATIENT-IMP: NORMAL
ERYTHROCYTE [DISTWIDTH] IN BLOOD BY AUTOMATED COUNT: 13.3 % (ref 10–15)
HCT VFR BLD AUTO: 28.4 % (ref 35–47)
HGB BLD-MCNC: 9 G/DL (ref 11.7–15.7)
MAGNESIUM SERPL-MCNC: 5.6 MG/DL (ref 1.6–2.3)
MCH RBC QN AUTO: 26.8 PG (ref 26.5–33)
MCHC RBC AUTO-ENTMCNC: 31.7 G/DL (ref 31.5–36.5)
MCV RBC AUTO: 85 FL (ref 78–100)
NUM BPU REQUESTED: 2
PLATELET # BLD AUTO: 339 10E9/L (ref 150–450)
RBC # BLD AUTO: 3.36 10E12/L (ref 3.8–5.2)
SPECIMEN EXP DATE BLD: NORMAL
WBC # BLD AUTO: 11.6 10E9/L (ref 4–11)

## 2019-02-16 PROCEDURE — 85027 COMPLETE CBC AUTOMATED: CPT | Performed by: OBSTETRICS & GYNECOLOGY

## 2019-02-16 PROCEDURE — 25000132 ZZH RX MED GY IP 250 OP 250 PS 637: Performed by: STUDENT IN AN ORGANIZED HEALTH CARE EDUCATION/TRAINING PROGRAM

## 2019-02-16 PROCEDURE — 12000001 ZZH R&B MED SURG/OB UMMC

## 2019-02-16 PROCEDURE — 36415 COLL VENOUS BLD VENIPUNCTURE: CPT | Performed by: OBSTETRICS & GYNECOLOGY

## 2019-02-16 PROCEDURE — 36415 COLL VENOUS BLD VENIPUNCTURE: CPT | Performed by: STUDENT IN AN ORGANIZED HEALTH CARE EDUCATION/TRAINING PROGRAM

## 2019-02-16 PROCEDURE — 25800030 ZZH RX IP 258 OP 636: Performed by: STUDENT IN AN ORGANIZED HEALTH CARE EDUCATION/TRAINING PROGRAM

## 2019-02-16 PROCEDURE — 83735 ASSAY OF MAGNESIUM: CPT | Performed by: STUDENT IN AN ORGANIZED HEALTH CARE EDUCATION/TRAINING PROGRAM

## 2019-02-16 PROCEDURE — 25000128 H RX IP 250 OP 636: Performed by: OBSTETRICS & GYNECOLOGY

## 2019-02-16 RX ORDER — GABAPENTIN 300 MG/1
300 CAPSULE ORAL EVERY 8 HOURS PRN
Status: DISCONTINUED | OUTPATIENT
Start: 2019-02-16 | End: 2019-02-19 | Stop reason: HOSPADM

## 2019-02-16 RX ADMIN — IBUPROFEN 800 MG: 800 TABLET, FILM COATED ORAL at 03:31

## 2019-02-16 RX ADMIN — MAGNESIUM SULFATE IN WATER 2 G/HR: 40 INJECTION, SOLUTION INTRAVENOUS at 01:49

## 2019-02-16 RX ADMIN — HYDROXYZINE HYDROCHLORIDE 100 MG: 50 TABLET ORAL at 00:32

## 2019-02-16 RX ADMIN — LORATADINE 10 MG: 10 TABLET ORAL at 08:21

## 2019-02-16 RX ADMIN — ACETAMINOPHEN 650 MG: 325 TABLET, FILM COATED ORAL at 14:24

## 2019-02-16 RX ADMIN — SENNOSIDES AND DOCUSATE SODIUM 2 TABLET: 8.6; 5 TABLET ORAL at 08:21

## 2019-02-16 RX ADMIN — IBUPROFEN 800 MG: 800 TABLET, FILM COATED ORAL at 18:43

## 2019-02-16 RX ADMIN — ACETAMINOPHEN 650 MG: 325 TABLET, FILM COATED ORAL at 23:15

## 2019-02-16 RX ADMIN — ACETAMINOPHEN 650 MG: 325 TABLET, FILM COATED ORAL at 01:49

## 2019-02-16 RX ADMIN — SODIUM CHLORIDE, POTASSIUM CHLORIDE, SODIUM LACTATE AND CALCIUM CHLORIDE 75 ML/HR: 600; 310; 30; 20 INJECTION, SOLUTION INTRAVENOUS at 10:46

## 2019-02-16 RX ADMIN — SENNOSIDES AND DOCUSATE SODIUM 2 TABLET: 8.6; 5 TABLET ORAL at 21:34

## 2019-02-16 RX ADMIN — ACETAMINOPHEN 650 MG: 325 TABLET, FILM COATED ORAL at 08:21

## 2019-02-16 RX ADMIN — OXYMETAZOLINE HYDROCHLORIDE 2 SPRAY: 5 SPRAY NASAL at 20:30

## 2019-02-16 RX ADMIN — ACETAMINOPHEN 650 MG: 325 TABLET, FILM COATED ORAL at 18:43

## 2019-02-16 RX ADMIN — IBUPROFEN 800 MG: 800 TABLET, FILM COATED ORAL at 12:06

## 2019-02-16 RX ADMIN — OXYMETAZOLINE HYDROCHLORIDE 2 SPRAY: 5 SPRAY NASAL at 08:00

## 2019-02-16 RX ADMIN — MAGNESIUM SULFATE IN WATER 2 G/HR: 40 INJECTION, SOLUTION INTRAVENOUS at 10:47

## 2019-02-16 ASSESSMENT — MIFFLIN-ST. JEOR: SCORE: 1642.97

## 2019-02-16 NOTE — DISCHARGE SUMMARY
[facility] Discharge Summary    Traci Sloan MRN# [id number]   Age: 38 year old YOB: 1980     Date of Admission:  2019  Date of Discharge:  2019  Admitting Physician:  Dolores Boles MD  Discharge Physician:  Sammie Hoang MD    Admit Dx:   - Intrauterine pregnancy at 36w4d   - Preeclampsia with severe features   - H/o gHTN  - H/o PTD at 36 weeks, no 17-OHP  - Admitted with episode of vaginal bleeding of unknown etiology at 31w5d s/p BMZ course (-)  - LSIL pap, HPV neg. Needs colpo postpartum  - ADHD    Discharge Dx:  - Same as above, s/p   - Back pain    Procedures:  - Spontaneous vaginal delivery  - Epidural analgesia    Admit HPI/Labor Course:  Traci Sloan is a now  who was admitted for IOL due to pre-eclampsia with severe features. Cervical ripening was performed with misoprostol and a transcervical catheter. Her labor was augmented with AROM and pitocin. She progressed to full dilation. She pushed with good maternal effort and delivered a vigorous male infant on 2/15/2019 at 1925, with APGARs of 8/8 at 1 and 5 minutes, weighing 2800 g. Cephalic presentation with direct OA position. A nuchal hand was present. Shoulders delivered easily. Placenta delivered intact with a 3-vessel cord. A 1st degree perineal laceration was present and hemostatic. A left labial laceration was present and repaired with 4-0 vicryl. Fundus was firm with IV pitocin and massage.  ml.  Dr. Price was present for the delivery and repair.     Please see her Admission H&P and Delivery Summary for further details.    Postpartum Course:  The patient was continued on magnesium sulfate for seizure prophylaxis for 24 hours postpartum. On PPD#2, she had sustained severe range blood pressures requiring treatment with IV antihypertensive medications. She was started on Procardia 30 mg, which was titrated with her blood pressures. She was discharged on 60 mg daily. Repeat  HELLP labs were within normal limits. She had back pain postpartum that improved with NSAIDs, tylenol, and gabapentin. On PPD#4, she was meeting all of her postpartum goals and deemed stable for discharge. She was voiding without difficulty, tolerating a regular diet without nausea and vomiting, her pain was well controlled on oral pain medicines and her lochia was appropriate. Her hemoglobin prior to delivery was 9.7 and after delivery was 9. Her Rh status was positive, and Rhogam was not indicated.     Discharge Medications:     Review of your medicines      START taking      Dose / Directions   acetaminophen 650 MG CR tablet  Commonly known as:  TYLENOL  Replaces:  acetaminophen 325 MG tablet      Dose:  650 mg  Take 1 tablet (650 mg) by mouth every 8 hours as needed for mild pain or fever  Quantity:  60 tablet  Refills:  0     amphetamine-dextroamphetamine 15 MG tablet  Commonly known as:  ADDERALL  Used for:  Attention deficit hyperactivity disorder (ADHD), combined type  Replaces:  amphetamine-dextroamphetamine 30 MG 24 hr capsule      TAKE 1.5 TAB BY MOUTH IN THE AM AND 1 TAB IN THE PM  Quantity:  75 tablet  Refills:  0     ibuprofen 600 MG tablet  Commonly known as:  ADVIL/MOTRIN      Dose:  600 mg  Take 1 tablet (600 mg) by mouth every 6 hours as needed for other (cramping)  Quantity:  60 tablet  Refills:  1     NIFEdipine ER 60 MG 24 hr tablet  Commonly known as:  ADALAT CC      Dose:  60 mg  Take 1 tablet (60 mg) by mouth daily  Quantity:  30 tablet  Refills:  0     senna-docusate 8.6-50 MG tablet  Commonly known as:  SENOKOT-S/PERICOLACE      Dose:  1 tablet  Take 1 tablet by mouth 2 times daily  Quantity:  60 tablet  Refills:  0        CONTINUE these medicines which have NOT CHANGED      Dose / Directions   prenatal multivitamin w/iron 27-0.8 MG tablet  Used for:  AMA (advanced maternal age) multigravida 35+      Dose:  1 tablet  Take 1 tablet by mouth daily  Quantity:  100 tablet  Refills:  3         STOP taking    acetaminophen 325 MG tablet  Commonly known as:  TYLENOL  Replaced by:  acetaminophen 650 MG CR tablet        amphetamine-dextroamphetamine 10 MG tablet  Commonly known as:  ADDERALL        amphetamine-dextroamphetamine 30 MG 24 hr capsule  Commonly known as:  ADDERALL XR  Replaced by:  amphetamine-dextroamphetamine 15 MG tablet        doxylamine 25 MG Tabs tablet  Commonly known as:  UNISOM        fluticasone 50 MCG/ACT nasal spray  Commonly known as:  FLONASE        HYDROXYprogesterone caproate 250 MG/ML injection  Commonly known as:  HERNANDO        loratadine 10 MG tablet  Commonly known as:  CLARITIN        metroNIDAZOLE 500 MG tablet  Commonly known as:  FLAGYL        omeprazole 20 MG EC tablet  Commonly known as:  priLOSEC OTC        oxymetazoline 0.05 % nasal spray  Commonly known as:  AFRIN NASAL SPRAY        vitamin B6 25 MG tablet  Commonly known as:  pyridOXINE              Where to get your medicines      These medications were sent to [facility] [address]  [address] [address]    Phone:  [phone]     acetaminophen 650 MG CR tablet    ibuprofen 600 MG tablet    NIFEdipine ER 60 MG 24 hr tablet    senna-docusate 8.6-50 MG tablet     Some of these will need a paper prescription and others can be bought over the counter. Ask your nurse if you have questions.    Bring a paper prescription for each of these medications    amphetamine-dextroamphetamine 15 MG tablet           Discharge/Disposition:  Traci Sloan was discharged to home in stable condition with the following instructions/medications:  1) Call for temperature > 100.4, bright red vaginal bleeding >1 pad an hour x 2 hours, foul smelling vaginal discharge, pain not controlled by usual oral pain meds, persistent nausea and vomiting not controlled on medications  2) She desired condoms with plans for vasectomy for contraception.  3) For feeding she decided to  breastfeed.  4) She was instructed to follow-up with her primary OB in 6 weeks for a routine postpartum visit and in 1 week for a blood pressure check.      Mandy Saldana MD  PGY-2 Ob/Gyn  02/19/19    I, Sammie Hoang MD, personally saw and evaluated this patient.  I discussed the patient with the resident and care team, and agree with the assessment and plan of care as documented in the resident's note of 02/19/19.  I personally reviewed vital signs, medications, lab, and exam.     Key Findings:  Meeting goals for discharge.  Abdomen non-tender, fundus firm.  Blood pressure controlled on medication.     PLAN:  Discharge home.  She will see Dr. Haider in the office tomorrow.     Sammie Hoang MD   Date of Service (when I saw the patient) 02/19/19   '

## 2019-02-16 NOTE — PROVIDER NOTIFICATION
02/15/19 2105   Vitals   /84   Resp 18   SpO2 96 %   Oximeter Heart Rate 79 bpm   Pt stressed from family being here and hot from magnesium/. Will recheck in 15 if still high, will treat.

## 2019-02-16 NOTE — PLAN OF CARE
Patient arrived to [facility] unit via wheelchair at 2305 ,with belongings, accompanied by spouse/ significant other, with infant in bassinet. Received report from JUNE Simms  and checked bands. Unit and room orientation started. Call light given; no concerns present at this time. Continue with plan of care.

## 2019-02-16 NOTE — PROVIDER NOTIFICATION
02/15/19 2120   Vitals   /84   Resp 18   SpO2 98 %   Oximeter Heart Rate 76 bpm   Gave 20mg labetalol per Dr. Taylor. Two BP's >160 systolic. Patient also feeling a slight headache/heaviness. Will continue to monitor closely.

## 2019-02-16 NOTE — PLAN OF CARE
Stable postpartum. Denies HA, blurred vision. Epigastric discomfort. Ambulating to the bathroom and urinating without issue. Trying to rest/sleep.

## 2019-02-16 NOTE — PROVIDER NOTIFICATION
02/15/19 1933 02/15/19 1950   Vitals   /81 (!) 160/95   Resp 18 18   SpO2 96 % 97 %   Oximeter Heart Rate 88 bpm 81 bpm   Dr. Price in room and aware of patients' BP. Magnesium sulfate increased to 2gms/hr (50mL/hr). Will continue to monitor closely.

## 2019-02-16 NOTE — PROVIDER NOTIFICATION
02/15/19 3941   Provider Notification   Provider Name/Title Dr. Taylor, G3   Method of Notification In Department   Request Evaluate-Remote   Notification Reason Medication Request     Pt c/o increased cramping discomfort. Requesting oxycodone and something to sleep/relax. Provider to order 1 time dose of oxycodone and order for vistaril.

## 2019-02-16 NOTE — PLAN OF CARE
of viable Male with Dr. Price and Dr. Taylor in attandance.  NICU/Nursery RN Tiffani Pedraza present.  Infant with spontaneous cry, to mothers abdomen, dried and stimulated.  Apgars 8/8 .  Placenta delivered without complication, Pitocin bolused, 1st degree laceration with repair, leonela cares provided.  Mother and baby in stable condition.

## 2019-02-16 NOTE — PLAN OF CARE
Mother and baby transferred to postpartum unit at 2300 via wheelchair and bassinet after completion of immediate recovery period. Patient oriented to room and report given to Susu Hsieh RN who assumes patient care. Mother and baby bonding well and in stable condition upon transfer.

## 2019-02-16 NOTE — PROGRESS NOTES
Patient comfortable with epidural.   FHT's reassuring.   Ctx's irregular, mild.   Pitocin at 24 mu/min  Magnesium at 2 gm/hr.   Cx tight 4 cm/ 50%/ high but presenting part (head) is palpable.   AROM performed with a pudendal needle.  Copious clear fluid noted with some bloody show. FHT's tolerated it well.   Head verified as presenting part with bedside US.   Will continue to monitor for cervical change.   Haley Price MD

## 2019-02-16 NOTE — L&D DELIVERY NOTE
Delivery Summary    Traci Sloan MRN# [id number]   Age: 38 year old YOB: 1980     Traci Sloan is a now a  who was admitted for IOL due to pre-eclampsia with severe features at 36w4d. She was started on magnesium. Cervical ripening was performed with misoprostol and a transcervical catheter. Her labor was augmented with AROM and pitocin. She progressed to full dilation. She pushed with good maternal effort and delivered a vigorous male infant on 2/15/2019 at 1925, with APGARs of 8/8 at 1 and 5 minutes, weighing 2800 g. Cephalic presentation with direct OA position. A nuchal hand was present. Shoulders delivered easily. Cord clamping was delayed. Placenta delivered intact with a 3-vessel cord. A 1st degree perineal laceration was present and hemostatic. A left labial laceration was present and repaired with 4-0 vicryl. Fundus was firm with IV pitocin and massage.  ml.  Dr. Price was present for the delivery and repair.     Sofía Taylor MD PhD  Ob/Gyn PGY-3  2/15/2019 11:42 PM      Physician Attestation   I was present for this uncomplicated vaginal delivery and repair of the above noted laceration.   Details as noted above. Mom and baby stable following birth.      Haley Price MD   February 15, 2019            Labor Event Times    Labor onset date:  2/15/19    Dilation complete date:  2/15/19 Complete time:   7:05 PM   Start pushing date/time:  2/15/2019 1912      Labor Length    2nd Stage (hrs):  0 (min):  20   3rd Stage (hrs):  0 (min):  6      Labor Events     labor?:  Yes   steroids:  Full Course  Labor Type:  Induction  Predominate monitoring during 1st stage:  continuous electronic fetal monitoring     Antibiotics received during labor?:  No     Rupture identifier:  Rupture 1  Rupture date/time: 2/15/19 1415   Rupture type:  Artificial Rupture of Membranes  Fluid color:  Clear     Induction:  Misoprostol, AROM, Mechanical ripening agent  Induction  "date/time:     Cervical ripening date/time:     Indications for induction:  Severe Preeclampsia     Delivery/Placenta Date and Time    Delivery Date:  2/15/19 Delivery Time:   7:25 PM   Placenta Date/Time:  2/15/2019  7:31 PM  Oxytocin given at the time of delivery:  after delivery of baby     Vaginal Counts     Initial count performed by 2 team members:   Two Team Members   Demi Price       Needles Suture [employer]   Initial counts 2 0 5    Added to count 0 1 0    Final counts 2 1 5    Placed during labor Accounted for at the end of labor   NA NA   NA NA   NA NA    Final count performed by 2 team members:   Two Team Members   DEMI Price      Final count correct?:  Yes     Apgars    Living status:  Living   1 Minute 5 Minute 10 Minute 15 Minute 20 Minute   Skin color: 1  1       Heart rate: 2  2       Reflex irritability: 2  2       Muscle tone: 1  1       Respiratory effort: 2  2       Total: 8  8       Apgars assigned by:  DEVIN BUCK     Cord    Vessels:  3 Vessels Complications:  None   Cord Blood Disposition:  Lab Gases Sent?:  No      [place] Resuscitation    Methods:  None  [place] Care at Delivery:  Infant with spontaneous cry to mothers' abdomen where he was further dried and stimulated      Measurements    Weight:  6 lb 2.8 oz Length:  1' 7.5\"   Head circumference:  33.7 cm       Skin to Skin and Feeding Plan    Skin to skin initiation date/time: 1841    Skin to skin with:  Mother  Skin to skin end date/time: 1841    Breastfeeding initiated date/time:  2/15/2019 2015  How do you plan to feed your baby:  Breastfeeding     Labor Events and Shoulder Dystocia    Fetal Tracing Prior to Delivery:  Category 1  Shoulder dystocia present?:  Neg     Delivery (Maternal) (Provider to Complete) ([id number])    Episiotomy:  None  Perineal lacerations:  1st Repaired?:  No   Labial laceration:  right Repaired?:  Yes   Est. blood loss (mL):  150   "   Blood Loss  Mother: Traci Sloan #[id number]   Start of Mother's Information    IO Blood Loss  02/15/19 0000 - 02/16/19 1925    EBL (mL) Hospital Encounter 150 mL    Total QBL Blood Loss (mL) Hospital Encounter 371 mL    Total  521 mL         End of Mother's Information  Mother: Traci Sloan #[id number]         Delivery - Provider to Complete ([id number])    Delivering clinician:  Haley Price MD  Attempted Delivery Types (Choose all that apply):  Spontaneous Vaginal Delivery  Delivery Type (Choose the 1 that will go to the Birth History):  Vaginal, Spontaneous   Other personnel:   Provider Role   Sofía Taylor MD Resident         Placenta    Delayed Cord Clamping:  Done  Date/Time:  2/15/2019  7:31 PM  Removal:  Spontaneous  Disposition:  Pathology     Anesthesia    Method:  Epidural  Cervical dilation at placement:  4-7          Presentation and Position    Presentation:  Vertex  Position:  Middle Occiput Anterior           Haley Price MD

## 2019-02-16 NOTE — LACTATION NOTE
"This note was copied from a baby's chart.  Consult for: Later  infant    Delivery Information: Baby boy born at 36.4 weeks via vaginal delivery on 2/15/19 at 1925    Maternal Health History:  Traci has a history of AMA, gestational hypertension, pre-eclampsia, ADHD (takes Adderall) and IBS. She has a history  delivery at 36 weeks.   Adderall is considered category L3- Limited Data-Probably Compatible per Judah Preciado's Medications and Mother's Milk.    She was able to breastfeed, but shares her milk supply \"tanked\" when she went back to work at 12 weeks. She has experience with breastfeeding a late  infant and denies questions/concerns at this time.    Traci is currently on Magnesium Sulfate until 24 hours postpartum and is feeling very tired.    Maternal Breast Exam: breast growth noted in pregnancy. Traci has been able to hand express colostrum and denies pain when breastfeeding.     Infant information:  Infant has been waking appropriately to feed per parents and has age appropriate output. His birthweight was 6lb 2.8 oz and he is < 24 hours of age. He had low blood sugars initially but now his blood sugars have been stable.   Recent Labs   Lab 19  1056 19  0835 19  0628 19  0337 19  0022 02/15/19  2349 02/15/19  2117   GLC  --   --   --   --  47  --   --    BGM 58 54 61 47  --  28* 32*     Feeding Assessment:  Traci denies assistance at this time due to fatigue and recent feeding. She has been breastfeeding with a nipple shield, has been hand expressing and pumping after feedings and is supplementing with her expressed milk (2-4ml).     Education: early feeding cues, breastfeeding positions, signs breastfeeding is going well (comfortable latch, age appropriate output and weight loss, swallowing heard at the breast), potential feeding challenges of late  infants,benefits of using a nipple shield until infant would be term gestation,benefits of breast massage " and hand expression of colostrum, hand expression and pumping recommendations due to infant prematurity, [facility] breast pump part/infant feeding supplies cleaning recommendations,  supplementation guidelines for late  infant, satiety cues, expected  output,  weight loss, benefits of skin to skin, the Second Night, outpatient donor milk resources, inpatient lactation support and outpatient lactation resources.      Supplement Guidelines for infants <37 weeks gestation or < 6 lbs at birth per the [facility] Feeding Methods for the  Infant (35-42 weeks) Policy ([facility] Guidelines):  Infants <37 weeks OR <6 pounds   Birth-24 hours of age: 5ml (1 tsp) every 2 - 3 hours, at least 8 times in 24 hours   24-48 hours of age: 10 ml (2 tsp) every 2 - 3 hours, at least 8 times in 24 hours   48-72 hours of age: 15 ml (3 tsp) every 2 - 3 hours, at least 8 times in 24 hours    Plan: Continue breastfeeding with a nipple shield every 2-3 hours with RN support as needed with a goal of 8-12 feedings per day. Encourage frequent skin to skin. Encourage hand expression and/or pumping after every feeding due to infant prematurity (pumping goal of at least 6 times per day). Continue supplementing with Traci's expressed milk, but if she is unable to express recommended supplement volumes consider using donor milk.    Family encouraged to follow up with an outpatient lactation consultant within 1 week of discharge for continued outpatient lactation support due to infant prematurity (monitor weight gain, milk supply and assist with eventual weaning from the nipple shield). Family plans to follow up with Yee Medrano NP and Dr. Mullins at [facility] for support.

## 2019-02-16 NOTE — PROVIDER NOTIFICATION
02/15/19 1905   Provider Notification   Provider Name/Title Dr Price   Method of Notification Phone   Request Attend Delivery   Notification Reason SVE   SVE at 1905, 10/100/0 with urge to push. Dr Price paged for delivery.

## 2019-02-16 NOTE — PROVIDER NOTIFICATION
02/15/19 2205   Vitals   /71   Resp 22   Oximeter Heart Rate 65 bpm   Patient feeling faint after using restroom. Patient given yogurt and a cool rag. Will continue to monitor closely- as soon as she feels better will transition upstairs.

## 2019-02-16 NOTE — PLAN OF CARE
VSS. BPs in normotensive to mild range. Denies HA, vision changes or epigastric pain. Magnesium infusing at 2g/hr. Taking ibuprofen and tylenol as available for cramping and perineal discomfort. Also took 1 time dose of oxycodone and vistaril. FOB at bedside. Will continue to monitor.

## 2019-02-16 NOTE — PROGRESS NOTES
Postpartum Magnesium Check    S: Patient has been feeling ross while on the magnesium. She is eager to have it turned off. Otherwise she is doing well postpartum. No headache, vision changes, upper abdominal pain, chest pain or SOB. She has had dry eyes. Voiding spontaneously.     O:   Vitals:    19 0045 19 0410 19 0830 19 1300   BP: 138/70 112/72 138/80 129/76   Pulse:   76    Resp:    Temp: 98.1  F (36.7  C) 98.2  F (36.8  C) 98.3  F (36.8  C) 98.5  F (36.9  C)   TempSrc: Oral Oral Axillary Oral   SpO2:   97% 100%   Weight:       Height:           Gen: appears comfortable, NAD  Resp: CTAB  CV: RRR with no murmurs  Abdomen: soft, appropriately tender, uterus firm and at the umbilicus  Ext: warm, well perfused, trace edema, 1+ patellar and biceps reflexes. No clonus.     I/O last 3 completed shifts:  In: 2779.83 [P.O.:1690; I.V.:1089.83]  Out: 3271 [Urine:2750; Blood:521]     UOP: 1400 ml in last 8 hours out    Hemoglobin   Date Value Ref Range Status   2019 9.0 (L) 11.7 - 15.7 g/dL Final     A/P: Traci Sloan is a 38 year old  on PPD #1 s/p . Her pregnancy is notable for pre-eclampsia with severe features, currently on magnesium.      Pre-eclampsia with severe features:  - continue IV magnesium for 24 hours after delivery (off at 1925)  - nonsustained severe range BP after delivery, most recently BP has been normotensive to mild range.  - UOP is adequate, 175 ml/hr for last 8 hours  - HELLP wnl, repeat as clinically indicated  - no new signs or symptoms of worsening pre-eclampsia  - Mag level ordered     Postpartum cares:  - continue with routine postpartum management  Pain:     Well-controlled with ibuprofen and tylenol  Hgb:      9.7>> 9.0. Patient is asymptomatic from acute blood loss anemia and vitals are reassuring will discharge home with PO iron   Rh:        positive  Rubella: immune  Feed:    breast  BC:       Condoms/vasectomy   Dispo:   DC  pending clinical course, possibly in AM if blood pressures remain stable      Clementina Lopez MD, MPH  Obstetrics and Gyncology, PGY-1  February 16, 2019 , 3:38 PM

## 2019-02-16 NOTE — PROVIDER NOTIFICATION
02/15/19 1903   Provider Notification   Provider Name/Title Dr Taylor, G3   Method of Notification In Department   Request Evaluate in Person   Notification Reason Labor Status   At 1855 pt abruptly started feeling rectal pressure. Requesting SVE. Per Dr Taylor, reduce magnesium sulfate to 1gm/hr and half pitocin dose from 24mu/min to 12mu/min.

## 2019-02-16 NOTE — PROGRESS NOTES
[facility]   Post-partum Note    Name:  Traci Sloan  MRN: [id number]    S: Patient is tired this morning.  Pain is controlled.  Denies dizziness, chest pain, SOB, nausea or vomiting, headaches, scotoma, RUQ pain. Tolerating regular diet without nausea or vomiting.  Ambulating without dizziness.  Spontaneously voiding. Reports flatus.  Lochia is slightly greater than menses.  Breast feeding.  Plans condoms/vasectomy for postpartum contraception.     O:   Patient Vitals for the past 24 hrs:   BP Temp Temp src Resp SpO2   02/16/19 0410 112/72 98.2  F (36.8  C) Oral 18 --   02/16/19 0045 138/70 98.1  F (36.7  C) Oral 18 --   02/15/19 2315 127/77 97.5  F (36.4  C) Oral 18 --   02/15/19 2235 134/77 -- -- 18 --   02/15/19 2230 125/72 -- -- 20 --   02/15/19 2225 124/75 -- -- 18 --   02/15/19 2220 119/66 -- -- 20 --   02/15/19 2205 119/71 -- -- 22 --   02/15/19 2150 155/85 98.2  F (36.8  C) Oral 18 99 %   02/15/19 2140 152/72 -- -- 18 98 %   02/15/19 2135 140/63 -- -- 18 98 %   02/15/19 2120 170/84 -- -- 18 98 %   02/15/19 2105 164/84 -- -- 18 96 %   02/15/19 2050 159/85 -- -- 18 96 %   02/15/19 2035 (!) 152/106 -- -- 18 96 %   02/15/19 2020 146/82 -- -- 18 95 %   02/15/19 2005 (!) 156/92 -- -- 18 97 %   02/15/19 1950 (!) 160/95 -- -- 18 97 %   02/15/19 1933 163/81 -- -- 18 96 %   02/15/19 1825 139/79 -- -- -- 99 %   02/15/19 1816 149/83 -- -- -- 100 %   02/15/19 1811 145/87 -- -- -- 100 %   02/15/19 1806 152/85 -- -- -- 100 %   02/15/19 1759 (!) 148/95 -- -- -- 99 %   02/15/19 1757 (!) 142/92 -- -- -- --   02/15/19 1755 138/85 -- -- -- 100 %   02/15/19 1753 145/80 -- -- 18 99 %   02/15/19 1750 143/81 -- -- 20 100 %   02/15/19 1735 -- -- -- -- 99 %   02/15/19 1716 141/86 98.5  F (36.9  C) Oral 16 100 %   02/15/19 1706 -- -- -- -- 98 %   02/15/19 1646 -- -- -- -- 100 %   02/15/19 1600 145/83 98.6  F (37  C) Oral 18 99 %   02/15/19 1545 -- -- -- -- 99 %   02/15/19 1500 137/85 -- -- 16 99 %    02/15/19 1445 -- -- -- -- 100 %   02/15/19 1345 127/63 -- -- -- 98 %   02/15/19 1322 120/77 -- -- 18 --   02/15/19 1315 118/76 -- -- 16 98 %   02/15/19 1253 117/55 -- -- -- 98 %   02/15/19 1245 119/69 98.4  F (36.9  C) Oral 16 98 %   02/15/19 1240 117/65 -- -- -- --   02/15/19 1238 117/66 -- -- -- 96 %   02/15/19 1236 115/62 -- -- -- --   02/15/19 1234 115/61 -- -- -- --   02/15/19 1232 112/66 -- -- -- 99 %   02/15/19 1230 121/64 -- -- -- --   02/15/19 1228 116/64 -- -- -- 100 %   02/15/19 1226 128/80 -- -- -- --   02/15/19 1224 119/71 -- -- -- --   02/15/19 1222 130/69 -- -- -- 99 %   02/15/19 1220 119/67 -- -- -- 99 %   02/15/19 1200 -- -- -- -- 100 %   02/15/19 1005 150/78 -- -- 16 --   02/15/19 0950 (!) 170/93 -- -- 18 --   02/15/19 0830 140/84 98.6  F (37  C) Oral 16 97 %   02/15/19 0732 145/90 97.9  F (36.6  C) Oral 18 99 %     Gen:  Resting comfortably, NAD  CV:  RRR, no murmur  Pulm:  CTAB, no wheezes  Abd:  Soft, appropriately ttp, non-distended.Fundus is below the umbilicus, firm and non-tender.  Ext:  non-tender, trace LE edema b/l, 1+ patellar reflexes, no clonus bilaterally    I/O last 3 completed shifts:  In: 4501.37 [P.O.:2415; I.V.:2086.37]  Out: 3071 [Urine:2550; Blood:521]    Hgb:   Hemoglobin   Date Value Ref Range Status   02/15/2019 9.7 (L) 11.7 - 15.7 g/dL Final       Assessment/Plan:  Traci Sloan is a 38 year old  on PPD #1 s/p . Her pregnancy is notable for pre-eclampsia with severe features.    Pre-eclampsia with severe features:  - continue IV magnesium for 24 hours after delivery  - nonsustained severe range BP after delivery, most recently BP has been normotensive.   - UOP is adequate, 4150 ml yesterday  - HELLP wnl, repeat as clinically indicated  - no new signs or symptoms of worsening pre-eclampsia    Postpartum cares:  - continue with routine postpartum management  Pain: Well-controlled with ibuprofen and tylenol  Hgb: 9.7>> AM hgb pending. Patient is asymptomatic  from acute blood loss anemia and vitals are reassuring will discharge home with PO iron   Rh: positive  Rubella: immune  Feed: breast  BC: Condoms/vasectomy   Dispo: DC pending clinical course    Sofía Taylor MD PhD  Ob/Gyn PGY-3  2/16/2019 7:11 AM

## 2019-02-16 NOTE — PROVIDER NOTIFICATION
02/15/19 1922   Fetal Assessment   Fetal HR Assessment Method external US   Fetal HR (beats/min) 130   Fetal Heart Baseline Rate normal range   Fetal HR Variability moderate (amplitude range 6 to 25 bpm)   Fetal HR Accelerations present   Fetal HR Decelerations variable;prolonged   Dr. Price and Dr Taylor in room with patient and aware of decelerations. NICU called for delivery and patient making great progress. Will continue to monitor closely.

## 2019-02-17 LAB
ABO + RH BLD: NORMAL
ABO + RH BLD: NORMAL
ALT SERPL W P-5'-P-CCNC: 11 U/L (ref 0–50)
AST SERPL W P-5'-P-CCNC: 14 U/L (ref 0–45)
BLD GP AB SCN SERPL QL: NORMAL
BLD PROD TYP BPU: NORMAL
BLD PROD TYP BPU: NORMAL
BLD UNIT ID BPU: 0
BLD UNIT ID BPU: 0
BLOOD BANK CMNT PATIENT-IMP: NORMAL
BLOOD PRODUCT CODE: NORMAL
BLOOD PRODUCT CODE: NORMAL
BPU ID: NORMAL
BPU ID: NORMAL
CREAT SERPL-MCNC: 0.59 MG/DL (ref 0.52–1.04)
ERYTHROCYTE [DISTWIDTH] IN BLOOD BY AUTOMATED COUNT: 13.3 % (ref 10–15)
GFR SERPL CREATININE-BSD FRML MDRD: >90 ML/MIN/{1.73_M2}
HCT VFR BLD AUTO: 25.8 % (ref 35–47)
HGB BLD-MCNC: 8 G/DL (ref 11.7–15.7)
MCH RBC QN AUTO: 26.1 PG (ref 26.5–33)
MCHC RBC AUTO-ENTMCNC: 31 G/DL (ref 31.5–36.5)
MCV RBC AUTO: 84 FL (ref 78–100)
PLATELET # BLD AUTO: 374 10E9/L (ref 150–450)
RBC # BLD AUTO: 3.07 10E12/L (ref 3.8–5.2)
SPECIMEN EXP DATE BLD: NORMAL
TRANSFUSION STATUS PATIENT QL: NORMAL
WBC # BLD AUTO: 9.6 10E9/L (ref 4–11)

## 2019-02-17 PROCEDURE — 86901 BLOOD TYPING SEROLOGIC RH(D): CPT | Performed by: STUDENT IN AN ORGANIZED HEALTH CARE EDUCATION/TRAINING PROGRAM

## 2019-02-17 PROCEDURE — 84450 TRANSFERASE (AST) (SGOT): CPT | Performed by: STUDENT IN AN ORGANIZED HEALTH CARE EDUCATION/TRAINING PROGRAM

## 2019-02-17 PROCEDURE — 36415 COLL VENOUS BLD VENIPUNCTURE: CPT | Performed by: STUDENT IN AN ORGANIZED HEALTH CARE EDUCATION/TRAINING PROGRAM

## 2019-02-17 PROCEDURE — 25000132 ZZH RX MED GY IP 250 OP 250 PS 637: Performed by: STUDENT IN AN ORGANIZED HEALTH CARE EDUCATION/TRAINING PROGRAM

## 2019-02-17 PROCEDURE — 84460 ALANINE AMINO (ALT) (SGPT): CPT | Performed by: STUDENT IN AN ORGANIZED HEALTH CARE EDUCATION/TRAINING PROGRAM

## 2019-02-17 PROCEDURE — 85027 COMPLETE CBC AUTOMATED: CPT | Performed by: STUDENT IN AN ORGANIZED HEALTH CARE EDUCATION/TRAINING PROGRAM

## 2019-02-17 PROCEDURE — 86850 RBC ANTIBODY SCREEN: CPT | Performed by: STUDENT IN AN ORGANIZED HEALTH CARE EDUCATION/TRAINING PROGRAM

## 2019-02-17 PROCEDURE — 86900 BLOOD TYPING SEROLOGIC ABO: CPT | Performed by: STUDENT IN AN ORGANIZED HEALTH CARE EDUCATION/TRAINING PROGRAM

## 2019-02-17 PROCEDURE — 25000128 H RX IP 250 OP 636: Performed by: STUDENT IN AN ORGANIZED HEALTH CARE EDUCATION/TRAINING PROGRAM

## 2019-02-17 PROCEDURE — 82565 ASSAY OF CREATININE: CPT | Performed by: STUDENT IN AN ORGANIZED HEALTH CARE EDUCATION/TRAINING PROGRAM

## 2019-02-17 PROCEDURE — 12000001 ZZH R&B MED SURG/OB UMMC

## 2019-02-17 RX ORDER — NIFEDIPINE 30 MG/1
30 TABLET, EXTENDED RELEASE ORAL DAILY
Status: DISCONTINUED | OUTPATIENT
Start: 2019-02-17 | End: 2019-02-18

## 2019-02-17 RX ORDER — NIFEDIPINE 10 MG/1
10 CAPSULE ORAL
Status: DISCONTINUED | OUTPATIENT
Start: 2019-02-17 | End: 2019-02-19 | Stop reason: HOSPADM

## 2019-02-17 RX ORDER — SODIUM CHLORIDE, SODIUM LACTATE, POTASSIUM CHLORIDE, CALCIUM CHLORIDE 600; 310; 30; 20 MG/100ML; MG/100ML; MG/100ML; MG/100ML
10-125 INJECTION, SOLUTION INTRAVENOUS CONTINUOUS
Status: DISCONTINUED | OUTPATIENT
Start: 2019-02-17 | End: 2019-02-19 | Stop reason: HOSPADM

## 2019-02-17 RX ORDER — AMOXICILLIN 250 MG
1 CAPSULE ORAL 2 TIMES DAILY
Qty: 60 TABLET | Refills: 0 | Status: SHIPPED | OUTPATIENT
Start: 2019-02-17 | End: 2019-03-19

## 2019-02-17 RX ORDER — CALCIUM GLUCONATE 94 MG/ML
1 INJECTION, SOLUTION INTRAVENOUS
Status: DISCONTINUED | OUTPATIENT
Start: 2019-02-17 | End: 2019-02-19 | Stop reason: HOSPADM

## 2019-02-17 RX ORDER — SENNOSIDES 8.6 MG
650 CAPSULE ORAL EVERY 8 HOURS PRN
Qty: 60 TABLET | Refills: 0 | Status: SHIPPED | OUTPATIENT
Start: 2019-02-17 | End: 2019-02-27

## 2019-02-17 RX ORDER — MAGNESIUM SULFATE IN WATER 40 MG/ML
2 INJECTION, SOLUTION INTRAVENOUS CONTINUOUS
Status: DISCONTINUED | OUTPATIENT
Start: 2019-02-17 | End: 2019-02-17

## 2019-02-17 RX ORDER — MAGNESIUM SULFATE HEPTAHYDRATE 40 MG/ML
4 INJECTION, SOLUTION INTRAVENOUS
Status: DISCONTINUED | OUTPATIENT
Start: 2019-02-17 | End: 2019-02-17

## 2019-02-17 RX ORDER — MAGNESIUM SULFATE HEPTAHYDRATE 500 MG/ML
4 INJECTION, SOLUTION INTRAMUSCULAR; INTRAVENOUS
Status: DISCONTINUED | OUTPATIENT
Start: 2019-02-17 | End: 2019-02-17

## 2019-02-17 RX ORDER — LORAZEPAM 2 MG/ML
2 INJECTION INTRAMUSCULAR
Status: DISCONTINUED | OUTPATIENT
Start: 2019-02-17 | End: 2019-02-19 | Stop reason: HOSPADM

## 2019-02-17 RX ORDER — IBUPROFEN 600 MG/1
600 TABLET, FILM COATED ORAL EVERY 6 HOURS PRN
Qty: 60 TABLET | Refills: 1 | Status: SHIPPED | OUTPATIENT
Start: 2019-02-17 | End: 2019-03-19

## 2019-02-17 RX ORDER — MAGNESIUM SULFATE HEPTAHYDRATE 40 MG/ML
4 INJECTION, SOLUTION INTRAVENOUS ONCE
Status: DISCONTINUED | OUTPATIENT
Start: 2019-02-17 | End: 2019-02-17

## 2019-02-17 RX ADMIN — ACETAMINOPHEN 650 MG: 325 TABLET, FILM COATED ORAL at 08:36

## 2019-02-17 RX ADMIN — SENNOSIDES AND DOCUSATE SODIUM 1 TABLET: 8.6; 5 TABLET ORAL at 08:36

## 2019-02-17 RX ADMIN — LORATADINE 10 MG: 10 TABLET ORAL at 08:36

## 2019-02-17 RX ADMIN — ACETAMINOPHEN 650 MG: 325 TABLET, FILM COATED ORAL at 14:46

## 2019-02-17 RX ADMIN — SENNOSIDES AND DOCUSATE SODIUM 2 TABLET: 8.6; 5 TABLET ORAL at 21:01

## 2019-02-17 RX ADMIN — IBUPROFEN 800 MG: 800 TABLET, FILM COATED ORAL at 14:46

## 2019-02-17 RX ADMIN — GABAPENTIN 300 MG: 300 CAPSULE ORAL at 00:23

## 2019-02-17 RX ADMIN — ACETAMINOPHEN 650 MG: 325 TABLET, FILM COATED ORAL at 04:00

## 2019-02-17 RX ADMIN — IBUPROFEN 800 MG: 800 TABLET, FILM COATED ORAL at 00:23

## 2019-02-17 RX ADMIN — OXYMETAZOLINE HYDROCHLORIDE 2 SPRAY: 5 SPRAY NASAL at 21:00

## 2019-02-17 RX ADMIN — LABETALOL 20 MG/4 ML (5 MG/ML) INTRAVENOUS SYRINGE 40 MG: at 20:23

## 2019-02-17 RX ADMIN — IBUPROFEN 800 MG: 800 TABLET, FILM COATED ORAL at 22:57

## 2019-02-17 RX ADMIN — ACETAMINOPHEN 650 MG: 325 TABLET, FILM COATED ORAL at 19:45

## 2019-02-17 RX ADMIN — NIFEDIPINE 30 MG: 30 TABLET, FILM COATED, EXTENDED RELEASE ORAL at 21:01

## 2019-02-17 RX ADMIN — LABETALOL 20 MG/4 ML (5 MG/ML) INTRAVENOUS SYRINGE 20 MG: at 19:59

## 2019-02-17 NOTE — PLAN OF CARE
The patient's magnesium sulfate was discontinued at 1927. Her blood pressure this evening has remained in the 140-150s/70-80s. No clonus and reflexes WNL. She was feeling very irritable, per patient, and she was experiencing visual changes while on the magnesium sulfate. She was very relieved to be off of it. She is breastfeeding baby well and independently with a nipple shield and she is pumping and doing hand expression. Additional supplementation was started this evening for baby for a weight loss of 6.4% at 24 hours. Consent was obtained. Continue to support this family with feedings and as needed.

## 2019-02-17 NOTE — PROGRESS NOTES
[facility] Obstetrics Postpartum Progress Note    , PPD#2,  @ 36w4d    S: Patient states she is doing well.  Back pain improved with the addition of gabapentin.  Lochia light.  Eating and drinking without nausea/vomiting.  Ambulating without difficulty, no dizziness.  Voiding without difficulty.   Breastfeeding.   No headache, vision changes, CP, SOB, nausea/vomiting, abdominal pain.    O:  Patient Vitals for the past 12 hrs:   BP Temp Temp src Heart Rate Resp   19 0400 133/84 98.4  F (36.9  C) Oral 70 16   19 2133 152/87 -- -- 73 18     Gen:  NAD  CV: regular rate and rhythm  Resp: CTAB, good air movement  Abd: soft, nondistended, nontender, fundus firm at 1cm below umbilicus  Ext: non-tender, 1+ BLE edema, no erythema    Hemoglobin   Date Value Ref Range Status   2019 9.0 (L) 11.7 - 15.7 g/dL Final   02/15/2019 9.7 (L) 11.7 - 15.7 g/dL Final       A/P: 38 year old  PPD#2 s/p , pregnancy complicated by preeclampsia with severe features, doing well postpartum    1. Preeclampsia with severe features: the patient had severe range blood pressures in the office and on labor and delivery and underwent IOL.  She had had normal HELLP labs and no proteinuria.    Blood pressures: normotensive to mild-range overnight, continue to monitor.      Urine output: only one shift measured yesterday, but excellent at that time at 1400mL.  Will follow daily weights.    Symptoms: currently asymptomatic    S/p magnesium sulfate for seizure prophylaxis 24h postpartum  2. Heme: the patient had an admission Hgb of 9.7, a QBL of 150cc was associated with delivery.  PPD1 Hgb is 9.0, appropriate.  Patient is asymptomatic.  3. Pain control: pain is well-controlled with Tylenol & Motrin, continue.  For back pain, pt had described hypersensitive area and localized pain but no motor deficit in left lumbar region, started on gabapentin to good effect.  4. Rh positive  5. Rubella immune  6. Routine  postpartum:    encourage breastfeeding    Encourage ambulation    Continue regular diet    Continue bowel regimen    Contraception: planning for vasectomy  7. Dispo: likely discharge home later today or tomorrow    Rosalinda Cooper MD  PGY-3 OB/GYN  02/17/2019 8:56 AM       Physician Attestation   I, Haley Price, personally saw and evaluated Traci.  I have reviewed and discussed with the resident their discharge plan.  My key history or physical exam findings from the day of discharge: patient is voiding, tolerating a regular diet and passing gas.  Bleeding is appropriate and vitals are stable. 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  She has done physical therapy in the past.  She is able to cope with the pain using position changes.  For now she prefers to use pain meds and heat.  If no improvement over the next few days, she will contact office for referral to physical therapy.   Key management decisions made by me: ok for d/c this evening after 24 hours post magnesium.    Discussed postpartum instructions/restrictions and follow up.    Haley Price MD

## 2019-02-17 NOTE — PROVIDER NOTIFICATION
Text page sent to G3 resident/Dr. Cooper: Luther in [id number] is feeling very uncomfortable on her lower back near her epidural site. She has c/o this all day, but is now in tears saying it feels different and she can't put pressure on it. Are you able to come up and see her? Thanks, JUNE Fisher

## 2019-02-17 NOTE — PLAN OF CARE
Stable postpartum. BPs labile, denies HA. Blurred vision, epigastric discomfort. Independent with self and  cares. Pumping every 3 hours and collecting milk to feed LPT .

## 2019-02-17 NOTE — PLAN OF CARE
"Data: Vital signs within normal limits. Postpartum checks within normal limits - see flow record. Patient eating and drinking normally. Patient able to empty bladder independently and is up ambulating. Patient performing self cares and is able to care for infant.  Action: Patient medicated during the shift for lower back pain, \"nerves firing.\" Resident Kenneth assessed and prescribed gabapentin - patient states it has helped.   Response: Positive attachment behaviors observed with infant.   Will continue to monitor and provide support.   "

## 2019-02-17 NOTE — PLAN OF CARE
To LIBERTAD Cooper - can you place that gabapentin order? or let me know what anesthesia says. thanks!

## 2019-02-18 PROCEDURE — 25000132 ZZH RX MED GY IP 250 OP 250 PS 637: Performed by: STUDENT IN AN ORGANIZED HEALTH CARE EDUCATION/TRAINING PROGRAM

## 2019-02-18 PROCEDURE — 25000132 ZZH RX MED GY IP 250 OP 250 PS 637: Performed by: OBSTETRICS & GYNECOLOGY

## 2019-02-18 PROCEDURE — 12000001 ZZH R&B MED SURG/OB UMMC

## 2019-02-18 RX ORDER — NIFEDIPINE 30 MG/1
30 TABLET, EXTENDED RELEASE ORAL DAILY
Status: COMPLETED | OUTPATIENT
Start: 2019-02-18 | End: 2019-02-18

## 2019-02-18 RX ORDER — NIFEDIPINE 30 MG/1
60 TABLET, EXTENDED RELEASE ORAL DAILY
Status: DISCONTINUED | OUTPATIENT
Start: 2019-02-18 | End: 2019-02-19

## 2019-02-18 RX ADMIN — IBUPROFEN 800 MG: 800 TABLET, FILM COATED ORAL at 16:44

## 2019-02-18 RX ADMIN — SENNOSIDES AND DOCUSATE SODIUM 2 TABLET: 8.6; 5 TABLET ORAL at 08:02

## 2019-02-18 RX ADMIN — IBUPROFEN 800 MG: 800 TABLET, FILM COATED ORAL at 11:30

## 2019-02-18 RX ADMIN — NIFEDIPINE 60 MG: 30 TABLET, FILM COATED, EXTENDED RELEASE ORAL at 08:02

## 2019-02-18 RX ADMIN — ACETAMINOPHEN 650 MG: 325 TABLET, FILM COATED ORAL at 05:19

## 2019-02-18 RX ADMIN — ACETAMINOPHEN 650 MG: 325 TABLET, FILM COATED ORAL at 16:44

## 2019-02-18 RX ADMIN — IBUPROFEN 800 MG: 800 TABLET, FILM COATED ORAL at 22:25

## 2019-02-18 RX ADMIN — OXYMETAZOLINE HYDROCHLORIDE 2 SPRAY: 5 SPRAY NASAL at 22:25

## 2019-02-18 RX ADMIN — ACETAMINOPHEN 650 MG: 325 TABLET, FILM COATED ORAL at 13:18

## 2019-02-18 RX ADMIN — ACETAMINOPHEN 650 MG: 325 TABLET, FILM COATED ORAL at 20:25

## 2019-02-18 RX ADMIN — GABAPENTIN 300 MG: 300 CAPSULE ORAL at 19:13

## 2019-02-18 RX ADMIN — GABAPENTIN 300 MG: 300 CAPSULE ORAL at 11:45

## 2019-02-18 RX ADMIN — ACETAMINOPHEN 650 MG: 325 TABLET, FILM COATED ORAL at 09:22

## 2019-02-18 RX ADMIN — ACETAMINOPHEN 650 MG: 325 TABLET, FILM COATED ORAL at 01:07

## 2019-02-18 RX ADMIN — IBUPROFEN 800 MG: 800 TABLET, FILM COATED ORAL at 05:19

## 2019-02-18 RX ADMIN — LORATADINE 10 MG: 10 TABLET ORAL at 08:02

## 2019-02-18 RX ADMIN — OXYMETAZOLINE HYDROCHLORIDE 2 SPRAY: 5 SPRAY NASAL at 08:02

## 2019-02-18 RX ADMIN — SENNOSIDES AND DOCUSATE SODIUM 2 TABLET: 8.6; 5 TABLET ORAL at 19:13

## 2019-02-18 RX ADMIN — NIFEDIPINE 30 MG: 30 TABLET, FILM COATED, EXTENDED RELEASE ORAL at 17:42

## 2019-02-18 ASSESSMENT — MIFFLIN-ST. JEOR: SCORE: 1624.5

## 2019-02-18 NOTE — ADDENDUM NOTE
Addendum  created 02/18/19 1220 by Trinity Mauro MD    Intraprocedure Attestations deleted, Intraprocedure Attestations filed

## 2019-02-18 NOTE — PROVIDER NOTIFICATION
02/17/19 1953   Provider Notification   Provider Name/Title Dr. Saldana   Method of Notification Phone   Notification Reason Vital Signs Change   Dr. Saldana notitifed of pt having two severe range BPs 15 minutes apart.  Plan to give IV labetalol and may restart magnesium infusion.

## 2019-02-18 NOTE — PLAN OF CARE
Data: Elevated Blood pressures.  Treated x 2 for severe range BPs. Postpartum checks within normal limits - see flow record. Patient eating and drinking normally. Patient able to empty bladder independently and is up ambulating. No apparent signs of infection. Patient performing self cares and is able to care for infant.  Action: Patient medicated during the shift for pain. See MAR. Patient reassessed within 1 hour after each medication and pain was improved - patient stated she was comfortable. Patient education done about procarida. See flow record.  Response: Positive attachment behaviors observed with infant. Support persons  present.   Plan: Anticipate discharge on Tuesday February 19, 2018.

## 2019-02-18 NOTE — PLAN OF CARE
"VSS, except for elevated BPs in the 140-150s/90-100s. Denies s/sx of Pre-E, except for headache that pt describes as \"sinus pressure.\" DTR +2, no clonus. Postpartum check WDL. C/o of back pain on L side that improved with Tylenol, Ibuprofen, and Gabapentin. Up ad aiden. Tolerating regular diet. Voiding without difficulty. Soaked in tub. Breastfeeding with nipple shield and pumping independently. EDS 4. Bonding well with baby. Significant other at bedside and supportive. Continue to monitor. Anticipates discharge to home this evening.   "

## 2019-02-18 NOTE — PLAN OF CARE
[id number]: The patient's blood pressure remains elevated at 153/89 at 4 PM. She planned to discharge this evening after 24-hours post-magnesium sulfate. The patient was handed off to an on-coming RN at 1900, but her blood pressure spiked and her discharge plans were cancelled. Continue to monitor.

## 2019-02-18 NOTE — PROGRESS NOTES
[facility] Obstetrics Postpartum Progress Note    , PPD#3,  @ 36w4d    S: Patient states she is doing well.  Back pain only problematic when she is late on medications.  Lochia light.  Eating and drinking without nausea/vomiting.  Ambulating without difficulty, no dizziness.  Voiding without difficulty.   Breastfeeding.   No headache, vision changes, CP, SOB, nausea/vomiting, abdominal pain.    O:  Patient Vitals for the past 12 hrs:   BP Temp Temp src Heart Rate Resp   19 0500 153/89 98  F (36.7  C) Oral 82 18   19 0320 141/75 -- -- 85 18   19 0116 154/74 -- -- 84 --   19 0100 (!) 166/100 98.4  F (36.9  C) Oral 82 18   19 2245 153/87 -- -- 88 --   19 2206 146/88 -- -- 86 --   195 142/83 -- -- 82 --   19 152/79 -- -- 85 --   19 138/79 -- -- 79 --   19 148/82 -- -- 79 --   19 147/80 -- -- 76 --   19 143/77 -- -- 77 --   19 162/83 -- -- 76 --   19 (!) 165/105 -- -- 79 --   19 (!) 152/97 -- -- 72 --   19 (!) 163/103 -- -- 70 --   19 (!) 164/103 -- -- 79 --     Gen:  NAD  CV: regular rate and rhythm  Resp: CTAB, good air movement  Abd: soft, nondistended, nontender, fundus firm at 1cm below umbilicus  Ext: non-tender, 1+ BLE edema, no erythema    Hemoglobin   Date Value Ref Range Status   2019 8.0 (L) 11.7 - 15.7 g/dL Final   2019 9.0 (L) 11.7 - 15.7 g/dL Final     AST   Date Value Ref Range Status   2019 14 0 - 45 U/L Final     ALT   Date Value Ref Range Status   2019 11 0 - 50 U/L Final     Creatinine   Date Value Ref Range Status   2019 0.59 0.52 - 1.04 mg/dL Final     Platelet Count   Date Value Ref Range Status   2019 374 150 - 450 10e9/L Final       A/P: 38 year old  PPD#3 s/p , pregnancy complicated by preeclampsia with severe features, doing well postpartum, requiring ongoing inpatient care due to  severe range blood pressures last night.    1. Preeclampsia with severe features: the patient had severe range blood pressures in the office and on labor and delivery and underwent IOL.  She had had normal HELLP labs and no proteinuria.    Blood pressures: sustained severe range blood pressures last night requiring IV labetalol 20 mg and 40 mg. Procardia 30 mg XL started last night, increased to 60 mg this morning due to persistent high mild range and one nonsustained severe range BP last night.    Urine output: unmeasured yesterday    Symptoms: currently asymptomatic    S/p magnesium sulfate for seizure prophylaxis 24h postpartum  2. Heme: the patient had an admission Hgb of 9.7, a QBL of 150cc was associated with delivery.  PPD2 Hgb is 8.0, appropriate.  Patient is asymptomatic.  3. Pain control: pain is well-controlled with Tylenol & Motrin, continue.  For back pain, pt had described hypersensitive area and localized pain but no motor deficit in left lumbar region, started on gabapentin to good effect.  4. Rh positive  5. Rubella immune  6. Routine postpartum:    encourage breastfeeding    Encourage ambulation    Continue regular diet    Continue bowel regimen    Contraception: planning for vasectomy  7. Dispo: likely discharge home later today or tomorrow pending blood pressure stability after addition of oral medication starting last night.     Mandy Saldana MD  PGY-2 OB/GYN  02/18/2019 6:44 AM

## 2019-02-18 NOTE — PROGRESS NOTES
[facility] Obstetrics Postpartum Progress Note    , PPD#3,  @ 36w4d    S: Patient states she is doing well.  Back pain only problematic when she is late on medications.  Lochia light.  Eating and drinking without nausea/vomiting.  Ambulating without difficulty, no dizziness.  Voiding without difficulty.   Breastfeeding.   No headache, vision changes, CP, SOB, nausea/vomiting, abdominal pain.    O:  Patient Vitals for the past 12 hrs:   BP Temp Temp src Heart Rate Resp   19 0500 153/89 98  F (36.7  C) Oral 82 18   19 0320 141/75 -- -- 85 18   19 0116 154/74 -- -- 84 --   19 0100 (!) 166/100 98.4  F (36.9  C) Oral 82 18   19 2245 153/87 -- -- 88 --   19 2206 146/88 -- -- 86 --   195 142/83 -- -- 82 --   19 152/79 -- -- 85 --   19 138/79 -- -- 79 --   19 148/82 -- -- 79 --   19 147/80 -- -- 76 --   19 143/77 -- -- 77 --   19 162/83 -- -- 76 --   19 (!) 165/105 -- -- 79 --   19 (!) 152/97 -- -- 72 --   19 (!) 163/103 -- -- 70 --   19 (!) 164/103 -- -- 79 --     Gen:  NAD  CV: regular rate and rhythm  Resp: CTAB, good air movement  Abd: soft, nondistended, nontender, fundus firm at 1cm below umbilicus  Ext: non-tender, 1+ BLE edema, no erythema    Hemoglobin   Date Value Ref Range Status   2019 8.0 (L) 11.7 - 15.7 g/dL Final   2019 9.0 (L) 11.7 - 15.7 g/dL Final     AST   Date Value Ref Range Status   2019 14 0 - 45 U/L Final     ALT   Date Value Ref Range Status   2019 11 0 - 50 U/L Final     Creatinine   Date Value Ref Range Status   2019 0.59 0.52 - 1.04 mg/dL Final     Platelet Count   Date Value Ref Range Status   2019 374 150 - 450 10e9/L Final     BP at 0900 was 150/100.      A/P: 38 year old  PPD#3 s/p , pregnancy complicated by preeclampsia with severe features, doing well postpartum, requiring  ongoing inpatient care due to severe range blood pressures last night.    1. Preeclampsia with severe features: the patient had severe range blood pressures in the office and on labor and delivery and underwent IOL.  She had had normal HELLP labs and no proteinuria.    Blood pressures: sustained severe range blood pressures last night requiring IV labetalol 20 mg and 40 mg. Procardia 30 mg XL started last night, increased to 60 mg this morning due to persistent high mild range and one nonsustained severe range BP last night.    Urine output: unmeasured yesterday    Symptoms: currently asymptomatic    S/p magnesium sulfate for seizure prophylaxis 24h postpartum  2. Heme: the patient had an admission Hgb of 9.7, a QBL of 150cc was associated with delivery.  PPD2 Hgb is 8.0, appropriate.  Patient is asymptomatic.  3. Pain control: pain is well-controlled with Tylenol & Motrin, continue.  For back pain, pt had described hypersensitive area and localized pain but no motor deficit in left lumbar region, started on gabapentin to good effect.  4. Rh positive  5. Rubella immune  6. Routine postpartum:    encourage breastfeeding    Encourage ambulation    Continue regular diet    Continue bowel regimen    Contraception: planning for vasectomy  7. Dispo: likely discharge home later today or tomorrow pending blood pressure stability and oral medication titration.      Mandy Saldana MD  PGY-2 OB/GYN  02/18/2019 6:44 AM     I saw and examined the patient.  She is feeling well and continues to be asymptomatic.  BP is mild high range and Procardia started yesterday, titrated to 60 mg po daily this morning.    If BPs remain stable will be able to discharge to home late this afternoon, on Procardia XL 60 mg po daily, with follow up in clinic in one week.     Theodora Alvaraod MD  [facility]  18 Feb 2019  10:00 AM

## 2019-02-18 NOTE — PROGRESS NOTES
/94.  Pt c/o HA in forehead, some relief with Tylenol.    Plan:  Add Procardia XL 30 mg po now.  May increase Procardia to 90 mg tomorrow.  Likely home tomorrow.    Theodora Alvarado MD

## 2019-02-18 NOTE — PROVIDER NOTIFICATION
Paged Dr. Alvarado the last bp was 145/94 and pt is c/o headache in forehead/sinus area, wondering if Dr. Alvarado plans to discharge the patient tonight.

## 2019-02-18 NOTE — LACTATION NOTE
"This note was copied from a baby's chart.  Follow up visit for this LPT infant, parents experienced with LPT infant feeding and cares as 5 y/o daughter was born at 36w0d. Maternal history and exam in first consult note 2/16/19, also anemia with pre-delivery hgb 9.7, 8.0 postpartum (521 mL QBL at delivery). Traci reports she had excellent milk supply, made \"a lot\" but lost her supply fairly quickly when she went back to work, cut down on the number of times per day of milk removal and didn't know at the time the effect it would have.  Traci sees Yee Medrano, NP who specializes in breastfeeding support, as her primary provider as well. They will make appt. Within a week of discharge to follow up with adderall medication changes and follow up with milk supply, Merritt's progress with feedings, how long to continue with nipple shield use, pumping, and guidance on weaning from both.     Reviewed medications with Traci, Gabapentin 300mg q 8 hours prn (Hale category L2), hydroxyzine 100mg @ HS prn (Hale L2), Claritin (Hale L1), Procardia XL 60 mg started last evening (Hale L2) and Afrin spray (Hale L3) which she confirms will just be 3 day use. She plans to switch ADD Rx to Adderall short acting dose postpartum, instead of the previous extended release, this is managed by her NP in clinic. They made this change after last delivery also in order to time dosing with breastfeeding and minimize what infant is getting.      Traci's milk coming in, 2 to 3 ounces in bottle on counter from last pumping. Discussed change to maintenance phase on Symphony pump, & recommended use of this pump in first month or so at home. Traci reports she had excellent results with her regular pump last time until she went back to work. Reviewed feeding log (brought up to date with diapers thus far) and breastfeeding community, phone support resources for after discharge, adding in [employer]. Reviewed recommendations for LPT feedings, supplements, " milk expression and follow up, placed summary of this in discharge instructions as well.  Gave support and encouragement, answered questions.

## 2019-02-18 NOTE — PROGRESS NOTES
Brief Progress Note    Patient had planned to discharge tonight, but had sustained severe range BPs. 20 mg IV labetalol given.    Patient having back pain, but otherwise feeling well. Denies HA, vision changes, CP, SOB, RUQ/epigastric pain. Had some toe tingling earlier.    Patient Vitals for the past 24 hrs:   BP Temp Temp src Heart Rate Resp   02/17/19 2025 162/83 -- -- -- --   02/17/19 2015 (!) 165/105 -- -- -- --   02/17/19 2003 (!) 152/97 -- -- 72 --   02/17/19 1953 (!) 163/103 -- -- 70 --   02/17/19 1938 (!) 164/103 -- -- 79 --   02/17/19 1617 153/89 98.5  F (36.9  C) Oral 88 18   02/17/19 1000 157/81 98  F (36.7  C) Oral 96 16   02/17/19 0400 133/84 98.4  F (36.9  C) Oral 70 16   02/16/19 2133 152/87 -- -- 73 18       Gen: appears comfortable  CV: RRR  Lungs: CTAB  Abd: no RUQ tenderness  Ext: 1+ edema    A/P:  - repeat HELLP labs pending  - additional 40 mg labetalol given  - will start Procardia 30 mg XL daily with one dose now and plan next in the morning. Will increase PRN.    Plan discussed with Dr. Price.    Mandy Saldana MD  PGY-2 Department of Obstetrics, Gynecology, and Women's Health  02/17/19

## 2019-02-19 ENCOUNTER — NURSE TRIAGE (OUTPATIENT)
Dept: NURSING | Facility: CLINIC | Age: 39
End: 2019-02-19

## 2019-02-19 ENCOUNTER — HOSPITAL ENCOUNTER (INPATIENT)
Facility: CLINIC | Age: 39
LOS: 1 days | Discharge: HOME | DRG: 776 | End: 2019-02-20
Attending: EMERGENCY MEDICINE | Admitting: OBSTETRICS & GYNECOLOGY
Payer: COMMERCIAL

## 2019-02-19 VITALS
HEIGHT: 68 IN | HEART RATE: 76 BPM | DIASTOLIC BLOOD PRESSURE: 87 MMHG | WEIGHT: 192.9 LBS | RESPIRATION RATE: 16 BRPM | BODY MASS INDEX: 29.24 KG/M2 | TEMPERATURE: 98.2 F | OXYGEN SATURATION: 99 % | SYSTOLIC BLOOD PRESSURE: 149 MMHG

## 2019-02-19 DIAGNOSIS — R51.9 INTRACTABLE HEADACHE, UNSPECIFIED CHRONICITY PATTERN, UNSPECIFIED HEADACHE TYPE: ICD-10-CM

## 2019-02-19 LAB
ALBUMIN SERPL-MCNC: 2.9 G/DL (ref 3.4–5)
ALP SERPL-CCNC: 153 U/L (ref 40–150)
ALT SERPL W P-5'-P-CCNC: 34 U/L (ref 0–50)
ANION GAP SERPL CALCULATED.3IONS-SCNC: 9 MMOL/L (ref 3–14)
AST SERPL W P-5'-P-CCNC: 21 U/L (ref 0–45)
BASOPHILS # BLD AUTO: 0 10E9/L (ref 0–0.2)
BASOPHILS NFR BLD AUTO: 0.2 %
BILIRUB SERPL-MCNC: 0.2 MG/DL (ref 0.2–1.3)
BUN SERPL-MCNC: 16 MG/DL (ref 7–30)
CALCIUM SERPL-MCNC: 8.5 MG/DL (ref 8.5–10.1)
CHLORIDE SERPL-SCNC: 106 MMOL/L (ref 94–109)
CO2 SERPL-SCNC: 23 MMOL/L (ref 20–32)
CREAT SERPL-MCNC: 0.59 MG/DL (ref 0.52–1.04)
DIFFERENTIAL METHOD BLD: ABNORMAL
EOSINOPHIL # BLD AUTO: 0.2 10E9/L (ref 0–0.7)
EOSINOPHIL NFR BLD AUTO: 2.3 %
ERYTHROCYTE [DISTWIDTH] IN BLOOD BY AUTOMATED COUNT: 13.4 % (ref 10–15)
GFR SERPL CREATININE-BSD FRML MDRD: >90 ML/MIN/{1.73_M2}
GLUCOSE SERPL-MCNC: 93 MG/DL (ref 70–99)
HCT VFR BLD AUTO: 29 % (ref 35–47)
HGB BLD-MCNC: 9.3 G/DL (ref 11.7–15.7)
IMM GRANULOCYTES # BLD: 0.1 10E9/L (ref 0–0.4)
IMM GRANULOCYTES NFR BLD: 0.5 %
LYMPHOCYTES # BLD AUTO: 2.1 10E9/L (ref 0.8–5.3)
LYMPHOCYTES NFR BLD AUTO: 20.9 %
MCH RBC QN AUTO: 26.3 PG (ref 26.5–33)
MCHC RBC AUTO-ENTMCNC: 32.1 G/DL (ref 31.5–36.5)
MCV RBC AUTO: 82 FL (ref 78–100)
MONOCYTES # BLD AUTO: 0.6 10E9/L (ref 0–1.3)
MONOCYTES NFR BLD AUTO: 6.3 %
NEUTROPHILS # BLD AUTO: 7 10E9/L (ref 1.6–8.3)
NEUTROPHILS NFR BLD AUTO: 69.8 %
NRBC # BLD AUTO: 0 10*3/UL
NRBC BLD AUTO-RTO: 0 /100
PLATELET # BLD AUTO: 469 10E9/L (ref 150–450)
POTASSIUM SERPL-SCNC: 3.7 MMOL/L (ref 3.4–5.3)
PROT SERPL-MCNC: 7.6 G/DL (ref 6.8–8.8)
RBC # BLD AUTO: 3.53 10E12/L (ref 3.8–5.2)
SODIUM SERPL-SCNC: 138 MMOL/L (ref 133–144)
WBC # BLD AUTO: 10.1 10E9/L (ref 4–11)

## 2019-02-19 PROCEDURE — 25000132 ZZH RX MED GY IP 250 OP 250 PS 637: Performed by: OBSTETRICS & GYNECOLOGY

## 2019-02-19 PROCEDURE — 80053 COMPREHEN METABOLIC PANEL: CPT | Performed by: EMERGENCY MEDICINE

## 2019-02-19 PROCEDURE — 25000132 ZZH RX MED GY IP 250 OP 250 PS 637: Performed by: STUDENT IN AN ORGANIZED HEALTH CARE EDUCATION/TRAINING PROGRAM

## 2019-02-19 PROCEDURE — 25000132 ZZH RX MED GY IP 250 OP 250 PS 637: Performed by: EMERGENCY MEDICINE

## 2019-02-19 PROCEDURE — 99285 EMERGENCY DEPT VISIT HI MDM: CPT | Mod: Z6 | Performed by: EMERGENCY MEDICINE

## 2019-02-19 PROCEDURE — 85025 COMPLETE CBC W/AUTO DIFF WBC: CPT | Performed by: EMERGENCY MEDICINE

## 2019-02-19 PROCEDURE — 99285 EMERGENCY DEPT VISIT HI MDM: CPT | Mod: 25

## 2019-02-19 PROCEDURE — 96374 THER/PROPH/DIAG INJ IV PUSH: CPT

## 2019-02-19 RX ORDER — METOCLOPRAMIDE HYDROCHLORIDE 5 MG/ML
10 INJECTION INTRAMUSCULAR; INTRAVENOUS ONCE
Status: COMPLETED | OUTPATIENT
Start: 2019-02-19 | End: 2019-02-20

## 2019-02-19 RX ORDER — OXYCODONE HYDROCHLORIDE 5 MG/1
5 TABLET ORAL ONCE
Status: COMPLETED | OUTPATIENT
Start: 2019-02-19 | End: 2019-02-19

## 2019-02-19 RX ORDER — NIFEDIPINE 30 MG/1
90 TABLET, EXTENDED RELEASE ORAL DAILY
Status: DISCONTINUED | OUTPATIENT
Start: 2019-02-19 | End: 2019-02-19 | Stop reason: HOSPADM

## 2019-02-19 RX ORDER — SIMETHICONE 80 MG
80 TABLET,CHEWABLE ORAL EVERY 6 HOURS PRN
Status: DISCONTINUED | OUTPATIENT
Start: 2019-02-19 | End: 2019-02-19 | Stop reason: HOSPADM

## 2019-02-19 RX ADMIN — LORATADINE 10 MG: 10 TABLET ORAL at 08:03

## 2019-02-19 RX ADMIN — NIFEDIPINE 90 MG: 30 TABLET, FILM COATED, EXTENDED RELEASE ORAL at 08:03

## 2019-02-19 RX ADMIN — IBUPROFEN 800 MG: 800 TABLET, FILM COATED ORAL at 10:27

## 2019-02-19 RX ADMIN — GABAPENTIN 300 MG: 300 CAPSULE ORAL at 03:57

## 2019-02-19 RX ADMIN — SENNOSIDES AND DOCUSATE SODIUM 2 TABLET: 8.6; 5 TABLET ORAL at 08:03

## 2019-02-19 RX ADMIN — ACETAMINOPHEN 650 MG: 325 TABLET, FILM COATED ORAL at 08:03

## 2019-02-19 RX ADMIN — SIMETHICONE CHEW TAB 80 MG 80 MG: 80 TABLET ORAL at 08:56

## 2019-02-19 RX ADMIN — OXYCODONE HYDROCHLORIDE 5 MG: 5 TABLET ORAL at 23:15

## 2019-02-19 RX ADMIN — IBUPROFEN 800 MG: 800 TABLET, FILM COATED ORAL at 04:23

## 2019-02-19 RX ADMIN — ACETAMINOPHEN 650 MG: 325 TABLET, FILM COATED ORAL at 00:05

## 2019-02-19 ASSESSMENT — ENCOUNTER SYMPTOMS
FEVER: 0
EYE REDNESS: 0
NECK STIFFNESS: 0
ARTHRALGIAS: 0
SHORTNESS OF BREATH: 0
VOMITING: 0
HEADACHES: 1
NAUSEA: 0
ABDOMINAL PAIN: 1
COLOR CHANGE: 0
CONFUSION: 0
DIFFICULTY URINATING: 0

## 2019-02-19 ASSESSMENT — MIFFLIN-ST. JEOR
SCORE: 1613.49
SCORE: 1616.66

## 2019-02-19 NOTE — PROVIDER NOTIFICATION
Pt was c/o of sharp R abd pain that began this AM. Still has sinus headache. BP was 149/87. DTR +2, no clonus. Is there anything you recommend?

## 2019-02-19 NOTE — PROGRESS NOTES
"[facility] Obstetrics Postpartum Progress Note    , PPD#4,  @ 36w4d    S: Patient states she is doing well.  Back pain \"like an injury.\" Lochia light.  Tolerating regular diet.  Ambulating without difficulty, no dizziness.  Voiding without difficulty.   Breastfeeding. She did have a headache last night, now improved.    O:  Patient Vitals for the past 24 hrs:   BP Temp Temp src Heart Rate Resp SpO2 Weight   19 1957 137/83 98.3  F (36.8  C) Oral 95 16 -- --   19 1600 (!) 145/94 98.3  F (36.8  C) Oral 86 16 -- --   19 1314 (!) 142/92 -- -- -- 18 99 % --   19 0908 (!) 153/100 98.4  F (36.9  C) Oral 82 16 -- 88.6 kg (195 lb 5.2 oz)   19 0500 153/89 98  F (36.7  C) Oral 82 18 -- --   19 0320 141/75 -- -- 85 18 -- --   19 0116 154/74 -- -- 84 -- -- --   19 0100 (!) 166/100 98.4  F (36.9  C) Oral 82 18 -- --     Gen:  NAD  CV: regular rate and rhythm  Resp: CTAB, good air movement  Abd: soft, nondistended, nontender, fundus firm at 1cm below umbilicus  Ext: non-tender, 1+ BLE edema, no erythema    Hemoglobin   Date Value Ref Range Status   2019 8.0 (L) 11.7 - 15.7 g/dL Final   2019 9.0 (L) 11.7 - 15.7 g/dL Final     AST   Date Value Ref Range Status   2019 14 0 - 45 U/L Final     ALT   Date Value Ref Range Status   2019 11 0 - 50 U/L Final     Creatinine   Date Value Ref Range Status   2019 0.59 0.52 - 1.04 mg/dL Final     Platelet Count   Date Value Ref Range Status   2019 374 150 - 450 10e9/L Final       A/P: 38 year old  PPD#4 s/p , pregnancy complicated by preeclampsia with severe features, doing well postpartum, requiring ongoing inpatient care due to severe range blood pressures last night.    1. Preeclampsia with severe features: the patient had severe range blood pressures in the office and on labor and delivery and underwent IOL.  She had had normal HELLP labs and no proteinuria.    Blood pressures: " sustained severe range blood pressures on PPD#2 requiring IV labetalol 20 mg and 40 mg. Procardia 30 mg XL started at the time and has been titrated since then. She received 90 mg total yesterday, plan to continue 60 mg daily due to normal BPs overnight and headache. Urine output: unmeasured yesterday    Symptoms: currently asymptomatic    S/p magnesium sulfate for seizure prophylaxis 24h postpartum  2. Heme: the patient had an admission Hgb of 9.7, a QBL of 150cc was associated with delivery.  PPD2 Hgb is 8.0, appropriate.  Patient is asymptomatic.  3. Pain control: pain is well-controlled with Tylenol & Motrin, continue.  For back pain, pt had described hypersensitive area and localized pain but no motor deficit in left lumbar region, started on gabapentin to good effect.  4. Rh positive  5. Rubella immune  6. Routine postpartum:    encourage breastfeeding    Encourage ambulation    Continue regular diet    Continue bowel regimen    Contraception: planning for vasectomy  7. Dispo: discharge to home today      Mandy Saldana MD  PGY-2 Ob/Gyn  02/19/19    I, Sammie Hoang MD, personally saw and evaluated this patient.  I discussed the patient with the resident and care team, and agree with the assessment and plan of care as documented in the resident's note of 02/19/19.  I personally reviewed vital signs, medications, lab, and exam.     Key Findings:  Meeting goals for discharge.  Abdomen non-tender, fundus firm.  Blood pressure has stabilized on medication.      PLAN:  Discharge home. She has an office visit with Dr. Haider tomorrow, will keep this appointment, reassess blood pressure at that time.      Sammie Hoang MD   Date of Service (when I saw the patient) 02/19/19   '

## 2019-02-19 NOTE — PLAN OF CARE
VSS. Complained of sharp R abdominal pain this AM that pt thinks is gas pain. Dr. Myhre notified and Simethicone was given. Still complains of sinus pressure mostly around R eye, but denies other s/sx of Pre-E. DTR +2, no clonus. Patient taking care of self and infant independently. Breastfeeding using nipple shield independently, good latch observed. Postpartum checks wnl. Sent with electric breast pump, educated on use and care of pump.  Pt educated on discharge instructions along with s/sx of Pre-E and given written handout, verbalized understanding of instructions. Discharged with medications, patient educated on medications and given written copies as well.  Patient discharged via wheel chair with infant at 1330.

## 2019-02-19 NOTE — PLAN OF CARE
VSS, BP WDL. +2 reflexes, no clonus. Denies SOB, epigastric pain, or blurry vision. Does have ongoing headache but pt states it feels more like sinus pressure. Postpartum checks WDL. Up independently, voiding without difficulty. Breastfeeding infant with nipple shield independently. Pumping and finger-feeding infant breast milk after feeds. Taking tylenol, ibuprofen, and gabapentin for pain. Will continue POC.

## 2019-02-19 NOTE — PLAN OF CARE
Data: Vital signs within normal limits, given extra dose of 30 mg procardia this evening. Postpartum checks within normal limits, reflexes +2, no clonus- see flow record. Patient eating and drinking normally. Patient able to empty bladder independently and is up ambulating. No apparent signs of infection. perineum  healing well. Patient performing self cares and is able to care for infant.  Action: Patient medicated during the shift for pain. See MAR. Patient reassessed within 1 hour after each medication and pain was improved - patient stated she was comfortable. Patient education done about pre-e symptoms, pain relief, discharge. See flow record.  Response: Positive attachment behaviors observed with infant. Support persons are present.   Plan: Anticipate discharge on Tuesday.

## 2019-02-20 ENCOUNTER — APPOINTMENT (OUTPATIENT)
Dept: MRI IMAGING | Facility: CLINIC | Age: 39
DRG: 776 | End: 2019-02-20
Payer: COMMERCIAL

## 2019-02-20 VITALS
TEMPERATURE: 97.9 F | HEART RATE: 75 BPM | OXYGEN SATURATION: 100 % | WEIGHT: 188.8 LBS | SYSTOLIC BLOOD PRESSURE: 134 MMHG | HEIGHT: 68 IN | BODY MASS INDEX: 28.61 KG/M2 | DIASTOLIC BLOOD PRESSURE: 81 MMHG | RESPIRATION RATE: 18 BRPM

## 2019-02-20 LAB
ALBUMIN UR-MCNC: NEGATIVE MG/DL
ALT SERPL W P-5'-P-CCNC: 28 U/L (ref 0–50)
APPEARANCE UR: CLEAR
AST SERPL W P-5'-P-CCNC: 17 U/L (ref 0–45)
BACTERIA #/AREA URNS HPF: ABNORMAL /HPF
BILIRUB UR QL STRIP: NEGATIVE
COLOR UR AUTO: ABNORMAL
CREAT SERPL-MCNC: 0.54 MG/DL (ref 0.52–1.04)
CREAT UR-MCNC: 33 MG/DL
ERYTHROCYTE [DISTWIDTH] IN BLOOD BY AUTOMATED COUNT: 13.4 % (ref 10–15)
GFR SERPL CREATININE-BSD FRML MDRD: >90 ML/MIN/{1.73_M2}
GLUCOSE UR STRIP-MCNC: NEGATIVE MG/DL
HCT VFR BLD AUTO: 30.6 % (ref 35–47)
HGB BLD-MCNC: 9.6 G/DL (ref 11.7–15.7)
HGB UR QL STRIP: ABNORMAL
KETONES UR STRIP-MCNC: NEGATIVE MG/DL
LEUKOCYTE ESTERASE UR QL STRIP: ABNORMAL
MCH RBC QN AUTO: 26.1 PG (ref 26.5–33)
MCHC RBC AUTO-ENTMCNC: 31.4 G/DL (ref 31.5–36.5)
MCV RBC AUTO: 83 FL (ref 78–100)
MUCOUS THREADS #/AREA URNS LPF: PRESENT /LPF
NITRATE UR QL: NEGATIVE
PH UR STRIP: 5.5 PH (ref 5–7)
PLATELET # BLD AUTO: 482 10E9/L (ref 150–450)
PROT UR-MCNC: <0.05 G/L
PROT/CREAT 24H UR: NORMAL G/G CR (ref 0–0.2)
RBC # BLD AUTO: 3.68 10E12/L (ref 3.8–5.2)
RBC #/AREA URNS AUTO: 1 /HPF (ref 0–2)
SOURCE: ABNORMAL
SP GR UR STRIP: 1.01 (ref 1–1.03)
SQUAMOUS #/AREA URNS AUTO: 1 /HPF (ref 0–1)
UROBILINOGEN UR STRIP-MCNC: NORMAL MG/DL (ref 0–2)
WBC # BLD AUTO: 9 10E9/L (ref 4–11)
WBC #/AREA URNS AUTO: 12 /HPF (ref 0–5)

## 2019-02-20 PROCEDURE — 82565 ASSAY OF CREATININE: CPT | Performed by: STUDENT IN AN ORGANIZED HEALTH CARE EDUCATION/TRAINING PROGRAM

## 2019-02-20 PROCEDURE — 25000128 H RX IP 250 OP 636: Performed by: OBSTETRICS & GYNECOLOGY

## 2019-02-20 PROCEDURE — 25000132 ZZH RX MED GY IP 250 OP 250 PS 637: Performed by: OBSTETRICS & GYNECOLOGY

## 2019-02-20 PROCEDURE — 36415 COLL VENOUS BLD VENIPUNCTURE: CPT | Performed by: STUDENT IN AN ORGANIZED HEALTH CARE EDUCATION/TRAINING PROGRAM

## 2019-02-20 PROCEDURE — 25000128 H RX IP 250 OP 636: Performed by: EMERGENCY MEDICINE

## 2019-02-20 PROCEDURE — 84450 TRANSFERASE (AST) (SGOT): CPT | Performed by: STUDENT IN AN ORGANIZED HEALTH CARE EDUCATION/TRAINING PROGRAM

## 2019-02-20 PROCEDURE — 25800030 ZZH RX IP 258 OP 636: Performed by: STUDENT IN AN ORGANIZED HEALTH CARE EDUCATION/TRAINING PROGRAM

## 2019-02-20 PROCEDURE — A9585 GADOBUTROL INJECTION: HCPCS | Performed by: OBSTETRICS & GYNECOLOGY

## 2019-02-20 PROCEDURE — 70546 MR ANGIOGRAPH HEAD W/O&W/DYE: CPT

## 2019-02-20 PROCEDURE — 99214 OFFICE O/P EST MOD 30 MIN: CPT | Mod: 24 | Performed by: OBSTETRICS & GYNECOLOGY

## 2019-02-20 PROCEDURE — 25000128 H RX IP 250 OP 636: Performed by: STUDENT IN AN ORGANIZED HEALTH CARE EDUCATION/TRAINING PROGRAM

## 2019-02-20 PROCEDURE — 12000001 ZZH R&B MED SURG/OB UMMC

## 2019-02-20 PROCEDURE — 84460 ALANINE AMINO (ALT) (SGPT): CPT | Performed by: STUDENT IN AN ORGANIZED HEALTH CARE EDUCATION/TRAINING PROGRAM

## 2019-02-20 PROCEDURE — 70544 MR ANGIOGRAPHY HEAD W/O DYE: CPT

## 2019-02-20 PROCEDURE — 25000132 ZZH RX MED GY IP 250 OP 250 PS 637: Performed by: STUDENT IN AN ORGANIZED HEALTH CARE EDUCATION/TRAINING PROGRAM

## 2019-02-20 PROCEDURE — 25500064 ZZH RX 255 OP 636: Performed by: OBSTETRICS & GYNECOLOGY

## 2019-02-20 PROCEDURE — 81001 URINALYSIS AUTO W/SCOPE: CPT | Performed by: EMERGENCY MEDICINE

## 2019-02-20 PROCEDURE — 84156 ASSAY OF PROTEIN URINE: CPT | Performed by: EMERGENCY MEDICINE

## 2019-02-20 PROCEDURE — 85027 COMPLETE CBC AUTOMATED: CPT | Performed by: STUDENT IN AN ORGANIZED HEALTH CARE EDUCATION/TRAINING PROGRAM

## 2019-02-20 RX ORDER — GADOBUTROL 604.72 MG/ML
10 INJECTION INTRAVENOUS ONCE
Status: COMPLETED | OUTPATIENT
Start: 2019-02-20 | End: 2019-02-20

## 2019-02-20 RX ORDER — IBUPROFEN 600 MG/1
600 TABLET, FILM COATED ORAL EVERY 6 HOURS PRN
Status: DISCONTINUED | OUTPATIENT
Start: 2019-02-20 | End: 2019-02-20 | Stop reason: HOSPADM

## 2019-02-20 RX ORDER — NIFEDIPINE 30 MG/1
60 TABLET, EXTENDED RELEASE ORAL DAILY
Status: DISCONTINUED | OUTPATIENT
Start: 2019-02-20 | End: 2019-02-20 | Stop reason: HOSPADM

## 2019-02-20 RX ORDER — LABETALOL 20 MG/4 ML (5 MG/ML) INTRAVENOUS SYRINGE
80 EVERY 10 MIN PRN
Status: DISCONTINUED | OUTPATIENT
Start: 2019-02-20 | End: 2019-02-20 | Stop reason: HOSPADM

## 2019-02-20 RX ORDER — ACETAMINOPHEN 325 MG/1
650 TABLET ORAL EVERY 8 HOURS PRN
Status: DISCONTINUED | OUTPATIENT
Start: 2019-02-20 | End: 2019-02-20 | Stop reason: HOSPADM

## 2019-02-20 RX ORDER — DIPHENHYDRAMINE HCL 25 MG
25 CAPSULE ORAL EVERY 6 HOURS PRN
Status: DISCONTINUED | OUTPATIENT
Start: 2019-02-20 | End: 2019-02-20 | Stop reason: HOSPADM

## 2019-02-20 RX ORDER — LIDOCAINE 40 MG/G
CREAM TOPICAL
Status: DISCONTINUED | OUTPATIENT
Start: 2019-02-20 | End: 2019-02-20 | Stop reason: HOSPADM

## 2019-02-20 RX ORDER — LANOLIN 100 %
OINTMENT (GRAM) TOPICAL
Status: DISCONTINUED | OUTPATIENT
Start: 2019-02-20 | End: 2019-02-20 | Stop reason: HOSPADM

## 2019-02-20 RX ORDER — LABETALOL 20 MG/4 ML (5 MG/ML) INTRAVENOUS SYRINGE
20 EVERY 10 MIN PRN
Status: DISCONTINUED | OUTPATIENT
Start: 2019-02-20 | End: 2019-02-20 | Stop reason: HOSPADM

## 2019-02-20 RX ORDER — SODIUM CHLORIDE, SODIUM LACTATE, POTASSIUM CHLORIDE, CALCIUM CHLORIDE 600; 310; 30; 20 MG/100ML; MG/100ML; MG/100ML; MG/100ML
10-125 INJECTION, SOLUTION INTRAVENOUS CONTINUOUS
Status: DISCONTINUED | OUTPATIENT
Start: 2019-02-20 | End: 2019-02-20 | Stop reason: HOSPADM

## 2019-02-20 RX ORDER — CALCIUM GLUCONATE 94 MG/ML
1 INJECTION, SOLUTION INTRAVENOUS
Status: DISCONTINUED | OUTPATIENT
Start: 2019-02-20 | End: 2019-02-20 | Stop reason: HOSPADM

## 2019-02-20 RX ORDER — LORAZEPAM 2 MG/ML
2 INJECTION INTRAMUSCULAR
Status: DISCONTINUED | OUTPATIENT
Start: 2019-02-20 | End: 2019-02-20 | Stop reason: HOSPADM

## 2019-02-20 RX ORDER — SENNOSIDES 8.6 MG
650 CAPSULE ORAL EVERY 8 HOURS PRN
Status: DISCONTINUED | OUTPATIENT
Start: 2019-02-20 | End: 2019-02-20

## 2019-02-20 RX ORDER — BISACODYL 10 MG
10 SUPPOSITORY, RECTAL RECTAL DAILY PRN
Status: DISCONTINUED | OUTPATIENT
Start: 2019-02-22 | End: 2019-02-20 | Stop reason: HOSPADM

## 2019-02-20 RX ORDER — MAGNESIUM SULFATE HEPTAHYDRATE 40 MG/ML
4 INJECTION, SOLUTION INTRAVENOUS
Status: DISCONTINUED | OUTPATIENT
Start: 2019-02-20 | End: 2019-02-20 | Stop reason: HOSPADM

## 2019-02-20 RX ORDER — MAGNESIUM SULFATE IN WATER 40 MG/ML
2 INJECTION, SOLUTION INTRAVENOUS CONTINUOUS
Status: DISCONTINUED | OUTPATIENT
Start: 2019-02-20 | End: 2019-02-20 | Stop reason: HOSPADM

## 2019-02-20 RX ORDER — MAGNESIUM SULFATE HEPTAHYDRATE 40 MG/ML
4 INJECTION, SOLUTION INTRAVENOUS ONCE
Status: COMPLETED | OUTPATIENT
Start: 2019-02-20 | End: 2019-02-20

## 2019-02-20 RX ORDER — HYDROCORTISONE 2.5 %
CREAM (GRAM) TOPICAL 3 TIMES DAILY PRN
Status: DISCONTINUED | OUTPATIENT
Start: 2019-02-20 | End: 2019-02-20 | Stop reason: HOSPADM

## 2019-02-20 RX ORDER — AMOXICILLIN 250 MG
1 CAPSULE ORAL 2 TIMES DAILY
Status: DISCONTINUED | OUTPATIENT
Start: 2019-02-20 | End: 2019-02-20 | Stop reason: HOSPADM

## 2019-02-20 RX ORDER — LABETALOL 20 MG/4 ML (5 MG/ML) INTRAVENOUS SYRINGE
40 EVERY 10 MIN PRN
Status: DISCONTINUED | OUTPATIENT
Start: 2019-02-20 | End: 2019-02-20 | Stop reason: HOSPADM

## 2019-02-20 RX ORDER — NIFEDIPINE 10 MG/1
10 CAPSULE ORAL
Status: DISCONTINUED | OUTPATIENT
Start: 2019-02-20 | End: 2019-02-20 | Stop reason: HOSPADM

## 2019-02-20 RX ORDER — CYCLOBENZAPRINE HCL 10 MG
10 TABLET ORAL 3 TIMES DAILY PRN
Status: DISCONTINUED | OUTPATIENT
Start: 2019-02-20 | End: 2019-02-20 | Stop reason: HOSPADM

## 2019-02-20 RX ORDER — MAGNESIUM SULFATE HEPTAHYDRATE 500 MG/ML
4 INJECTION, SOLUTION INTRAMUSCULAR; INTRAVENOUS
Status: DISCONTINUED | OUTPATIENT
Start: 2019-02-20 | End: 2019-02-20 | Stop reason: HOSPADM

## 2019-02-20 RX ADMIN — SODIUM CHLORIDE, POTASSIUM CHLORIDE, SODIUM LACTATE AND CALCIUM CHLORIDE 75 ML/HR: 600; 310; 30; 20 INJECTION, SOLUTION INTRAVENOUS at 02:17

## 2019-02-20 RX ADMIN — IBUPROFEN 600 MG: 600 TABLET ORAL at 02:01

## 2019-02-20 RX ADMIN — SENNOSIDES AND DOCUSATE SODIUM 1 TABLET: 8.6; 5 TABLET ORAL at 08:28

## 2019-02-20 RX ADMIN — MAGNESIUM SULFATE HEPTAHYDRATE 2 G/HR: 40 INJECTION, SOLUTION INTRAVENOUS at 03:00

## 2019-02-20 RX ADMIN — SODIUM CHLORIDE 40 ML: 9 INJECTION, SOLUTION INTRAVENOUS at 10:30

## 2019-02-20 RX ADMIN — NIFEDIPINE 60 MG: 30 TABLET, FILM COATED, EXTENDED RELEASE ORAL at 08:28

## 2019-02-20 RX ADMIN — CYCLOBENZAPRINE HYDROCHLORIDE 10 MG: 10 TABLET, FILM COATED ORAL at 11:08

## 2019-02-20 RX ADMIN — CYCLOBENZAPRINE HYDROCHLORIDE 10 MG: 10 TABLET, FILM COATED ORAL at 02:01

## 2019-02-20 RX ADMIN — IBUPROFEN 600 MG: 600 TABLET ORAL at 08:28

## 2019-02-20 RX ADMIN — MAGNESIUM SULFATE IN WATER 4 G: 40 INJECTION, SOLUTION INTRAVENOUS at 02:17

## 2019-02-20 RX ADMIN — ACETAMINOPHEN 650 MG: 325 TABLET, FILM COATED ORAL at 11:08

## 2019-02-20 RX ADMIN — METOCLOPRAMIDE 10 MG: 5 INJECTION, SOLUTION INTRAMUSCULAR; INTRAVENOUS at 00:03

## 2019-02-20 RX ADMIN — ACETAMINOPHEN 650 MG: 325 TABLET, FILM COATED ORAL at 02:28

## 2019-02-20 RX ADMIN — GADOBUTROL 8.5 ML: 604.72 INJECTION INTRAVENOUS at 10:28

## 2019-02-20 ASSESSMENT — MIFFLIN-ST. JEOR: SCORE: 1594.89

## 2019-02-20 NOTE — H&P
"[facility] Labor and Delivery History and Physical    Traci Cosme MRN# [id number]   Age: 38 year old YOB: 1980     Date of Admission:  2019    Primary care provider: Valarie Medrano         CC:    Severe headache         HPI:   Traci Cosme is a 38 year old  PPD#5 following an  after induction for preE w/ severe features. She was discharged on  with nifedipine 60mg XL and received magnesium during her admission and for 24 hours postpartum. She had a headache at the time of discharge, but states that it has gotten much more severe. She also had a headache for an hour on  but this one is much worse. She also has had increased LE edema and mild right flank pain. Denies vision blurriness, shortness of breath, confusion, dizziness, weakness, loss of visual fields, thunderclap headache, phonophobia. She does endorse photophobia but has no history of migraines. The headache is frontal in nature, pulsating in intensity and rated as a 7/10. She tried tylenol and ibuprofen without relief. She has had some nasal congestion since delivery, but it has been improving. She denies cough, fever, sore throat, breast tenderness, lower abdominal pain, abnormal vaginal discharge. She is breastfeeding and pumping. She has a history of \"elevated blood pressures\" with her first pregnancy but did not receive magnesium.           Pregnancy history:     OBSTETRIC HISTORY:    Obstetric History       T0      L2     SAB1   TAB0   Ectopic0   Multiple0   Live Births2       # Outcome Date GA Lbr Scar/2nd Weight Sex Delivery Anes PTL Lv   4  02/15/19 36w4d / 00:20 2.8 kg (6 lb 2.8 oz) M Vag-Spont EPI Y ALKA      Name: BA,MALE-TRACI      Apgar1:  8                Apgar5: 8   3  16 36w0d 77:00 / 00:31 2.39 kg (5 lb 4.3 oz) F Vag-Spont EPI  ALKA      Name: RICK COSME      Apgar1:  9                Apgar5: 9   2 SAB 13 5w0d        " "  1 AB 08/2010 5w0d               Medication Prior to Admission  Ibuprofen  Tylenol  Nifedipine 60 mg XR  Senna        Maternal Past Medical History:     Past Medical History:   Diagnosis Date     Abnormal Pap smear of cervix 08/22/2018    LSIL, Neg HPV, pregnant     ADHD (attention deficit hyperactivity disorder)      Anemia      Gastric reflux      IBS (irritable bowel syndrome)           Maternal Past Surgical History:     Past Surgical History:   Procedure Laterality Date     CYSTOSCOPY, DILATE URETHRA, COMBINED  11/1999    for frequent UTI'S     KNEE SURGERY  10/1997             Family History:     Family History   Problem Relation Age of Onset     Heart Failure Maternal Grandmother      Breast Cancer Maternal Grandmother      Colon Cancer Maternal Grandmother      Cerebrovascular Disease Maternal Grandfather      Diabetes Maternal Grandfather      Other Cancer Maternal Grandfather      Mental Illness Paternal Grandfather      Hypertension Sister           Social History:   Lives with her  and children.          Physical Exam:     Vitals:    02/19/19 2215   BP: 141/78   Pulse: 98   Resp: 18   Temp: 98.6  F (37  C)   TempSrc: Oral   SpO2: 98%   Weight: 87.8 kg (193 lb 9.6 oz)   Height: 1.727 m (5' 8\")   2 repeat BP while in the room were 120-130s/60-80s    Gen: Lying in dark room, uncomfortable appearing  Cardio: RRR, no murmurs  Resp: CTAB, no wheezes  Abdomen: Mildly tender to palpation over right lateral flank, no tenderness under right upper subcostal margin. No epigastric tenderness or fundal tenderness. Fundus firm 1 cm below umbilicus. No lower abdominal tenderness.   Extremities: Lower and upper extremity reflexes +2 bilaterally. +2 pitting edema over bilateral lower extremities. No clonus.     Labs:  UPC too low to calculate, has never been >0.3 this pregnancy   WBC 10.1  Hgb 8.0 (2/17) > 9.3 (2/19)  Plt 374 (2/17) >469 (2/19)  Glucose 93  Creatinine 0.59>0.59  AST 14>21  ALT 11>34        " Assessment:   Traci Sloan is a 38 year old  PPD#5 from an  following IOL for preE w/ SF who presents with a severe frontal headache. Blood pressures are mild elevated to normotensive. However, given her headache that has not responded to analgesics in the setting of preE w/ SF, will plan to admit for magnesium therapy for eclampsia prophylaxis. Her AST and ALT are also slightly elevated (although still within normal range) from 2 days prior.         Plan:     PreE w/ SF with severe frontal headache  - admit to ob/gyn  - magnesium for seizure prophylaxis, 4g load followed by 2g/hr  - magnesium level if signs/symptoms of mag toxicity  - repeat HELLP labs at 0500  - reglan 10mg IV once for headache, can also try benadryl or flexeril   - if RUQ worsens or LFT abnormalities, consider RUQ US  - strict I/O  - brain imaging if neuro symptoms develop   - continue nifedipine 60 mg XL daily    Postpartum cares  - senna, ibuprofen, tylenol PRN   - breast pump at bedside    Patient discussed with Dr. Jr Gomez MD  OB/GYN PGY3  2019 12:34 AM

## 2019-02-20 NOTE — PROGRESS NOTES
Obstetrics & Gynecology Service  Magnesium Check Note    S:   Patient is feeling slightly better. She was able to get some sleep. Does not note a headache this AM. Right flank pain also improved. Denies vision changes/spots in vision, chest pain, shortness of breath or epigastric pain. Pumping breast milk.     O:  Patient Vitals for the past 4 hrs:   BP Heart Rate Resp SpO2 Weight   19 0600 136/80 73 18 95 % 85.6 kg (188 lb 12.8 oz)   19 0500 119/74 71 16 96 % --   19 0400 117/72 73 16 98 % --   19 0330 123/78 73 18 98 % --   19 0300 118/79 74 18 97 % --   19 0250 131/75 79 18 96 % --   19 0245 130/78 81 20 95 % --   19 0240 125/80 80 18 95 % --   19 0235 122/73 71 18 94 % --   19 0230 116/73 69 16 95 % --   19 0225 122/77 69 14 97 % --   19 0220 125/78 68 14 98 % --     Gen: Well appearing, laying in bed  CV:  RRR, no murmurs  Pulm:  CTAB, no wheezes or crackles  Abd:  Soft, non-tender in RUQ, epigastric or fundus   Ext:  Patellar reflexes hypoactive b/l, no clonus b/l, +2 LE edema b/l    I/O:    Intake/Output Summary (Last 24 hours) at 2019 0622  Last data filed at 2019 0600  Gross per 24 hour   Intake 278.75 ml   Output 500 ml   Net -221.25 ml   ~100cc/hr over the last 5 hours      A/P:  Traci Sloan is a 38 year old  on PPD# 5 s/p  readmitted, with pregnancy complicated by preeclampsia with severe features and a severe frontal headache.    Preeclampsia with severe features   - BP: normotensive   - UOP: adequate   - Symptoms: headache and right flank pain resolved   - HELLP labs normal on admit, repeat in process this AM   - Mag sulfate for seizure prophylaxis: 4g load at 0217>2g/hr   - Next clinical mag check at 1030   - Strict I/O    - Continue nifedipine 60 mg XL daily    - S/p reglan, flexeril, tylenol, oxycodone and ibuprofen for headache     Postpartum:  - Routine postpartum care    Bonnie Gomez MD  Obstetrics and  "Gynecology, PGY-3    Attestation:   This patient was seen and evaluated by me, separately from the house staff team. I have reviewed the note/plan above and agree.     States HA now 3-4 out of 10 and much better. Will get MRI/MRA/MRV of head to make sure did not have clot that occurred and discussed stopping magnesium this afternoon since already had full course with delivery a few days ago. No vision changes noted and thinks the \"benedryl\" may have helped the most with her HA.  Reviewed that not sure why would have such a severe HA with normal BP on arrival and also had been on nifedipine 2 days prior so should not be from that. Could be atypical migraine.   Will see how today goes and get imaging done to r/o brain causes.    Courtney Burris MD          "

## 2019-02-20 NOTE — DISCHARGE INSTRUCTIONS
Postpartum Vaginal Delivery Instructions    Activity       Ask family and friends for help when you need it.    Do not place anything in your vagina for 6 weeks.    You are not restricted on other activities, but take it easy for a few weeks to allow your body to recover from delivery.  You are able to do any activities you feel up to that point.    No driving until you have stopped taking your pain medications (usually two weeks after delivery).     Call your health care provider if you have any of these symptoms:       Increased pain, swelling, redness, or fluid around your stiches from an episiotomy or perineal tear.    A fever above 100.4 F (38 C) with or without chills when placing a thermometer under your tongue.    You soak a sanitary pad with blood within 1 hour, or you see blood clots larger than a golf ball.    Bleeding that lasts more than 6 weeks.    Vaginal discharge that smells bad.    Severe pain, cramping or tenderness in your lower belly area.    A need to urinate more frequently (use the toilet more often), more urgently (use the toilet very quickly), or it burns when you urinate.    Nausea and vomiting.    Redness, swelling or pain around a vein in your leg.    Problems breastfeeding or a red or painful area on your breast.    Chest pain and cough or are gasping for air.    Problems coping with sadness, anxiety, or depression.  If you have any concerns about hurting yourself or the baby, call your provider immediately.     You have questions or concerns after you return home.     Keep your hands clean:  Always wash your hands before touching your perineal area and stitches.  This helps reduce your risk of infection.  If your hands aren't dirty, you may use an alcohol hand-rub to clean your hands. Keep your nails clean and short.    Preeclampsia   Call your doctor right away if you have any of the following:  - Edema (swelling) in your face or hands  - Rapid weight gain-about 1 pound or more in a  day  - Headache  - Abdominal pain on your right side  - Vision problems (flashes or spots)  - You have questions or concerns once you return home.

## 2019-02-20 NOTE — DISCHARGE SUMMARY
[facility] Discharge Summary    Traci Sloan MRN# [id number]   Age: 38 year old YOB: 1980     Date of Admission:  2019  Date of Discharge::  2019  Admitting Physician:  Dr. Dolores Norris  Discharge Physician:  Dr. Courtney Burris            Admission Diagnoses:   1.  PPD#5 s/p   2. Preeclampsia with severe features  3. Severe headache          Discharge Diagnosis:   1.  PPD#5 s/p   2. Preeclampsia with severe features  3. Severe headache, suspect migraine          Medications Prior to Admission:       No current facility-administered medications on file prior to encounter.   Current Outpatient Medications on File Prior to Encounter:  acetaminophen (TYLENOL) 650 MG CR tablet Take 1 tablet (650 mg) by mouth every 8 hours as needed for mild pain or fever   amphetamine-dextroamphetamine (ADDERALL) 15 MG tablet TAKE 1.5 TAB BY MOUTH IN THE AM AND 1 TAB IN THE PM   ibuprofen (ADVIL/MOTRIN) 600 MG tablet Take 1 tablet (600 mg) by mouth every 6 hours as needed for other (cramping)   NIFEdipine ER OSMOTIC (ADALAT CC) 60 MG 24 hr tablet Take 1 tablet (60 mg) by mouth daily   senna-docusate (SENOKOT-S/PERICOLACE) 8.6-50 MG tablet Take 1 tablet by mouth 2 times daily          Discharge Medications:        Review of your medicines      CONTINUE these medicines which have NOT CHANGED      Dose / Directions   acetaminophen 650 MG CR tablet  Commonly known as:  TYLENOL  Used for:   (normal spontaneous vaginal delivery)      Dose:  650 mg  Take 1 tablet (650 mg) by mouth every 8 hours as needed for mild pain or fever  Quantity:  60 tablet  Refills:  0     amphetamine-dextroamphetamine 15 MG tablet  Commonly known as:  ADDERALL  Used for:  Attention deficit hyperactivity disorder (ADHD), combined type      TAKE 1.5 TAB BY MOUTH IN THE AM AND 1 TAB IN THE PM  Quantity:  75 tablet  Refills:  0     ibuprofen 600 MG tablet  Commonly known as:  ADVIL/MOTRIN  Used for:    "(normal spontaneous vaginal delivery)      Dose:  600 mg  Take 1 tablet (600 mg) by mouth every 6 hours as needed for other (cramping)  Quantity:  60 tablet  Refills:  1     NIFEdipine ER 60 MG 24 hr tablet  Commonly known as:  ADALAT CC      Dose:  60 mg  Take 1 tablet (60 mg) by mouth daily  Quantity:  30 tablet  Refills:  0     senna-docusate 8.6-50 MG tablet  Commonly known as:  SENOKOT-S/PERICOLACE  Used for:   (normal spontaneous vaginal delivery)      Dose:  1 tablet  Take 1 tablet by mouth 2 times daily  Quantity:  60 tablet  Refills:  0                Consultations:   none          Brief History of Illness:   Traci Sloan is a 38 year old  PPD#5 following an  after induction for preE w/ severe features. She was discharged on  with nifedipine 60mg XL and received magnesium during her admission and for 24 hours postpartum. She had a headache at the time of discharge, but states that it has gotten much more severe. She also had a headache for an hour on  but this one is much worse. She also has had increased LE edema and mild right flank pain. Denies vision blurriness, shortness of breath, confusion, dizziness, weakness, loss of visual fields, thunderclap headache, phonophobia. She does endorse photophobia but has no history of migraines. The headache is frontal in nature, pulsating in intensity and rated as a 7/10. She tried tylenol and ibuprofen without relief. She has had some nasal congestion since delivery, but it has been improving. She denies cough, fever, sore throat, breast tenderness, lower abdominal pain, abnormal vaginal discharge. She is breastfeeding and pumping. She has a history of \"elevated blood pressures\" with her first pregnancy but did not receive magnesium.          Hospital Course:    Given her severe headache and preeclampsia with severe features, she was admitted for a repeat course of magnesium for seizure prophylaxis. HELLP labs on admission were within normal " limits and blood pressures were mild elevated to normotensive. She was continued on oral nifedipine 60 mg XL during her admission.    She had an MRA and MRV of the brain that were both normal.  Her headache improved significantly and was barely noticeable at time of discharge.  She rated this as a 2/10.           Discharge Instructions and Follow-Up:   Discharge diet: Regular   Discharge activity: Pelvic rest 6 week postpartum   Discharge follow-up: Follow up with OB in 1 week               Discharge Disposition:     Discharged home in good condition.  Rosalinda Cooper MD  PGY-3 OB/GYN  [phone] (OB G3 Pager) / [phone] (OB G2 Pager)

## 2019-02-20 NOTE — PLAN OF CARE
Patient is stable following readmission for pre eclampsia. Blood pressures and labs stable, brain scan WDL. Patient states headache is improved. Discharge home at 1350.

## 2019-02-20 NOTE — TELEPHONE ENCOUNTER
"Traci called and reports that she was just discharged from hospital today after childbirth delivery and treatment for pre-eclampsia.    Reports that she has a great deal of swelling to feet and lower extremities that extends into thighs. She did not have this swelling when she was discharged home.    She also reports dizziness and having a \"terrrible headache\" that she rates ranging from 6-9/10.  Took 600 mg Ibuprofen at 4:30 pm  And  650 mg Acetaminophen at 6 pm    Denies blurred vision. States that she is able to stand and ambulate.     Per protocol, advised to be seen in ED.  Patient states that she has someone available to drive her.        Reason for Disposition    SEVERE leg swelling (e.g., swelling extends above knee, entire leg is swollen)    Additional Information    Negative: Severe difficulty breathing (e.g., struggling for each breath, speaks in single words)    Negative: Sounds like a life-threatening emergency to the triager    Negative: Chest pain    Negative: [1] Difficulty breathing AND [2] new onset or worsening    Protocols used: POSTPARTUM - LEG SWELLING AND EDEMA-ADULT-AH      "

## 2019-02-20 NOTE — ED PROVIDER NOTES
[facility] [place] EMERGENCY DEPARTMENT ([facility])    19       History     Chief Complaint   Patient presents with     Pre-Eclampsia     has been on BP meds for two days; now has swelling in legs and feet; severe headache, dizziness     Abdominal Pain      pain just under right lateral rib cage; denies N & V; denies fever; denies bladder problems; reports constipation for 2 days     The history is provided by the patient and medical records.     Traci Sloan is a 38 year old  female who was admitted on 2019 for preeclampsia and had a  on 2/15/2019.  Patient was hospitalized until 2019 (earlier today).  Patient's hypertension was treated with IV antihypertensives; she was started on Procardia 30 mg and was titrated with her blood pressures.  She was discharged on 60 mg daily.  The patient was given symptoms to watch out for after being discharged.    Patient presents to the Emergency Department now for evaluation of a headache, bilateral leg swelling and RUQ abdominal pain.  The patient reports that she did have a headache that lasted for only 1 hour while she was hospitalized, but she thought this was due to stress.  After being discharged, the patient developed a frontal headache that is waxing and waning between 6/10 and 9/10 in severity.  She also notes bilateral leg swelling.  She reports that she did have some swelling while hospitalized, but this was improving.  However, it suddenly worsened today when her headache started.  She also developed RUQ abdominal pain.  She denies any nausea or vomiting and denies any shortness of breath.  The patient has been taking ibuprofen and Tylenol for pain management.  The patient denies any increase in activity since being discharged.    I have reviewed the Medications, Allergies, Past Medical and Surgical History, and Social History in the [employer] system.    Past Medical History:   Diagnosis Date     Abnormal Pap smear of cervix 2018     LSIL, Neg HPV, pregnant     ADHD (attention deficit hyperactivity disorder)      Anemia      Gastric reflux      IBS (irritable bowel syndrome)        Past Surgical History:   Procedure Laterality Date     CYSTOSCOPY, DILATE URETHRA, COMBINED  11/1999    for frequent UTI'S     KNEE SURGERY  10/1997       Family History   Problem Relation Age of Onset     Heart Failure Maternal Grandmother      Breast Cancer Maternal Grandmother      Colon Cancer Maternal Grandmother      Cerebrovascular Disease Maternal Grandfather      Diabetes Maternal Grandfather      Other Cancer Maternal Grandfather      Mental Illness Paternal Grandfather      Hypertension Sister        Social History     Tobacco Use     Smoking status: Former Smoker     Last attempt to quit: 10/25/2015     Years since quitting: 3.3     Smokeless tobacco: Never Used   Substance Use Topics     Alcohol use: No     Alcohol/week: 0.0 oz     Comment: occ       No current facility-administered medications for this encounter.      Current Outpatient Medications   Medication     acetaminophen (TYLENOL) 650 MG CR tablet     amphetamine-dextroamphetamine (ADDERALL) 15 MG tablet     ibuprofen (ADVIL/MOTRIN) 600 MG tablet     NIFEdipine ER OSMOTIC (ADALAT CC) 60 MG 24 hr tablet     senna-docusate (SENOKOT-S/PERICOLACE) 8.6-50 MG tablet      No Known Allergies      Review of Systems   Constitutional: Negative for fever.   HENT: Negative for congestion.    Eyes: Negative for redness.   Respiratory: Negative for shortness of breath.    Cardiovascular: Positive for leg swelling (bilateral). Negative for chest pain.   Gastrointestinal: Positive for abdominal pain (RUQ). Negative for nausea and vomiting.   Genitourinary: Negative for difficulty urinating.   Musculoskeletal: Negative for arthralgias and neck stiffness.   Skin: Negative for color change.   Neurological: Positive for headaches (frontal).   Psychiatric/Behavioral: Negative for confusion.       Physical Exam   BP:  "141/78  Pulse: 98  Temp: 98.6  F (37  C)  Resp: 18  Height: 172.7 cm (5' 8\")  Weight: 87.8 kg (193 lb 9.6 oz)  SpO2: 98 %      Physical Exam   Constitutional: She is oriented to person, place, and time. She appears well-developed and well-nourished. No distress.   HENT:   Head: Normocephalic and atraumatic.   Mouth/Throat: Oropharynx is clear and moist. No oropharyngeal exudate.   Eyes: Pupils are equal, round, and reactive to light. No scleral icterus.   Neck: Normal range of motion.   Cardiovascular: Normal rate, regular rhythm, normal heart sounds and intact distal pulses.   Pulmonary/Chest: Effort normal and breath sounds normal. No respiratory distress.   Abdominal: Soft. Bowel sounds are normal.   Musculoskeletal: Normal range of motion. She exhibits edema (BLE). She exhibits no tenderness.   Neurological: She is alert and oriented to person, place, and time. She has normal strength. No cranial nerve deficit. Coordination normal.   Skin: Skin is warm. No rash noted. She is not diaphoretic.   Nursing note and vitals reviewed.      ED Course   10:55 PM  The patient was seen and examined by Carlo Gómez MD in Room ED04.        Procedures            Critical Care time:    Results for orders placed or performed during the hospital encounter of 02/19/19   CBC with platelets differential   Result Value Ref Range    WBC 10.1 4.0 - 11.0 10e9/L    RBC Count 3.53 (L) 3.8 - 5.2 10e12/L    Hemoglobin 9.3 (L) 11.7 - 15.7 g/dL    Hematocrit 29.0 (L) 35.0 - 47.0 %    MCV 82 78 - 100 fl    MCH 26.3 (L) 26.5 - 33.0 pg    MCHC 32.1 31.5 - 36.5 g/dL    RDW 13.4 10.0 - 15.0 %    Platelet Count 469 (H) 150 - 450 10e9/L    Diff Method Automated Method     % Neutrophils 69.8 %    % Lymphocytes 20.9 %    % Monocytes 6.3 %    % Eosinophils 2.3 %    % Basophils 0.2 %    % Immature Granulocytes 0.5 %    Nucleated RBCs 0 0 /100    Absolute Neutrophil 7.0 1.6 - 8.3 10e9/L    Absolute Lymphocytes 2.1 0.8 - 5.3 10e9/L    Absolute Monocytes " 0.6 0.0 - 1.3 10e9/L    Absolute Eosinophils 0.2 0.0 - 0.7 10e9/L    Absolute Basophils 0.0 0.0 - 0.2 10e9/L    Abs Immature Granulocytes 0.1 0 - 0.4 10e9/L    Absolute Nucleated RBC 0.0    Comprehensive metabolic panel   Result Value Ref Range    Sodium 138 133 - 144 mmol/L    Potassium 3.7 3.4 - 5.3 mmol/L    Chloride 106 94 - 109 mmol/L    Carbon Dioxide 23 20 - 32 mmol/L    Anion Gap 9 3 - 14 mmol/L    Glucose 93 70 - 99 mg/dL    Urea Nitrogen 16 7 - 30 mg/dL    Creatinine 0.59 0.52 - 1.04 mg/dL    GFR Estimate >90 >60 mL/min/[1.73_m2]    GFR Estimate If Black >90 >60 mL/min/[1.73_m2]    Calcium 8.5 8.5 - 10.1 mg/dL    Bilirubin Total 0.2 0.2 - 1.3 mg/dL    Albumin 2.9 (L) 3.4 - 5.0 g/dL    Protein Total 7.6 6.8 - 8.8 g/dL    Alkaline Phosphatase 153 (H) 40 - 150 U/L    ALT 34 0 - 50 U/L    AST 21 0 - 45 U/L   UA with Microscopic   Result Value Ref Range    Color Urine Light Yellow     Appearance Urine Clear     Glucose Urine Negative NEG^Negative mg/dL    Bilirubin Urine Negative NEG^Negative    Ketones Urine Negative NEG^Negative mg/dL    Specific Gravity Urine 1.007 1.003 - 1.035    Blood Urine Moderate (A) NEG^Negative    pH Urine 5.5 5.0 - 7.0 pH    Protein Albumin Urine Negative NEG^Negative mg/dL    Urobilinogen mg/dL Normal 0.0 - 2.0 mg/dL    Nitrite Urine Negative NEG^Negative    Leukocyte Esterase Urine Moderate (A) NEG^Negative    Source Midstream Urine     WBC Urine 12 (H) 0 - 5 /HPF    RBC Urine 1 0 - 2 /HPF    Bacteria Urine Few (A) NEG^Negative /HPF    Squamous Epithelial /HPF Urine 1 0 - 1 /HPF    Mucous Urine Present (A) NEG^Negative /LPF      Seen by OB/Gyn       Medications   oxyCODONE (ROXICODONE) tablet 5 mg (5 mg Oral Given 2/19/19 7425)   metoclopramide (REGLAN) injection 10 mg (10 mg Intravenous Given 2/20/19 0003)           Assessments & Plan (with Medical Decision Making)   38 year old female who is 4 days postpartum from a spontaneous vaginal delivery at 36 weeks and 4 days for  preeclampsia with severe signs.  She was discharged from labor and delivery today.  She returns now with complaints of headache, right upper quadrant abdominal pain, and leg swelling.  Her blood pressures are mildly elevated.  Her physical examination is normal.  She appears uncomfortable.  Her labs are reassuring.  Patient was seen by OB/GYN.  Due to the severity of her symptoms, in particular her headache, the patient will be admitted.  She was given oxycodone and Reglan here in the emergency department.  OB/GYN service plan to treat with magnesium once admitted.  She is a normal neurologic examination at this time so no further diagnostic evaluation is felt necessary at this point.      I have reviewed the nursing notes.    I have reviewed the findings, diagnosis, plan and need for follow up with the patient.       Medication List      There are no discharge medications for this visit.         Final diagnoses:   Pre-eclampsia in postpartum period   Intractable headache, unspecified chronicity pattern, unspecified headache type     I, Hola Torres, am serving as a trained medical scribe to document services personally performed by Carlo Gómez MD, based on the provider's statements to me.   I, aCrlo Gómez MD, was physically present and have reviewed and verified the accuracy of this note documented by Hola Torres.    2/19/2019   [facility], [place], EMERGENCY DEPARTMENT     Carlo Gómez MD  02/20/19 0024

## 2019-02-20 NOTE — ED NOTES
[facility]   ED Nurse to Floor Handoff     Traci Sloan is a 38 year old female who speaks English and lives with a spouse,  in a home  They arrived in the ED by car from home    ED Chief Complaint: Pre-Eclampsia (has been on BP meds for two days; now has swelling in legs and feet; severe headache, dizziness) and Abdominal Pain ( pain just under right lateral rib cage; denies N & V; denies fever; denies bladder problems; reports constipation for 2 days)    ED Dx;   Final diagnoses:   Pre-eclampsia in postpartum period   Intractable headache, unspecified chronicity pattern, unspecified headache type         Needed?: No    Allergies: No Known Allergies.  Past Medical Hx:   Past Medical History:   Diagnosis Date     Abnormal Pap smear of cervix 08/22/2018    LSIL, Neg HPV, pregnant     ADHD (attention deficit hyperactivity disorder)      Anemia      Gastric reflux      IBS (irritable bowel syndrome)       Baseline Mental status: WDL  Current Mental Status changes: at basesline    Infection present or suspected this encounter: no  Sepsis suspected: No  Isolation type: No active isolations     Activity level - Baseline/Home:  Independent  Activity Level - Current:   Independent    Bariatric equipment needed?: No    In the ED these meds were given:   Medications   oxyCODONE (ROXICODONE) tablet 5 mg (5 mg Oral Given 2/19/19 7965)   metoclopramide (REGLAN) injection 10 mg (10 mg Intravenous Given 2/20/19 0003)       Drips running?  No    Home pump  No    Current LDAs  Peripheral IV 02/19/19 Left (Active)   Site Assessment WDL 2/19/2019 10:47 PM   Number of days: 1       Wound 05/03/17 Groin Laceration (Active)   Number of days: 658       Labs results:   Labs Ordered and Resulted from Time of ED Arrival Up to the Time of Departure from the ED   CBC WITH PLATELETS DIFFERENTIAL - Abnormal; Notable for the following components:       Result Value    RBC Count 3.53 (*)      "Hemoglobin 9.3 (*)     Hematocrit 29.0 (*)     MCH 26.3 (*)     Platelet Count 469 (*)     All other components within normal limits   COMPREHENSIVE METABOLIC PANEL - Abnormal; Notable for the following components:    Albumin 2.9 (*)     Alkaline Phosphatase 153 (*)     All other components within normal limits   ROUTINE UA WITH MICROSCOPIC   PROTEIN  RANDOM URINE   PERIPHERAL IV CATHETER       Imaging Studies: No results found for this or any previous visit (from the past 24 hour(s)).    Recent vital signs:   /78   Pulse 98   Temp 98.6  F (37  C) (Oral)   Resp 18   Ht 1.727 m (5' 8\")   Wt 87.8 kg (193 lb 9.6 oz)   LMP 02/15/2019   SpO2 98%   BMI 29.44 kg/m      Cardiac Rhythm: Normal Sinus  Pt needs tele? No  Skin/wound Issues: None    Code Status: Full Code    Pain control: fair    Nausea control: good    Abnormal labs/tests/findings requiring intervention: labs pending, urine pending    Family present during ED course? Yes   Family Comments/Social Situation comments:     Tasks needing completion:     Isidra Cha, RN    [phone] [facility]  [phone] [facility]    "

## 2019-02-20 NOTE — PLAN OF CARE
Admitted patient to [facility] from ED at 0130 after receiving report from JUNE Miner, via phone. VSS with BPs 110-130s/70-80s overnight. Magnesium sulfate initiated at 0220. Traci reported mild to moderate HA overnight but slept comfortably throughout magnesium sulfate loading dose and continuous infusion. She has taken ibuprofen, tylenol, and flexeril for pain control. She reports that the flexeril helps most with the headache and stated that the IV Reglan worked well when she was in the ED. Denied RUQ pain and vision changes. Edema +1 for BLE. No clonus. Reflexes hypoactive baseline. Continue to be hypoactive during magnesium sulfate tx. She is pumping with good supply. No immediate concerns at this time. Continue plan of care.

## 2019-02-20 NOTE — PROGRESS NOTES
OB Progress Note    In to see patient at 1100 and 1240.    S: Patient is feeling much better.  She feels that her headache is barely noticeable compared to last night.  She has not had vision changes.  She had some light sensitivity when the headache was at it's peak.  No chest pain, SOB, denies other complaints.    O:  Patient Vitals for the past 12 hrs:   BP Temp Temp src Pulse Heart Rate Resp SpO2 Weight   19 0834 134/81 97.9  F (36.6  C) Oral -- -- 18 100 % --   19 0600 136/80 98.3  F (36.8  C) Oral -- 73 18 95 % 85.6 kg (188 lb 12.8 oz)   19 0500 119/74 -- -- -- 71 16 96 % --   19 0400 117/72 -- -- -- 73 16 98 % --   19 0330 123/78 -- -- -- 73 18 98 % --   19 0300 118/79 -- -- -- 74 18 97 % --   19 0250 131/75 -- -- -- 79 18 96 % --   19 0245 130/78 -- -- -- 81 20 95 % --   19 0240 125/80 -- -- -- 80 18 95 % --   19 0235 122/73 -- -- -- 71 18 94 % --   19 0230 116/73 -- -- -- 69 16 95 % --   19 0225 122/77 -- -- -- 69 14 97 % --   19 0220 125/78 -- -- -- 68 14 98 % --   19 0215 130/84 -- -- -- 67 14 97 % --   19 0136 132/86 98.2  F (36.8  C) Oral -- 71 14 99 % --   19 0113 124/83 97.8  F (36.6  C) Oral 75 -- 15 98 % --     Gen: NAD  CV: RRR  Resp: CTAB  Abd: soft, nontender    A/P: Traci Sloan is a 38 year old  PPD#5 s/p  following pregnancy complicated preeclampsia with severe features readmitted with severe headache.  She received 24 hours of magnesium postpartum.  On readmission she received an additional 7 hours of mag sulfate due to concern for recurrent preeclampsia in the setting of this headache.  Upon further observation of blood pressures she has been normotensive over an extended period and her headache is much improved, inconsistent with cerebral sx of severe preeclampsia.  MRA and MRV were done to rule or intracranial aneurysm or thrombus and both were normal.  As she is clinically improved and  normotensive we feel that she is stable for discharge home.    Discussed with Dr. Burris.    Stable for discharge with follow-up in 1 week.  Patient has [facility] visiting and will check BPs over the next 4 days.  We discussed goal of <150/100 for BP.  Pt aware of preE warning sx.  Will return to hospital if very severe HA returns.    Rosalinda Cooper MD  PGY-3 OB/GYN  [phone] (OB G3 Pager) / [phone] (OB G2 Pager)    Agree with above note. Nothing on imaging and HA has almost completely resolved, will discharge home.   Courtney Burris MD

## 2019-02-21 ENCOUNTER — TELEPHONE (OUTPATIENT)
Dept: FAMILY MEDICINE | Facility: CLINIC | Age: 39
End: 2019-02-21

## 2019-02-21 NOTE — TELEPHONE ENCOUNTER
Prior Authorization Retail Medication Request    Medication/Dose:   ICD code (if different than what is on RX):    Previously Tried and Failed:    Rationale:      Insurance Name:  [phone]  Insurance ID:  [id number]      Pharmacy Information (if different than what is on RX)  [facility]  Phone:  [phone]  Fax: [phone]

## 2019-02-22 ENCOUNTER — ALLIED HEALTH/NURSE VISIT (OUTPATIENT)
Dept: NURSING | Facility: CLINIC | Age: 39
End: 2019-02-22
Payer: COMMERCIAL

## 2019-02-22 VITALS — SYSTOLIC BLOOD PRESSURE: 141 MMHG | DIASTOLIC BLOOD PRESSURE: 83 MMHG

## 2019-02-22 DIAGNOSIS — Z01.30 BP CHECK: Primary | ICD-10-CM

## 2019-02-22 PROCEDURE — 99207 ZZC NO CHARGE NURSE ONLY: CPT

## 2019-02-23 LAB — COPATH REPORT: NORMAL

## 2019-02-26 ENCOUNTER — NURSE TRIAGE (OUTPATIENT)
Dept: NURSING | Facility: CLINIC | Age: 39
End: 2019-02-26

## 2019-02-26 ENCOUNTER — HOSPITAL ENCOUNTER (EMERGENCY)
Facility: CLINIC | Age: 39
Discharge: HOME | End: 2019-02-26
Attending: EMERGENCY MEDICINE | Admitting: EMERGENCY MEDICINE
Payer: COMMERCIAL

## 2019-02-26 ENCOUNTER — APPOINTMENT (OUTPATIENT)
Dept: ULTRASOUND IMAGING | Facility: CLINIC | Age: 39
End: 2019-02-26
Attending: EMERGENCY MEDICINE
Payer: COMMERCIAL

## 2019-02-26 VITALS
BODY MASS INDEX: 28.8 KG/M2 | DIASTOLIC BLOOD PRESSURE: 88 MMHG | WEIGHT: 189.4 LBS | OXYGEN SATURATION: 99 % | SYSTOLIC BLOOD PRESSURE: 144 MMHG | TEMPERATURE: 98.3 F | RESPIRATION RATE: 18 BRPM

## 2019-02-26 DIAGNOSIS — N93.9 VAGINAL BLEEDING: ICD-10-CM

## 2019-02-26 LAB
ABO + RH BLD: NORMAL
ABO + RH BLD: NORMAL
ALBUMIN SERPL-MCNC: 3.2 G/DL (ref 3.4–5)
ALBUMIN UR-MCNC: NEGATIVE MG/DL
ALP SERPL-CCNC: 173 U/L (ref 40–150)
ALT SERPL W P-5'-P-CCNC: 23 U/L (ref 0–50)
ANION GAP SERPL CALCULATED.3IONS-SCNC: 8 MMOL/L (ref 3–14)
APPEARANCE UR: CLEAR
APTT PPP: 31 SEC (ref 22–37)
AST SERPL W P-5'-P-CCNC: 12 U/L (ref 0–45)
BACTERIA #/AREA URNS HPF: ABNORMAL /HPF
BASOPHILS # BLD AUTO: 0 10E9/L (ref 0–0.2)
BASOPHILS NFR BLD AUTO: 0.2 %
BILIRUB SERPL-MCNC: 0.2 MG/DL (ref 0.2–1.3)
BILIRUB UR QL STRIP: NEGATIVE
BLD GP AB SCN SERPL QL: NORMAL
BLOOD BANK CMNT PATIENT-IMP: NORMAL
BUN SERPL-MCNC: 19 MG/DL (ref 7–30)
CALCIUM SERPL-MCNC: 8.5 MG/DL (ref 8.5–10.1)
CHLORIDE SERPL-SCNC: 107 MMOL/L (ref 94–109)
CO2 SERPL-SCNC: 25 MMOL/L (ref 20–32)
COLOR UR AUTO: YELLOW
CREAT SERPL-MCNC: 0.63 MG/DL (ref 0.52–1.04)
DIFFERENTIAL METHOD BLD: ABNORMAL
EOSINOPHIL # BLD AUTO: 0.1 10E9/L (ref 0–0.7)
EOSINOPHIL NFR BLD AUTO: 1.1 %
ERYTHROCYTE [DISTWIDTH] IN BLOOD BY AUTOMATED COUNT: 13.3 % (ref 10–15)
GFR SERPL CREATININE-BSD FRML MDRD: >90 ML/MIN/{1.73_M2}
GLUCOSE SERPL-MCNC: 95 MG/DL (ref 70–99)
GLUCOSE UR STRIP-MCNC: NEGATIVE MG/DL
HCT VFR BLD AUTO: 31 % (ref 35–47)
HGB BLD-MCNC: 9.7 G/DL (ref 11.7–15.7)
HGB UR QL STRIP: ABNORMAL
IMM GRANULOCYTES # BLD: 0 10E9/L (ref 0–0.4)
IMM GRANULOCYTES NFR BLD: 0.1 %
INR PPP: 1 (ref 0.86–1.14)
KETONES UR STRIP-MCNC: NEGATIVE MG/DL
LEUKOCYTE ESTERASE UR QL STRIP: ABNORMAL
LYMPHOCYTES # BLD AUTO: 2 10E9/L (ref 0.8–5.3)
LYMPHOCYTES NFR BLD AUTO: 16.3 %
MCH RBC QN AUTO: 26.2 PG (ref 26.5–33)
MCHC RBC AUTO-ENTMCNC: 31.3 G/DL (ref 31.5–36.5)
MCV RBC AUTO: 84 FL (ref 78–100)
MONOCYTES # BLD AUTO: 0.9 10E9/L (ref 0–1.3)
MONOCYTES NFR BLD AUTO: 7.6 %
MUCOUS THREADS #/AREA URNS LPF: PRESENT /LPF
NEUTROPHILS # BLD AUTO: 9.2 10E9/L (ref 1.6–8.3)
NEUTROPHILS NFR BLD AUTO: 74.7 %
NITRATE UR QL: NEGATIVE
NRBC # BLD AUTO: 0 10*3/UL
NRBC BLD AUTO-RTO: 0 /100
PH UR STRIP: 5.5 PH (ref 5–7)
PLATELET # BLD AUTO: 531 10E9/L (ref 150–450)
POTASSIUM SERPL-SCNC: 3.9 MMOL/L (ref 3.4–5.3)
PROT SERPL-MCNC: 7.2 G/DL (ref 6.8–8.8)
RBC # BLD AUTO: 3.7 10E12/L (ref 3.8–5.2)
RBC #/AREA URNS AUTO: 8 /HPF (ref 0–2)
SODIUM SERPL-SCNC: 140 MMOL/L (ref 133–144)
SOURCE: ABNORMAL
SP GR UR STRIP: 1.01 (ref 1–1.03)
SPECIMEN EXP DATE BLD: NORMAL
UROBILINOGEN UR STRIP-MCNC: NORMAL MG/DL (ref 0–2)
WBC # BLD AUTO: 12.3 10E9/L (ref 4–11)
WBC #/AREA URNS AUTO: 6 /HPF (ref 0–5)

## 2019-02-26 PROCEDURE — 25000132 ZZH RX MED GY IP 250 OP 250 PS 637: Performed by: EMERGENCY MEDICINE

## 2019-02-26 PROCEDURE — 76856 US EXAM PELVIC COMPLETE: CPT

## 2019-02-26 PROCEDURE — 81001 URINALYSIS AUTO W/SCOPE: CPT | Performed by: EMERGENCY MEDICINE

## 2019-02-26 PROCEDURE — 99284 EMERGENCY DEPT VISIT MOD MDM: CPT | Mod: Z6 | Performed by: EMERGENCY MEDICINE

## 2019-02-26 PROCEDURE — 85610 PROTHROMBIN TIME: CPT | Performed by: EMERGENCY MEDICINE

## 2019-02-26 PROCEDURE — 85730 THROMBOPLASTIN TIME PARTIAL: CPT | Performed by: EMERGENCY MEDICINE

## 2019-02-26 PROCEDURE — 86901 BLOOD TYPING SEROLOGIC RH(D): CPT | Performed by: EMERGENCY MEDICINE

## 2019-02-26 PROCEDURE — 25000128 H RX IP 250 OP 636: Performed by: EMERGENCY MEDICINE

## 2019-02-26 PROCEDURE — 85025 COMPLETE CBC W/AUTO DIFF WBC: CPT | Performed by: EMERGENCY MEDICINE

## 2019-02-26 PROCEDURE — 86900 BLOOD TYPING SEROLOGIC ABO: CPT | Performed by: EMERGENCY MEDICINE

## 2019-02-26 PROCEDURE — 80053 COMPREHEN METABOLIC PANEL: CPT | Performed by: EMERGENCY MEDICINE

## 2019-02-26 PROCEDURE — 99284 EMERGENCY DEPT VISIT MOD MDM: CPT | Mod: 25

## 2019-02-26 PROCEDURE — 86850 RBC ANTIBODY SCREEN: CPT | Performed by: EMERGENCY MEDICINE

## 2019-02-26 PROCEDURE — 96360 HYDRATION IV INFUSION INIT: CPT

## 2019-02-26 RX ORDER — SODIUM CHLORIDE 9 MG/ML
1000 INJECTION, SOLUTION INTRAVENOUS CONTINUOUS
Status: DISCONTINUED | OUTPATIENT
Start: 2019-02-26 | End: 2019-02-27 | Stop reason: HOSPADM

## 2019-02-26 RX ORDER — ACETAMINOPHEN 325 MG/1
975 TABLET ORAL ONCE
Status: COMPLETED | OUTPATIENT
Start: 2019-02-26 | End: 2019-02-26

## 2019-02-26 RX ORDER — MISOPROSTOL 200 UG/1
400 TABLET ORAL SEE ADMIN INSTRUCTIONS
Qty: 4 TABLET | Refills: 0 | Status: SHIPPED | OUTPATIENT
Start: 2019-02-26 | End: 2019-05-01

## 2019-02-26 RX ADMIN — SODIUM CHLORIDE 1000 ML: 9 INJECTION, SOLUTION INTRAVENOUS at 21:49

## 2019-02-26 RX ADMIN — ACETAMINOPHEN 975 MG: 325 TABLET, FILM COATED ORAL at 22:01

## 2019-02-26 ASSESSMENT — ENCOUNTER SYMPTOMS
FEVER: 0
SHORTNESS OF BREATH: 0
NAUSEA: 0
VOMITING: 0

## 2019-02-26 NOTE — ED AVS SNAPSHOT
[facility], [place], Emergency Department  [address]  [place] [address]  Phone:  [phone]  Fax:  [phone]                                    Traci Sloan   MRN: [id number]    Department:  [facility], Emergency Department   Date of Visit:  2/26/2019           After Visit Summary Signature Page    I have received my discharge instructions, and my questions have been answered. I have discussed any challenges I see with this plan with the nurse or doctor.    ..........................................................................................................................................  Patient/Patient Representative Signature      ..........................................................................................................................................  Patient Representative Print Name and Relationship to Patient    ..................................................               ................................................  Date                                   Time    ..........................................................................................................................................  Reviewed by Signature/Title    ...................................................              ..............................................  Date                                               Time          22EPIC Rev 08/18

## 2019-02-27 ENCOUNTER — PATIENT OUTREACH (OUTPATIENT)
Dept: CARE COORDINATION | Facility: CLINIC | Age: 39
End: 2019-02-27

## 2019-02-27 DIAGNOSIS — Z00.00 ROUTINE GENERAL MEDICAL EXAMINATION AT A HEALTH CARE FACILITY: Primary | ICD-10-CM

## 2019-02-27 NOTE — ED PROVIDER NOTES
"    [facility] EMERGENCY DEPARTMENT ([facility])    19        History     Chief Complaint   Patient presents with     Vaginal Bleeding     Pt is having having heavy vag bleeding 10 days postpartum. Pt states she was changing pad frequently. Blood is dark with no clots     The history is provided by the patient and medical records.     Traci Sloan is a 38 year old female who is a  with a medical history significant for pre-eclampsia,  deliveries, gestational hypertension and GERD who presents to the Emergency Department today with heavy vaginal bleeding. Patient reports giving birth here on 19 to her second child. Patient has been bleeding fine before today as it was drying up like normal. Patient reported that today her vaginal bleeding had picked up around 6 PM. Patient reports that her blood was super dark and out of nowhere. Patient states to be bleeding every 10 minutes. Patient reports a \"dull continuous cramping feeling\". Patient denies a fever, shortness of breath, nausea or vomiting. Patient denies being on anti coagulates. Patient reports that he blood pressure has been the same since pregnancy.    Per chart review patient was hospitalized here from 19-19 due to her pre-eclampsia and a headache. She received a repeat course of magnesium for seizure prophylaxis. HELLP labs on admission were within normal limits and blood pressures were mild elevated to normotensive. She was continued on oral nifedipine 60 mg XL during her admission. She had a MRA and MRV which were both normal. Her headache was resolved and she was discharged in good condition.    I have reviewed the Medications, Allergies, Past Medical and Surgical History, and Social History in the [employer] system.    Past Medical History:   Diagnosis Date     Abnormal Pap smear of cervix 2018    LSIL, Neg HPV, pregnant     ADHD (attention deficit hyperactivity disorder)      Anemia      Gastric reflux      IBS " (irritable bowel syndrome)        Past Surgical History:   Procedure Laterality Date     CYSTOSCOPY, DILATE URETHRA, COMBINED  11/1999    for frequent UTI'S     KNEE SURGERY  10/1997       Family History   Problem Relation Age of Onset     Heart Failure Maternal Grandmother      Breast Cancer Maternal Grandmother      Colon Cancer Maternal Grandmother      Cerebrovascular Disease Maternal Grandfather      Diabetes Maternal Grandfather      Other Cancer Maternal Grandfather      Mental Illness Paternal Grandfather      Hypertension Sister        Social History     Tobacco Use     Smoking status: Former Smoker     Last attempt to quit: 10/25/2015     Years since quitting: 3.3     Smokeless tobacco: Never Used   Substance Use Topics     Alcohol use: No     Alcohol/week: 0.0 oz     Comment: occ       Current Facility-Administered Medications   Medication     sodium chloride 0.9% infusion     Current Outpatient Medications   Medication     acetaminophen (TYLENOL) 650 MG CR tablet     amphetamine-dextroamphetamine (ADDERALL) 15 MG tablet     ibuprofen (ADVIL/MOTRIN) 600 MG tablet     misoprostol (CYTOTEC) 200 MCG tablet     NIFEdipine ER OSMOTIC (ADALAT CC) 60 MG 24 hr tablet     omeprazole (PRILOSEC) 20 MG DR capsule     senna-docusate (SENOKOT-S/PERICOLACE) 8.6-50 MG tablet      No Known Allergies      Review of Systems   Constitutional: Negative for fever.   Respiratory: Negative for shortness of breath.    Gastrointestinal: Negative for nausea and vomiting.   Genitourinary: Positive for vaginal bleeding.       Physical Exam   BP: 155/88  Heart Rate: 77  Temp: 97.3  F (36.3  C)  Resp: 16  Weight: 85.9 kg (189 lb 6.4 oz)  SpO2: 98 %      Physical Exam   Constitutional: No distress.   HENT:   Head: Atraumatic.   Mouth/Throat: Oropharynx is clear and moist. No oropharyngeal exudate.   Eyes: EOM are normal. No scleral icterus.   Neck: Neck supple.   Cardiovascular: Normal rate, regular rhythm and normal heart sounds.    Pulmonary/Chest: Breath sounds normal. No respiratory distress.   Abdominal: Soft. She exhibits no distension. There is no tenderness.   Genitourinary:   Genitourinary Comments: Mild vaginal bleeding dark red blood   Musculoskeletal: She exhibits no edema or deformity.   Neurological: She is alert.   Skin: Skin is warm and dry. She is not diaphoretic.   Psychiatric: She has a normal mood and affect. Her behavior is normal.       ED Course   9:14 PM  The patient was seen and examined by Brody Charles DO in Room ED05.        Procedures     Labs Ordered and Resulted from Time of ED Arrival Up to the Time of Departure from the ED   CBC WITH PLATELETS DIFFERENTIAL - Abnormal; Notable for the following components:       Result Value    WBC 12.3 (*)     RBC Count 3.70 (*)     Hemoglobin 9.7 (*)     Hematocrit 31.0 (*)     MCH 26.2 (*)     MCHC 31.3 (*)     Platelet Count 531 (*)     Absolute Neutrophil 9.2 (*)     All other components within normal limits   COMPREHENSIVE METABOLIC PANEL - Abnormal; Notable for the following components:    Albumin 3.2 (*)     Alkaline Phosphatase 173 (*)     All other components within normal limits   UA MACROSCOPIC WITH REFLEX TO MICRO AND CULTURE - Abnormal; Notable for the following components:    Blood Urine Moderate (*)     Leukocyte Esterase Urine Trace (*)     RBC Urine 8 (*)     WBC Urine 6 (*)     Bacteria Urine Few (*)     Mucous Urine Present (*)     All other components within normal limits   INR   PARTIAL THROMBOPLASTIN TIME   PERIPHERAL IV CATHETER   ABO/RH TYPE AND SCREEN          Results for orders placed or performed during the hospital encounter of 02/26/19   US Pelvis Complete without Transvaginal    Narrative    PELVIC ULTRASOUND   2/26/2019 10:26 PM     HISTORY: Pelvic pain, postpartum bleeding concern for retained  products of conception    COMPARISON: None.    FINDINGS: Uterus measures 13.8 x 6.8 cm in size. There is hypoechoic  material filling the endometrial  cavity. This material measures up to  3.1 cm in thickness. It measures up to 10 cm in length. There is a  small area of increased vascularity within the hypoechoic material.  This material probably represents a blood clot but the area of  hypervascularity could be due to retained products of conception.  There is a intramural fibroid in the posterior uterine body measuring  2.1 x 1.6 x 2 cm.      Impression    IMPRESSION: Endometrial cavity is filled with hypoechoic material  which probably represents a blood clot. There is however a small area  of hypervascularity in the fundal region. This could be due to a small  amount of retained products of conception.    JARET DIOP MD   CBC with platelets differential   Result Value Ref Range    WBC 12.3 (H) 4.0 - 11.0 10e9/L    RBC Count 3.70 (L) 3.8 - 5.2 10e12/L    Hemoglobin 9.7 (L) 11.7 - 15.7 g/dL    Hematocrit 31.0 (L) 35.0 - 47.0 %    MCV 84 78 - 100 fl    MCH 26.2 (L) 26.5 - 33.0 pg    MCHC 31.3 (L) 31.5 - 36.5 g/dL    RDW 13.3 10.0 - 15.0 %    Platelet Count 531 (H) 150 - 450 10e9/L    Diff Method Automated Method     % Neutrophils 74.7 %    % Lymphocytes 16.3 %    % Monocytes 7.6 %    % Eosinophils 1.1 %    % Basophils 0.2 %    % Immature Granulocytes 0.1 %    Nucleated RBCs 0 0 /100    Absolute Neutrophil 9.2 (H) 1.6 - 8.3 10e9/L    Absolute Lymphocytes 2.0 0.8 - 5.3 10e9/L    Absolute Monocytes 0.9 0.0 - 1.3 10e9/L    Absolute Eosinophils 0.1 0.0 - 0.7 10e9/L    Absolute Basophils 0.0 0.0 - 0.2 10e9/L    Abs Immature Granulocytes 0.0 0 - 0.4 10e9/L    Absolute Nucleated RBC 0.0    Comprehensive metabolic panel   Result Value Ref Range    Sodium 140 133 - 144 mmol/L    Potassium 3.9 3.4 - 5.3 mmol/L    Chloride 107 94 - 109 mmol/L    Carbon Dioxide 25 20 - 32 mmol/L    Anion Gap 8 3 - 14 mmol/L    Glucose 95 70 - 99 mg/dL    Urea Nitrogen 19 7 - 30 mg/dL    Creatinine 0.63 0.52 - 1.04 mg/dL    GFR Estimate >90 >60 mL/min/[1.73_m2]    GFR Estimate If Black >90 >60  mL/min/[1.73_m2]    Calcium 8.5 8.5 - 10.1 mg/dL    Bilirubin Total 0.2 0.2 - 1.3 mg/dL    Albumin 3.2 (L) 3.4 - 5.0 g/dL    Protein Total 7.2 6.8 - 8.8 g/dL    Alkaline Phosphatase 173 (H) 40 - 150 U/L    ALT 23 0 - 50 U/L    AST 12 0 - 45 U/L   INR   Result Value Ref Range    INR 1.00 0.86 - 1.14   Partial thromboplastin time   Result Value Ref Range    PTT 31 22 - 37 sec   UA reflex to Microscopic and Culture   Result Value Ref Range    Color Urine Yellow     Appearance Urine Clear     Glucose Urine Negative NEG^Negative mg/dL    Bilirubin Urine Negative NEG^Negative    Ketones Urine Negative NEG^Negative mg/dL    Specific Gravity Urine 1.014 1.003 - 1.035    Blood Urine Moderate (A) NEG^Negative    pH Urine 5.5 5.0 - 7.0 pH    Protein Albumin Urine Negative NEG^Negative mg/dL    Urobilinogen mg/dL Normal 0.0 - 2.0 mg/dL    Nitrite Urine Negative NEG^Negative    Leukocyte Esterase Urine Trace (A) NEG^Negative    Source Midstream Urine     RBC Urine 8 (H) 0 - 2 /HPF    WBC Urine 6 (H) 0 - 5 /HPF    Bacteria Urine Few (A) NEG^Negative /HPF    Mucous Urine Present (A) NEG^Negative /LPF   ABO/Rh type and screen   Result Value Ref Range    ABO O     RH(D) Pos     Antibody Screen Neg     Test Valid Only At          [facility]    Specimen Expires 03/01/2019        Assessments & Plan (with Medical Decision Making)   38-year-old female presents to us with a chief complaint of vaginal bleeding postpartum.  Differential includes but not limited to postpartum hemorrhage, retained products of conception, urinary tract infection.  Patient also had a history of preeclampsia.  Her blood pressure today is 144/88 she reports is similar to where it has been.  UA shows no evidence of protein or urinary tract infection.  Patient's hemoglobin is stable at 9.7.  Patient does not have appreciable tenderness on exam.  Ultrasound was performed that showed likely blood clot but small  possibility of retained products of conception.  Discussed with gynecology service who evaluated the patient at bedside.  As the bleeding has improved and the patient is in little pain they are not concerned that this likely represents retained products of conception.  Most likely the patient just had a a normal small amount of postpartum blood that passed all at once.  Gynecology recommended 400 mcg of misoprostol at home.  If continued bleeding is a 12 hours she should take an additional 400 mcg of misoprostol.  Patient is comfortable discharge home.  She will follow-up with gynecology at her next scheduled appointment in 3 days.    I have reviewed the nursing notes.    I have reviewed the findings, diagnosis, plan and need for follow up with the patient.       Medication List      Started    misoprostol 200 MCG tablet  Commonly known as:  CYTOTEC  400 mcg, Oral, SEE ADMIN INSTRUCTIONS            Final diagnoses:   Vaginal bleeding     IDann, am serving as a trained medical scribe to document services personally performed by Brody Charles DO, based on the provider's statements to me.   Brody DIETRICH DO, was physically present and have reviewed and verified the accuracy of this note documented by Dann Sosa.    2/26/2019   [facility], EMERGENCY DEPARTMENT     Brody Charles DO  02/26/19 1217

## 2019-02-27 NOTE — ED NOTES
Patient states having vaginal bleeding that started today around 1800. Patient states she has saturated 6 pads. Patient states bleeding is dark red with no clots. Patient states having a baby 10 days ago. Patient states no abdominal pain and states no nausea or vomiting.

## 2019-02-27 NOTE — TELEPHONE ENCOUNTER
"Patient states she has had an onset of heavy vaginal post partum bleeding this afternoon.   Patient is 10 days post partum. Delivered baby #2 2/15/19.   States vaginal  bleeding has increased in the past one hour.  States every \"10 minutes I feel a gush of dark red blood.\"  Denies clots.   States has low abdominal \"dull cramping\" today.   States previous to today vaginal bleeding was \"returning to normal.\"  Breast feeding without problems by report.  Temperature not checked. Denies fever.  States is on \"a new blood pressure medication\" post partum.     Protocol-  Postpartum- Vaginal Bleeding & Lochia- Adult  Care advice reviewed.   Disposition- See Physician within 4 hours   Advised to be seen at [facility] ED. Patient states she has transportation and will go within one hour.     Caller states understanding of the recommended disposition.   Advised to call back if further questions or concerns.     JOSHUA DrummondN RN  [facility]      Reason for Disposition    MODERATE vaginal bleeding (i.e., soaking 1 pad or tampon per hour and present > 6 hours)    Additional Information    Negative: Shock suspected (e.g., cold/pale/clammy skin, too weak to stand, low BP, rapid pulse)    Negative: Difficult to awaken or acting confused  (e.g., disoriented, slurred speech)    Negative: Passed out (i.e., lost consciousness, collapsed and was not responding)    Negative: Sounds like a life-threatening emergency to the triager    Negative: SEVERE dizziness (e.g., unable to stand, requires support to walk, feels like passing out now)    Negative: [1] SEVERE abdominal pain AND [2] present > 1 hour    Negative: Fever > 100.4 F (38.0 C)    Negative: SEVERE vaginal bleeding (i.e., soaking 2 pads or tampons per hour and present 2 or more hours)    Negative: Patient sounds very sick or weak to the triager    Protocols used: POSTPARTUM - VAGINAL BLEEDING AND LOCHIA-ADULT-AH      "

## 2019-02-27 NOTE — TELEPHONE ENCOUNTER
PA Initiation    Medication: amphetamine-dextroamphetamine (ADDERALL) 15 MG tablet- INITIATED  Insurance Company: [employer] - Phone [phone] Fax [phone]  [facility] [address] [place] [address] AT [facility] [address]  Filling Pharmacy Phone:    Filling Pharmacy Fax:    Start Date: 2/27/2019

## 2019-02-27 NOTE — LETTER
[place] CARE COORDINATION    February 27, 2019    Traci Luther  [address]  [place] [address]      Dear Traci,    I am a clinic care coordinator who works with WINSTON Richards CNP at [facility]. I wanted to introduce myself and provide you with my contact information so that you can call me with questions or concerns about your health care. Below is a description of clinic care coordination and how I can further assist you.     The clinic care coordinator is a registered nurse and/or  who understand the health care system. The goal of clinic care coordination is to help you manage your health and improve access to the [place] system in the most efficient manner. The registered nurse can assist you in meeting your health care goals by providing education, coordinating services, and strengthening the communication among your providers. The  can assist you with financial, behavioral, psychosocial, chemical dependency, counseling, and/or psychiatric resources.    Please feel free to contact me at [phone], with any questions or concerns. We at [facility] are focused on providing you with the highest-quality healthcare experience possible and that all starts with you.     Sincerely,     Siria Faulkner    Enclosed: I have enclosed a copy of a 24 Hour Access Plan. This has helpful phone numbers for you to call when needed. Please keep this in an easy to access place to use as needed.

## 2019-02-27 NOTE — DISCHARGE INSTRUCTIONS
Follow-up with your regular scheduled appointment in 3 days with gynecology.  Return the emergency department if you develop worsening or more severe bleeding.  If you feel the or becoming faint or dizzy also please return the emergency department for further evaluation.

## 2019-02-27 NOTE — LETTER
Health Care Home - Access Care Plan    About Me  Patient Name:  Traci Sloan    YOB: 1980  Age:                             38 year old   Jackeline MRN:            [id number] Telephone Information:   Home Phone [phone]   Mobile [phone]       Address:    [address]  [place] [address] Email address:  [email or web address]      Emergency Contact(s)  Name Relationship Lgl Grd Work Phone Home Phone Mobile Phone   1. MAIRA SCHUMACHER* Significant ot* No  [phone]    2. NO SECONDARY C* Other   none              Health Maintenance:      My Access Plan  Medical Emergency 911   Questions or concerns during clinic hours Primary Clinic Line, I will call the clinic directly: [facility] [phone]   24 Hour Appointment Line [phone] or  [phone]) (toll free)   24 Hour Nurse Line [phone] (toll free)   Questions or concerns outside clinic hours 24 Hour Appointment Line, I will call the after-hours on-call line:   [facility] [phone] or [phone]) (toll-free)   Preferred Urgent Care     [facility]  [phone]   Preferred Pharmacy [facility] [address] [facility] [address] AT [address] [facility]     Behavioral Health Crisis Line The National Suicide Prevention Lifeline at 1-918.136.8655 or 911     My Care Team Members  Patient Care Team       Relationship Specialty Notifications Start End    Valarie Medrano APRN CNP PCP - General Nurse Practitioner - Family  7/12/18     Phone: [phone] Fax: [phone] [address] [facility] [address]    Valarie Medrano APRN CNP PCP - Assigned PCP   4/8/16     Phone: [phone] Fax: [phone] [address] [facility] [address]           My Medical and Care Information  Problem List   Patient Active Problem List   Diagnosis     Polypharmacy      Gastroesophageal reflux disease     Irritable bowel syndrome     History of chronic urinary tract infection     Attention deficit hyperactivity disorder (ADHD), combined type     Other closed fracture of distal end of left fibula, initial encounter     Need for Tdap vaccination     History of  delivery, currently pregnant     Papanicolaou smear of cervix with low grade squamous intraepithelial lesion (LGSIL)     Abdominal pain     Encounter for triage in pregnant patient     Vaginal bleeding in pregnancy     Hypertension in pregnancy     Gestational hypertension     Preeclampsia      (normal spontaneous vaginal delivery)     Preeclampsia in postpartum period      Current Medications and Allergies:  See printed Medication Report

## 2019-02-27 NOTE — PROGRESS NOTES
Clinic Care Coordination Contact  [facility]/Voicemail    Referral Source: ED Follow-Up  Clinical Data: Care Coordinator Outreach  Outreach attempted x 1.  Left message on voicemail with call back information and requested return call.  Plan: Care Coordinator will mail out care coordination introduction letter with care coordinator contact information and explanation of care coordination services. Care Coordinator will try to reach patient again in 1-2 business days.    Siria Faulkner R.N.  Clinic Care Coordinator  [facility]  [phone]

## 2019-02-28 NOTE — TELEPHONE ENCOUNTER
Prior Authorization Approval    Authorization Effective Date: 2/28/2019  Authorization Expiration Date: 2/28/2021  Medication: amphetamine-dextroamphetamine (ADDERALL) 15 MG tablet- APPROVED  Approved Dose/Quantity: 75 PER 30 DAYS  Reference #: [id number]   Insurance Company: [employer] - Phone [phone] Fax [phone]  Expected CoPay:       CoPay Card Available:      Foundation Assistance Needed:    Which Pharmacy is filling the prescription (Not needed for infusion/clinic administered): [facility] [address] [place] [address] AT [facility] [address] [facility]  Pharmacy Notified: Yes  Patient Notified: Yes

## 2019-03-09 ENCOUNTER — HEALTH MAINTENANCE LETTER (OUTPATIENT)
Age: 39
End: 2019-03-09

## 2019-03-15 ENCOUNTER — MYC REFILL (OUTPATIENT)
Dept: FAMILY MEDICINE | Facility: CLINIC | Age: 39
End: 2019-03-15

## 2019-03-15 DIAGNOSIS — F90.2 ATTENTION DEFICIT HYPERACTIVITY DISORDER (ADHD), COMBINED TYPE: ICD-10-CM

## 2019-03-15 NOTE — PROGRESS NOTES
Clinic Care Coordination Contact  [facility]/Voicemail    Referral Source: ED Follow-Up  Clinical Data: Care Coordinator Outreach  Outreach attempted x 2.  Left message on voicemail with call back information and requested return call.  Plan: Care Coordinator will mail out care coordination introduction letter with care coordinator contact information and explanation of care coordination services. Care Coordinator will do no further outreaches at this time.    Siria Faulkner R.N.  Clinic Care Coordinator  [facility] Primary [facility]  [phone]

## 2019-03-18 DIAGNOSIS — F90.2 ATTENTION DEFICIT HYPERACTIVITY DISORDER (ADHD), COMBINED TYPE: ICD-10-CM

## 2019-03-18 RX ORDER — DEXTROAMPHETAMINE SACCHARATE, AMPHETAMINE ASPARTATE, DEXTROAMPHETAMINE SULFATE AND AMPHETAMINE SULFATE 3.75; 3.75; 3.75; 3.75 MG/1; MG/1; MG/1; MG/1
TABLET ORAL
Qty: 75 TABLET | Refills: 0 | Status: SHIPPED | OUTPATIENT
Start: 2019-04-18 | End: 2019-03-18

## 2019-03-18 RX ORDER — DEXTROAMPHETAMINE SACCHARATE, AMPHETAMINE ASPARTATE, DEXTROAMPHETAMINE SULFATE AND AMPHETAMINE SULFATE 3.75; 3.75; 3.75; 3.75 MG/1; MG/1; MG/1; MG/1
TABLET ORAL
Qty: 75 TABLET | Refills: 0 | Status: SHIPPED | OUTPATIENT
Start: 2019-03-18 | End: 2019-03-18

## 2019-03-18 NOTE — TELEPHONE ENCOUNTER
Pt states her script was not written correctly and will be bringing it back to get it corrected. Pt states the date to be filled is 4/18/2019.

## 2019-03-18 NOTE — TELEPHONE ENCOUNTER
"Yee/Provider Pool:    Please review/sign or advise for refill of amphetamine-dextroamphetamine (ADDERALL) 15 MG tablet. Cued scripts for 2 months worth.    Pt requested via food.det: \"This seems to be working fine.  Can we fill 2 months worth? For March and April and will revisit in May.\"    : Adderall 15 mg tablet last filled on 02/20/19 for 75 tablets.    Luna Bran RN  [facility]  "

## 2019-03-18 NOTE — TELEPHONE ENCOUNTER
Called patient. Notified of new script signed. Script placed at . Pt will bring in other script to shred.    Luna Bran RN  [facility]

## 2019-03-18 NOTE — TELEPHONE ENCOUNTER
Yee,    Pt received 1 script for Adderall 15 mg tablet today (03/18). She is wondering if she can get another script for April and plan on an OV in May for further scripts.    Please advise. Cued script if okay.    Luna Bran RN  [facility]

## 2019-03-18 NOTE — TELEPHONE ENCOUNTER
Pt came to  new prescription on 3/18/19 at 12:21 pm.      Pt also brought back original prescription. Will shred prescription.

## 2019-03-18 NOTE — TELEPHONE ENCOUNTER
please call her   MS not here until Wednesday   I can Ok 1 month but she needs to talk to MS before getting more

## 2019-03-18 NOTE — TELEPHONE ENCOUNTER
Cued new prescription, not sure if you want to correct the script that the patient brings in or just write a new one and shred the old one.    Luna Bran RN  [facility]

## 2019-03-20 RX ORDER — DEXTROAMPHETAMINE SACCHARATE, AMPHETAMINE ASPARTATE, DEXTROAMPHETAMINE SULFATE AND AMPHETAMINE SULFATE 3.75; 3.75; 3.75; 3.75 MG/1; MG/1; MG/1; MG/1
TABLET ORAL
Qty: 75 TABLET | Refills: 0 | Status: SHIPPED | OUTPATIENT
Start: 2019-04-18 | End: 2019-12-11

## 2019-03-22 ENCOUNTER — TELEPHONE (OUTPATIENT)
Dept: FAMILY MEDICINE | Facility: CLINIC | Age: 39
End: 2019-03-22

## 2019-03-22 DIAGNOSIS — O14.93 PRE-ECLAMPSIA IN THIRD TRIMESTER: ICD-10-CM

## 2019-03-22 NOTE — TELEPHONE ENCOUNTER
Reason for call:  Other   Patient called regarding (reason for call): Medication request    Additional comments: The patient called and stated that she had NIFEdipine ER OSMOTIC (ADALAT CC) 60 MG 24 hr tablet that was given to her at the hospital when she gave birth to her baby. She would like Yee Medrano to take over seeing her for the medication. She is wondering if Yee would write her a new prescription for the medication so she can have her manage her blood pressure care. She would like a call back to see if this would be possible or not. If this would be possible she would like the prescription sent to the [facility] in [employer] [facility] [address] in [place].     Phone number to reach patient:  Cell number on file:    Telephone Information:   Mobile [phone]       Best Time: Any    Can we leave a detailed message on this number?  YES

## 2019-03-22 NOTE — TELEPHONE ENCOUNTER
I can take that over.  We can combine hypertension with her next ADHD visit which I think is in May unless she wants to stop the nifedipine before this.  ZANA Medrano, MARIBETH

## 2019-03-22 NOTE — TELEPHONE ENCOUNTER
Called patient, relayed provider message below. Patient verbalized understanding    Halie Gaitan, RN  Triage Nurse

## 2019-04-02 ENCOUNTER — PRENATAL OFFICE VISIT (OUTPATIENT)
Dept: OBGYN | Facility: CLINIC | Age: 39
End: 2019-04-02

## 2019-04-02 VITALS
OXYGEN SATURATION: 97 % | SYSTOLIC BLOOD PRESSURE: 125 MMHG | WEIGHT: 180 LBS | HEART RATE: 105 BPM | BODY MASS INDEX: 27.28 KG/M2 | HEIGHT: 68 IN | DIASTOLIC BLOOD PRESSURE: 84 MMHG

## 2019-04-02 DIAGNOSIS — Z12.4 SCREENING FOR MALIGNANT NEOPLASM OF CERVIX: ICD-10-CM

## 2019-04-02 DIAGNOSIS — R87.612 PAPANICOLAOU SMEAR OF CERVIX WITH LOW GRADE SQUAMOUS INTRAEPITHELIAL LESION (LGSIL): ICD-10-CM

## 2019-04-02 PROCEDURE — 88175 CYTOPATH C/V AUTO FLUID REDO: CPT | Performed by: OBSTETRICS & GYNECOLOGY

## 2019-04-02 PROCEDURE — G0124 SCREEN C/V THIN LAYER BY MD: HCPCS | Performed by: OBSTETRICS & GYNECOLOGY

## 2019-04-02 PROCEDURE — 87624 HPV HI-RISK TYP POOLED RSLT: CPT | Performed by: OBSTETRICS & GYNECOLOGY

## 2019-04-02 PROCEDURE — 99207 ZZC POST PARTUM EXAM: CPT | Performed by: OBSTETRICS & GYNECOLOGY

## 2019-04-02 ASSESSMENT — ANXIETY QUESTIONNAIRES
5. BEING SO RESTLESS THAT IT IS HARD TO SIT STILL: NOT AT ALL
IF YOU CHECKED OFF ANY PROBLEMS ON THIS QUESTIONNAIRE, HOW DIFFICULT HAVE THESE PROBLEMS MADE IT FOR YOU TO DO YOUR WORK, TAKE CARE OF THINGS AT HOME, OR GET ALONG WITH OTHER PEOPLE: NOT DIFFICULT AT ALL
1. FEELING NERVOUS, ANXIOUS, OR ON EDGE: NOT AT ALL
2. NOT BEING ABLE TO STOP OR CONTROL WORRYING: NOT AT ALL
3. WORRYING TOO MUCH ABOUT DIFFERENT THINGS: NOT AT ALL
GAD7 TOTAL SCORE: 0
6. BECOMING EASILY ANNOYED OR IRRITABLE: NOT AT ALL
7. FEELING AFRAID AS IF SOMETHING AWFUL MIGHT HAPPEN: NOT AT ALL

## 2019-04-02 ASSESSMENT — MIFFLIN-ST. JEOR: SCORE: 1554.97

## 2019-04-02 ASSESSMENT — PATIENT HEALTH QUESTIONNAIRE - PHQ9
5. POOR APPETITE OR OVEREATING: NOT AT ALL
SUM OF ALL RESPONSES TO PHQ QUESTIONS 1-9: 3

## 2019-04-02 NOTE — NURSING NOTE
"Chief Complaint   Patient presents with     Postpartum Care       Initial /84   Pulse 105   Ht 1.727 m (5' 8\")   Wt 81.6 kg (180 lb)   LMP 06/10/2018   SpO2 97%   Breastfeeding? Yes   BMI 27.37 kg/m   Estimated body mass index is 27.37 kg/m  as calculated from the following:    Height as of this encounter: 1.727 m (5' 8\").    Weight as of this encounter: 81.6 kg (180 lb).  BP completed using cuff size: regular    Questioned patient about current smoking habits.  Pt. quit smoking some time ago.          The following HM Due: pap smear      The following patient reported/Care Every where data was sent to:  P ABSTRACT QUALITY INITIATIVES [91504]  n/a      patient has appointment for today              "

## 2019-04-03 ASSESSMENT — ANXIETY QUESTIONNAIRES: GAD7 TOTAL SCORE: 0

## 2019-04-03 NOTE — PROGRESS NOTES
"Traci is here for a 6-week postpartum checkup.    She had a  of a viable boy, weight 6 pounds 3 oz., with PIH complications.  Since delivery, she has been breast feeding.  She has no signs of infection, bleeding or other complications.  She is not pregnant.  We discussed contraception and she has chosen vasectomy.    Mood is good.  Today's Depression Rating was   PHQ-9 SCORE 2019   PHQ-9 Total Score 3     Doesn't feel like she needs anything for contraception until vasectomy.   On nifedipine still for blood pressures.  Seen family practice for medication management.  She had delayed postpartum bleeding for which she was seen in the emergency room.  She was given misoprostol.  She expelled the retained tissue and has had minimal bleeding since.      EXAM:  Vitals:    19 1106   BP: 125/84   Pulse: 105   SpO2: 97%   Weight: 81.6 kg (180 lb)   Height: 1.727 m (5' 8\")     HEENT: grossly normal.  NECK: no lymphadenopathy or thyroidomegaly.  LUNGS: CTA X 2, no rales or crackles.  BREASTS: symmetric, no masses or discharge  BACK: No spinal or CVA tenderness.  HEART: RRR without murmurs clicks or gallops.  ABDOMEN: soft, non tender, good bowel sounds, without masses rebound, guarding or tenderness.      PELVIC:    External genitalia: normal without lesion, repair well healed.                            Vagina: normal mucosa and rugae, no discharge.  Cervix: multiparous, well healed, without lesion.  Uterus: non pregnant in size, firm , mobile, no lesions.  Adnexa: non tender, without masses    EXTREMITIES: Warm to touch, good pulses, no ankle edema or calf tenderness.  NEUROLOGIC: grossly normal.    ASSESSMENT:   Normal 6-week postpartum exam after .    PLAN:  Pap smear Done and vasectomy for contraception.    "

## 2019-04-05 LAB
COPATH REPORT: ABNORMAL
PAP: ABNORMAL

## 2019-04-16 DIAGNOSIS — O14.93 PRE-ECLAMPSIA IN THIRD TRIMESTER: ICD-10-CM

## 2019-04-16 NOTE — TELEPHONE ENCOUNTER
Pt has appt with provider on 05/01/19.    Medication is being filled for 1 time refill only due to:  Patient needs to be seen because has upcoming appt.     Luna Bran RN  [facility]

## 2019-04-16 NOTE — TELEPHONE ENCOUNTER
"Requested Prescriptions   Pending Prescriptions Disp Refills     NIFEdipine ER (ADALAT CC) 60 MG 24 hr tablet  Last Written Prescription Date:  03/22/2019  Last Fill Quantity: 30,  # refills: 0   Last office visit: 1/16/2019 with prescribing provider:  01/16/2019   Future Office Visit:   Next 5 appointments (look out 90 days)    May 01, 2019 10:45 AM CDT  Office Visit with WINSTON Griffin CNP  [facility] [address] [place] [id number]  [phone]   May 01, 2019 12:30 PM CDT  Colposcopy with Dolores Norris MD, RD PROC RM  [facility] ([facility]) [address] [place] [id number] [phone]        30 tablet 0     Sig: Take 1 tablet (60 mg) by mouth daily       Calcium Channel Blockers Protocol  Passed - 4/16/2019  2:11 PM        Passed - Blood pressure under 140/90 in past 12 months     BP Readings from Last 3 Encounters:   04/02/19 125/84   02/26/19 144/88   02/22/19 141/83                 Passed - Recent (12 mo) or future (30 days) visit within the authorizing provider's specialty     Patient had office visit in the last 12 months or has a visit in the next 30 days with authorizing provider or within the authorizing provider's specialty.  See \"Patient Info\" tab in inbasket, or \"Choose Columns\" in Meds & Orders section of the refill encounter.              Passed - Medication is active on med list        Passed - Patient is age 18 or older        Passed - No active pregnancy on record        Passed - Normal serum creatinine on file in past 12 months     Recent Labs   Lab Test 02/26/19  2124   CR 0.63             Passed - No positive pregnancy test in past 12 months        "

## 2019-04-30 NOTE — PROGRESS NOTES
SUBJECTIVE:                                                    Traci Sloan is a 38 year old female who is here today for a medication check and ADHD follow up.      ADHD CURRENT CONCERNS:  None  Health insurance is changing with new job    Infant is 2.5 months old - had been spitting up a lot and started GERD as well as on inclined bed.  Symptoms have improved.  Weight is going up.  GI appt is in .  Had been taking an herbal supplement     CURRENT PRESCRIPTIONS:    Adderall 22.5 mg in the morning and 15 mg in the afternoon   Times medication right before nursing or pumping - more difficult to time     MEDICATION BENEFITS:  Controlled symptoms:  Hyperactivity - motor restlessness, Attention span, Distractability, Finishing tasks and Frustration tolerance  Uncontrolled symptoms:  None     MEDICATION SIDE EFFECTS:   Has:  none  Denies:  none    Hypertension Follow-up      Outpatient blood pressures are not being checked.    OB had noted we can decrease at her next FP appt today    Low Salt Diet: not monitoring salt    Amount of exercise or physical activity: None    Problems taking medications regularly: No    Medication side effects: none    Diet: regular (no restrictions)    Rash - saw derm previously - no new diagnosis, rash has returned as is more widespread.  Skin is itchy  wondering if she can use the medication with breastfeeding      Problem list and histories reviewed & adjusted, as indicated.  Additional history: as documented    Patient Active Problem List   Diagnosis     Polypharmacy     Gastroesophageal reflux disease     Irritable bowel syndrome     History of chronic urinary tract infection     Attention deficit hyperactivity disorder (ADHD), combined type     Other closed fracture of distal end of left fibula, initial encounter     Need for Tdap vaccination     History of  delivery, currently pregnant     Papanicolaou smear of cervix with low grade squamous intraepithelial lesion (LGSIL)      Abdominal pain     Encounter for triage in pregnant patient     Vaginal bleeding in pregnancy     Hypertension in pregnancy     Gestational hypertension     Preeclampsia      (normal spontaneous vaginal delivery)     Preeclampsia in postpartum period     BP Readings from Last 6 Encounters:   19 126/68   19 125/84   19 144/88   19 141/83   19 134/81   19 149/87     Current Outpatient Medications   Medication     amphetamine-dextroamphetamine (ADDERALL) 15 MG tablet     NIFEdipine ER (ADALAT CC) 60 MG 24 hr tablet     omeprazole (PRILOSEC) 20 MG DR capsule     No current facility-administered medications for this visit.             ROS:  Constitutional, HEENT, cardiovascular, pulmonary, gi and gu systems are negative, except as otherwise noted.    OBJECTIVE:                                                    /68   Pulse 89   Temp 97.8  F (36.6  C) (Oral)   Wt 82.1 kg (181 lb)   SpO2 98%   BMI 27.52 kg/m      Exam:  GENERAL: healthy, alert and no distress  EYES: Eyes grossly normal to inspection, PERRL and conjunctivae and sclerae normal  HENT: ear canals and TM's normal, nose and mouth without ulcers or lesions  NECK: no adenopathy, no asymmetry, masses, or scars and thyroid normal to palpation  RESP: lungs clear to auscultation - no rales, rhonchi or wheezes  CV: regular rate and rhythm, normal S1 S2, no S3 or S4, no murmur, click or rub, no peripheral edema and peripheral pulses strong  ABDOMEN: soft, nontender, no hepatosplenomegaly, no masses and bowel sounds normal  MS: no gross musculoskeletal defects noted, no edema  SKIN: chest has scattered hypopigmented macules  NEURO: Normal strength and tone, mentation intact and speech normal  PSYCH: mentation appears normal, affect normal/bright    MENTAL STATUS EXAM  Appearance: appropriate  Attitude: cooperative  Behavior: normal  Eye Contact: normal  Speech: normal  Orientation: oriented to person , place, time  and situation  Mood:  happy  Affect: Mood Congruent  Thought Process: clear      Diagnostic Test Results:  none      ASSESSMENT/PLAN:                                                    Hypertension; improved   Associated with the following complications:    pregnancy   Plan:  Medications:     Calcium channel blocker - decrease and check at home      (F90.2) Attention deficit hyperactivity disorder (ADHD), combined type  (primary encounter diagnosis)  Comment:   Plan: amphetamine-dextroamphetamine (ADDERALL) 15 MG         tablet, amphetamine-dextroamphetamine         (ADDERALL) 15 MG tablet,         amphetamine-dextroamphetamine (ADDERALL) 15 MG         tablet   Reviewed Preciado with the patient and infant symptoms not c/w adderall effects     (O14.95) Preeclampsia in postpartum period  Comment:   Plan: NIFEdipine ER (ADALAT CC) 30 MG 24 hr tablet        BP well controlled.  Ok to trial lower nifedipine dose.  Patient will check at home and send [employer] message with results    (B36.0) Tinea versicolor  Comment:   Plan: ketoconazole (NIZORAL) 200 MG tablet,         ketoconazole (NIZORAL) 2 % external shampoo    [facility] lists ketoconazole both oral and topical as L2 - very minimal transfer into milk and absorption by infant      See Patient Instructions    WINSTON Richards [facility]

## 2019-05-01 ENCOUNTER — OFFICE VISIT (OUTPATIENT)
Dept: OBGYN | Facility: CLINIC | Age: 39
End: 2019-05-01
Payer: COMMERCIAL

## 2019-05-01 ENCOUNTER — OFFICE VISIT (OUTPATIENT)
Dept: FAMILY MEDICINE | Facility: CLINIC | Age: 39
End: 2019-05-01
Payer: COMMERCIAL

## 2019-05-01 VITALS
SYSTOLIC BLOOD PRESSURE: 126 MMHG | HEART RATE: 89 BPM | OXYGEN SATURATION: 98 % | TEMPERATURE: 97.8 F | BODY MASS INDEX: 27.52 KG/M2 | WEIGHT: 181 LBS | DIASTOLIC BLOOD PRESSURE: 68 MMHG

## 2019-05-01 VITALS
HEART RATE: 89 BPM | TEMPERATURE: 97.8 F | WEIGHT: 181 LBS | BODY MASS INDEX: 27.52 KG/M2 | SYSTOLIC BLOOD PRESSURE: 126 MMHG | DIASTOLIC BLOOD PRESSURE: 68 MMHG

## 2019-05-01 DIAGNOSIS — B36.0 TINEA VERSICOLOR: ICD-10-CM

## 2019-05-01 DIAGNOSIS — F90.2 ATTENTION DEFICIT HYPERACTIVITY DISORDER (ADHD), COMBINED TYPE: Primary | ICD-10-CM

## 2019-05-01 DIAGNOSIS — Z13.9 SCREENING PROCEDURE: ICD-10-CM

## 2019-05-01 DIAGNOSIS — R87.610 ASCUS OF CERVIX WITH NEGATIVE HIGH RISK HPV: ICD-10-CM

## 2019-05-01 DIAGNOSIS — R87.612 PAPANICOLAOU SMEAR OF CERVIX WITH LOW GRADE SQUAMOUS INTRAEPITHELIAL LESION (LGSIL): Primary | ICD-10-CM

## 2019-05-01 PROBLEM — O16.9 HYPERTENSION IN PREGNANCY: Status: RESOLVED | Noted: 2019-02-08 | Resolved: 2019-05-01

## 2019-05-01 PROBLEM — O14.90 PREECLAMPSIA: Status: RESOLVED | Noted: 2019-02-13 | Resolved: 2019-05-01

## 2019-05-01 LAB — HCG UR QL: NEGATIVE

## 2019-05-01 PROCEDURE — 57455 BIOPSY OF CERVIX W/SCOPE: CPT | Performed by: OBSTETRICS & GYNECOLOGY

## 2019-05-01 PROCEDURE — 81025 URINE PREGNANCY TEST: CPT | Performed by: OBSTETRICS & GYNECOLOGY

## 2019-05-01 PROCEDURE — 88305 TISSUE EXAM BY PATHOLOGIST: CPT | Performed by: OBSTETRICS & GYNECOLOGY

## 2019-05-01 PROCEDURE — 99214 OFFICE O/P EST MOD 30 MIN: CPT | Performed by: NURSE PRACTITIONER

## 2019-05-01 RX ORDER — KETOCONAZOLE 200 MG/1
200 TABLET ORAL DAILY
Qty: 10 TABLET | Refills: 0 | Status: SHIPPED | OUTPATIENT
Start: 2019-05-01 | End: 2019-12-11

## 2019-05-01 RX ORDER — DEXTROAMPHETAMINE SACCHARATE, AMPHETAMINE ASPARTATE, DEXTROAMPHETAMINE SULFATE AND AMPHETAMINE SULFATE 3.75; 3.75; 3.75; 3.75 MG/1; MG/1; MG/1; MG/1
TABLET ORAL
Qty: 75 TABLET | Refills: 0 | Status: SHIPPED | OUTPATIENT
Start: 2019-06-12 | End: 2019-12-11

## 2019-05-01 RX ORDER — DEXTROAMPHETAMINE SACCHARATE, AMPHETAMINE ASPARTATE, DEXTROAMPHETAMINE SULFATE AND AMPHETAMINE SULFATE 3.75; 3.75; 3.75; 3.75 MG/1; MG/1; MG/1; MG/1
TABLET ORAL
Qty: 75 TABLET | Refills: 0 | Status: SHIPPED | OUTPATIENT
Start: 2019-07-12 | End: 2019-11-11

## 2019-05-01 RX ORDER — NIFEDIPINE 30 MG
30 TABLET, EXTENDED RELEASE ORAL DAILY
Qty: 90 TABLET | Refills: 0 | Status: SHIPPED | OUTPATIENT
Start: 2019-05-01 | End: 2019-06-12

## 2019-05-01 RX ORDER — DEXTROAMPHETAMINE SACCHARATE, AMPHETAMINE ASPARTATE, DEXTROAMPHETAMINE SULFATE AND AMPHETAMINE SULFATE 3.75; 3.75; 3.75; 3.75 MG/1; MG/1; MG/1; MG/1
TABLET ORAL
Qty: 75 TABLET | Refills: 0 | Status: SHIPPED | OUTPATIENT
Start: 2019-05-12 | End: 2019-12-11

## 2019-05-01 RX ORDER — KETOCONAZOLE 20 MG/ML
SHAMPOO TOPICAL DAILY PRN
Qty: 120 ML | Refills: 11 | Status: SHIPPED | OUTPATIENT
Start: 2019-05-01 | End: 2019-12-11

## 2019-05-01 NOTE — PATIENT INSTRUCTIONS
Fenugreek supplement for mother-  (to increase milk production):  Fenugreek capsules: 3 capsules 3 times daily for 1-2 weeks.  Dosage range should be 1000-1500mg three times/day.   OR  Moringa/Mulungaway capsules (Go-Lacta is one brand) - dose range is 700-1050 mg 2-3 times a day  BONUS  1. Mother's Milk tea- 3 times/day   2. Omego 3 supplements if not in prenatal vitamins-for mother - -300mg daily  3. Oatmeal for mother-helps to increase milk supply- oatmeal cookies too!

## 2019-05-01 NOTE — PROGRESS NOTES
Traci Sloan is a 38 year old female  who presents for initial colposcopy, referred by Nuris Andrews MD. Pap smear on 2019 showed: ASCUS. The prior pap showed    18:  LSIL, HPV neg (colposcopy not done in pregnancy)  19:  ASCUS, HPV neg (pap repeated at post-partum visit)      No LMP recorded.  UPT today is negative  Patient does not smoke  Type of contraception: none  Age at first sexual intercourse:   Number of sexual partners (lifetime):   Past GYN history: No STD history  Prior cervical/vaginal disease:   Prior cervical treatment: no treatment.      PROCEDURE:      Before the procedure, it was ensured that the patient was educated regarding the nature of her findings to date, the implications, and what was to be done. She has been made aware of the role of HPV, the natural history of infection, ways to minimize her future risk, the effect of HPV on the cervix, and treatment options available should they be indicated. The details of the colposcopic procedure were reviewed. All questions were answered before proceeding, and informed consent was therefore obtained.      Speculum placed in vagina and excellent visualization of cervix acheived, cervix swabbed x 3 with acetic acid solution.    Biopsy taken at 1:00  FINDINGS:  Cervix: acetowhitening noted mild at SCJ.    Please refer to images section for details.  SCJ seen?: yes   ECC done?: No  Satisfactory examination?: yes      ASSESSMENT: Anticipate normal pathology  PLAN: specimens labelled and sent to Pathology and will base further treatment on Pathology findings      Dolores Norris MD

## 2019-05-04 LAB — COPATH REPORT: NORMAL

## 2019-06-05 ENCOUNTER — MYC MEDICAL ADVICE (OUTPATIENT)
Dept: FAMILY MEDICINE | Facility: CLINIC | Age: 39
End: 2019-06-05

## 2019-06-05 DIAGNOSIS — F90.2 ATTENTION DEFICIT HYPERACTIVITY DISORDER (ADHD), COMBINED TYPE: Primary | ICD-10-CM

## 2019-06-05 RX ORDER — DEXTROAMPHETAMINE SACCHARATE, AMPHETAMINE ASPARTATE MONOHYDRATE, DEXTROAMPHETAMINE SULFATE AND AMPHETAMINE SULFATE 7.5; 7.5; 7.5; 7.5 MG/1; MG/1; MG/1; MG/1
30 CAPSULE, EXTENDED RELEASE ORAL DAILY
Qty: 30 CAPSULE | Refills: 0 | Status: SHIPPED | OUTPATIENT
Start: 2019-07-05 | End: 2019-08-27

## 2019-06-05 RX ORDER — DEXTROAMPHETAMINE SACCHARATE, AMPHETAMINE ASPARTATE, DEXTROAMPHETAMINE SULFATE AND AMPHETAMINE SULFATE 2.5; 2.5; 2.5; 2.5 MG/1; MG/1; MG/1; MG/1
10 TABLET ORAL DAILY
Qty: 30 TABLET | Refills: 0 | Status: SHIPPED | OUTPATIENT
Start: 2019-07-05 | End: 2019-08-27

## 2019-06-05 RX ORDER — DEXTROAMPHETAMINE SACCHARATE, AMPHETAMINE ASPARTATE, DEXTROAMPHETAMINE SULFATE AND AMPHETAMINE SULFATE 2.5; 2.5; 2.5; 2.5 MG/1; MG/1; MG/1; MG/1
10 TABLET ORAL DAILY
Qty: 30 TABLET | Refills: 0 | Status: SHIPPED | OUTPATIENT
Start: 2019-06-05 | End: 2019-08-27

## 2019-06-05 RX ORDER — DEXTROAMPHETAMINE SACCHARATE, AMPHETAMINE ASPARTATE MONOHYDRATE, DEXTROAMPHETAMINE SULFATE AND AMPHETAMINE SULFATE 7.5; 7.5; 7.5; 7.5 MG/1; MG/1; MG/1; MG/1
30 CAPSULE, EXTENDED RELEASE ORAL DAILY
Qty: 30 CAPSULE | Refills: 0 | Status: SHIPPED | OUTPATIENT
Start: 2019-08-05 | End: 2019-08-26

## 2019-06-05 RX ORDER — DEXTROAMPHETAMINE SACCHARATE, AMPHETAMINE ASPARTATE MONOHYDRATE, DEXTROAMPHETAMINE SULFATE AND AMPHETAMINE SULFATE 7.5; 7.5; 7.5; 7.5 MG/1; MG/1; MG/1; MG/1
30 CAPSULE, EXTENDED RELEASE ORAL DAILY
Qty: 30 CAPSULE | Refills: 0 | Status: SHIPPED | OUTPATIENT
Start: 2019-06-05 | End: 2019-08-27

## 2019-06-05 RX ORDER — DEXTROAMPHETAMINE SACCHARATE, AMPHETAMINE ASPARTATE, DEXTROAMPHETAMINE SULFATE AND AMPHETAMINE SULFATE 2.5; 2.5; 2.5; 2.5 MG/1; MG/1; MG/1; MG/1
10 TABLET ORAL DAILY
Qty: 30 TABLET | Refills: 0 | Status: SHIPPED | OUTPATIENT
Start: 2019-08-05 | End: 2019-08-26

## 2019-06-05 NOTE — TELEPHONE ENCOUNTER
Yee,    Please see mychart message from patient.     Cued 3 scripts for each (if okay with signing, not sure if you would like 1 month supply or 3 month). Please check start dates, especially if you don't want 3 months supply.    Luna Bran RN  [facility]

## 2019-08-26 ENCOUNTER — MYC MEDICAL ADVICE (OUTPATIENT)
Dept: FAMILY MEDICINE | Facility: CLINIC | Age: 39
End: 2019-08-26

## 2019-08-26 ENCOUNTER — MYC REFILL (OUTPATIENT)
Dept: FAMILY MEDICINE | Facility: CLINIC | Age: 39
End: 2019-08-26

## 2019-08-26 ENCOUNTER — E-VISIT (OUTPATIENT)
Dept: FAMILY MEDICINE | Facility: CLINIC | Age: 39
End: 2019-08-26
Payer: COMMERCIAL

## 2019-08-26 DIAGNOSIS — F90.2 ATTENTION DEFICIT HYPERACTIVITY DISORDER (ADHD), COMBINED TYPE: ICD-10-CM

## 2019-08-26 DIAGNOSIS — F90.0 ATTENTION DEFICIT HYPERACTIVITY DISORDER (ADHD), PREDOMINANTLY INATTENTIVE TYPE: Primary | ICD-10-CM

## 2019-08-26 PROCEDURE — 99444 ZZC PHYSICIAN ONLINE EVALUATION & MANAGEMENT SERVICE: CPT | Performed by: NURSE PRACTITIONER

## 2019-08-27 NOTE — TELEPHONE ENCOUNTER
Yee,    Please review/sign or advise for refill of amphetamine-dextroamphetamine (ADDERALL XR) 30 MG 24 hr capsule and amphetamine-dextroamphetamine (ADDERALL) 10 MG tablet. Cued for start dates 08/27, 09/27, and 10/27. Pt submitted E-visit as well.    : Dextroamp-Amphetamin 10 Mg Tab last filled on 08/05/19 for #30   Adderall Xr 30 Mg Capsule last filled on 08/05/19 for #30    Luna Lopez RN  [facility]

## 2019-08-28 RX ORDER — DEXTROAMPHETAMINE SACCHARATE, AMPHETAMINE ASPARTATE MONOHYDRATE, DEXTROAMPHETAMINE SULFATE AND AMPHETAMINE SULFATE 7.5; 7.5; 7.5; 7.5 MG/1; MG/1; MG/1; MG/1
30 CAPSULE, EXTENDED RELEASE ORAL DAILY
Qty: 30 CAPSULE | Refills: 0 | Status: SHIPPED | OUTPATIENT
Start: 2019-10-27 | End: 2019-11-11

## 2019-08-28 RX ORDER — DEXTROAMPHETAMINE SACCHARATE, AMPHETAMINE ASPARTATE, DEXTROAMPHETAMINE SULFATE AND AMPHETAMINE SULFATE 2.5; 2.5; 2.5; 2.5 MG/1; MG/1; MG/1; MG/1
10 TABLET ORAL DAILY
Qty: 30 TABLET | Refills: 0 | Status: SHIPPED | OUTPATIENT
Start: 2019-08-28 | End: 2019-12-11

## 2019-08-28 RX ORDER — DEXTROAMPHETAMINE SACCHARATE, AMPHETAMINE ASPARTATE, DEXTROAMPHETAMINE SULFATE AND AMPHETAMINE SULFATE 2.5; 2.5; 2.5; 2.5 MG/1; MG/1; MG/1; MG/1
10 TABLET ORAL DAILY
Qty: 30 TABLET | Refills: 0 | Status: SHIPPED | OUTPATIENT
Start: 2019-10-27 | End: 2019-12-11

## 2019-08-28 RX ORDER — DEXTROAMPHETAMINE SACCHARATE, AMPHETAMINE ASPARTATE MONOHYDRATE, DEXTROAMPHETAMINE SULFATE AND AMPHETAMINE SULFATE 7.5; 7.5; 7.5; 7.5 MG/1; MG/1; MG/1; MG/1
30 CAPSULE, EXTENDED RELEASE ORAL DAILY
Qty: 30 CAPSULE | Refills: 0 | Status: SHIPPED | OUTPATIENT
Start: 2019-08-28 | End: 2019-12-11

## 2019-08-28 RX ORDER — DEXTROAMPHETAMINE SACCHARATE, AMPHETAMINE ASPARTATE MONOHYDRATE, DEXTROAMPHETAMINE SULFATE AND AMPHETAMINE SULFATE 7.5; 7.5; 7.5; 7.5 MG/1; MG/1; MG/1; MG/1
30 CAPSULE, EXTENDED RELEASE ORAL DAILY
Qty: 30 CAPSULE | Refills: 0 | Status: SHIPPED | OUTPATIENT
Start: 2019-09-27 | End: 2019-12-11

## 2019-08-28 RX ORDER — DEXTROAMPHETAMINE SACCHARATE, AMPHETAMINE ASPARTATE, DEXTROAMPHETAMINE SULFATE AND AMPHETAMINE SULFATE 2.5; 2.5; 2.5; 2.5 MG/1; MG/1; MG/1; MG/1
10 TABLET ORAL DAILY
Qty: 30 TABLET | Refills: 0 | Status: SHIPPED | OUTPATIENT
Start: 2019-09-27 | End: 2019-12-11

## 2019-08-29 NOTE — TELEPHONE ENCOUNTER
[employer] message sent to patient that prescriptions are ready to be picked up at the  of the clinic

## 2019-08-29 NOTE — TELEPHONE ENCOUNTER
bigtincanhart message sent to patient that prescriptions are ready to be picked up at the  of the clinic

## 2019-11-05 ENCOUNTER — HEALTH MAINTENANCE LETTER (OUTPATIENT)
Age: 39
End: 2019-11-05

## 2019-11-11 ENCOUNTER — MYC REFILL (OUTPATIENT)
Dept: FAMILY MEDICINE | Facility: CLINIC | Age: 39
End: 2019-11-11

## 2019-11-11 DIAGNOSIS — F90.2 ATTENTION DEFICIT HYPERACTIVITY DISORDER (ADHD), COMBINED TYPE: ICD-10-CM

## 2019-11-11 NOTE — TELEPHONE ENCOUNTER
Requested Prescriptions   Pending Prescriptions Disp Refills     amphetamine-dextroamphetamine (ADDERALL) 15 MG tablet 75 tablet 0     Sig: TAKE 1.5 TAB BY MOUTH IN THE AM AND 1 TAB IN THE PM       There is no refill protocol information for this order        amphetamine-dextroamphetamine (ADDERALL XR) 30 MG 24 hr capsule 30 capsule 0     Sig: Take 1 capsule (30 mg) by mouth daily       There is no refill protocol information for this order        Routing refill request to provider for review/approval because:  Drug not on the [facility] or [facility] refill protocol or controlled substance

## 2019-11-12 NOTE — TELEPHONE ENCOUNTER
Next 5 appointments (look out 90 days)    Dec 04, 2019  9:00 AM CST  PHYSICAL with WINSTON Griffin CNP  [facility] ([facility]) [address] [place] [id number] [phone]         Routing refill request to provider for review/approval because:  Drug not on the [facility], [facility] or [facility] refill protocol or controlled substance     CHECKED; LAST FILLED 10/27/19   ADDERALL XR 30 mg Capsules, 30 capsules, 0 refills  LAST FILLED ADDERALL 10 mg, 30 tablets, 0 refills.     Nayely Bonner RN on 11/12/2019 at 8:51 AM

## 2019-11-13 RX ORDER — DEXTROAMPHETAMINE SACCHARATE, AMPHETAMINE ASPARTATE, DEXTROAMPHETAMINE SULFATE AND AMPHETAMINE SULFATE 3.75; 3.75; 3.75; 3.75 MG/1; MG/1; MG/1; MG/1
TABLET ORAL
Qty: 75 TABLET | Refills: 0 | Status: SHIPPED | OUTPATIENT
Start: 2019-11-13 | End: 2019-12-11

## 2019-11-13 RX ORDER — DEXTROAMPHETAMINE SACCHARATE, AMPHETAMINE ASPARTATE MONOHYDRATE, DEXTROAMPHETAMINE SULFATE AND AMPHETAMINE SULFATE 7.5; 7.5; 7.5; 7.5 MG/1; MG/1; MG/1; MG/1
30 CAPSULE, EXTENDED RELEASE ORAL DAILY
Qty: 30 CAPSULE | Refills: 0 | Status: SHIPPED | OUTPATIENT
Start: 2019-11-13 | End: 2019-12-11

## 2019-11-13 NOTE — TELEPHONE ENCOUNTER
Mychart message sent to patient stating that prescription is ready to be picked up at the  of the clinic

## 2019-12-11 ENCOUNTER — OFFICE VISIT (OUTPATIENT)
Dept: FAMILY MEDICINE | Facility: CLINIC | Age: 39
End: 2019-12-11
Payer: MEDICAID

## 2019-12-11 VITALS
OXYGEN SATURATION: 97 % | HEART RATE: 86 BPM | HEIGHT: 66 IN | BODY MASS INDEX: 28.7 KG/M2 | WEIGHT: 178.6 LBS | SYSTOLIC BLOOD PRESSURE: 140 MMHG | TEMPERATURE: 98.2 F | DIASTOLIC BLOOD PRESSURE: 96 MMHG

## 2019-12-11 DIAGNOSIS — F90.2 ATTENTION DEFICIT HYPERACTIVITY DISORDER (ADHD), COMBINED TYPE: Primary | ICD-10-CM

## 2019-12-11 PROCEDURE — 99213 OFFICE O/P EST LOW 20 MIN: CPT | Performed by: NURSE PRACTITIONER

## 2019-12-11 RX ORDER — DEXTROAMPHETAMINE SACCHARATE, AMPHETAMINE ASPARTATE, DEXTROAMPHETAMINE SULFATE AND AMPHETAMINE SULFATE 2.5; 2.5; 2.5; 2.5 MG/1; MG/1; MG/1; MG/1
10 TABLET ORAL DAILY
Qty: 30 TABLET | Refills: 0 | Status: SHIPPED | OUTPATIENT
Start: 2019-12-11 | End: 2020-02-24

## 2019-12-11 RX ORDER — DEXTROAMPHETAMINE SACCHARATE, AMPHETAMINE ASPARTATE MONOHYDRATE, DEXTROAMPHETAMINE SULFATE AND AMPHETAMINE SULFATE 7.5; 7.5; 7.5; 7.5 MG/1; MG/1; MG/1; MG/1
30 CAPSULE, EXTENDED RELEASE ORAL DAILY
Qty: 30 CAPSULE | Refills: 0 | Status: SHIPPED | OUTPATIENT
Start: 2020-01-11 | End: 2020-02-24

## 2019-12-11 RX ORDER — DEXTROAMPHETAMINE SACCHARATE, AMPHETAMINE ASPARTATE, DEXTROAMPHETAMINE SULFATE AND AMPHETAMINE SULFATE 2.5; 2.5; 2.5; 2.5 MG/1; MG/1; MG/1; MG/1
10 TABLET ORAL DAILY
Qty: 30 TABLET | Refills: 0 | Status: SHIPPED | OUTPATIENT
Start: 2020-02-11 | End: 2020-08-10

## 2019-12-11 RX ORDER — DEXTROAMPHETAMINE SACCHARATE, AMPHETAMINE ASPARTATE MONOHYDRATE, DEXTROAMPHETAMINE SULFATE AND AMPHETAMINE SULFATE 7.5; 7.5; 7.5; 7.5 MG/1; MG/1; MG/1; MG/1
30 CAPSULE, EXTENDED RELEASE ORAL DAILY
Qty: 30 CAPSULE | Refills: 0 | Status: SHIPPED | OUTPATIENT
Start: 2019-12-11 | End: 2020-02-24

## 2019-12-11 RX ORDER — DEXTROAMPHETAMINE SACCHARATE, AMPHETAMINE ASPARTATE MONOHYDRATE, DEXTROAMPHETAMINE SULFATE AND AMPHETAMINE SULFATE 7.5; 7.5; 7.5; 7.5 MG/1; MG/1; MG/1; MG/1
30 CAPSULE, EXTENDED RELEASE ORAL DAILY
Qty: 30 CAPSULE | Refills: 0 | Status: SHIPPED | OUTPATIENT
Start: 2020-02-11 | End: 2020-08-10

## 2019-12-11 RX ORDER — DEXTROAMPHETAMINE SACCHARATE, AMPHETAMINE ASPARTATE, DEXTROAMPHETAMINE SULFATE AND AMPHETAMINE SULFATE 2.5; 2.5; 2.5; 2.5 MG/1; MG/1; MG/1; MG/1
10 TABLET ORAL DAILY
Qty: 30 TABLET | Refills: 0 | Status: SHIPPED | OUTPATIENT
Start: 2020-01-11 | End: 2020-02-24

## 2019-12-11 ASSESSMENT — MIFFLIN-ST. JEOR: SCORE: 1514.12

## 2019-12-11 NOTE — PROGRESS NOTES
"   SUBJECTIVE:   CC: Traci Sloan is an 39 year old woman who presents for ADHD recheck      ADD  Any changes in medications?  Wanting to talk to you about this, routines havent been the same recently but not as effective.  Does better when she has routine which was disrupted by loss of job  Concerns about ADD/medications? Feeling like slightly ineffective - less concentration  Medication side effects:  No    Working at [employer] since last Monday  Laid off from [employer] which got bought      Social History     Tobacco Use     Smoking status: Former Smoker     Last attempt to quit: 10/25/2015     Years since quittin.1     Smokeless tobacco: Never Used   Substance Use Topics     Alcohol use: No     Alcohol/week: 0.0 standard drinks     Comment: occ.        PAP / HPV Latest Ref Rng & Units 2019   PAP - ASC-US(A) LSIL(A)   HPV 16 DNA NEG:Negative Negative Negative   HPV 18 DNA NEG:Negative Negative Negative   OTHER HR HPV NEG:Negative Negative Negative     Reviewed and updated as needed this visit by clinical staff  Tobacco  Allergies  Soc Hx        Reviewed and updated as needed this visit by Provider        ROS:   ROS: 7 point ROS neg other than the symptoms noted above in the HPI.    OBJECTIVE:   BP (!) 140/96   Pulse 86   Temp 98.2  F (36.8  C)   Ht 1.68 m (5' 6.14\")   Wt 81 kg (178 lb 9.6 oz)   LMP 2019 (LMP Unknown)   SpO2 97%   BMI 28.70 kg/m    EXAM:  GENERAL: healthy, alert and no distress  EYES: Eyes grossly normal to inspection, PERRL and conjunctivae and sclerae normal  NECK: no adenopathy, no asymmetry, masses, or scars and thyroid normal to palpation  RESP: lungs clear to auscultation - no rales, rhonchi or wheezes  CV: regular rate and rhythm, normal S1 S2, no S3 or S4, no murmur, click or rub, no peripheral edema and peripheral pulses strong  ABDOMEN: soft, nontender, no hepatosplenomegaly, no masses and bowel sounds normal  MS: no gross musculoskeletal defects " noted, no edema  NEURO: Normal strength and tone, mentation intact and speech normal  PSYCH: mentation appears normal, affect normal/bright  MENTAL STATUS EXAM  Appearance: appropriate  Attitude: cooperative  Behavior: normal  Motor: fidgety  Eye Contact: normal  Speech: normal  Orientation: oriented to person , place, time and situation  Mood:  Happy, not depressed  Affect: Appropriate/mood-congruent and Anxious/Nervous   Thought Process: clear  Suicidal Ideation: reports thoughts, no intention  Hallucination: no    Diagnostic Test Results:  Labs reviewed in Epic  none     ASSESSMENT/PLAN:   (F90.2) Attention deficit hyperactivity disorder (ADHD), combined type  (primary encounter diagnosis)  Comment:   Plan: amphetamine-dextroamphetamine (ADDERALL XR) 30         MG 24 hr capsule, amphetamine-dextroamphetamine        (ADDERALL XR) 30 MG 24 hr capsule,         amphetamine-dextroamphetamine (ADDERALL XR) 30         MG 24 hr capsule, amphetamine-dextroamphetamine        (ADDERALL) 10 MG tablet,         amphetamine-dextroamphetamine (ADDERALL) 10 MG         tablet, amphetamine-dextroamphetamine         (ADDERALL) 10 MG tablet   Maintain same dose until settled into job.  If still not working we can consider new dose    Will do pap with OB-GYN in April    WINSTON Richards [facility]

## 2019-12-11 NOTE — PATIENT INSTRUCTIONS
In three months, ok refill by phone unless changes are needed.  If changes, come in.  Pap in April 2020    After that we can do the physical and pap

## 2020-02-23 ENCOUNTER — MYC MEDICAL ADVICE (OUTPATIENT)
Dept: FAMILY MEDICINE | Facility: CLINIC | Age: 40
End: 2020-02-23

## 2020-02-23 DIAGNOSIS — B36.0 TINEA VERSICOLOR: Primary | ICD-10-CM

## 2020-02-24 RX ORDER — KETOCONAZOLE 200 MG/1
TABLET ORAL
Refills: 0 | COMMUNITY
Start: 2019-05-02 | End: 2020-02-26

## 2020-02-24 RX ORDER — KETOCONAZOLE 20 MG/ML
SHAMPOO TOPICAL
Refills: 11 | COMMUNITY
Start: 2019-05-01 | End: 2021-09-27

## 2020-02-24 RX ORDER — NIFEDIPINE 30 MG
TABLET, EXTENDED RELEASE ORAL
Refills: 0 | COMMUNITY
Start: 2019-06-14 | End: 2021-02-03

## 2020-02-24 NOTE — TELEPHONE ENCOUNTER
Routing to Provider.     Please read patient MyChart message. Is requesting Ketoconazole in addition to adderall dose increase. Ketoconazole reconciled from outside source.   Please advise.   Requested Prescriptions   Pending Prescriptions Disp Refills     ketoconazole (NIZORAL) 2 % external shampoo  11       There is no refill protocol information for this order        ketoconazole (NIZORAL) 200 MG tablet  0       There is no refill protocol information for this order           Routing refill request to provider for review/approval because:  Drug not active on patient's medication list  Medication is reported/historical  Nayely Bonner RN on 2/24/2020 at 10:07 AM

## 2020-02-26 DIAGNOSIS — B37.31 YEAST INFECTION OF THE VAGINA: Primary | ICD-10-CM

## 2020-02-26 RX ORDER — KETOCONAZOLE 200 MG/1
TABLET ORAL
Refills: 0 | Status: CANCELLED | OUTPATIENT
Start: 2020-02-26

## 2020-02-26 RX ORDER — KETOCONAZOLE 20 MG/ML
SHAMPOO TOPICAL
Qty: 120 ML | Refills: 11 | Status: CANCELLED | OUTPATIENT
Start: 2020-02-26

## 2020-02-26 NOTE — TELEPHONE ENCOUNTER
ketoconazole (NIZORAL) 200 MG tablet      Last Written Prescription Date:  05/02/2019  Last Fill Quantity: ,   # refills:   Last Office Visit: 12/11/2019  Future Office visit:       Routing refill request to provider for review/approval because:  Medication is reported/historical

## 2020-02-26 NOTE — TELEPHONE ENCOUNTER
Pt is out of medication.  Pt states that she  prescription one a year and that seem to work for her.    Pharmacy attached.    If any questions, can reach out to pt. Phone number is on file, can lvm.

## 2020-02-26 NOTE — TELEPHONE ENCOUNTER
Please advise e-visit ( a bit early for ADHD typical visit) and the questions about oral antifungals (generrally only renew topical antifungals readily).  MARIBETH Hernandez

## 2020-02-27 ENCOUNTER — E-VISIT (OUTPATIENT)
Dept: FAMILY MEDICINE | Facility: CLINIC | Age: 40
End: 2020-02-27
Payer: MEDICAID

## 2020-02-27 ENCOUNTER — E-VISIT (OUTPATIENT)
Dept: FAMILY MEDICINE | Facility: CLINIC | Age: 40
End: 2020-02-27

## 2020-02-27 DIAGNOSIS — Z53.9 ERRONEOUS ENCOUNTER--DISREGARD: Primary | ICD-10-CM

## 2020-02-27 DIAGNOSIS — B36.0 TINEA VERSICOLOR: Primary | ICD-10-CM

## 2020-02-27 DIAGNOSIS — F90.2 ATTENTION DEFICIT HYPERACTIVITY DISORDER (ADHD), COMBINED TYPE: ICD-10-CM

## 2020-02-27 PROCEDURE — 99422 OL DIG E/M SVC 11-20 MIN: CPT | Performed by: NURSE PRACTITIONER

## 2020-02-27 NOTE — TELEPHONE ENCOUNTER
Wrote patient reply on MyChart, requested patient make an e-visit with provider.   Nayely Bonner RN on 2/27/2020 at 2:46 PM

## 2020-02-28 RX ORDER — KETOCONAZOLE 200 MG/1
200 TABLET ORAL DAILY
Qty: 10 TABLET | Refills: 0 | Status: SHIPPED | OUTPATIENT
Start: 2020-02-28 | End: 2020-09-10

## 2020-02-28 RX ORDER — DEXTROAMPHETAMINE SACCHARATE, AMPHETAMINE ASPARTATE MONOHYDRATE, DEXTROAMPHETAMINE SULFATE AND AMPHETAMINE SULFATE 5; 5; 5; 5 MG/1; MG/1; MG/1; MG/1
40 CAPSULE, EXTENDED RELEASE ORAL DAILY
Qty: 120 CAPSULE | Refills: 0 | Status: SHIPPED | OUTPATIENT
Start: 2020-04-30 | End: 2020-08-10

## 2020-02-28 RX ORDER — KETOCONAZOLE 200 MG/1
200 TABLET ORAL DAILY
Qty: 1 TABLET | Refills: 0 | Status: SHIPPED | OUTPATIENT
Start: 2020-02-28 | End: 2020-08-10

## 2020-02-28 NOTE — TELEPHONE ENCOUNTER
Routing to Provider.     Please see patient MyChart reply.   Nayely Bonner RN on 2/28/2020 at 9:09 AM

## 2020-03-05 ENCOUNTER — MYC MEDICAL ADVICE (OUTPATIENT)
Dept: FAMILY MEDICINE | Facility: CLINIC | Age: 40
End: 2020-03-05

## 2020-03-05 DIAGNOSIS — F90.2 ATTENTION DEFICIT HYPERACTIVITY DISORDER (ADHD), COMBINED TYPE: Primary | ICD-10-CM

## 2020-03-05 RX ORDER — DEXTROAMPHETAMINE SACCHARATE, AMPHETAMINE ASPARTATE MONOHYDRATE, DEXTROAMPHETAMINE SULFATE AND AMPHETAMINE SULFATE 5; 5; 5; 5 MG/1; MG/1; MG/1; MG/1
40 CAPSULE, EXTENDED RELEASE ORAL DAILY
Qty: 120 CAPSULE | Refills: 0 | Status: SHIPPED | OUTPATIENT
Start: 2020-03-05 | End: 2020-08-10

## 2020-03-05 NOTE — TELEPHONE ENCOUNTER
Routing to Provider.     Please read patient [employer] message. Refill queued.   Requested Prescriptions   Pending Prescriptions Disp Refills     amphetamine-dextroamphetamine (ADDERALL XR) 20 MG 24 hr capsule 112 capsule 0     Sig: Take 2 capsules (40 mg) by mouth daily       There is no refill protocol information for this order        Last Written Prescription Date:  4/30/2020  Last Fill Quantity: 120,   # refills: 0        Routing refill request to provider for review/approval because:  Drug not active on patient's medication list  Medication is reported/historical     CHECKED  LAST FILLED on:   adderall 10 mg tablets- 02/11/2020, 30 tablets, 0 refills by ZANA Medrano  1/11/2020, 30 tablets, 0 refills by ZANA Medrano  Adderall 30 mg capsules- -02/11/2020, 30 capsules, 0 refills bu ZANA Medrano.   Nayely Bonner, RN on 3/5/2020 at 9:24 AM

## 2020-03-06 ENCOUNTER — TELEPHONE (OUTPATIENT)
Dept: FAMILY MEDICINE | Facility: CLINIC | Age: 40
End: 2020-03-06

## 2020-03-06 NOTE — TELEPHONE ENCOUNTER
Prior Authorization Retail Medication Request    Medication/Dose: amphetamine-dextroamphetamine (ADDERALL XR) 20 MG 24 hr capsule  ICD code (if different than what is on RX):    Previously Tried and Failed:    Rationale:      Insurance Name:  [phone]  Insurance ID:  [id number]  States Max 2 per day      Pharmacy Information (if different than what is on RX)  [facility]  Phone:  [phone]  Fax: [phone]

## 2020-03-10 NOTE — TELEPHONE ENCOUNTER
Central Prior Authorization Team   Phone: [phone]      PA Initiation    Medication: amphetamine-dextroamphetamine (ADDERALL XR) 20 MG 24 hr capsule  Insurance Company: [employer] - Phone [phone] Fax [phone]  Pharmacy Filling the Rx: [id number] - [place], [place] - [address] AT [address]  Filling Pharmacy Phone: [phone]  Filling Pharmacy Fax:    Start Date: 3/10/2020

## 2020-03-13 NOTE — TELEPHONE ENCOUNTER
Prior Authorization Approval    Authorization Effective Date: 3/12/2020  Authorization Expiration Date: 3/11/2021  Medication: amphetamine-dextroamphetamine (ADDERALL XR) 20 MG 24 hr capsule-APPROVED  Approved Dose/Quantity:   Reference #:     Insurance Company: [employer] - Phone [phone] Fax [phone]  Expected CoPay:       CoPay Card Available:      Foundation Assistance Needed:    Which Pharmacy is filling the prescription (Not needed for infusion/clinic administered): [id number] - [address] AT [facility] [address] [facility]  Pharmacy Notified: Yes-Left message with approval, process, fill, and notify patient when ready  Patient Notified: No

## 2020-04-20 ENCOUNTER — MYC REFILL (OUTPATIENT)
Dept: FAMILY MEDICINE | Facility: CLINIC | Age: 40
End: 2020-04-20

## 2020-04-20 DIAGNOSIS — F90.2 ATTENTION DEFICIT HYPERACTIVITY DISORDER (ADHD), COMBINED TYPE: ICD-10-CM

## 2020-04-20 RX ORDER — DEXTROAMPHETAMINE SACCHARATE, AMPHETAMINE ASPARTATE MONOHYDRATE, DEXTROAMPHETAMINE SULFATE AND AMPHETAMINE SULFATE 5; 5; 5; 5 MG/1; MG/1; MG/1; MG/1
40 CAPSULE, EXTENDED RELEASE ORAL DAILY
Qty: 120 CAPSULE | Refills: 0 | Status: CANCELLED | OUTPATIENT
Start: 2020-04-20

## 2020-04-21 RX ORDER — DEXTROAMPHETAMINE SACCHARATE, AMPHETAMINE ASPARTATE MONOHYDRATE, DEXTROAMPHETAMINE SULFATE AND AMPHETAMINE SULFATE 7.5; 7.5; 7.5; 7.5 MG/1; MG/1; MG/1; MG/1
30 CAPSULE, EXTENDED RELEASE ORAL DAILY
Qty: 30 CAPSULE | Refills: 0 | Status: SHIPPED | OUTPATIENT
Start: 2020-05-22 | End: 2020-08-10

## 2020-04-21 RX ORDER — DEXTROAMPHETAMINE SACCHARATE, AMPHETAMINE ASPARTATE MONOHYDRATE, DEXTROAMPHETAMINE SULFATE AND AMPHETAMINE SULFATE 2.5; 2.5; 2.5; 2.5 MG/1; MG/1; MG/1; MG/1
10 CAPSULE, EXTENDED RELEASE ORAL DAILY
Qty: 30 CAPSULE | Refills: 0 | Status: SHIPPED | OUTPATIENT
Start: 2020-05-22 | End: 2020-08-10

## 2020-04-21 RX ORDER — DEXTROAMPHETAMINE SACCHARATE, AMPHETAMINE ASPARTATE MONOHYDRATE, DEXTROAMPHETAMINE SULFATE AND AMPHETAMINE SULFATE 7.5; 7.5; 7.5; 7.5 MG/1; MG/1; MG/1; MG/1
30 CAPSULE, EXTENDED RELEASE ORAL DAILY
Qty: 30 CAPSULE | Refills: 0 | Status: SHIPPED | OUTPATIENT
Start: 2020-06-22 | End: 2020-06-17

## 2020-04-21 RX ORDER — DEXTROAMPHETAMINE SACCHARATE, AMPHETAMINE ASPARTATE MONOHYDRATE, DEXTROAMPHETAMINE SULFATE AND AMPHETAMINE SULFATE 7.5; 7.5; 7.5; 7.5 MG/1; MG/1; MG/1; MG/1
30 CAPSULE, EXTENDED RELEASE ORAL DAILY
Qty: 30 CAPSULE | Refills: 0 | Status: SHIPPED | OUTPATIENT
Start: 2020-04-21 | End: 2020-08-10

## 2020-04-21 RX ORDER — DEXTROAMPHETAMINE SACCHARATE, AMPHETAMINE ASPARTATE MONOHYDRATE, DEXTROAMPHETAMINE SULFATE AND AMPHETAMINE SULFATE 2.5; 2.5; 2.5; 2.5 MG/1; MG/1; MG/1; MG/1
10 CAPSULE, EXTENDED RELEASE ORAL DAILY
Qty: 30 CAPSULE | Refills: 0 | Status: SHIPPED | OUTPATIENT
Start: 2020-06-22 | End: 2020-06-17

## 2020-04-21 RX ORDER — DEXTROAMPHETAMINE SACCHARATE, AMPHETAMINE ASPARTATE MONOHYDRATE, DEXTROAMPHETAMINE SULFATE AND AMPHETAMINE SULFATE 2.5; 2.5; 2.5; 2.5 MG/1; MG/1; MG/1; MG/1
10 CAPSULE, EXTENDED RELEASE ORAL DAILY
Qty: 30 CAPSULE | Refills: 0 | Status: SHIPPED | OUTPATIENT
Start: 2020-04-21 | End: 2020-08-10

## 2020-04-21 NOTE — TELEPHONE ENCOUNTER
Patient Comment: Can we switch to 1- 30mg XR and 1-10mg XR in the am? Also a 30 day script? The reason is insurance related.  Thank you.   Spontaneous, unlabored and symmetrical

## 2020-06-03 ENCOUNTER — TRANSFERRED RECORDS (OUTPATIENT)
Dept: HEALTH INFORMATION MANAGEMENT | Facility: CLINIC | Age: 40
End: 2020-06-03

## 2020-06-05 ENCOUNTER — VIRTUAL VISIT (OUTPATIENT)
Dept: FAMILY MEDICINE | Facility: CLINIC | Age: 40
End: 2020-06-05
Payer: COMMERCIAL

## 2020-06-05 ENCOUNTER — MYC MEDICAL ADVICE (OUTPATIENT)
Dept: FAMILY MEDICINE | Facility: CLINIC | Age: 40
End: 2020-06-05

## 2020-06-05 DIAGNOSIS — L03.115 CELLULITIS OF RIGHT LOWER EXTREMITY: Primary | ICD-10-CM

## 2020-06-05 PROCEDURE — 99213 OFFICE O/P EST LOW 20 MIN: CPT | Mod: 95 | Performed by: NURSE PRACTITIONER

## 2020-06-05 RX ORDER — OXYCODONE AND ACETAMINOPHEN 5; 325 MG/1; MG/1
1 TABLET ORAL EVERY 6 HOURS PRN
Qty: 12 TABLET | Refills: 0 | Status: SHIPPED | OUTPATIENT
Start: 2020-06-05 | End: 2020-08-10

## 2020-06-05 RX ORDER — SULFAMETHOXAZOLE AND TRIMETHOPRIM 400; 80 MG/1; MG/1
1 TABLET ORAL 2 TIMES DAILY
Qty: 20 TABLET | Refills: 0 | Status: SHIPPED | OUTPATIENT
Start: 2020-06-05 | End: 2020-08-10

## 2020-06-05 NOTE — PROGRESS NOTES
"Traci Sloan is a 39 year old female who is being evaluated via a billable video visit.      The patient has been notified of following:     \"This video visit will be conducted via a call between you and your physician/provider. We have found that certain health care needs can be provided without the need for an in-person physical exam.  This service lets us provide the care you need with a video conversation.  If a prescription is necessary we can send it directly to your pharmacy.  If lab work is needed we can place an order for that and you can then stop by our lab to have the test done at a later time.    Video visits are billed at different rates depending on your insurance coverage.  Please reach out to your insurance provider with any questions.    If during the course of the call the physician/provider feels a video visit is not appropriate, you will not be charged for this service.\"    Patient has given verbal consent for Video visit? Yes    How would you like to obtain your AVS? [facility]    Patient would like the video invitation sent by: [phone]    Will anyone else be joining your video visit? No  .Stacy Sandifer,MA  Subjective     Traci Sloan is a 39 year old female who presents today via video visit for the following health issues:    HPI      Younger baby is 15 months old and daughter will be 4 in September.  She was in  until covid pandemic.  Patient is working from home.    Last Wednesday night (9 days ago) tripped with right foot outside on heavy chair and lower anterior leg went between two chairs as she fell.  Had scrape and open skin on the shin.  Went to ER two days ago - xray showed no fracture and was dx as contusion.  Ruled out blood clot with ultrasound.  Lower half of right leg is entirely black and blue, swollen.  Does look reddened around the open skin.  No red streak.  The area is not warmer than the surrounding tissue Pain is significant and worsened with standing or " "pressure  Keeping wrapped and still helps.      Problem list and histories reviewed & adjusted, as indicated.  Additional history: as documented      ROS:  Constitutional, HEENT, cardiovascular, pulmonary, gi and gu systems are negative, except as otherwise noted.    OBJECTIVE:                                                        Video Start Time: 3:13 PM      Reviewed and updated as needed this visit by Provider             Objective    There were no vitals taken for this visit.  Estimated body mass index is 28.7 kg/m  as calculated from the following:    Height as of 12/11/19: 1.68 m (5' 6.14\").    Weight as of 12/11/19: 81 kg (178 lb 9.6 oz).  Physical Exam     GENERAL: healthy, alert and mild distress  EYES: Eyes grossly normal to inspection.  No discharge or erythema, or obvious scleral/conjunctival abnormalities.  RESP: No audible wheeze, cough, or visible cyanosis.  No visible retractions or increased work of breathing.    MS: right lower leg diffuse swelling and bruising, center mid shin is deep purplish reddish with scrapes  SKIN: Visible skin clear. No significant rash, abnormal pigmentation or lesions.  NEURO: Cranial nerves grossly intact.  Mentation and speech appropriate for age.  PSYCH: Mentation appears normal, affect normal/bright, judgement and insight intact, normal speech and appearance well-groomed.      Diagnostic Test Results:  Labs reviewed in Epic  none         Assessment & Plan     (L03.115) Cellulitis of right lower extremity  (primary encounter diagnosis)  Comment:   Plan: sulfamethoxazole-trimethoprim (BACTRIM) 400-80         MG tablet, oxyCODONE-acetaminophen (PERCOCET)         5-325 MG tablet      Try soaking.  Do continue compression with bandage.  Start antibiotic and have follow-up Tuesday next week.  If no improvement and worsening even on abx, please go to ED over weekend.               FUTURE APPOINTMENTS:       - Follow-up visit in 3 days    Return in about 3 days (around " 6/8/2020) for cellulitis/leg injury.    WINSTON Richards CNP  [facility]      Video-Visit Details    Type of service:  Video Visit    Video End Time:3:40 PM    Originating Location (pt. Location): Home    Distant Location (provider location):  [facility]     Platform used for Video Visit: AmWell    Return in about 3 days (around 6/8/2020) for cellulitis/leg injury.       WINSTON Richards CNP

## 2020-06-09 ENCOUNTER — OFFICE VISIT (OUTPATIENT)
Dept: FAMILY MEDICINE | Facility: CLINIC | Age: 40
End: 2020-06-09
Payer: COMMERCIAL

## 2020-06-09 VITALS
HEART RATE: 100 BPM | WEIGHT: 182.5 LBS | BODY MASS INDEX: 28.64 KG/M2 | DIASTOLIC BLOOD PRESSURE: 86 MMHG | SYSTOLIC BLOOD PRESSURE: 138 MMHG | OXYGEN SATURATION: 97 % | HEIGHT: 67 IN | TEMPERATURE: 96.9 F

## 2020-06-09 DIAGNOSIS — S80.11XD CONTUSION OF RIGHT LOWER LEG, SUBSEQUENT ENCOUNTER: ICD-10-CM

## 2020-06-09 DIAGNOSIS — L03.115 CELLULITIS OF RIGHT LOWER EXTREMITY: Primary | ICD-10-CM

## 2020-06-09 PROCEDURE — 99213 OFFICE O/P EST LOW 20 MIN: CPT | Performed by: NURSE PRACTITIONER

## 2020-06-09 ASSESSMENT — MIFFLIN-ST. JEOR: SCORE: 1544.31

## 2020-06-09 NOTE — PROGRESS NOTES
"Subjective     Traci Sloan is a 39 year old female who presents to clinic today for the following health issues:    HPI   Cellulitis       Duration: a little over a week     Description (location/character/radiation): Has gotten better, located lower right shin, milkd pain at around 2.     Intensity:  mild    Accompanying signs and symptoms: bruising but getting better.     History (similar episodes/previous evaluation): None    Precipitating or alleviating factors: elevation, rest, and ice makes it better     Therapies tried and outcome: peroxide and wrap.            Reviewed and updated as needed this visit by Provider         Review of Systems     ROS:5 point ROS including CONST, HEENT, Respiratory, CV, and GI other than that noted in the HPI,  is negative       Objective    /86   Pulse 100   Temp 96.9  F (36.1  C) (Temporal)   Ht 1.7 m (5' 6.93\")   Wt 82.8 kg (182 lb 8 oz)   SpO2 97%   BMI 28.64 kg/m    Body mass index is 28.64 kg/m .  Physical Exam   GENERAL: healthy, alert and no distress  MS: normal muscle tone, normal range of motion, no edema, tenderness to palpation anterior right lower extremity and gait normal, no ataxia  SKIN: erythema - right anterior lower leg and ecchymoses - right lower leg, ankle, foot and 3rd and 4th toes    Diagnostic Test Results:  Labs reviewed in Epic  none         Assessment & Plan     (L03.115) Cellulitis of right lower extremity  (primary encounter diagnosis)  Comment:   Plan: Improved pain and redness since starting bactrim.  Finish antibiotics, continue to elevate, monitor for worsening    (S80.11XD) Contusion of right lower leg, subsequent encounter  Comment:   Plan: no fx on xray        Return in about 2 weeks (around 6/23/2020) for have Dr. Mullins look at leg in two weeks at son's [facility].    WINSTON Richards [facility]          "

## 2020-06-16 ENCOUNTER — VIRTUAL VISIT (OUTPATIENT)
Dept: FAMILY MEDICINE | Facility: OTHER | Age: 40
End: 2020-06-16

## 2020-06-16 ENCOUNTER — MYC MEDICAL ADVICE (OUTPATIENT)
Dept: FAMILY MEDICINE | Facility: CLINIC | Age: 40
End: 2020-06-16

## 2020-06-16 DIAGNOSIS — F90.2 ATTENTION DEFICIT HYPERACTIVITY DISORDER (ADHD), COMBINED TYPE: ICD-10-CM

## 2020-06-16 NOTE — PROGRESS NOTES
"Date: 2020 10:29:51  Clinician: Bob Lama  Clinician NPI: [id number]  Patient: Traci Sloan  Patient : 1980  Patient Address: [address] [place] [id number]  Patient Phone: [phone]  Visit Protocol: Allergic rhinitis  Patient Summary:  Traci is a 40 year old ( : 1980 ) female who initiated a Visit for evaluation of seasonal allergies.  When asked the question \"Please sign me up to receive news, health information and promotions. \", Traci responded \"No\".    Traci currently has allergy symptoms and experiences allergies every year.   Symptom details  Her current symptoms started less than a week ago and consist of myalgia, sore throat, facial pain or pressure, new or worsening cough, dry eyes, scratchy throat, itching of the inner ear, and postnasal drip.    Sore throat: Traci reports having moderate throat pain (4-6 on a 10 point pain scale).    Note: Possible COVID-19 symptoms are highlighted in red.  The following allergens make her symptoms worse:     Dust or dust mites    Pollens from trees or grass     Denied symptoms include wheezing, rhinitis, nausea, vomiting, and/or diarrhea, ageusia, anosmia, eye redness (bloodshot eyes), more tears than usual, itchy eyes, itchy nose, sneezing, headache, and itching of the roof of the mouth. She is not experiencing dyspnea and does not feel feverish.   Pertinent medical history  Traci does not have nasal ulcers or perforations.   Traci has not tried allergy treatments.   Traci does not smoke or use smokeless tobacco.   She denies pregnancy and denies breastfeeding. She is currently menstruating.     MEDICATIONS: Adderall oral, ALLERGIES: NKDA  Clinician Response:  Dear Traci,  Based on the information you have provided, you have allergic rhinoconjunctivitis, commonly called hay fever or seasonal allergies.   Medication information  I am prescribing:       Flonase Sensimist 27.5 mcg/actuation nasal spray. Inhale 2 sprays in each nostril 1 time " per day. After a week, inhale 1-2 sprays in each nostril 1 time per day. Your prescription includes 6 refills.      Olopatadine (Patanol) 0.1% ophthalmic (eye) drops. Apply 1 drop into affected eye(s) 2 times per day. Your prescrition includes 2 refills.     Unless you are allergic to the over-the-counter medication(s) below, I recommend using:     Cetirizine (Zyrtec) 10 mg oral tablet to treat your allergy symptoms. Take 1 tablet by mouth 1 time per day.   Over-the-counter medications do not require a prescription. Ask the pharmacist if you have any questions.  Self care  Please take the following precautions to help reduce your symptoms:       Avoid substances you are allergic to    Try to reduce the amount of humidity in your living and working environments    Consider moving pets outside of your living and/or working area    You can decrease your allergy symptoms by staying indoors as much as possible until your allergy season is over.     When to seek care  Please make an appointment to be seen in a clinic or urgent care if any of the following occur:     Your allergy symptoms haven't improved after taking the recommended medication for 2 weeks    New symptoms develop, or symptoms become worse      Diagnosis: Allergic rhinoconjunctivitis  Diagnosis ICD: J30.1  Prescription: Flonase Sensimist 27.5 mcg/actuation nasal spray,suspension 1 120 spray aerosol with adapter (milliliters), 30 days supply. Inhale 2 sprays in each nostril 1 time per day. after a week, inhale 1-2 sprays in each nostril 1 time per day. Refills: 6, Refill as needed: no, Allow substitutions: yes  Prescription: olopatadine (Patanol) 0.1 % ophthalmic (eye) drops 1 5 ml dropper bottle, 30 days supply. Apply 1 drop into affected eye(s) 2 times per day. Refills: 2, Refill as needed: no, Allow substitutions: yes  Pharmacy: [id number] - [phone] - [address] [place] [address]

## 2020-06-17 RX ORDER — DEXTROAMPHETAMINE SACCHARATE, AMPHETAMINE ASPARTATE MONOHYDRATE, DEXTROAMPHETAMINE SULFATE AND AMPHETAMINE SULFATE 7.5; 7.5; 7.5; 7.5 MG/1; MG/1; MG/1; MG/1
30 CAPSULE, EXTENDED RELEASE ORAL DAILY
Qty: 30 CAPSULE | Refills: 0 | Status: SHIPPED | OUTPATIENT
Start: 2020-06-22 | End: 2020-07-13

## 2020-06-17 RX ORDER — DEXTROAMPHETAMINE SACCHARATE, AMPHETAMINE ASPARTATE MONOHYDRATE, DEXTROAMPHETAMINE SULFATE AND AMPHETAMINE SULFATE 2.5; 2.5; 2.5; 2.5 MG/1; MG/1; MG/1; MG/1
10 CAPSULE, EXTENDED RELEASE ORAL DAILY
Qty: 30 CAPSULE | Refills: 0 | Status: SHIPPED | OUTPATIENT
Start: 2020-06-22 | End: 2020-07-13

## 2020-06-17 NOTE — TELEPHONE ENCOUNTER
Yee,    Please see patients my chart message.     checked: last fill for 10 mg and 30 mg was 5/22/20    Med and pharm cued for Adderall 10 mg and 30 mg for start date of 6/22/20 for pharmacy in [place] per patients request.    Thanks  Rossana Grande RN   [facility][note 3]

## 2020-06-25 ENCOUNTER — MYC MEDICAL ADVICE (OUTPATIENT)
Dept: FAMILY MEDICINE | Facility: CLINIC | Age: 40
End: 2020-06-25

## 2020-07-01 ENCOUNTER — E-VISIT (OUTPATIENT)
Dept: FAMILY MEDICINE | Facility: CLINIC | Age: 40
End: 2020-07-01
Payer: COMMERCIAL

## 2020-07-01 ENCOUNTER — TELEPHONE (OUTPATIENT)
Dept: FAMILY MEDICINE | Facility: CLINIC | Age: 40
End: 2020-07-01

## 2020-07-01 ENCOUNTER — TRANSFERRED RECORDS (OUTPATIENT)
Dept: HEALTH INFORMATION MANAGEMENT | Facility: CLINIC | Age: 40
End: 2020-07-01

## 2020-07-01 DIAGNOSIS — M54.50 ACUTE LOW BACK PAIN, UNSPECIFIED BACK PAIN LATERALITY, UNSPECIFIED WHETHER SCIATICA PRESENT: Primary | ICD-10-CM

## 2020-07-01 PROCEDURE — 99421 OL DIG E/M SVC 5-10 MIN: CPT | Performed by: NURSE PRACTITIONER

## 2020-07-01 NOTE — TELEPHONE ENCOUNTER
LVM to schedule, first in person isn't until next week. Could do video with Yee [place]      Appointment Request From: Traci Sloan     With Provider: WINSTON Richards [facility] [[facility]     Preferred Date Range: 7/1/2020 - 7/1/2020     Preferred Times: Any Time     Reason for visit: Request an Appointment     Comments:  I definitely did something to my back putting the baby down tonight.  Lower right side.  Never done anything like this before.  Pain is 8 of 10 when sitting.  10 of 10 when moving wrong.

## 2020-07-03 ENCOUNTER — MYC MEDICAL ADVICE (OUTPATIENT)
Dept: FAMILY MEDICINE | Facility: CLINIC | Age: 40
End: 2020-07-03

## 2020-07-10 NOTE — PROGRESS NOTES
[facility] Initial Evaluation    Subjective:  Traci Sloan is a 40 year old female with a lumbar condition. Symptoms commenced as a result of: bending forward holding 1.5 year old. Patient went to the ER and diagnosed muscle strain.  Patient given muscle relaxant, lidocaine patches, and ibuprofen.Location of symptoms: right low back intially, some in left. Associated symptoms: denies numbness and tingling, denies weakness, denies bowel and bladder issues.  Symptoms are exacerbated by: bending forward, sitting, strenuous activity, lifting, walking, sitting, standing, sleeping, traveling, computer work. Symptoms are relieved by: rest, wearing back brace.  Special tests (x-ray, MRI, CT scan, EMG, bone scan): None.     Answers for HPI/ROS submitted by the patient on 7/13/2020   History Reported by Patient  Reason for Visit:: Back muscle strain  When problem began:: 6/30/2020  How problem occurred:: Bending holding 1.5 yr old  Number scale: 3/10  General health as reported by patient: good  Please check all that apply to your current or past medical history: history of fractures  Medical allergies: none  Surgeries: orthopedic surgery  Medications you are currently taking: muscle relaxants  Occupation:: [employer]  What are your primary job tasks: computer work, driving, lifting/carrying, prolonged standing    Objective  Posture    Sitting (good/fair/poor): fair  Standing (good/fair/poor):  fair  Lordosis (red/acc/normal):  normal  Lateral shift (right/left/nil):  nil  Relevant lateral shift (yes/no):  no  Correction of posture (better/worse/no effect): better    Lumbar Movement Loss Asael Mod Min Nil Pain   Flexion    x    Extension   x     Side Gliding L    x +   Side Gliding R    x +       Assessment/Plan:    Patient is a 40 year old female with lumbar complaints.    Patient has the following significant findings with corresponding treatment plan.                Diagnosis 1:   Right LBP  Pain -  manual therapy, self management, education, directional preference exercise and home program  Decreased ROM/flexibility - manual therapy and therapeutic exercise  Decreased joint mobility - manual therapy and therapeutic exercise  Decreased strength - therapeutic exercise and therapeutic activities  Impaired muscle performance - neuro re-education  Decreased function - therapeutic activities  Impaired posture - neuro re-education    Previous and current functional limitations:  (See Goal Flow Sheet for this information)    Short term and Long term goals: (See Goal Flow Sheet for this information)     Communication ability:  Patient appears to be able to clearly communicate and understand verbal and written communication and follow directions correctly.  Treatment Explanation - The following has been discussed with the patient:   RX ordered/plan of care  Anticipated outcomes  Possible risks and side effects  This patient would benefit from PT intervention to resume normal activities.   Rehab potential is good.    Frequency:  1 X week, once daily  Duration:  for 4 weeks tapering to 2 X a month over 1 month  Discharge Plan:  Achieve all LTG.  Independent in home treatment program.  Reach maximal therapeutic benefit.    Please refer to the daily flowsheet for treatment today, total treatment time and time spent performing 1:1 timed codes.

## 2020-07-13 ENCOUNTER — MYC REFILL (OUTPATIENT)
Dept: FAMILY MEDICINE | Facility: CLINIC | Age: 40
End: 2020-07-13

## 2020-07-13 ENCOUNTER — THERAPY VISIT (OUTPATIENT)
Dept: PHYSICAL THERAPY | Facility: CLINIC | Age: 40
End: 2020-07-13
Attending: NURSE PRACTITIONER
Payer: COMMERCIAL

## 2020-07-13 DIAGNOSIS — M54.50 ACUTE LOW BACK PAIN, UNSPECIFIED BACK PAIN LATERALITY, UNSPECIFIED WHETHER SCIATICA PRESENT: ICD-10-CM

## 2020-07-13 DIAGNOSIS — F90.2 ATTENTION DEFICIT HYPERACTIVITY DISORDER (ADHD), COMBINED TYPE: ICD-10-CM

## 2020-07-13 DIAGNOSIS — M54.50 ACUTE RIGHT-SIDED LOW BACK PAIN WITHOUT SCIATICA: ICD-10-CM

## 2020-07-13 PROCEDURE — 97110 THERAPEUTIC EXERCISES: CPT | Mod: GP | Performed by: PHYSICAL THERAPIST

## 2020-07-13 PROCEDURE — 97161 PT EVAL LOW COMPLEX 20 MIN: CPT | Mod: GP | Performed by: PHYSICAL THERAPIST

## 2020-07-13 PROCEDURE — 97530 THERAPEUTIC ACTIVITIES: CPT | Mod: GP | Performed by: PHYSICAL THERAPIST

## 2020-07-14 NOTE — TELEPHONE ENCOUNTER
RD reception,    Can you please call patient to schedule a follow up with Yee this week.    Thanks  Rossana Grande RN   [facility]

## 2020-07-15 RX ORDER — DEXTROAMPHETAMINE SACCHARATE, AMPHETAMINE ASPARTATE MONOHYDRATE, DEXTROAMPHETAMINE SULFATE AND AMPHETAMINE SULFATE 2.5; 2.5; 2.5; 2.5 MG/1; MG/1; MG/1; MG/1
10 CAPSULE, EXTENDED RELEASE ORAL DAILY
Qty: 30 CAPSULE | Refills: 0 | Status: SHIPPED | OUTPATIENT
Start: 2020-07-15 | End: 2020-08-07

## 2020-07-15 RX ORDER — DEXTROAMPHETAMINE SACCHARATE, AMPHETAMINE ASPARTATE MONOHYDRATE, DEXTROAMPHETAMINE SULFATE AND AMPHETAMINE SULFATE 7.5; 7.5; 7.5; 7.5 MG/1; MG/1; MG/1; MG/1
30 CAPSULE, EXTENDED RELEASE ORAL DAILY
Qty: 30 CAPSULE | Refills: 0 | Status: SHIPPED | OUTPATIENT
Start: 2020-07-15 | End: 2020-08-07

## 2020-07-15 NOTE — TELEPHONE ENCOUNTER
Requested Prescriptions   Pending Prescriptions Disp Refills     amphetamine-dextroamphetamine (ADDERALL XR) 10 MG 24 hr capsule 30 capsule 0     Sig: Take 1 capsule (10 mg) by mouth daily       Last Written Prescription Date:  6/22/20  Last Fill Quantity: 30,   # refills: 0             There is no refill protocol information for this order        amphetamine-dextroamphetamine (ADDERALL XR) 30 MG 24 hr capsule 30 capsule 0     Sig: Take 1 capsule (30 mg) by mouth daily       There is no refill protocol information for this order        Problem List Complete:  Yes    Last Written Prescription Date:  6/22/20  Last Fill Quantity: 30,   # refills: 0        Last Office Visit with [facility] primary care provider: 7/1/20 acute    Future Office visit:     Controlled substance agreement:   Encounter-Level CSA:    There are no encounter-level csa.     Patient-Level CSA:    There are no patient-level csa.         Last Urine Drug Screen: No results found for: CDAUT, No results found for: COMDAT, No results found for: THC13, PCP13, COC13, MAMP13, OPI13, AMP13, BZO13, TCA13, MTD13, BAR13, OXY13, PPX13, BUP13     Processing:  eprescribe    [email or web address]   checked in past 3 months?  No, route to RN writer is not  delegate

## 2020-08-07 ENCOUNTER — MYC REFILL (OUTPATIENT)
Dept: FAMILY MEDICINE | Facility: CLINIC | Age: 40
End: 2020-08-07

## 2020-08-07 DIAGNOSIS — F90.2 ATTENTION DEFICIT HYPERACTIVITY DISORDER (ADHD), COMBINED TYPE: ICD-10-CM

## 2020-08-10 NOTE — TELEPHONE ENCOUNTER
Pt had note in mychart requesting brand name Rx.   Reply sent, await Pt response.     Pending Prescriptions:                       Disp   Refills    amphetamine-dextroamphetamine (ADDERALL X*30 cap*0            Sig: Take 1 capsule (10 mg) by mouth daily    amphetamine-dextroamphetamine (ADDERALL X*30 cap*0            Sig: Take 1 capsule (30 mg) by mouth daily  Routing refill request to provider for review/approval because:  Drug not on the [facility] refill protocol        No access for this provider.     Margarette Savage RN   [facility]

## 2020-08-11 NOTE — TELEPHONE ENCOUNTER
Pt requesting brand name adderall.   Pending Prescriptions:                       Disp   Refills    ADDERALL XR 10 MG 24 hr capsule           30 cap*0            Sig: Take 1 capsule (10 mg) by mouth daily    ADDERALL XR 30 MG 24 hr capsule           30 cap*0            Sig: Take 1 capsule (30 mg) by mouth daily    Routing refill request to provider for review/approval because:  Drug not on the [facility] refill protocol      No access for this provider.     Margarette Savage RN   [facility]

## 2020-08-13 ENCOUNTER — MYC MEDICAL ADVICE (OUTPATIENT)
Dept: FAMILY MEDICINE | Facility: CLINIC | Age: 40
End: 2020-08-13

## 2020-08-13 DIAGNOSIS — F90.2 ATTENTION DEFICIT HYPERACTIVITY DISORDER (ADHD), COMBINED TYPE: ICD-10-CM

## 2020-08-13 RX ORDER — DEXTROAMPHETAMINE SULFATE, DEXTROAMPHETAMINE SACCHARATE, AMPHETAMINE SULFATE AND AMPHETAMINE ASPARTATE 7.5; 7.5; 7.5; 7.5 MG/1; MG/1; MG/1; MG/1
30 CAPSULE, EXTENDED RELEASE ORAL DAILY
Qty: 30 CAPSULE | Refills: 0 | Status: SHIPPED | OUTPATIENT
Start: 2020-08-14 | End: 2020-09-10

## 2020-08-13 RX ORDER — DEXTROAMPHETAMINE/AMPHETAMINE 10 MG
10 CAPSULE, EXT RELEASE 24 HR ORAL DAILY
Qty: 30 CAPSULE | Refills: 0 | Status: SHIPPED | OUTPATIENT
Start: 2020-09-14 | End: 2020-08-14

## 2020-08-14 NOTE — TELEPHONE ENCOUNTER
Yee,    Please see patients my chart message. Looks like you wrote the 30 mg for 8/14, but the 10 mg for 9/14. I have cued up the 10 mg for 8/14.    Thanks  Rossana Grande RN   [facility]

## 2020-08-17 ENCOUNTER — MYC MEDICAL ADVICE (OUTPATIENT)
Dept: FAMILY MEDICINE | Facility: CLINIC | Age: 40
End: 2020-08-17

## 2020-08-17 ENCOUNTER — TELEPHONE (OUTPATIENT)
Dept: FAMILY MEDICINE | Facility: CLINIC | Age: 40
End: 2020-08-17

## 2020-08-17 NOTE — TELEPHONE ENCOUNTER
Response sent to patient via [employer].     Margarette Savage RN   [facility]

## 2020-08-17 NOTE — TELEPHONE ENCOUNTER
Patient started their own PA. Questions received via fax, located in MAIN [facility] FOL[facility]

## 2020-08-17 NOTE — TELEPHONE ENCOUNTER
Response sent to patient via [employer].   Please provide reason for needing Brand Adderall.    Margarette Savage RN   [facility]

## 2020-08-19 NOTE — TELEPHONE ENCOUNTER
Response sent in a refill encounter for patient to have adderall 10 mg resent to start now instead of 9/14    Rossana Grande RN   [facility]

## 2020-08-19 NOTE — TELEPHONE ENCOUNTER
Is all that is needed at this point for a PA to be completed?  Or are there refills needed to go to [facility]?  MARIBETH Hernandez

## 2020-08-19 NOTE — TELEPHONE ENCOUNTER
Yee,    Patient needs the 10 mg still sent to pharmacy, as the previous one you sent on 8/14 was sent to start on 9/14. Please sign and send.    Thanks  Rossana Grande RN   [facility]

## 2020-08-21 RX ORDER — DEXTROAMPHETAMINE/AMPHETAMINE 10 MG
10 CAPSULE, EXT RELEASE 24 HR ORAL DAILY
Qty: 30 CAPSULE | Refills: 0 | Status: SHIPPED | OUTPATIENT
Start: 2020-08-21 | End: 2020-09-10

## 2020-09-01 ENCOUNTER — THERAPY VISIT (OUTPATIENT)
Dept: PHYSICAL THERAPY | Facility: CLINIC | Age: 40
End: 2020-09-01
Payer: COMMERCIAL

## 2020-09-01 DIAGNOSIS — M54.50 ACUTE RIGHT-SIDED LOW BACK PAIN WITHOUT SCIATICA: ICD-10-CM

## 2020-09-01 PROCEDURE — 97112 NEUROMUSCULAR REEDUCATION: CPT | Mod: GP | Performed by: PHYSICAL THERAPIST

## 2020-09-01 PROCEDURE — 97110 THERAPEUTIC EXERCISES: CPT | Mod: GP | Performed by: PHYSICAL THERAPIST

## 2020-09-10 ENCOUNTER — MYC REFILL (OUTPATIENT)
Dept: FAMILY MEDICINE | Facility: CLINIC | Age: 40
End: 2020-09-10

## 2020-09-10 DIAGNOSIS — F90.2 ATTENTION DEFICIT HYPERACTIVITY DISORDER (ADHD), COMBINED TYPE: ICD-10-CM

## 2020-09-10 DIAGNOSIS — B36.0 TINEA VERSICOLOR: ICD-10-CM

## 2020-09-11 NOTE — TELEPHONE ENCOUNTER
Patient called regarding this prescription (and a separate refill request encounter for Adderall) wondering if she could get it filled by the end of the day today. Informed patient that we ask for 24-72 hours for refill requests to be filled but I told her that I would reroute it to the nurses anyway.

## 2020-09-13 RX ORDER — KETOCONAZOLE 200 MG/1
200 TABLET ORAL DAILY
Qty: 10 TABLET | Refills: 0 | Status: SHIPPED | OUTPATIENT
Start: 2020-09-13 | End: 2021-02-03

## 2020-09-13 RX ORDER — DEXTROAMPHETAMINE/AMPHETAMINE 10 MG
10 CAPSULE, EXT RELEASE 24 HR ORAL DAILY
Qty: 30 CAPSULE | Refills: 0 | Status: SHIPPED | OUTPATIENT
Start: 2020-09-13 | End: 2020-10-06

## 2020-09-13 RX ORDER — DEXTROAMPHETAMINE SULFATE, DEXTROAMPHETAMINE SACCHARATE, AMPHETAMINE SULFATE AND AMPHETAMINE ASPARTATE 7.5; 7.5; 7.5; 7.5 MG/1; MG/1; MG/1; MG/1
30 CAPSULE, EXTENDED RELEASE ORAL DAILY
Qty: 30 CAPSULE | Refills: 0 | Status: SHIPPED | OUTPATIENT
Start: 2020-09-13 | End: 2020-10-06

## 2020-09-13 NOTE — TELEPHONE ENCOUNTER
Requested Prescriptions   Pending Prescriptions Disp Refills     ADDERALL XR 30 MG 24 hr capsule 30 capsule 0     Sig: Take 1 capsule (30 mg) by mouth daily       There is no refill protocol information for this order        ADDERALL XR 10 MG 24 hr capsule 30 capsule 0     Sig: Take 1 capsule (10 mg) by mouth daily       There is no refill protocol information for this order        Routing refill request to provider for review/approval because:  Drug not on the [facility] refill protocol     [place] 2020  Last refill 30 m/18  Disp: 30  Last refill 10 m/21  Disp: 30  Concerns: none

## 2020-09-14 NOTE — TELEPHONE ENCOUNTER
Response sent to patient via [employer].     Margarette Savage RN   [facility]

## 2020-10-06 ENCOUNTER — MYC REFILL (OUTPATIENT)
Dept: FAMILY MEDICINE | Facility: CLINIC | Age: 40
End: 2020-10-06

## 2020-10-06 DIAGNOSIS — F90.2 ATTENTION DEFICIT HYPERACTIVITY DISORDER (ADHD), COMBINED TYPE: ICD-10-CM

## 2020-10-07 RX ORDER — DEXTROAMPHETAMINE/AMPHETAMINE 10 MG
10 CAPSULE, EXT RELEASE 24 HR ORAL DAILY
Qty: 30 CAPSULE | Refills: 0 | Status: SHIPPED | OUTPATIENT
Start: 2020-10-07 | End: 2021-02-03

## 2020-10-07 RX ORDER — DEXTROAMPHETAMINE SULFATE, DEXTROAMPHETAMINE SACCHARATE, AMPHETAMINE SULFATE AND AMPHETAMINE ASPARTATE 7.5; 7.5; 7.5; 7.5 MG/1; MG/1; MG/1; MG/1
30 CAPSULE, EXTENDED RELEASE ORAL DAILY
Qty: 30 CAPSULE | Refills: 0 | Status: SHIPPED | OUTPATIENT
Start: 2020-10-07 | End: 2021-02-03

## 2020-10-07 NOTE — TELEPHONE ENCOUNTER
Routing refill request to provider for review/approval because:  Drug not on the [facility] refill protocol      check done last fill, 9/21/2020 for 30 of 10mg, Yee LARES  9/14/2020 30mg, Dr Mullins    Last office visit: 6/9/2020 with prescribing provider:   Future Office Visit:      Lexy Luciano RN   [facility]

## 2020-10-08 ENCOUNTER — MYC MEDICAL ADVICE (OUTPATIENT)
Dept: FAMILY MEDICINE | Facility: CLINIC | Age: 40
End: 2020-10-08

## 2020-10-08 RX ORDER — DEXTROAMPHETAMINE SACCHARATE, AMPHETAMINE ASPARTATE MONOHYDRATE, DEXTROAMPHETAMINE SULFATE AND AMPHETAMINE SULFATE 7.5; 7.5; 7.5; 7.5 MG/1; MG/1; MG/1; MG/1
30 CAPSULE, EXTENDED RELEASE ORAL DAILY
Qty: 30 CAPSULE | Refills: 0 | Status: SHIPPED | OUTPATIENT
Start: 2020-12-09 | End: 2020-12-29

## 2020-10-08 RX ORDER — DEXTROAMPHETAMINE SACCHARATE, AMPHETAMINE ASPARTATE MONOHYDRATE, DEXTROAMPHETAMINE SULFATE AND AMPHETAMINE SULFATE 7.5; 7.5; 7.5; 7.5 MG/1; MG/1; MG/1; MG/1
30 CAPSULE, EXTENDED RELEASE ORAL DAILY
Qty: 30 CAPSULE | Refills: 0 | Status: SHIPPED | OUTPATIENT
Start: 2020-11-08 | End: 2020-12-08

## 2020-10-08 RX ORDER — DEXTROAMPHETAMINE SACCHARATE, AMPHETAMINE ASPARTATE MONOHYDRATE, DEXTROAMPHETAMINE SULFATE AND AMPHETAMINE SULFATE 2.5; 2.5; 2.5; 2.5 MG/1; MG/1; MG/1; MG/1
10 CAPSULE, EXTENDED RELEASE ORAL DAILY
Qty: 30 CAPSULE | Refills: 0 | Status: SHIPPED | OUTPATIENT
Start: 2020-11-08 | End: 2020-12-08

## 2020-10-08 RX ORDER — DEXTROAMPHETAMINE SACCHARATE, AMPHETAMINE ASPARTATE MONOHYDRATE, DEXTROAMPHETAMINE SULFATE AND AMPHETAMINE SULFATE 2.5; 2.5; 2.5; 2.5 MG/1; MG/1; MG/1; MG/1
10 CAPSULE, EXTENDED RELEASE ORAL DAILY
Qty: 30 CAPSULE | Refills: 0 | Status: SHIPPED | OUTPATIENT
Start: 2020-10-08 | End: 2020-11-07

## 2020-10-08 RX ORDER — DEXTROAMPHETAMINE SACCHARATE, AMPHETAMINE ASPARTATE MONOHYDRATE, DEXTROAMPHETAMINE SULFATE AND AMPHETAMINE SULFATE 2.5; 2.5; 2.5; 2.5 MG/1; MG/1; MG/1; MG/1
10 CAPSULE, EXTENDED RELEASE ORAL DAILY
Qty: 30 CAPSULE | Refills: 0 | Status: SHIPPED | OUTPATIENT
Start: 2020-12-09 | End: 2020-12-29

## 2020-10-08 RX ORDER — DEXTROAMPHETAMINE SACCHARATE, AMPHETAMINE ASPARTATE MONOHYDRATE, DEXTROAMPHETAMINE SULFATE AND AMPHETAMINE SULFATE 7.5; 7.5; 7.5; 7.5 MG/1; MG/1; MG/1; MG/1
30 CAPSULE, EXTENDED RELEASE ORAL DAILY
Qty: 30 CAPSULE | Refills: 0 | Status: SHIPPED | OUTPATIENT
Start: 2020-10-08 | End: 2020-11-07

## 2020-10-08 NOTE — TELEPHONE ENCOUNTER
Yee,    See my chart message. Encounter for medication already sent to you    Thanks  Rossana Grande RN   [facility]

## 2020-10-09 PROBLEM — M54.50 ACUTE RIGHT-SIDED LOW BACK PAIN WITHOUT SCIATICA: Status: RESOLVED | Noted: 2020-07-13 | Resolved: 2020-10-09

## 2020-10-09 NOTE — PROGRESS NOTES
Discharge Note    Progress reporting period is from initial evaluation date (please see noted date below) to Sep 1, 2020.  Linked Episodes   Type: Episode: Status: Noted: Resolved: Last update: Updated by:   PHYSICAL THERAPY lbp Active 7/13/2020 9/1/2020  8:37 AM Rick Lopez, NICOLE      Comments:       Traci failed to follow up and current status is unknown.  Please see information below for last relevant information on current status.  Patient seen for 2 visits.    SUBJECTIVE  Subjective changes noted by patient:  Pt reports that the pain is consistent currently. Not as frequent with the exercises. Does have some pain in the right hip.  .  Current pain level is 1/10.     Previous pain level was  3/10.   Changes in function:  Yes (See Goal flowsheet attached for changes in current functional level)  Adverse reaction to treatment or activity: None    OBJECTIVE  Changes noted in objective findings: Lumbar AROM: flexion full to floor pain free, extension full no change in pain, SB L pain and with repeated motion gets worse, SB R full no pain. Prone on elbow centralized lumbar symptoms and pressups central lumbar pain and no change with repeated. Hip AROM full rosemarie pain free.     ASSESSMENT/PLAN  Diagnosis: right LBP   Updated problem list and treatment plan:     STG/LTGs have been met or progress has been made towards goals:  Yes, please see goal flowsheet for most current information  Assessment of Progress: current status is unknown.    Last current status: Pt is progressing slower than anticipated   Self Management Plans:  HEP  I have re-evaluated this patient and find that the nature, scope, duration and intensity of the therapy is appropriate for the medical condition of the patient.  Traci continues to require the following intervention to meet STG and LTG's:  HEP.    Recommendations:  Discharge with current home program.  Patient to follow up with MD as needed.    Please refer to the daily flowsheet for  treatment today, total treatment time and time spent performing 1:1 timed codes.

## 2020-11-04 ENCOUNTER — MYC MEDICAL ADVICE (OUTPATIENT)
Dept: FAMILY MEDICINE | Facility: CLINIC | Age: 40
End: 2020-11-04

## 2020-11-04 ENCOUNTER — MYC REFILL (OUTPATIENT)
Dept: FAMILY MEDICINE | Facility: CLINIC | Age: 40
End: 2020-11-04

## 2020-11-04 DIAGNOSIS — F90.2 ATTENTION DEFICIT HYPERACTIVITY DISORDER (ADHD), COMBINED TYPE: ICD-10-CM

## 2020-11-04 RX ORDER — DEXTROAMPHETAMINE SACCHARATE, AMPHETAMINE ASPARTATE MONOHYDRATE, DEXTROAMPHETAMINE SULFATE AND AMPHETAMINE SULFATE 7.5; 7.5; 7.5; 7.5 MG/1; MG/1; MG/1; MG/1
30 CAPSULE, EXTENDED RELEASE ORAL DAILY
Qty: 30 CAPSULE | Refills: 0 | OUTPATIENT
Start: 2020-11-04

## 2020-11-04 RX ORDER — DEXTROAMPHETAMINE SACCHARATE, AMPHETAMINE ASPARTATE MONOHYDRATE, DEXTROAMPHETAMINE SULFATE AND AMPHETAMINE SULFATE 2.5; 2.5; 2.5; 2.5 MG/1; MG/1; MG/1; MG/1
10 CAPSULE, EXTENDED RELEASE ORAL DAILY
Qty: 30 CAPSULE | Refills: 0 | OUTPATIENT
Start: 2020-11-04

## 2020-11-04 NOTE — TELEPHONE ENCOUNTER
[employer] message to pt  Pharmacy has refills available  This request denied     check done last fills   10/19/2020 10mg for 30KAVIN, this would be an early fill  10/9/2020 30mg for 30KAVIN RN   [facility]

## 2020-11-22 ENCOUNTER — HEALTH MAINTENANCE LETTER (OUTPATIENT)
Age: 40
End: 2020-11-22

## 2020-12-29 ENCOUNTER — MYC REFILL (OUTPATIENT)
Dept: FAMILY MEDICINE | Facility: CLINIC | Age: 40
End: 2020-12-29

## 2020-12-29 DIAGNOSIS — F90.2 ATTENTION DEFICIT HYPERACTIVITY DISORDER (ADHD), COMBINED TYPE: ICD-10-CM

## 2021-01-03 NOTE — TELEPHONE ENCOUNTER
Requested Prescriptions   Pending Prescriptions Disp Refills     amphetamine-dextroamphetamine (ADDERALL XR) 10 MG 24 hr capsule 30 capsule 0     Sig: Take 1 capsule (10 mg) by mouth daily       There is no refill protocol information for this order        amphetamine-dextroamphetamine (ADDERALL XR) 30 MG 24 hr capsule 30 capsule 0     Sig: Take 1 capsule (30 mg) by mouth daily       There is no refill protocol information for this order        Routing refill request to provider for review/approval because:  Drug not on the [facility] refill protocol   [place] 1/3/2021    10 mg  Last refill: 12/14/20  Disp: 30  Concerns: none    30 mg  Last refill: 12/09/20  Disp: 30  Concerns: none

## 2021-01-05 RX ORDER — DEXTROAMPHETAMINE SACCHARATE, AMPHETAMINE ASPARTATE MONOHYDRATE, DEXTROAMPHETAMINE SULFATE AND AMPHETAMINE SULFATE 2.5; 2.5; 2.5; 2.5 MG/1; MG/1; MG/1; MG/1
10 CAPSULE, EXTENDED RELEASE ORAL DAILY
Qty: 30 CAPSULE | Refills: 0 | Status: SHIPPED | OUTPATIENT
Start: 2021-01-05 | End: 2021-01-29

## 2021-01-05 RX ORDER — DEXTROAMPHETAMINE SACCHARATE, AMPHETAMINE ASPARTATE MONOHYDRATE, DEXTROAMPHETAMINE SULFATE AND AMPHETAMINE SULFATE 7.5; 7.5; 7.5; 7.5 MG/1; MG/1; MG/1; MG/1
30 CAPSULE, EXTENDED RELEASE ORAL DAILY
Qty: 30 CAPSULE | Refills: 0 | Status: SHIPPED | OUTPATIENT
Start: 2021-01-05 | End: 2021-01-29

## 2021-01-15 ENCOUNTER — HEALTH MAINTENANCE LETTER (OUTPATIENT)
Age: 41
End: 2021-01-15

## 2021-01-22 ENCOUNTER — PATIENT OUTREACH (OUTPATIENT)
Dept: OBGYN | Facility: CLINIC | Age: 41
End: 2021-01-22

## 2021-01-29 ENCOUNTER — MYC REFILL (OUTPATIENT)
Dept: FAMILY MEDICINE | Facility: CLINIC | Age: 41
End: 2021-01-29

## 2021-01-29 DIAGNOSIS — F90.2 ATTENTION DEFICIT HYPERACTIVITY DISORDER (ADHD), COMBINED TYPE: ICD-10-CM

## 2021-01-29 RX ORDER — DEXTROAMPHETAMINE SACCHARATE, AMPHETAMINE ASPARTATE MONOHYDRATE, DEXTROAMPHETAMINE SULFATE AND AMPHETAMINE SULFATE 7.5; 7.5; 7.5; 7.5 MG/1; MG/1; MG/1; MG/1
30 CAPSULE, EXTENDED RELEASE ORAL DAILY
Qty: 30 CAPSULE | Refills: 0 | Status: SHIPPED | OUTPATIENT
Start: 2021-01-29 | End: 2021-06-10

## 2021-01-29 RX ORDER — DEXTROAMPHETAMINE SACCHARATE, AMPHETAMINE ASPARTATE MONOHYDRATE, DEXTROAMPHETAMINE SULFATE AND AMPHETAMINE SULFATE 2.5; 2.5; 2.5; 2.5 MG/1; MG/1; MG/1; MG/1
10 CAPSULE, EXTENDED RELEASE ORAL DAILY
Qty: 30 CAPSULE | Refills: 0 | Status: SHIPPED | OUTPATIENT
Start: 2021-01-29 | End: 2021-06-10

## 2021-01-29 NOTE — TELEPHONE ENCOUNTER
Requested Prescriptions   Pending Prescriptions Disp Refills     amphetamine-dextroamphetamine (ADDERALL XR) 10 MG 24 hr capsule 30 capsule 0     Sig: Take 1 capsule (10 mg) by mouth daily       There is no refill protocol information for this order        amphetamine-dextroamphetamine (ADDERALL XR) 30 MG 24 hr capsule 30 capsule 0     Sig: Take 1 capsule (30 mg) by mouth daily       There is no refill protocol information for this order        Routing refill request to provider for review/approval because:  Drug not on the [facility] refill protocol   [place]  reviewed 1/29/2021    10 mg  Last refill: 1/5/21  Disp: 30  Concerns: none    30 mg  Last refill: 1/5/21  Disp: 30  Concerns: none

## 2021-02-03 ENCOUNTER — VIRTUAL VISIT (OUTPATIENT)
Dept: FAMILY MEDICINE | Facility: CLINIC | Age: 41
End: 2021-02-03
Payer: COMMERCIAL

## 2021-02-03 DIAGNOSIS — R87.612 PAPANICOLAOU SMEAR OF CERVIX WITH LOW GRADE SQUAMOUS INTRAEPITHELIAL LESION (LGSIL): ICD-10-CM

## 2021-02-03 DIAGNOSIS — F90.2 ATTENTION DEFICIT HYPERACTIVITY DISORDER (ADHD), COMBINED TYPE: Primary | ICD-10-CM

## 2021-02-03 PROCEDURE — 99214 OFFICE O/P EST MOD 30 MIN: CPT | Mod: GT | Performed by: NURSE PRACTITIONER

## 2021-02-03 RX ORDER — DEXTROAMPHETAMINE SACCHARATE, AMPHETAMINE ASPARTATE MONOHYDRATE, DEXTROAMPHETAMINE SULFATE AND AMPHETAMINE SULFATE 2.5; 2.5; 2.5; 2.5 MG/1; MG/1; MG/1; MG/1
10 CAPSULE, EXTENDED RELEASE ORAL DAILY
Qty: 30 CAPSULE | Refills: 0 | Status: SHIPPED | OUTPATIENT
Start: 2021-04-06 | End: 2021-05-06

## 2021-02-03 RX ORDER — DEXTROAMPHETAMINE SACCHARATE, AMPHETAMINE ASPARTATE MONOHYDRATE, DEXTROAMPHETAMINE SULFATE AND AMPHETAMINE SULFATE 2.5; 2.5; 2.5; 2.5 MG/1; MG/1; MG/1; MG/1
10 CAPSULE, EXTENDED RELEASE ORAL DAILY
Qty: 30 CAPSULE | Refills: 0 | Status: SHIPPED | OUTPATIENT
Start: 2021-03-06 | End: 2021-04-05

## 2021-02-03 RX ORDER — DEXTROAMPHETAMINE SACCHARATE, AMPHETAMINE ASPARTATE MONOHYDRATE, DEXTROAMPHETAMINE SULFATE AND AMPHETAMINE SULFATE 2.5; 2.5; 2.5; 2.5 MG/1; MG/1; MG/1; MG/1
10 CAPSULE, EXTENDED RELEASE ORAL DAILY
Qty: 30 CAPSULE | Refills: 0 | Status: SHIPPED | OUTPATIENT
Start: 2021-02-03 | End: 2021-03-05

## 2021-02-03 RX ORDER — DEXTROAMPHETAMINE SACCHARATE, AMPHETAMINE ASPARTATE MONOHYDRATE, DEXTROAMPHETAMINE SULFATE AND AMPHETAMINE SULFATE 7.5; 7.5; 7.5; 7.5 MG/1; MG/1; MG/1; MG/1
30 CAPSULE, EXTENDED RELEASE ORAL DAILY
Qty: 30 CAPSULE | Refills: 0 | Status: SHIPPED | OUTPATIENT
Start: 2021-04-06 | End: 2021-05-06

## 2021-02-03 RX ORDER — DEXTROAMPHETAMINE SACCHARATE, AMPHETAMINE ASPARTATE MONOHYDRATE, DEXTROAMPHETAMINE SULFATE AND AMPHETAMINE SULFATE 7.5; 7.5; 7.5; 7.5 MG/1; MG/1; MG/1; MG/1
30 CAPSULE, EXTENDED RELEASE ORAL DAILY
Qty: 30 CAPSULE | Refills: 0 | Status: SHIPPED | OUTPATIENT
Start: 2021-03-06 | End: 2021-04-05

## 2021-02-03 RX ORDER — DEXTROAMPHETAMINE SACCHARATE, AMPHETAMINE ASPARTATE MONOHYDRATE, DEXTROAMPHETAMINE SULFATE AND AMPHETAMINE SULFATE 7.5; 7.5; 7.5; 7.5 MG/1; MG/1; MG/1; MG/1
30 CAPSULE, EXTENDED RELEASE ORAL DAILY
Qty: 30 CAPSULE | Refills: 0 | Status: SHIPPED | OUTPATIENT
Start: 2021-02-03 | End: 2021-03-05

## 2021-02-03 NOTE — PATIENT INSTRUCTIONS
Switch to the brand name adderall - written into the scripts.  If there is any problem, I could also verbally call in the prescription (but we'd likely need to do this each time)    Schedule pap smear    COVID testing - you can always contact me with questions or concerns and we do have testing at [place]  I recommend [facility] covid testing for accessibility and turn around  In person testing (quicker, but around other people) - https://[place].gov/[place]id19/get-tested/testing-locations/community-testing.jsp  At home testing (a bit slower, but stay at home) - https://www.health.[facility].[place]./diseases/coronavirus/testsites/athome.html    With the new variants that are more contagious we do not yet know if our current precautions are sufficient.  Based on reports from the [place] suggesting that they might not be sufficient, I now recommend the following measures:  1) Get a well fitting surgical mask rather than a cloth mask (cloth masks have been effective with current strain, but were not meant to be long-term solution and it is unclear if they are effective with newer strains).  Look for masks that are [employer] certified.   - KN94 or KN95 are some options and this is one company in [place] with several good masks options -                      https://[employer]/   - this company has N95 masks which are pricier, but their Respokare version is reusable and self-sanitizing    https://g28dhebmx.[employer]ade/ or Revivn.[employer]  2) Do not enter stores or other indoor places unless absolutely needed.  Wear masks in situations you might not have previously (on a walk outside, for example)  3) Vaccine - the Pfizer and Moderna vaccines are essentially 100% effective.  Out of 32,000 people in the trials only 1 person who had been vaccinated got severe covid.  At this point, I expect that [facility] will be the source for when a vaccine is available to the general public.

## 2021-02-22 NOTE — TELEPHONE ENCOUNTER
Patient due for Pap and HPV.    Reminder done today via telephone call-spoke to the pt she has the phone number to call and schedule.

## 2021-03-22 NOTE — TELEPHONE ENCOUNTER
Mohsen Fraire,  Patient is lost to pap tracking follow-up. Attempts to contact pt have been made per reminder process and there has been no reply and/or no appt scheduled.       Pap Hx:  8/22/18: LSIL, Neg HPV, pregnant. Plan [place]. [place] not done.  04/2/19: ASCUS Pap, Neg HR HPV result. Plan [place] per provider.    05/1/19: [place] Bx Neg for Dysplasia. Plan cotest in 1 year.    04/17/20: COVID 19 interim plan updated to: Plan unchanged.     1/22/21 Reminder logan, pt viewed  02/22/21 Reminder call-spoke to the pt she has the phone number to call and schedule.   03/22/21 Lost to follow-up for pap tracking, angelica routed to provider

## 2021-04-16 ENCOUNTER — OFFICE VISIT (OUTPATIENT)
Dept: OBGYN | Facility: CLINIC | Age: 41
End: 2021-04-16
Payer: COMMERCIAL

## 2021-04-16 VITALS
HEART RATE: 97 BPM | SYSTOLIC BLOOD PRESSURE: 132 MMHG | BODY MASS INDEX: 25.46 KG/M2 | DIASTOLIC BLOOD PRESSURE: 83 MMHG | HEIGHT: 68 IN | WEIGHT: 168 LBS | TEMPERATURE: 97.3 F

## 2021-04-16 DIAGNOSIS — N89.8 VAGINAL IRRITATION: ICD-10-CM

## 2021-04-16 DIAGNOSIS — Z00.00 PREVENTATIVE HEALTH CARE: ICD-10-CM

## 2021-04-16 DIAGNOSIS — Z01.419 ENCOUNTER FOR GYNECOLOGICAL EXAMINATION WITHOUT ABNORMAL FINDING: Primary | ICD-10-CM

## 2021-04-16 LAB
SPECIMEN SOURCE: NORMAL
WET PREP SPEC: NORMAL

## 2021-04-16 PROCEDURE — 99395 PREV VISIT EST AGE 18-39: CPT | Performed by: OBSTETRICS & GYNECOLOGY

## 2021-04-16 PROCEDURE — 87491 CHLMYD TRACH DNA AMP PROBE: CPT | Performed by: OBSTETRICS & GYNECOLOGY

## 2021-04-16 PROCEDURE — 88141 CYTOPATH C/V INTERPRET: CPT | Performed by: PATHOLOGY

## 2021-04-16 PROCEDURE — 84443 ASSAY THYROID STIM HORMONE: CPT | Performed by: OBSTETRICS & GYNECOLOGY

## 2021-04-16 PROCEDURE — 88175 CYTOPATH C/V AUTO FLUID REDO: CPT | Performed by: OBSTETRICS & GYNECOLOGY

## 2021-04-16 PROCEDURE — 80061 LIPID PANEL: CPT | Performed by: OBSTETRICS & GYNECOLOGY

## 2021-04-16 PROCEDURE — 87624 HPV HI-RISK TYP POOLED RSLT: CPT | Performed by: OBSTETRICS & GYNECOLOGY

## 2021-04-16 PROCEDURE — 87591 N.GONORRHOEAE DNA AMP PROB: CPT | Performed by: OBSTETRICS & GYNECOLOGY

## 2021-04-16 PROCEDURE — 99213 OFFICE O/P EST LOW 20 MIN: CPT | Mod: 25 | Performed by: OBSTETRICS & GYNECOLOGY

## 2021-04-16 PROCEDURE — 82947 ASSAY GLUCOSE BLOOD QUANT: CPT | Performed by: OBSTETRICS & GYNECOLOGY

## 2021-04-16 PROCEDURE — 36415 COLL VENOUS BLD VENIPUNCTURE: CPT | Performed by: OBSTETRICS & GYNECOLOGY

## 2021-04-16 PROCEDURE — 87210 SMEAR WET MOUNT SALINE/INK: CPT | Performed by: OBSTETRICS & GYNECOLOGY

## 2021-04-16 ASSESSMENT — MIFFLIN-ST. JEOR: SCORE: 1490.54

## 2021-04-16 NOTE — PROGRESS NOTES
Traci is a 40 year old  female who presents for annual exam.     Menses are regular q 28-30 days and normal lasting 7 days.  Menses flow: normal.  Patient's last menstrual period was 2021.. Using condoms for contraception.  She is not currently considering pregnancy.  Besides routine health maintenance, she has noticed that after getting a peloton a few months ago, she has had vaginal and vulvar irritation right after her period.  She has tried OTC cream which seems to help, but has come back with each period.   GYNECOLOGIC HISTORY:  Menarche: 14  Age at first intercourse: 17 Number of lifetime partners: 20  Traci is sexually active with 1male partner(s) and is currently in monogamous relationship .    History sexually transmitted infections:No STD history  STI testing offered?  Accepted  BRITTNEE exposure: No  History of abnormal Pap smear: YES - updated in Problem List and Health Maintenance accordingly  Family history of breast CA: Yes (Please explain): mgmo  Family history of uterine/ovarian CA: No    Family history of colon CA: Yes (Please explain): mgmo    HEALTH MAINTENANCE:  Cholesterol: (No results found for: CHOL History of abnormal lipids: No  Mammo: no . History of abnormal Mammo: Not applicable.  Regular Self Breast Exams: Yes  Calcium/Vitamin D intake: source:  dairy, dietary supplement(s) Adequate? Yes  TSH: (No results found for: TSH )  Pap; (  Lab Results   Component Value Date    PAP ASC-US 2019    PAP LSIL 2018    )    HISTORY:  OB History    Para Term  AB Living   4 2 0 2 2 2   SAB TAB Ectopic Multiple Live Births   1 0 0 0 2      # Outcome Date GA Lbr Scar/2nd Weight Sex Delivery Anes PTL Lv   4  02/15/19 36w4d / 00:20 2.8 kg (6 lb 2.8 oz) M Vag-Spont EPI Y ALKA      Name: BA,MALE-TRACI      Apgar1: 8  Apgar5: 8   3  / 36w0d 77:00 / 00:31 2.39 kg (5 lb 4.3 oz) F Vag-Spont EPI  ALKA      Name: BABABY1 TRACI      Apgar1: 9  Apgar5: 9   2  SAB 13 5w0d          1 AB 2010 5w0d             Obstetric Comments   Sofia Bang     Past Medical History:   Diagnosis Date     Abnormal Pap smear of cervix 2018, 19    LSIL, Neg HPV, pregnant. 19: See problem list.      ADHD (attention deficit hyperactivity disorder)      Anemia      Gastric reflux      IBS (irritable bowel syndrome)      Preeclampsia 2019     Past Surgical History:   Procedure Laterality Date     CYSTOSCOPY, DILATE URETHRA, COMBINED  1999    for frequent UTI'S     KNEE SURGERY  10/1997     Family History   Problem Relation Age of Onset     Heart Failure Maternal Grandmother      Breast Cancer Maternal Grandmother      Colon Cancer Maternal Grandmother      Cerebrovascular Disease Maternal Grandfather      Diabetes Maternal Grandfather      Other Cancer Maternal Grandfather      Mental Illness Paternal Grandfather      Hypertension Sister      Social History     Socioeconomic History     Marital status: Single     Spouse name: None     Number of children: None     Years of education: None     Highest education level: None   Occupational History     None   Social Needs     Financial resource strain: None     Food insecurity     Worry: None     Inability: None     Transportation needs     Medical: None     Non-medical: None   Tobacco Use     Smoking status: Former Smoker     Quit date: 10/25/2015     Years since quittin.4     Smokeless tobacco: Never Used   Substance and Sexual Activity     Alcohol use: No     Alcohol/week: 0.0 standard drinks     Comment: occ.      Drug use: No     Sexual activity: Not Currently     Partners: Male     Birth control/protection: None   Lifestyle     Physical activity     Days per week: None     Minutes per session: None     Stress: None   Relationships     Social connections     Talks on phone: None     Gets together: None     Attends Bahai service: None     Active member of club or organization: None     Attends meetings  of clubs or organizations: None     Relationship status: None     Intimate partner violence     Fear of current or ex partner: None     Emotionally abused: None     Physically abused: None     Forced sexual activity: None   Other Topics Concern     Parent/sibling w/ CABG, MI or angioplasty before 65F 55M? No   Social History Narrative    Caffeine intake/servings daily - 1    Calcium intake/servings daily - 3    Exercise 5 times weekly - describe ; bikes    Sunscreen used - Yes    Seatbelts used - Yes    Guns stored in the home - No    Self Breast Exam - No    Pap test up to date -  No    Eye exam up to date -  No    Dental exam up to date -  No    DEXA scan up to date -  No    Flex Sig/Colonoscopy up to date -  No    Mammography up to date -  No    Immunizations reviewed and up to date - Yes    Abuse: Current or Past (Physical, Sexual or Emotional) - No    Do you feel safe in your environment - Yes    Do you cope well with stress - Yes    Do you suffer from insomnia - No               Current Outpatient Medications:      amphetamine-dextroamphetamine (ADDERALL XR) 10 MG 24 hr capsule, Take 1 capsule (10 mg) by mouth daily, Disp: 30 capsule, Rfl: 0     amphetamine-dextroamphetamine (ADDERALL XR) 10 MG 24 hr capsule, Take 1 capsule (10 mg) by mouth daily, Disp: 30 capsule, Rfl: 0     amphetamine-dextroamphetamine (ADDERALL XR) 30 MG 24 hr capsule, Take 1 capsule (30 mg) by mouth daily, Disp: 30 capsule, Rfl: 0     amphetamine-dextroamphetamine (ADDERALL XR) 30 MG 24 hr capsule, Take 1 capsule (30 mg) by mouth daily, Disp: 30 capsule, Rfl: 0     ketoconazole (NIZORAL) 2 % external shampoo, LUIS EXT D PRF ITCHING OR IRRITATION, Disp: , Rfl: 11   No Known Allergies    Past medical, surgical, social and family history were reviewed and updated in EPIC.    ROS:   C:     NEGATIVE for fever, chills, change in weight  I:       NEGATIVE for worrisome rashes, moles or lesions  E:     NEGATIVE for vision changes or  irritation  E/M: NEGATIVE for ear, mouth and throat problems  R:     NEGATIVE for significant cough or SOB  CV:   NEGATIVE for chest pain, palpitations or peripheral edema  GI:     NEGATIVE for nausea, abdominal pain, heartburn, or change in bowel habits  :   NEGATIVE for frequency, dysuria, hematuria, vaginal discharge, or irregular bleeding  M:     NEGATIVE for significant arthralgias or myalgia  N:      NEGATIVE for weakness, dizziness or paresthesias  E:      NEGATIVE for temperature intolerance, skin/hair changes  P:      NEGATIVE for changes in mood or affect.    EXAM:  /83   Pulse 97   Temp 97.3  F (36.3  C) (Oral)   Wt 76.2 kg (168 lb)   LMP 04/02/2021   BMI 26.37 kg/m     BMI: Body mass index is 26.37 kg/m .  Constitutional: healthy, alert and no distress  Head: Normocephalic. No masses, lesions, tenderness or abnormalities  Neck: Neck supple. Trachea midline. No adenopathy. Thyroid symmetric, normal size.   Cardiovascular: RRR.   Respiratory: Negative.   Breast: No nodularity, asymmetry or nipple discharge bilaterally.  Gastrointestinal: Abdomen soft, non-tender, non-distended. No masses, organomegaly.  :  Vulva:  No external lesions, normal female hair distribution, no inguinal adenopathy.    Urethra:  Midline, non-tender, well supported, no discharge  Vagina:  Moist, pink, no abnormal discharge, no lesions  Uterus:  Normal size, non-tender, freely mobile  Ovaries:  No masses appreciated, non-tender, mobile  Rectal Exam: deferred  Musculoskeletal: extremities normal  Skin: no suspicious lesions or rashes  Psychiatric: Affect appropriate, cooperative,mentation appears normal.     COUNSELING:   Reviewed preventive health counseling, as reflected in patient instructions  Special attention given to:        Regular exercise       Healthy diet/nutrition       Contraception       Osteoporosis prevention/bone health       Safe sex practices/STD prevention   reports that she quit smoking about 5  years ago. She has never used smokeless tobacco.    Body mass index is 26.37 kg/m .    FRAX Risk Assessment    ASSESSMENT:  40 year old female with satisfactory annual exam. vulvar and vaginal irritation  Plan: cotesting done.  Non fasting labs.  Wet prep.  Will also check gc/ct for thoroughness.  RTC yearly or prn.    JAZMIN CHRISTIANSON MD

## 2021-04-17 LAB
C TRACH DNA SPEC QL NAA+PROBE: NEGATIVE
CHOLEST SERPL-MCNC: 202 MG/DL
GLUCOSE SERPL-MCNC: 82 MG/DL (ref 70–99)
HDLC SERPL-MCNC: 90 MG/DL
LDLC SERPL CALC-MCNC: 97 MG/DL
N GONORRHOEA DNA SPEC QL NAA+PROBE: NEGATIVE
NONHDLC SERPL-MCNC: 112 MG/DL
SPECIMEN SOURCE: NORMAL
SPECIMEN SOURCE: NORMAL
TRIGL SERPL-MCNC: 75 MG/DL
TSH SERPL DL<=0.005 MIU/L-ACNC: 2.14 MU/L (ref 0.4–4)

## 2021-04-22 LAB
COPATH REPORT: ABNORMAL
PAP: ABNORMAL

## 2021-04-26 LAB
FINAL DIAGNOSIS: ABNORMAL
HPV HR 12 DNA CVX QL NAA+PROBE: POSITIVE
HPV16 DNA SPEC QL NAA+PROBE: NEGATIVE
HPV18 DNA SPEC QL NAA+PROBE: NEGATIVE
SPECIMEN DESCRIPTION: ABNORMAL
SPECIMEN SOURCE CVX/VAG CYTO: ABNORMAL

## 2021-04-27 ENCOUNTER — PATIENT OUTREACH (OUTPATIENT)
Dept: OBGYN | Facility: CLINIC | Age: 41
End: 2021-04-27

## 2021-05-18 ENCOUNTER — OFFICE VISIT (OUTPATIENT)
Dept: OBGYN | Facility: CLINIC | Age: 41
End: 2021-05-18
Payer: COMMERCIAL

## 2021-05-18 VITALS
BODY MASS INDEX: 25.54 KG/M2 | SYSTOLIC BLOOD PRESSURE: 136 MMHG | DIASTOLIC BLOOD PRESSURE: 87 MMHG | WEIGHT: 168 LBS | HEART RATE: 89 BPM

## 2021-05-18 DIAGNOSIS — R87.612 PAPANICOLAOU SMEAR OF CERVIX WITH LOW GRADE SQUAMOUS INTRAEPITHELIAL LESION (LGSIL): Primary | ICD-10-CM

## 2021-05-18 LAB — HCG UR QL: NEGATIVE

## 2021-05-18 PROCEDURE — 88305 TISSUE EXAM BY PATHOLOGIST: CPT | Performed by: PATHOLOGY

## 2021-05-18 PROCEDURE — 81025 URINE PREGNANCY TEST: CPT | Performed by: OBSTETRICS & GYNECOLOGY

## 2021-05-18 PROCEDURE — 57454 BX/CURETT OF CERVIX W/SCOPE: CPT | Performed by: OBSTETRICS & GYNECOLOGY

## 2021-05-18 SDOH — HEALTH STABILITY: MENTAL HEALTH: HOW OFTEN DO YOU HAVE 6 OR MORE DRINKS ON ONE OCCASION?: NOT ASKED

## 2021-05-18 SDOH — HEALTH STABILITY: MENTAL HEALTH: HOW MANY STANDARD DRINKS CONTAINING ALCOHOL DO YOU HAVE ON A TYPICAL DAY?: NOT ASKED

## 2021-05-18 SDOH — HEALTH STABILITY: MENTAL HEALTH: HOW OFTEN DO YOU HAVE A DRINK CONTAINING ALCOHOL?: NOT ASKED

## 2021-05-18 NOTE — PATIENT INSTRUCTIONS

## 2021-05-18 NOTE — NURSING NOTE
"Chief Complaint   Patient presents with     Colposcopy       Initial /87   Pulse 89   Wt 76.2 kg (168 lb)   LMP 2021 (Approximate)   BMI 25.54 kg/m   Estimated body mass index is 25.54 kg/m  as calculated from the following:    Height as of 21: 1.727 m (5' 8\").    Weight as of this encounter: 76.2 kg (168 lb).  BP completed using cuff size: regular    Questioned patient about current smoking habits.  Pt. has never smoked.          The following HM Due: NONE      The following patient reported/Care Every where data was sent to:  P ABSTRACT QUALITY INITIATIVES [30333]        patient has appointment for today  Nidia Terry                "

## 2021-05-18 NOTE — PROGRESS NOTES
Traci Sloan is a 40 year old female  who presents for repeat colposcopy, referred by Liz Haider. Pap smear on 21 showed: LGSIL and with high risk HPV present: other. The prior pap showed ASCUS, -HPV.       No LMP recorded.  UPT today is negative  Patient does not smoke  Type of contraception: condoms  Age at first sexual intercourse: 17  Number of sexual partners (lifetime): >6  Past GYN history: HPV  Prior cervical/vaginal disease: Normal exam without visible pathology.  Prior cervical treatment: no treatment.      PROCEDURE:      Before the procedure, it was ensured that the patient was educated regarding the nature of her findings to date, the implications, and what was to be done. She has been made aware of the role of HPV, the natural history of infection, ways to minimize her future risk, the effect of HPV on the cervix, and treatment options available should they be indicated. The details of the colposcopic procedure were reviewed. All questions were answered before proceeding, and informed consent was therefore obtained.      Speculum placed in vagina and excellent visualization of cervix acheived, cervix swabbed x 3 with acetic acid solution.      FINDINGS:  Cervix: acetowhitening noted 8;00, biopsy taken  Please refer to images section for details.  SCJ seen?: yes   ECC done?: Yes   Satisfactory examination?: yes      ASSESSMENT: HPV related changes.  PLAN: specimens labelled and sent to Pathology, will base further treatment on Pathology findings, treatment options discussed with patient and post biopsy instructions given to patient    Liz Haider MD

## 2021-05-20 ENCOUNTER — PATIENT OUTREACH (OUTPATIENT)
Dept: OBGYN | Facility: CLINIC | Age: 41
End: 2021-05-20

## 2021-05-20 LAB — COPATH REPORT: NORMAL

## 2021-05-29 ENCOUNTER — MYC MEDICAL ADVICE (OUTPATIENT)
Dept: FAMILY MEDICINE | Facility: CLINIC | Age: 41
End: 2021-05-29

## 2021-06-01 NOTE — TELEPHONE ENCOUNTER
Response sent to patient via [employer].     Margarette Savage RN   [facility]

## 2021-06-10 ENCOUNTER — MYC REFILL (OUTPATIENT)
Dept: FAMILY MEDICINE | Facility: CLINIC | Age: 41
End: 2021-06-10

## 2021-06-10 ENCOUNTER — MYC MEDICAL ADVICE (OUTPATIENT)
Dept: FAMILY MEDICINE | Facility: CLINIC | Age: 41
End: 2021-06-10

## 2021-06-10 DIAGNOSIS — F90.2 ATTENTION DEFICIT HYPERACTIVITY DISORDER (ADHD), COMBINED TYPE: ICD-10-CM

## 2021-06-11 RX ORDER — DEXTROAMPHETAMINE SACCHARATE, AMPHETAMINE ASPARTATE MONOHYDRATE, DEXTROAMPHETAMINE SULFATE AND AMPHETAMINE SULFATE 7.5; 7.5; 7.5; 7.5 MG/1; MG/1; MG/1; MG/1
30 CAPSULE, EXTENDED RELEASE ORAL DAILY
Qty: 30 CAPSULE | Refills: 0 | Status: SHIPPED | OUTPATIENT
Start: 2021-06-11 | End: 2021-07-07

## 2021-06-11 RX ORDER — DEXTROAMPHETAMINE SACCHARATE, AMPHETAMINE ASPARTATE MONOHYDRATE, DEXTROAMPHETAMINE SULFATE AND AMPHETAMINE SULFATE 2.5; 2.5; 2.5; 2.5 MG/1; MG/1; MG/1; MG/1
10 CAPSULE, EXTENDED RELEASE ORAL DAILY
Qty: 30 CAPSULE | Refills: 0 | Status: SHIPPED | OUTPATIENT
Start: 2021-06-11 | End: 2021-07-07

## 2021-06-11 NOTE — TELEPHONE ENCOUNTER
Routing refill request to provider for review/approval because:  Drug not on the [facility] refill protocol   Celina BLANCA RN

## 2021-07-07 ENCOUNTER — MYC REFILL (OUTPATIENT)
Dept: FAMILY MEDICINE | Facility: CLINIC | Age: 41
End: 2021-07-07

## 2021-07-07 DIAGNOSIS — F90.2 ATTENTION DEFICIT HYPERACTIVITY DISORDER (ADHD), COMBINED TYPE: ICD-10-CM

## 2021-07-12 RX ORDER — DEXTROAMPHETAMINE SACCHARATE, AMPHETAMINE ASPARTATE MONOHYDRATE, DEXTROAMPHETAMINE SULFATE AND AMPHETAMINE SULFATE 7.5; 7.5; 7.5; 7.5 MG/1; MG/1; MG/1; MG/1
30 CAPSULE, EXTENDED RELEASE ORAL DAILY
Qty: 30 CAPSULE | Refills: 0 | Status: SHIPPED | OUTPATIENT
Start: 2021-07-12 | End: 2021-08-03

## 2021-07-12 RX ORDER — DEXTROAMPHETAMINE SACCHARATE, AMPHETAMINE ASPARTATE MONOHYDRATE, DEXTROAMPHETAMINE SULFATE AND AMPHETAMINE SULFATE 2.5; 2.5; 2.5; 2.5 MG/1; MG/1; MG/1; MG/1
10 CAPSULE, EXTENDED RELEASE ORAL DAILY
Qty: 30 CAPSULE | Refills: 0 | Status: SHIPPED | OUTPATIENT
Start: 2021-07-12 | End: 2021-08-03

## 2021-08-26 DIAGNOSIS — F90.2 ATTENTION DEFICIT HYPERACTIVITY DISORDER (ADHD), COMBINED TYPE: ICD-10-CM

## 2021-08-26 RX ORDER — DEXTROAMPHETAMINE SACCHARATE, AMPHETAMINE ASPARTATE MONOHYDRATE, DEXTROAMPHETAMINE SULFATE AND AMPHETAMINE SULFATE 7.5; 7.5; 7.5; 7.5 MG/1; MG/1; MG/1; MG/1
30 CAPSULE, EXTENDED RELEASE ORAL DAILY
Qty: 30 CAPSULE | Refills: 0 | Status: SHIPPED | OUTPATIENT
Start: 2021-08-26 | End: 2021-09-24

## 2021-08-26 RX ORDER — DEXTROAMPHETAMINE SACCHARATE, AMPHETAMINE ASPARTATE MONOHYDRATE, DEXTROAMPHETAMINE SULFATE AND AMPHETAMINE SULFATE 2.5; 2.5; 2.5; 2.5 MG/1; MG/1; MG/1; MG/1
10 CAPSULE, EXTENDED RELEASE ORAL DAILY
Qty: 30 CAPSULE | Refills: 0 | Status: SHIPPED | OUTPATIENT
Start: 2021-08-26 | End: 2021-09-24

## 2021-08-26 NOTE — TELEPHONE ENCOUNTER
Yee and POD,    Pt scheduled for f/u with Yee 10/25/21.    Requested Prescriptions   Pending Prescriptions Disp Refills     amphetamine-dextroamphetamine (ADDERALL XR) 10 MG 24 hr capsule 30 capsule 0     Sig: Take 1 capsule (10 mg) by mouth daily Needs office visit prior to refills       There is no refill protocol information for this order        amphetamine-dextroamphetamine (ADDERALL XR) 30 MG 24 hr capsule 30 capsule 0     Sig: Take 1 capsule (30 mg) by mouth daily Needs office visit prior to refills       There is no refill protocol information for this order        Routing refill request to provider for review/approval because:  Drug not on the [facility] refill protocol     Dolores Cole RN  [facility]

## 2021-08-26 NOTE — TELEPHONE ENCOUNTER
Reason for Call:  Medication or medication refill:    Do you use a [facility]?  Name of the pharmacy and phone number for the current request:  [id number] - [place], [place] - [facility]  [phone]    Name of the medication requested: amphetamine-dextroamphetamine (ADDERALL XR) 30 MG 24 hr capsule, amphetamine-dextroamphetamine (ADDERALL XR) 10 MG 24 hr capsule    Other request: Patient is almost out of medication. Pharmacy queued.    Can we leave a detailed message on this number? YES    Phone number patient can be reached at: Cell number on file:    Telephone Information:   Mobile [phone]       Best Time: Anytime    Call taken on 8/26/2021 at 11:49 AM by Sher Cristina

## 2021-09-01 DIAGNOSIS — B36.0 TINEA VERSICOLOR: ICD-10-CM

## 2021-09-02 RX ORDER — KETOCONAZOLE 200 MG/1
TABLET ORAL
Qty: 10 TABLET | Refills: 0 | OUTPATIENT
Start: 2021-09-02

## 2021-09-02 NOTE — TELEPHONE ENCOUNTER
Requested Prescriptions   Pending Prescriptions Disp Refills     ketoconazole (NIZORAL) 200 MG tablet [Pharmacy Med Name: KETOCONAZOLE 200MG TABLETS] 10 tablet 0     Sig: TAKE 1 TABLET(200 MG) BY MOUTH DAILY       There is no refill protocol information for this order        Routing refill request to provider for review/approval because:  Last ordered 5/2019    ThanksHenna RN  [facility]

## 2021-09-09 NOTE — TELEPHONE ENCOUNTER
Patient called and notified. Patient verbalized understanding.     Thanks,  JUNE Aguillon  [facility]

## 2021-09-19 ENCOUNTER — HEALTH MAINTENANCE LETTER (OUTPATIENT)
Age: 41
End: 2021-09-19

## 2021-09-24 ENCOUNTER — MYC REFILL (OUTPATIENT)
Dept: FAMILY MEDICINE | Facility: CLINIC | Age: 41
End: 2021-09-24

## 2021-09-24 ENCOUNTER — E-VISIT (OUTPATIENT)
Dept: FAMILY MEDICINE | Facility: CLINIC | Age: 41
End: 2021-09-24
Payer: COMMERCIAL

## 2021-09-24 DIAGNOSIS — F90.2 ATTENTION DEFICIT HYPERACTIVITY DISORDER (ADHD), COMBINED TYPE: ICD-10-CM

## 2021-09-24 DIAGNOSIS — B36.0 TINEA VERSICOLOR: ICD-10-CM

## 2021-09-24 PROCEDURE — 99422 OL DIG E/M SVC 11-20 MIN: CPT | Performed by: NURSE PRACTITIONER

## 2021-09-24 RX ORDER — DEXTROAMPHETAMINE SACCHARATE, AMPHETAMINE ASPARTATE MONOHYDRATE, DEXTROAMPHETAMINE SULFATE AND AMPHETAMINE SULFATE 7.5; 7.5; 7.5; 7.5 MG/1; MG/1; MG/1; MG/1
30 CAPSULE, EXTENDED RELEASE ORAL DAILY
Qty: 30 CAPSULE | Refills: 0 | Status: SHIPPED | OUTPATIENT
Start: 2021-09-24 | End: 2021-10-18

## 2021-09-24 RX ORDER — KETOCONAZOLE 20 MG/ML
SHAMPOO TOPICAL
Refills: 11 | Status: CANCELLED | OUTPATIENT
Start: 2021-09-24

## 2021-09-24 RX ORDER — DEXTROAMPHETAMINE SACCHARATE, AMPHETAMINE ASPARTATE MONOHYDRATE, DEXTROAMPHETAMINE SULFATE AND AMPHETAMINE SULFATE 2.5; 2.5; 2.5; 2.5 MG/1; MG/1; MG/1; MG/1
10 CAPSULE, EXTENDED RELEASE ORAL DAILY
Qty: 30 CAPSULE | Refills: 0 | Status: SHIPPED | OUTPATIENT
Start: 2021-09-24 | End: 2021-10-18

## 2021-09-27 RX ORDER — KETOCONAZOLE 20 MG/ML
SHAMPOO TOPICAL
Qty: 120 ML | Refills: 11 | Status: SHIPPED | OUTPATIENT
Start: 2021-09-27 | End: 2023-04-25

## 2021-09-27 RX ORDER — KETOCONAZOLE 200 MG/1
200 TABLET ORAL DAILY
Qty: 10 TABLET | Refills: 0 | Status: SHIPPED | OUTPATIENT
Start: 2021-09-27 | End: 2023-04-25

## 2021-10-18 ENCOUNTER — MYC REFILL (OUTPATIENT)
Dept: FAMILY MEDICINE | Facility: CLINIC | Age: 41
End: 2021-10-18

## 2021-10-18 DIAGNOSIS — F90.2 ATTENTION DEFICIT HYPERACTIVITY DISORDER (ADHD), COMBINED TYPE: ICD-10-CM

## 2021-10-19 RX ORDER — DEXTROAMPHETAMINE SACCHARATE, AMPHETAMINE ASPARTATE MONOHYDRATE, DEXTROAMPHETAMINE SULFATE AND AMPHETAMINE SULFATE 7.5; 7.5; 7.5; 7.5 MG/1; MG/1; MG/1; MG/1
30 CAPSULE, EXTENDED RELEASE ORAL DAILY
Qty: 30 CAPSULE | Refills: 0 | Status: SHIPPED | OUTPATIENT
Start: 2021-10-19 | End: 2022-04-22

## 2021-10-19 RX ORDER — DEXTROAMPHETAMINE SACCHARATE, AMPHETAMINE ASPARTATE MONOHYDRATE, DEXTROAMPHETAMINE SULFATE AND AMPHETAMINE SULFATE 2.5; 2.5; 2.5; 2.5 MG/1; MG/1; MG/1; MG/1
10 CAPSULE, EXTENDED RELEASE ORAL DAILY
Qty: 30 CAPSULE | Refills: 0 | Status: SHIPPED | OUTPATIENT
Start: 2021-10-19 | End: 2022-04-22

## 2021-10-19 NOTE — TELEPHONE ENCOUNTER
Requested Prescriptions   Pending Prescriptions Disp Refills     amphetamine-dextroamphetamine (ADDERALL XR) 10 MG 24 hr capsule 30 capsule 0     Sig: Take 1 capsule (10 mg) by mouth daily Needs office visit prior to refills       There is no refill protocol information for this order        amphetamine-dextroamphetamine (ADDERALL XR) 30 MG 24 hr capsule 30 capsule 0     Sig: Take 1 capsule (30 mg) by mouth daily Needs office visit prior to refills       There is no refill protocol information for this order        Routing refill request to provider for review/approval because:  Drug not on the [facility] refill protocol

## 2021-10-22 NOTE — PROGRESS NOTES
"Traci is a 41 year old who is being evaluated via a billable video visit.      How would you like to obtain your AVS? MyChart  If the video visit is dropped, the invitation should be resent by: Text to cell phone:  [phone]   [email or web address]  Will anyone else be joining your video visit? No    Video Start Time: 9:59 AM    Assessment & Plan     Attention deficit hyperactivity disorder (ADHD), combined type  No changes    Chronic right-sided low back pain without sciatica  Discussed PM&R option in addition to physical therapy       35 minutes spent on the date of the encounter doing chart review, history and exam, documentation and further activities per the note     BMI:   Estimated body mass index is 25.54 kg/m  as calculated from the following:    Height as of 4/16/21: 1.727 m (5' 8\").    Weight as of 5/18/21: 76.2 kg (168 lb).   Weight management plan: Discussed healthy diet and exercise guidelines    Patient Instructions   Continue doing the physical therapy exercises that you have as well as the ergonomic changes you plan to implement (or have implemented).    Schedule with PM&R (also called Physiatry) for more comprehensive and long-term solution to the chronic back pain.  There are two referrals placed - make sure to check insurance coverage (which \"tier\" coverage or % covered for each).      Return in about 3 months (around 1/25/2022) for Physical Exam; ADHD, CSA.    Valarie Medrano, WINSTON CNP  [facility]    Subjective   Traci is a 41 year old who presents for the following health issues    HPI      Last face-to-face visit was video visit 2/3/21  Due for CSA  Due for flu shot - scheduled flu shot and booster 11/2 at [employer] vaccine ([employer]) #2 3/31/21 (>6 months)    CURRENT ADHD CONCERNS:  Refill medications  Brands have changed and difficulties with pharmacies and refills    OTHER concerns:  Back bothering her last year, went to physical therapy and this helped.  " Current flare with daily pain and stiffness on waking occurring for past few months. Does have some back of the leg pain - not shooting.  Has had no irwin leg weakness, bowel or bladder changes, waking at night with pain, no fever, chills, weight loss.  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  Pain would come and go over the years and Traci is always careful with physical activity.  Worsened by being more present since covid due to home office and house move and kids sleeping in their bed.  Plans to do the previous physical therapy exercises and try a different sitting situations and stand with working.       CURRENT PRESCRIPTIONS:    Current Outpatient Medications   Medication     amphetamine-dextroamphetamine (ADDERALL XR) 10 MG 24 hr capsule     amphetamine-dextroamphetamine (ADDERALL XR) 30 MG 24 hr capsule     ketoconazole (NIZORAL) 2 % external shampoo     ketoconazole (NIZORAL) 200 MG tablet     No current facility-administered medications for this visit.   Taking 30 mg + 10 mg XL for total dose of 40 mg XL dose in AM and it wears off around afternoon.  Not as helpful as in the past.    Took different brands/types in the past - took Concerta and it didn't work well.  Has tried Vyvanse in the past and doesn't remember much.       MEDICATION BENEFITS:  Uncontrolled symptoms:  Distractability and Frustration tolerance     Problem list and histories reviewed & adjusted, as indicated.  Additional history: as documented    Patient Active Problem List   Diagnosis     Gastroesophageal reflux disease     Irritable bowel syndrome     History of chronic urinary tract infection     Attention deficit hyperactivity disorder (ADHD), combined type     Other closed fracture of distal end of left fibula, initial encounter     Need for Tdap vaccination     History of  delivery, currently pregnant      Papanicolaou smear of cervix with low grade squamous intraepithelial lesion (LGSIL)     Abdominal pain     Encounter for triage in pregnant patient     Vaginal bleeding in pregnancy     Gestational hypertension      (normal spontaneous vaginal delivery)     Preeclampsia in postpartum period     Chronic right-sided low back pain without sciatica     BP Readings from Last 6 Encounters:   21 136/87   21 132/83   20 138/86   19 (!) 140/96   19 126/68   19 126/68       Review of Systems   Constitutional, HEENT, cardiovascular, pulmonary, gi and gu systems are negative, except as otherwise noted.      Objective           Vitals:  No vitals were obtained today due to virtual visit.    Physical Exam   GENERAL: Healthy, alert and no distress  EYES: Eyes grossly normal to inspection.  No discharge or erythema, or obvious scleral/conjunctival abnormalities.  RESP: No audible wheeze, cough, or visible cyanosis.  No visible retractions or increased work of breathing.    SKIN: Visible skin clear. No significant rash, abnormal pigmentation or lesions.  NEURO: Cranial nerves grossly intact.  Mentation and speech appropriate for age.  PSYCH: Mentation appears normal, affect normal/bright, judgement and insight intact, normal speech and appearance well-groomed.       MENTAL STATUS EXAM  Appearance: appropriate  Attitude: cooperative  Behavior: normal  Eye Contact: normal  Speech: normal  Orientation: oriented to person , place, time and situation  Affect: Appropriate/mood-congruent and Bright  Thought Process: clear  Suicidal Ideation: reports no thoughts, no intention  Hallucination: no          Video-Visit Details    Type of service:  Video Visit    Video End Time:10:28 AM    Originating Location (pt. Location): Home    Distant Location (provider location):  [facility]     Platform used for Video Visit: [employer]

## 2021-10-25 ENCOUNTER — VIRTUAL VISIT (OUTPATIENT)
Dept: FAMILY MEDICINE | Facility: CLINIC | Age: 41
End: 2021-10-25
Payer: COMMERCIAL

## 2021-10-25 DIAGNOSIS — F90.2 ATTENTION DEFICIT HYPERACTIVITY DISORDER (ADHD), COMBINED TYPE: Primary | ICD-10-CM

## 2021-10-25 DIAGNOSIS — G89.29 CHRONIC RIGHT-SIDED LOW BACK PAIN WITHOUT SCIATICA: ICD-10-CM

## 2021-10-25 DIAGNOSIS — M54.50 CHRONIC RIGHT-SIDED LOW BACK PAIN WITHOUT SCIATICA: ICD-10-CM

## 2021-10-25 PROCEDURE — 99214 OFFICE O/P EST MOD 30 MIN: CPT | Mod: 95 | Performed by: NURSE PRACTITIONER

## 2021-10-25 RX ORDER — DEXTROAMPHETAMINE SACCHARATE, AMPHETAMINE ASPARTATE MONOHYDRATE, DEXTROAMPHETAMINE SULFATE AND AMPHETAMINE SULFATE 2.5; 2.5; 2.5; 2.5 MG/1; MG/1; MG/1; MG/1
10 CAPSULE, EXTENDED RELEASE ORAL DAILY
Qty: 30 CAPSULE | Refills: 0 | Status: SHIPPED | OUTPATIENT
Start: 2021-12-26 | End: 2022-01-25

## 2021-10-25 RX ORDER — DEXTROAMPHETAMINE SACCHARATE, AMPHETAMINE ASPARTATE MONOHYDRATE, DEXTROAMPHETAMINE SULFATE AND AMPHETAMINE SULFATE 7.5; 7.5; 7.5; 7.5 MG/1; MG/1; MG/1; MG/1
30 CAPSULE, EXTENDED RELEASE ORAL DAILY
Qty: 30 CAPSULE | Refills: 0 | Status: SHIPPED | OUTPATIENT
Start: 2021-11-25 | End: 2022-01-03

## 2021-10-25 RX ORDER — DEXTROAMPHETAMINE SACCHARATE, AMPHETAMINE ASPARTATE MONOHYDRATE, DEXTROAMPHETAMINE SULFATE AND AMPHETAMINE SULFATE 2.5; 2.5; 2.5; 2.5 MG/1; MG/1; MG/1; MG/1
10 CAPSULE, EXTENDED RELEASE ORAL DAILY
Qty: 30 CAPSULE | Refills: 0 | Status: SHIPPED | OUTPATIENT
Start: 2021-11-25 | End: 2022-01-03

## 2021-10-25 RX ORDER — DEXTROAMPHETAMINE SACCHARATE, AMPHETAMINE ASPARTATE MONOHYDRATE, DEXTROAMPHETAMINE SULFATE AND AMPHETAMINE SULFATE 7.5; 7.5; 7.5; 7.5 MG/1; MG/1; MG/1; MG/1
30 CAPSULE, EXTENDED RELEASE ORAL DAILY
Qty: 30 CAPSULE | Refills: 0 | Status: SHIPPED | OUTPATIENT
Start: 2021-12-26 | End: 2022-01-25

## 2021-10-25 RX ORDER — DEXTROAMPHETAMINE SACCHARATE, AMPHETAMINE ASPARTATE MONOHYDRATE, DEXTROAMPHETAMINE SULFATE AND AMPHETAMINE SULFATE 2.5; 2.5; 2.5; 2.5 MG/1; MG/1; MG/1; MG/1
10 CAPSULE, EXTENDED RELEASE ORAL DAILY
Qty: 30 CAPSULE | Refills: 0 | Status: SHIPPED | OUTPATIENT
Start: 2021-10-25 | End: 2021-11-24

## 2021-10-25 RX ORDER — DEXTROAMPHETAMINE SACCHARATE, AMPHETAMINE ASPARTATE MONOHYDRATE, DEXTROAMPHETAMINE SULFATE AND AMPHETAMINE SULFATE 7.5; 7.5; 7.5; 7.5 MG/1; MG/1; MG/1; MG/1
30 CAPSULE, EXTENDED RELEASE ORAL DAILY
Qty: 30 CAPSULE | Refills: 0 | Status: SHIPPED | OUTPATIENT
Start: 2021-10-25 | End: 2021-11-24

## 2021-10-25 NOTE — PATIENT INSTRUCTIONS
"Continue doing the physical therapy exercises that you have as well as the ergonomic changes you plan to implement (or have implemented).    Schedule with PM&R (also called Physiatry) for more comprehensive and long-term solution to the chronic back pain.  There are two referrals placed - make sure to check insurance coverage (which \"tier\" coverage or % covered for each).  "

## 2021-11-29 ENCOUNTER — E-VISIT (OUTPATIENT)
Dept: FAMILY MEDICINE | Facility: CLINIC | Age: 41
End: 2021-11-29
Payer: COMMERCIAL

## 2021-11-29 DIAGNOSIS — J02.9 ACUTE PHARYNGITIS, UNSPECIFIED ETIOLOGY: Primary | ICD-10-CM

## 2021-11-29 DIAGNOSIS — R05.3 PERSISTENT COUGH FOR 3 WEEKS OR LONGER: ICD-10-CM

## 2021-11-29 PROCEDURE — 99423 OL DIG E/M SVC 21+ MIN: CPT | Performed by: NURSE PRACTITIONER

## 2021-11-29 RX ORDER — ALBUTEROL SULFATE 90 UG/1
2 AEROSOL, METERED RESPIRATORY (INHALATION) EVERY 6 HOURS
Qty: 18 G | Refills: 1 | Status: SHIPPED | OUTPATIENT
Start: 2021-11-29 | End: 2021-12-20

## 2021-11-29 NOTE — PATIENT INSTRUCTIONS
Thank you for choosing us for your care. I have placed an order for a prescription so that you can start treatment. View your full visit summary for details by clicking on the link below. Your pharmacist will able to address any questions you may have about the medication.     If you're not feeling better within 5-7 days, please schedule an appointment.  You can schedule an appointment right here in [facility], or call [phone]  If the visit is for the same symptoms as your eVisit, we'll refund the cost of your eVisit if seen within seven days.

## 2021-12-14 ENCOUNTER — E-VISIT (OUTPATIENT)
Dept: FAMILY MEDICINE | Facility: CLINIC | Age: 41
End: 2021-12-14
Payer: COMMERCIAL

## 2021-12-14 DIAGNOSIS — M54.50 ACUTE LOW BACK PAIN, UNSPECIFIED BACK PAIN LATERALITY, UNSPECIFIED WHETHER SCIATICA PRESENT: Primary | ICD-10-CM

## 2021-12-14 DIAGNOSIS — M54.50 RECURRENT LOW BACK PAIN: ICD-10-CM

## 2021-12-14 PROCEDURE — 99422 OL DIG E/M SVC 11-20 MIN: CPT | Performed by: NURSE PRACTITIONER

## 2021-12-14 NOTE — TELEPHONE ENCOUNTER
Provider E-Visit time total (minutes): 18    Questionnaire: Back Pain     Question: Where are you having pain  Answer:   Lower Back     Question: Does the pain extend into your legs?  Answer:   Yes, into my right leg     Question: How bad is the pain?  Answer:   The pain is severe     Question: Did you have an injury that caused the pain?  Answer:   Yes, the pain started after an injury     Questionnaire: Back Pain     Question: Please describe the circumstances of your injury  Answer:   The injury question is not as cut and dry as yes or no.  I have had pain in my lower right back, off and on since I was 17. In the Summer of 2020 I severly strained it and Exacerbated the injury/pain.                          Today the pain is too severe to move much.  I have used ice on my lower right back and hip.  I have taken ibuprofen.  I have also tried exercises assigned by physical therapy in summer of 2020.     Question: Was your injury related to your job?  Answer:   No     Questionnaire: Back Pain     Question: How long has the pain been present?  Answer:   More than 4 weeks     Question: Have you had back pain in the past?  Answer:   I have had back pain before, but this is markedly different     Questionnaire: Back Pain     Question: Please list any medications you have previously taken for back pain.  Answer:   Pulled info from [facility]:            Lidocaine 5% Patch - 7/2020                        Oxycodone/Acetaminophen 5-325Mg Tab - 7/2020 QTY: 10 and followed by 12                        Cyclobenzaprine 10Mg Tablets - 7/2020 QTY: 15                        OTC: Alternate Ibuprofen & Tylenol     Questionnaire: Back Pain     Question: Do you have a fever?  Answer:   No, I do not have a fever     Question: Do you have any of the following?  Answer:   None of the above     Question: What makes the pain worse?  Answer:   Any movement            Bending over            Sitting down            Strenuous  activity     Question: What makes the pain better?  Answer:   Pain medicine            Nothing makes it better     Question: Have you ever been diagnosed with cancer?  Answer:   No     Question: Have you ever been diagnosed with arthritis?  Answer:   No     Question: Have you ever been diagnosed with osteoporosis or any other bone weakness?  Answer:   No     Question: Have you ever had surgery on your back or spine?  Answer:   No     Question: What is your usual health status?  Answer:   I am active and can move normally     ~~~~~~~~~~~~~~~~~~~~~~~~~~~~~~~~  Wrap up      Question: Anything else you would like to add?      Answer:   I have an appointment with a specialist scheduled for January 6th, 2022.                        It hasnt been THIS painful since 7/2020.                        I dont know what caused this exacerbation.         Question: Are you pregnant or breastfeeding?      Answer:   I am confident that I am neither

## 2021-12-15 RX ORDER — METHOCARBAMOL 500 MG/1
500 TABLET, FILM COATED ORAL 4 TIMES DAILY PRN
Qty: 30 TABLET | Refills: 1 | Status: SHIPPED | OUTPATIENT
Start: 2021-12-15 | End: 2022-01-11

## 2021-12-15 RX ORDER — LIDOCAINE 50 MG/G
1 PATCH TOPICAL EVERY 24 HOURS
Qty: 10 PATCH | Refills: 3 | Status: SHIPPED | OUTPATIENT
Start: 2021-12-15 | End: 2024-01-10

## 2021-12-20 DIAGNOSIS — R05.3 PERSISTENT COUGH FOR 3 WEEKS OR LONGER: ICD-10-CM

## 2021-12-20 RX ORDER — ALBUTEROL SULFATE 90 UG/1
2 AEROSOL, METERED RESPIRATORY (INHALATION) EVERY 6 HOURS
Qty: 18 G | Refills: 1 | Status: SHIPPED | OUTPATIENT
Start: 2021-12-20 | End: 2022-06-23

## 2021-12-20 NOTE — TELEPHONE ENCOUNTER
"Requested Prescriptions   Signed Prescriptions Disp Refills    albuterol (PROAIR HFA/PROVENTIL HFA/VENTOLIN HFA) 108 (90 Base) MCG/ACT inhaler 18 g 1     Sig: Inhale 2 puffs into the lungs every 6 hours       Asthma Maintenance Inhalers - Anticholinergics Passed - 12/20/2021  8:47 AM        Passed - Patient is age 12 years or older        Passed - Recent (12 mo) or future (30 days) visit within the authorizing provider's specialty     Patient has had an office visit with the authorizing provider or a provider within the authorizing providers department within the previous 12 mos or has a future within next 30 days. See \"Patient Info\" tab in inbasket, or \"Choose Columns\" in Meds & Orders section of the refill encounter.              Passed - Medication is active on med list       Short-Acting Beta Agonist Inhalers Protocol  Passed - 12/20/2021  8:47 AM        Passed - Patient is age 12 or older        Passed - Recent (12 mo) or future (30 days) visit within the authorizing provider's specialty     Patient has had an office visit with the authorizing provider or a provider within the authorizing providers department within the previous 12 mos or has a future within next 30 days. See \"Patient Info\" tab in inbasket, or \"Choose Columns\" in Meds & Orders section of the refill encounter.              Passed - Medication is active on med list           Henna Herrmann RN  [facility]     "

## 2021-12-20 NOTE — TELEPHONE ENCOUNTER
Reason for Call:  Medication or medication refill:    Do you use a [facility]?  Name of the pharmacy and phone number for the current request:  [id number] [place] [address] AT [address] (Pharmacy)  Name of the medication requested: albuterol (PROAIR HFA/PROVENTIL HFA/VENTOLIN HFA) 108 (90 Base) MCG/ACT inhaler    Other request: N/A    Can we leave a detailed message on this number? YES    Phone number patient can be reached at: Cell number on file:    Telephone Information:   Mobile [phone]       Best Time: ANY    Call taken on 12/20/2021 at 8:47 AM by Milvia Manzano

## 2022-01-03 ENCOUNTER — MYC REFILL (OUTPATIENT)
Dept: FAMILY MEDICINE | Facility: CLINIC | Age: 42
End: 2022-01-03
Payer: COMMERCIAL

## 2022-01-03 DIAGNOSIS — F90.2 ATTENTION DEFICIT HYPERACTIVITY DISORDER (ADHD), COMBINED TYPE: ICD-10-CM

## 2022-01-03 NOTE — TELEPHONE ENCOUNTER
Yee,    Requested Prescriptions   Pending Prescriptions Disp Refills     amphetamine-dextroamphetamine (ADDERALL XR) 30 MG 24 hr capsule 30 capsule 0     Sig: Take 1 capsule (30 mg) by mouth daily       There is no refill protocol information for this order        amphetamine-dextroamphetamine (ADDERALL XR) 10 MG 24 hr capsule 30 capsule 0     Sig: Take 1 capsule (10 mg) by mouth daily       There is no refill protocol information for this order        Routing refill request to provider for review/approval because:  Drug not on the [facility] refill protocol     Dolores Cole RN  [facility]

## 2022-01-04 RX ORDER — DEXTROAMPHETAMINE SACCHARATE, AMPHETAMINE ASPARTATE MONOHYDRATE, DEXTROAMPHETAMINE SULFATE AND AMPHETAMINE SULFATE 7.5; 7.5; 7.5; 7.5 MG/1; MG/1; MG/1; MG/1
30 CAPSULE, EXTENDED RELEASE ORAL DAILY
Qty: 30 CAPSULE | Refills: 0 | Status: SHIPPED | OUTPATIENT
Start: 2022-01-04 | End: 2022-02-22

## 2022-01-04 RX ORDER — DEXTROAMPHETAMINE SACCHARATE, AMPHETAMINE ASPARTATE MONOHYDRATE, DEXTROAMPHETAMINE SULFATE AND AMPHETAMINE SULFATE 2.5; 2.5; 2.5; 2.5 MG/1; MG/1; MG/1; MG/1
10 CAPSULE, EXTENDED RELEASE ORAL DAILY
Qty: 30 CAPSULE | Refills: 0 | Status: SHIPPED | OUTPATIENT
Start: 2022-01-04 | End: 2022-02-22

## 2022-01-06 ENCOUNTER — OFFICE VISIT (OUTPATIENT)
Dept: PHYSICAL MEDICINE AND REHAB | Facility: CLINIC | Age: 42
End: 2022-01-06
Attending: NURSE PRACTITIONER
Payer: COMMERCIAL

## 2022-01-06 VITALS
SYSTOLIC BLOOD PRESSURE: 144 MMHG | HEART RATE: 85 BPM | DIASTOLIC BLOOD PRESSURE: 93 MMHG | RESPIRATION RATE: 16 BRPM | OXYGEN SATURATION: 99 %

## 2022-01-06 DIAGNOSIS — M47.817 LUMBOSACRAL SPONDYLOSIS WITHOUT MYELOPATHY: Primary | ICD-10-CM

## 2022-01-06 DIAGNOSIS — G57.01 PIRIFORMIS SYNDROME, RIGHT: ICD-10-CM

## 2022-01-06 DIAGNOSIS — M54.50 CHRONIC RIGHT-SIDED LOW BACK PAIN WITHOUT SCIATICA: ICD-10-CM

## 2022-01-06 DIAGNOSIS — G89.29 CHRONIC RIGHT-SIDED LOW BACK PAIN WITHOUT SCIATICA: ICD-10-CM

## 2022-01-06 DIAGNOSIS — M53.3 DISORDER OF SACRUM: ICD-10-CM

## 2022-01-06 DIAGNOSIS — M53.3 DISORDER OF SACRUM: Primary | ICD-10-CM

## 2022-01-06 DIAGNOSIS — M70.61 TROCHANTERIC BURSITIS OF RIGHT HIP: ICD-10-CM

## 2022-01-06 DIAGNOSIS — M79.18 MYALGIA, OTHER SITE: ICD-10-CM

## 2022-01-06 PROCEDURE — 99205 OFFICE O/P NEW HI 60 MIN: CPT | Performed by: PHYSICAL MEDICINE & REHABILITATION

## 2022-01-06 RX ORDER — IBUPROFEN 600 MG/1
600 TABLET, FILM COATED ORAL
COMMUNITY
Start: 2020-07-01 | End: 2022-10-06

## 2022-01-06 ASSESSMENT — PAIN SCALES - GENERAL: PAINLEVEL: MILD PAIN (3)

## 2022-01-06 NOTE — NURSING NOTE
Chief Complaint   Patient presents with     Consult     [facility] New - Chronic right-sided low back pain          Cresencio Estrada

## 2022-01-06 NOTE — PROGRESS NOTES
CC: I am seeing this patient for evaluation of chronic low back pain affecting the right side.    History of present illness: Patient is a very pleasant 41-year-old woman who reports having low back pain since age 17.  She did gymnastics.  She recalls requiring surgery to her right knee while in 10th grade.  During this time she was walking with some imbalance.  She also has history of tear in her right knee cartilage that also affected her gait.  Most recently she noticed pain worsening when she attempted to handover her 1-1/2-year-old child to her mother and her back seized up.  She rated the pain at a 10 out of 10.  It slowly improved over time.  Did physical therapy.  However the pain has never gone away completely.  On a scale of 0-10 she rates it at its best 1, most often at 3 and at worst a 7.  Pain is increased with certain activities and maneuvers.  She has been managing.  In addition to physical therapy she has tried medications without improvement.  She has had imaging studies including MRI of the lumbar spine which was negative.  It was done at the time when she was having back pain with fever and they were evaluating for discitis.  She denies any numbness or tingling denies any bowel bladder disturbance.  The left side is generally doing quite well.  He gives no history of moderate or accident.  She continues to do home exercise program.  Her kids are now 2 and 5 and she is managing.    Past Medical History:   Diagnosis Date     Abnormal Pap smear of cervix 08/22/2018, 04/2/19 08/22/2018, 04/2/19, 04/16/21     Anemia      Cervical high risk HPV (human papillomavirus) test positive 04/16/2021 04/16/21     Gastroesophageal reflux disease 1/5/2006     Irritable bowel syndrome 1/5/2006     Preeclampsia 02/13/2019       Past Surgical History:   Procedure Laterality Date     CYSTOSCOPY, DILATE URETHRA, COMBINED  11/1999    for frequent UTI'S     KNEE SURGERY  10/1997       Family History     Problem (#  of Occurrences) Relation (Name,Age of Onset)    Breast Cancer (1) Maternal Grandmother    Cerebrovascular Disease (1) Maternal Grandfather    Colon Cancer (1) Maternal Grandmother    Diabetes (1) Maternal Grandfather    Heart Failure (1) Maternal Grandmother    Hypertension (1) Sister    Mental Illness (1) Paternal Grandfather    Other Cancer (1) Maternal Grandfather          Social History     Social History Narrative    Caffeine intake/servings daily - 1    Calcium intake/servings daily - 3    Exercise 5 times weekly - describe ; bikes    Sunscreen used - Yes    Seatbelts used - Yes    Guns stored in the home - No    Self Breast Exam - No    Pap test up to date -  No    Eye exam up to date -  No    Dental exam up to date -  No    DEXA scan up to date -  No    Flex Sig/Colonoscopy up to date -  No    Mammography up to date -  No    Immunizations reviewed and up to date - Yes    Abuse: Current or Past (Physical, Sexual or Emotional) - No    Do you feel safe in your environment - Yes    Do you cope well with stress - Yes    Do you suffer from insomnia - No                 Current Outpatient Medications   Medication Sig Dispense Refill     albuterol (PROAIR HFA/PROVENTIL HFA/VENTOLIN HFA) 108 (90 Base) MCG/ACT inhaler Inhale 2 puffs into the lungs every 6 hours 18 g 1     amphetamine-dextroamphetamine (ADDERALL XR) 10 MG 24 hr capsule Take 1 capsule (10 mg) by mouth daily 30 capsule 0     amphetamine-dextroamphetamine (ADDERALL XR) 10 MG 24 hr capsule Take 1 capsule (10 mg) by mouth daily 30 capsule 0     amphetamine-dextroamphetamine (ADDERALL XR) 10 MG 24 hr capsule Take 1 capsule (10 mg) by mouth daily Needs office visit prior to refills 30 capsule 0     amphetamine-dextroamphetamine (ADDERALL XR) 30 MG 24 hr capsule Take 1 capsule (30 mg) by mouth daily 30 capsule 0     amphetamine-dextroamphetamine (ADDERALL XR) 30 MG 24 hr capsule Take 1 capsule (30 mg) by mouth daily 30 capsule 0      amphetamine-dextroamphetamine (ADDERALL XR) 30 MG 24 hr capsule Take 1 capsule (30 mg) by mouth daily Needs office visit prior to refills 30 capsule 0     ibuprofen (ADVIL/MOTRIN) 600 MG tablet Take 600 mg by mouth       ketoconazole (NIZORAL) 2 % external shampoo LUIS EXT D PRF ITCHING OR IRRITATION 120 mL 11     ketoconazole (NIZORAL) 200 MG tablet Take 1 tablet (200 mg) by mouth daily 10 tablet 0     lidocaine (LIDODERM) 5 % patch Place 1 patch onto the skin every 24 hours To prevent lidocaine toxicity, patient should be patch free for 12 hrs daily. 10 patch 3     methocarbamol (ROBAXIN) 500 MG tablet Take 1 tablet (500 mg) by mouth 4 times daily as needed for muscle spasms 30 tablet 1     MRI LUMBAR SPINE WITHOUT AND WITH CONTRAST   1/18/2016 4:16 PM      HISTORY: Back pain and fever. Evaluate for discitis.     TECHNIQUE: Multiplanar, multisequence MRI of the lumbar spine without  and with 6.5 mL Gadavist.     COMPARISON: None.     FINDINGS: The report is dictated assuming five lumbar-type vertebral  bodies, and radiographic correlation may be necessary.  Sagittal  images demonstrate normal vertebral body height.  Bone marrow signal  is unremarkable. Tip of the conus medullaris and cauda equina are  normal.     T12-L1:  No disc herniation or stenosis.  Facet joints are  unremarkable.     L1-L2:  No disc herniation or stenosis.  Facet joints are  unremarkable.     L2-L3:  No disc herniation or stenosis.  Facet joints are  unremarkable.       L3-L4:  No disc herniation or stenosis.  Facet joints are  unremarkable.     L4-L5:  No disc herniation or stenosis.  Facet joints are  unremarkable.      L5-S1:  No disc herniation or stenosis.  Facet joints are  unremarkable.       Paraspinous soft tissues:  No inflammation or evidence of infection.        IMPRESSION: Unremarkable lumbar spine MRI.     FIDEL BARNHART MD    BP (!) 144/93   Pulse 85   Resp 16   SpO2 99%      On examination, the patient is alert and  cooperative.  Vitals are stable.    HEENT is negative.  Face is symmetric.  Neck is supple.  She is able to move her upper extremities functionally.    She is able to carry out straight leg raising test without difficulty.  She does not have signs of hypermobility though her hip ranges excellent.  She does have difficulty with pain rotating the right hip. Girish test is positive on the right. She is tender over the right trochanter bursa, right piriformis and in a prone position she is tender over the right SI joint as well.    The left side was nontender.    Gait is unremarkable.  Romberg is negative.  Strength and sensation are full and reflexes are symmetric and plantars are downgoing.    Impression: At this point this patient with chronic pain affecting the right lower back and hip region extending down to the thigh on the right side has features of right SI joint dysfunction along with right piriformis and trochanteric bursal involvement.  I suspect some lumbar spondylosis affecting the right side as well accounting for her pain distribution.    She has received conservative treatment without improvement and currently her pain is affecting her activity and function.  I would therefore recommend treating this with SI joint, trochanter bursa and piriformis injections and reassess few weeks later.  It is possible that she may need repeat injections of this every 6 months if the pain recurs.  Additionally treatment of her lumbar facets may be necessary on the right side.  This was reviewed at length with the patient and she expressed understanding and would like to proceed with injection therapy at this point.    60 minutes spent for this visit, greater than 50% was for counseling on above-mentioned issues.    Thank you much for allow me to assist in her care.    Arya Freeman MD .

## 2022-01-06 NOTE — LETTER
1/6/2022       RE: Traci Sloan  [address]  [facility] [address]     Dear Colleague,    Thank you for referring your patient, Traci Sloan, to the [facility] [place] at . Please see a copy of my visit note below.    CC: I am seeing this patient for evaluation of chronic low back pain affecting the right side.    History of present illness: Patient is a very pleasant 41-year-old woman who reports having low back pain since age 17.  She did gymnastics.  She recalls requiring surgery to her right knee while in 10th grade.  During this time she was walking with some imbalance.  She also has history of tear in her right knee cartilage that also affected her gait.  Most recently she noticed pain worsening when she attempted to handover her 1-1/2-year-old child to her mother and her back seized up.  She rated the pain at a 10 out of 10.  It slowly improved over time.  Did physical therapy.  However the pain has never gone away completely.  On a scale of 0-10 she rates it at its best 1, most often at 3 and at worst a 7.  Pain is increased with certain activities and maneuvers.  She has been managing.  In addition to physical therapy she has tried medications without improvement.  She has had imaging studies including MRI of the lumbar spine which was negative.  It was done at the time when she was having back pain with fever and they were evaluating for discitis.  She denies any numbness or tingling denies any bowel bladder disturbance.  The left side is generally doing quite well.  He gives no history of moderate or accident.  She continues to do home exercise program.  Her kids are now 2 and 5 and she is managing.    Past Medical History:   Diagnosis Date     Abnormal Pap smear of cervix 08/22/2018, 04/2/19 08/22/2018, 04/2/19, 04/16/21     Anemia      Cervical high risk HPV (human papillomavirus)  test positive 04/16/2021 04/16/21     Gastroesophageal reflux disease 1/5/2006     Irritable bowel syndrome 1/5/2006     Preeclampsia 02/13/2019       Past Surgical History:   Procedure Laterality Date     CYSTOSCOPY, DILATE URETHRA, COMBINED  11/1999    for frequent UTI'S     KNEE SURGERY  10/1997       Family History     Problem (# of Occurrences) Relation (Name,Age of Onset)    Breast Cancer (1) Maternal Grandmother    Cerebrovascular Disease (1) Maternal Grandfather    Colon Cancer (1) Maternal Grandmother    Diabetes (1) Maternal Grandfather    Heart Failure (1) Maternal Grandmother    Hypertension (1) Sister    Mental Illness (1) Paternal Grandfather    Other Cancer (1) Maternal Grandfather          Social History     Social History Narrative    Caffeine intake/servings daily - 1    Calcium intake/servings daily - 3    Exercise 5 times weekly - describe ; bikes    Sunscreen used - Yes    Seatbelts used - Yes    Guns stored in the home - No    Self Breast Exam - No    Pap test up to date -  No    Eye exam up to date -  No    Dental exam up to date -  No    DEXA scan up to date -  No    Flex Sig/Colonoscopy up to date -  No    Mammography up to date -  No    Immunizations reviewed and up to date - Yes    Abuse: Current or Past (Physical, Sexual or Emotional) - No    Do you feel safe in your environment - Yes    Do you cope well with stress - Yes    Do you suffer from insomnia - No                 Current Outpatient Medications   Medication Sig Dispense Refill     albuterol (PROAIR HFA/PROVENTIL HFA/VENTOLIN HFA) 108 (90 Base) MCG/ACT inhaler Inhale 2 puffs into the lungs every 6 hours 18 g 1     amphetamine-dextroamphetamine (ADDERALL XR) 10 MG 24 hr capsule Take 1 capsule (10 mg) by mouth daily 30 capsule 0     amphetamine-dextroamphetamine (ADDERALL XR) 10 MG 24 hr capsule Take 1 capsule (10 mg) by mouth daily 30 capsule 0     amphetamine-dextroamphetamine (ADDERALL XR) 10 MG 24 hr capsule Take 1 capsule  (10 mg) by mouth daily Needs office visit prior to refills 30 capsule 0     amphetamine-dextroamphetamine (ADDERALL XR) 30 MG 24 hr capsule Take 1 capsule (30 mg) by mouth daily 30 capsule 0     amphetamine-dextroamphetamine (ADDERALL XR) 30 MG 24 hr capsule Take 1 capsule (30 mg) by mouth daily 30 capsule 0     amphetamine-dextroamphetamine (ADDERALL XR) 30 MG 24 hr capsule Take 1 capsule (30 mg) by mouth daily Needs office visit prior to refills 30 capsule 0     ibuprofen (ADVIL/MOTRIN) 600 MG tablet Take 600 mg by mouth       ketoconazole (NIZORAL) 2 % external shampoo LUIS EXT D PRF ITCHING OR IRRITATION 120 mL 11     ketoconazole (NIZORAL) 200 MG tablet Take 1 tablet (200 mg) by mouth daily 10 tablet 0     lidocaine (LIDODERM) 5 % patch Place 1 patch onto the skin every 24 hours To prevent lidocaine toxicity, patient should be patch free for 12 hrs daily. 10 patch 3     methocarbamol (ROBAXIN) 500 MG tablet Take 1 tablet (500 mg) by mouth 4 times daily as needed for muscle spasms 30 tablet 1     MRI LUMBAR SPINE WITHOUT AND WITH CONTRAST   1/18/2016 4:16 PM      HISTORY: Back pain and fever. Evaluate for discitis.     TECHNIQUE: Multiplanar, multisequence MRI of the lumbar spine without  and with 6.5 mL Gadavist.     COMPARISON: None.     FINDINGS: The report is dictated assuming five lumbar-type vertebral  bodies, and radiographic correlation may be necessary.  Sagittal  images demonstrate normal vertebral body height.  Bone marrow signal  is unremarkable. Tip of the conus medullaris and cauda equina are  normal.     T12-L1:  No disc herniation or stenosis.  Facet joints are  unremarkable.     L1-L2:  No disc herniation or stenosis.  Facet joints are  unremarkable.     L2-L3:  No disc herniation or stenosis.  Facet joints are  unremarkable.       L3-L4:  No disc herniation or stenosis.  Facet joints are  unremarkable.     L4-L5:  No disc herniation or stenosis.  Facet joints are  unremarkable.      L5-S1:  No  disc herniation or stenosis.  Facet joints are  unremarkable.       Paraspinous soft tissues:  No inflammation or evidence of infection.        IMPRESSION: Unremarkable lumbar spine MRI.     FIDEL BARNHART MD    BP (!) 144/93   Pulse 85   Resp 16   SpO2 99%      On examination, the patient is alert and cooperative.  Vitals are stable.    HEENT is negative.  Face is symmetric.  Neck is supple.  She is able to move her upper extremities functionally.    She is able to carry out straight leg raising test without difficulty.  She does not have signs of hypermobility though her hip ranges excellent.  She does have difficulty with pain rotating the right hip. Girish test is positive on the right. She is tender over the right trochanter bursa, right piriformis and in a prone position she is tender over the right SI joint as well.    The left side was nontender.    Gait is unremarkable.  Romberg is negative.  Strength and sensation are full and reflexes are symmetric and plantars are downgoing.    Impression: At this point this patient with chronic pain affecting the right lower back and hip region extending down to the thigh on the right side has features of right SI joint dysfunction along with right piriformis and trochanteric bursal involvement.  I suspect some lumbar spondylosis affecting the right side as well accounting for her pain distribution.    She has received conservative treatment without improvement and currently her pain is affecting her activity and function.  I would therefore recommend treating this with SI joint, trochanter bursa and piriformis injections and reassess few weeks later.  It is possible that she may need repeat injections of this every 6 months if the pain recurs.  Additionally treatment of her lumbar facets may be necessary on the right side.  This was reviewed at length with the patient and she expressed understanding and would like to proceed with injection therapy at this  point.    60 minutes spent for this visit, greater than 50% was for counseling on above-mentioned issues.    Thank you much for allow me to assist in her care.    Arya Freeman MD .

## 2022-01-07 DIAGNOSIS — Z11.59 ENCOUNTER FOR SCREENING FOR OTHER VIRAL DISEASES: Primary | ICD-10-CM

## 2022-01-07 PROBLEM — M53.3 DISORDER OF SACRUM: Status: ACTIVE | Noted: 2022-01-07

## 2022-01-07 PROBLEM — M70.61 TROCHANTERIC BURSITIS OF RIGHT HIP: Status: ACTIVE | Noted: 2022-01-07

## 2022-01-07 PROBLEM — G57.01 PIRIFORMIS SYNDROME, RIGHT: Status: ACTIVE | Noted: 2022-01-07

## 2022-01-10 DIAGNOSIS — M54.50 ACUTE LOW BACK PAIN, UNSPECIFIED BACK PAIN LATERALITY, UNSPECIFIED WHETHER SCIATICA PRESENT: ICD-10-CM

## 2022-01-11 RX ORDER — METHOCARBAMOL 500 MG/1
500 TABLET, FILM COATED ORAL 4 TIMES DAILY PRN
Qty: 30 TABLET | Refills: 1 | Status: SHIPPED | OUTPATIENT
Start: 2022-01-11 | End: 2023-04-25

## 2022-01-13 ENCOUNTER — LAB (OUTPATIENT)
Dept: LAB | Facility: CLINIC | Age: 42
End: 2022-01-13

## 2022-01-13 DIAGNOSIS — Z11.59 ENCOUNTER FOR SCREENING FOR OTHER VIRAL DISEASES: ICD-10-CM

## 2022-01-13 LAB — SARS-COV-2 RNA RESP QL NAA+PROBE: NEGATIVE

## 2022-01-13 PROCEDURE — U0003 INFECTIOUS AGENT DETECTION BY NUCLEIC ACID (DNA OR RNA); SEVERE ACUTE RESPIRATORY SYNDROME CORONAVIRUS 2 (SARS-COV-2) (CORONAVIRUS DISEASE [COVID-19]), AMPLIFIED PROBE TECHNIQUE, MAKING USE OF HIGH THROUGHPUT TECHNOLOGIES AS DESCRIBED BY CMS-2020-01-R: HCPCS | Performed by: PHYSICAL MEDICINE & REHABILITATION

## 2022-01-17 ENCOUNTER — TELEPHONE (OUTPATIENT)
Dept: PHYSICAL MEDICINE AND REHAB | Facility: CLINIC | Age: 42
End: 2022-01-17

## 2022-01-17 ENCOUNTER — E-VISIT (OUTPATIENT)
Dept: FAMILY MEDICINE | Facility: CLINIC | Age: 42
End: 2022-01-17
Payer: COMMERCIAL

## 2022-01-17 DIAGNOSIS — M70.61 TROCHANTERIC BURSITIS OF RIGHT HIP: ICD-10-CM

## 2022-01-17 DIAGNOSIS — Z11.59 ENCOUNTER FOR SCREENING FOR OTHER VIRAL DISEASES: Primary | ICD-10-CM

## 2022-01-17 DIAGNOSIS — M53.3 DISORDER OF SACRUM: ICD-10-CM

## 2022-01-17 DIAGNOSIS — M47.817 LUMBOSACRAL SPONDYLOSIS WITHOUT MYELOPATHY: ICD-10-CM

## 2022-01-17 DIAGNOSIS — M54.50 ACUTE LOW BACK PAIN, UNSPECIFIED BACK PAIN LATERALITY, UNSPECIFIED WHETHER SCIATICA PRESENT: Primary | ICD-10-CM

## 2022-01-17 PROCEDURE — 99422 OL DIG E/M SVC 11-20 MIN: CPT | Performed by: NURSE PRACTITIONER

## 2022-01-17 NOTE — TELEPHONE ENCOUNTER
Called pt and informed that ins denied injection, so we had to cancel the procedure. Pt stated that she would pay out of pocket to get injection done, advised I would find out how much it cost and see if there are any other options. Informed pt would call back once I find out.

## 2022-01-18 RX ORDER — HYDROCODONE BITARTRATE AND ACETAMINOPHEN 5; 325 MG/1; MG/1
1 TABLET ORAL 2 TIMES DAILY PRN
Qty: 10 TABLET | Refills: 0 | Status: SHIPPED | OUTPATIENT
Start: 2022-01-18 | End: 2022-01-25

## 2022-01-18 RX ORDER — MELOXICAM 15 MG/1
15 TABLET ORAL DAILY
Qty: 30 TABLET | Refills: 0 | Status: SHIPPED | OUTPATIENT
Start: 2022-01-18 | End: 2022-10-06

## 2022-01-18 NOTE — TELEPHONE ENCOUNTER
Provider E-Visit time total (minutes): 11    Question: Please describe the circumstances of your injury  Answer:   I had an appointment Dr. Arya Sears on 1/6. He scheduled a few injections for today. As i arrived to check in, my insurance denied coverage and it was rescheduled for next Monday. (Insurance requested more background information, it sounds promising. Regardless I will keep this procedure).  I ll pay OOP if it comes to that.  I have been in a near-constant pain level of 5/10  with spikes of 7-9/10. The pain is now shooting into the front of my thigh and quad.    Question: Please list any medications you have previously taken for back pain.  Answer:   Current: Lidocaine 5% Patch and Methocarbamol 500mg Tablets. Ibuprofen, Tylenol, ice, and physical therapy.     Question: Anything else you would like to add?      Answer:   I am wondering if there is something else I could do or take for the pain until the procedure next Monday.                        I should mention that Dr. Freeman was excellent at my Jan. 6 appointment.  He was able to easily pinpoint the areas I have pain. He also asked questions regarding what exacerbates the pain, that were spot on.  If I should conduct an e-visit with him, I certainly can.

## 2022-01-20 ENCOUNTER — LAB (OUTPATIENT)
Dept: LAB | Facility: CLINIC | Age: 42
End: 2022-01-20
Attending: PHYSICAL MEDICINE & REHABILITATION

## 2022-01-20 DIAGNOSIS — Z11.59 ENCOUNTER FOR SCREENING FOR OTHER VIRAL DISEASES: ICD-10-CM

## 2022-01-20 PROCEDURE — U0005 INFEC AGEN DETEC AMPLI PROBE: HCPCS | Performed by: PHYSICAL MEDICINE & REHABILITATION

## 2022-01-20 NOTE — TELEPHONE ENCOUNTER
[facility] Call Center    Phone Message    May a detailed message be left on voicemail: yes     Reason for Call: Other: Patient is requesting Dr. Dao to call [facility] to discuss patient up coming appointment 1/24/22 denial, please call patient at [phone] to advise.     Action Taken: Message routed to:  [facility] ([facility]    Travel Screening: Not Applicable

## 2022-01-21 LAB — SARS-COV-2 RNA RESP QL NAA+PROBE: NEGATIVE

## 2022-01-24 ENCOUNTER — ANCILLARY PROCEDURE (OUTPATIENT)
Dept: RADIOLOGY | Facility: AMBULATORY SURGERY CENTER | Age: 42
End: 2022-01-24
Attending: PHYSICAL MEDICINE & REHABILITATION
Payer: COMMERCIAL

## 2022-01-24 ENCOUNTER — HOSPITAL ENCOUNTER (OUTPATIENT)
Facility: AMBULATORY SURGERY CENTER | Age: 42
Discharge: HOME | End: 2022-01-24
Attending: PHYSICAL MEDICINE & REHABILITATION | Admitting: PHYSICAL MEDICINE & REHABILITATION
Payer: COMMERCIAL

## 2022-01-24 VITALS
DIASTOLIC BLOOD PRESSURE: 101 MMHG | TEMPERATURE: 98 F | SYSTOLIC BLOOD PRESSURE: 154 MMHG | BODY MASS INDEX: 24.71 KG/M2 | OXYGEN SATURATION: 99 % | WEIGHT: 163 LBS | RESPIRATION RATE: 16 BRPM | HEART RATE: 82 BPM | HEIGHT: 68 IN

## 2022-01-24 DIAGNOSIS — M70.61 TROCHANTERIC BURSITIS OF RIGHT HIP: ICD-10-CM

## 2022-01-24 DIAGNOSIS — M53.3 DISORDER OF SACRUM: ICD-10-CM

## 2022-01-24 DIAGNOSIS — G57.01 PIRIFORMIS SYNDROME, RIGHT: ICD-10-CM

## 2022-01-24 DIAGNOSIS — R52 PAIN: ICD-10-CM

## 2022-01-24 LAB
HCG UR QL: NEGATIVE
INTERNAL QC OK POCT: NORMAL
POCT KIT EXPIRATION DATE: NORMAL
POCT KIT LOT NUMBER: NORMAL

## 2022-01-24 PROCEDURE — 76942 ECHO GUIDE FOR BIOPSY: CPT | Mod: TC

## 2022-01-24 PROCEDURE — 20552 NJX 1/MLT TRIGGER POINT 1/2: CPT

## 2022-01-24 PROCEDURE — 27096 INJECT SACROILIAC JOINT: CPT | Mod: 59,RT

## 2022-01-24 PROCEDURE — 81025 URINE PREGNANCY TEST: CPT | Performed by: PATHOLOGY

## 2022-01-24 PROCEDURE — 20611 DRAIN/INJ JOINT/BURSA W/US: CPT | Mod: 59,RT

## 2022-01-24 RX ORDER — BETAMETHASONE SODIUM PHOSPHATE AND BETAMETHASONE ACETATE 3; 3 MG/ML; MG/ML
INJECTION, SUSPENSION INTRA-ARTICULAR; INTRALESIONAL; INTRAMUSCULAR; SOFT TISSUE PRN
Status: DISCONTINUED | OUTPATIENT
Start: 2022-01-24 | End: 2022-01-24 | Stop reason: HOSPADM

## 2022-01-24 RX ORDER — BUPIVACAINE HYDROCHLORIDE 5 MG/ML
INJECTION, SOLUTION EPIDURAL; INTRACAUDAL PRN
Status: DISCONTINUED | OUTPATIENT
Start: 2022-01-24 | End: 2022-01-24 | Stop reason: HOSPADM

## 2022-01-24 RX ORDER — LIDOCAINE HYDROCHLORIDE 10 MG/ML
INJECTION, SOLUTION EPIDURAL; INFILTRATION; INTRACAUDAL; PERINEURAL PRN
Status: DISCONTINUED | OUTPATIENT
Start: 2022-01-24 | End: 2022-01-24 | Stop reason: HOSPADM

## 2022-01-24 RX ORDER — IOPAMIDOL 408 MG/ML
INJECTION, SOLUTION INTRATHECAL PRN
Status: DISCONTINUED | OUTPATIENT
Start: 2022-01-24 | End: 2022-01-24 | Stop reason: HOSPADM

## 2022-01-24 ASSESSMENT — MIFFLIN-ST. JEOR: SCORE: 1462.86

## 2022-01-24 NOTE — DISCHARGE INSTRUCTIONS
PAIN INJECTION HOME CARE INSTRUCTIONS  Activity  -You may resume most normal activity levels with the exception of strenuous activity. It may help to move in ways that hurt before the injection, to see if the pain is still there, but do not overdo it. Take it easy for the rest of the day.    -DO NOT remove bandaid for 24 hours  -DO NOT shower for 24 hours      Pain  -You may feel immediate pain relief and numbness for a period of time after the injection. This may indicate the medication has reached the right spot.  -Your pain may return after this short pain-free period, or may even be a little worse for a day or two. It may be caused by needle irritation or by the medication itself. The medications usually take two or three days to start working, but can take as long as a week.    -You may use an ice pack for 20 minutes every 2 hours for the first 24 hours  -You may use a heating pad after the first 24 hours  -You may use Tylenol (acetaminophen) every 4 hours or other pain medicines as directed by your physician      DID YOU RECEIVE STEROIDS TODAY?  Yes    Common side effects of steroids:  Not everyone will experience corticosteroid side effects. If side effects are experienced, they will gradually subside in the 7-10 day period following an injection. Most common side effects include:  -Flushed face and/or chest  -Feeling of warmth, particularly in the face but could be an overall feeling of warmth  -Increased blood sugar in diabetic patients  -Menstrual irregularities my occur. If taking hormone-based birth control an alternate method of birth control is recommended  -Sleep disturbances and/or mood swings are possible  -Leg cramps    PLEASE KEEP TRACK OF YOUR SYMPTOMS AND NOTE ANY CHANGES FOR YOUR DOCTOR.       Please contact us if you have:  -Severe pain  -Fever more than 101.5 degrees Fahrenheit  -Signs of infection at the injection site (redness, swelling, or drainage)      FOR PAIN CENTER PATIENTS:  If you  have questions, please contact the Pain Clinic at [phone] Option #1 between the hours of 7:00 am and 3:00 pm Monday through Friday. After office hours you can contact the on call provider by dialing [phone]. If you need immediate attention, we recommend that you go to a hospital emergency room or dial [phone].      FOR PM&R PATIENTS:  For patients seen by the PM and R service, please call [phone]. If you need to fax a pain diary to PM&R the fax number is [phone]. If you are unable to fax, uploading to codesy is encouraged, then send to provider. If you have procedure scheduling questions please call [phone] Option #2

## 2022-01-24 NOTE — PROCEDURES
"This patient with chronic pain affecting the right lower back and hip region extending down to the thigh on the right side has features of right SI joint dysfunction along with right piriformis and trochanteric bursal involvement.  I suspect some lumbar spondylosis affecting the right side as well accounting for her pain distribution.     She has received conservative treatment without improvement and currently her pain is affecting her activity and function.  I would therefore recommend treating this with SI joint, trochanter bursa and piriformis injections and reassess few weeks later.  It is possible that she may need repeat injections of this every 6 months if the pain recurs.    PROCEDURE NOTE: With her informed consent, after explaining the benefits and risks of the procedure, she was taken to the fluoroscopic suite and placed prone on the table.  The area was prepped and draped in the usual sterile fashion.  Timeout was carried out.  Using fluoroscopy the RIGHT SI joint, piriformis location and the trochanteric bursal axis point was identified and marked.  Using 1% lidocaine topical anesthesia was obtained.  Using 22-gauge 3.5\" needle, the SI joint was accessed on the right side.  It was confirmed with Isovue-200 and injected with a mixture comprising of 2 cc of Celestone and 6 cc of 0.5% Marcaine, 3 cc was injected.    Next using 25-gauge 3.5\" needles, the RIGHT piriformis and the trochanteric bursa were accessed, confirmed with Isovue and injected with 2 cc and 3 cc of the mixture respectively.  The needles were then removed.  She tolerated the procedure well.  There was no change in strength or sensation and she felt comfortable.    She can ice the region, anticipate the onset of steroids within 2 to 3 days and update this clinic with a follow-up in 2 to 4 weeks time.    Thank you much for allowing me to assist in her care.    Arya Freeman MD   "

## 2022-02-22 ENCOUNTER — MYC REFILL (OUTPATIENT)
Dept: FAMILY MEDICINE | Facility: CLINIC | Age: 42
End: 2022-02-22
Payer: COMMERCIAL

## 2022-02-22 DIAGNOSIS — F90.2 ATTENTION DEFICIT HYPERACTIVITY DISORDER (ADHD), COMBINED TYPE: ICD-10-CM

## 2022-02-22 RX ORDER — DEXTROAMPHETAMINE SACCHARATE, AMPHETAMINE ASPARTATE MONOHYDRATE, DEXTROAMPHETAMINE SULFATE AND AMPHETAMINE SULFATE 7.5; 7.5; 7.5; 7.5 MG/1; MG/1; MG/1; MG/1
30 CAPSULE, EXTENDED RELEASE ORAL DAILY
Qty: 30 CAPSULE | Refills: 0 | Status: SHIPPED | OUTPATIENT
Start: 2022-02-22 | End: 2022-03-29

## 2022-02-22 RX ORDER — DEXTROAMPHETAMINE SACCHARATE, AMPHETAMINE ASPARTATE MONOHYDRATE, DEXTROAMPHETAMINE SULFATE AND AMPHETAMINE SULFATE 2.5; 2.5; 2.5; 2.5 MG/1; MG/1; MG/1; MG/1
10 CAPSULE, EXTENDED RELEASE ORAL DAILY
Qty: 30 CAPSULE | Refills: 0 | Status: SHIPPED | OUTPATIENT
Start: 2022-02-22 | End: 2022-03-29

## 2022-02-22 NOTE — TELEPHONE ENCOUNTER
Requested Prescriptions   Pending Prescriptions Disp Refills     amphetamine-dextroamphetamine (ADDERALL XR) 30 MG 24 hr capsule 30 capsule 0     Sig: Take 1 capsule (30 mg) by mouth daily       There is no refill protocol information for this order        amphetamine-dextroamphetamine (ADDERALL XR) 10 MG 24 hr capsule 30 capsule 0     Sig: Take 1 capsule (10 mg) by mouth daily       There is no refill protocol information for this order        Routing refill request to provider for review/approval because:  Drug not on [place] [facility] [place]     Henna Herrmann RN  [facility]

## 2022-03-04 ENCOUNTER — MYC REFILL (OUTPATIENT)
Dept: FAMILY MEDICINE | Facility: CLINIC | Age: 42
End: 2022-03-04
Payer: COMMERCIAL

## 2022-03-04 DIAGNOSIS — F90.2 ATTENTION DEFICIT HYPERACTIVITY DISORDER (ADHD), COMBINED TYPE: ICD-10-CM

## 2022-03-04 RX ORDER — DEXTROAMPHETAMINE SACCHARATE, AMPHETAMINE ASPARTATE MONOHYDRATE, DEXTROAMPHETAMINE SULFATE AND AMPHETAMINE SULFATE 7.5; 7.5; 7.5; 7.5 MG/1; MG/1; MG/1; MG/1
30 CAPSULE, EXTENDED RELEASE ORAL DAILY
Qty: 30 CAPSULE | Refills: 0 | Status: CANCELLED | OUTPATIENT
Start: 2022-03-04

## 2022-03-04 NOTE — TELEPHONE ENCOUNTER
Hold for PCP as review of  shows patient should have enough supply to last until 3/7 for evaluation.    Sorin Landeros MD

## 2022-03-04 NOTE — TELEPHONE ENCOUNTER
.Routing refill request to provider for review/approval because:  Drug not on the [facility] refill protocol   See pt request for alternative pharmacy and following dosing   ONE WEEK prescription of 1. 20mg XR 2 per day, take in am OR 2. 30 mg IR 1 time per day & 10 mg IR 1 time per day.      Lexy Luciano RN   [facility]

## 2022-03-07 NOTE — TELEPHONE ENCOUNTER
[facility] pharmacy closed for a couple days, but has now re-opened so no prescription needs.  MARIBETH Hernandez

## 2022-03-29 ENCOUNTER — MYC REFILL (OUTPATIENT)
Dept: FAMILY MEDICINE | Facility: CLINIC | Age: 42
End: 2022-03-29
Payer: COMMERCIAL

## 2022-03-29 DIAGNOSIS — F90.2 ATTENTION DEFICIT HYPERACTIVITY DISORDER (ADHD), COMBINED TYPE: ICD-10-CM

## 2022-03-29 RX ORDER — DEXTROAMPHETAMINE SACCHARATE, AMPHETAMINE ASPARTATE MONOHYDRATE, DEXTROAMPHETAMINE SULFATE AND AMPHETAMINE SULFATE 7.5; 7.5; 7.5; 7.5 MG/1; MG/1; MG/1; MG/1
30 CAPSULE, EXTENDED RELEASE ORAL DAILY
Qty: 30 CAPSULE | Refills: 0 | Status: SHIPPED | OUTPATIENT
Start: 2022-03-29 | End: 2022-05-17

## 2022-03-29 RX ORDER — DEXTROAMPHETAMINE SACCHARATE, AMPHETAMINE ASPARTATE MONOHYDRATE, DEXTROAMPHETAMINE SULFATE AND AMPHETAMINE SULFATE 2.5; 2.5; 2.5; 2.5 MG/1; MG/1; MG/1; MG/1
10 CAPSULE, EXTENDED RELEASE ORAL DAILY
Qty: 30 CAPSULE | Refills: 0 | Status: SHIPPED | OUTPATIENT
Start: 2022-03-29 | End: 2022-05-17

## 2022-03-29 NOTE — TELEPHONE ENCOUNTER
Requested Prescriptions   Pending Prescriptions Disp Refills     amphetamine-dextroamphetamine (ADDERALL XR) 30 MG 24 hr capsule 30 capsule 0     Sig: Take 1 capsule (30 mg) by mouth daily       There is no refill protocol information for this order        amphetamine-dextroamphetamine (ADDERALL XR) 10 MG 24 hr capsule 30 capsule 0     Sig: Take 1 capsule (10 mg) by mouth daily       There is no refill protocol information for this order        Routing refill request to provider for review/approval because:  Drug not on the [facility] refill protocol     Henna Herrmann RN  [place]

## 2022-03-31 ENCOUNTER — OFFICE VISIT (OUTPATIENT)
Dept: PHYSICAL MEDICINE AND REHAB | Facility: CLINIC | Age: 42
End: 2022-03-31
Payer: COMMERCIAL

## 2022-03-31 VITALS
RESPIRATION RATE: 16 BRPM | DIASTOLIC BLOOD PRESSURE: 99 MMHG | HEART RATE: 93 BPM | SYSTOLIC BLOOD PRESSURE: 139 MMHG | OXYGEN SATURATION: 98 %

## 2022-03-31 DIAGNOSIS — G57.01 PIRIFORMIS SYNDROME, RIGHT: ICD-10-CM

## 2022-03-31 DIAGNOSIS — G89.29 CHRONIC GLUTEAL PAIN: ICD-10-CM

## 2022-03-31 DIAGNOSIS — M79.18 CHRONIC GLUTEAL PAIN: ICD-10-CM

## 2022-03-31 DIAGNOSIS — M53.3 DISORDER OF SACRUM: Primary | ICD-10-CM

## 2022-03-31 DIAGNOSIS — M79.18 MYALGIA, OTHER SITE: ICD-10-CM

## 2022-03-31 PROCEDURE — 20553 NJX 1/MLT TRIGGER POINTS 3/>: CPT | Mod: 59 | Performed by: PHYSICAL MEDICINE & REHABILITATION

## 2022-03-31 PROCEDURE — 96372 THER/PROPH/DIAG INJ SC/IM: CPT | Performed by: PHYSICAL MEDICINE & REHABILITATION

## 2022-03-31 PROCEDURE — 99214 OFFICE O/P EST MOD 30 MIN: CPT | Mod: 25 | Performed by: PHYSICAL MEDICINE & REHABILITATION

## 2022-03-31 RX ORDER — BETAMETHASONE SODIUM PHOSPHATE AND BETAMETHASONE ACETATE 3; 3 MG/ML; MG/ML
6 INJECTION, SUSPENSION INTRA-ARTICULAR; INTRALESIONAL; INTRAMUSCULAR; SOFT TISSUE ONCE
Status: COMPLETED | OUTPATIENT
Start: 2022-03-31 | End: 2022-04-01

## 2022-03-31 RX ORDER — BUPIVACAINE HYDROCHLORIDE 5 MG/ML
5 INJECTION, SOLUTION PERINEURAL ONCE
Status: COMPLETED | OUTPATIENT
Start: 2022-03-31 | End: 2022-04-01

## 2022-03-31 ASSESSMENT — PAIN SCALES - GENERAL: PAINLEVEL: MILD PAIN (2)

## 2022-03-31 NOTE — PROGRESS NOTES
The patient returns for a follow-up visit for ongoing issues related to chronic pain affecting the buttock region bilaterally, right side worse than the left.    She was last seen by me for undergoing injections to the right SI joint, piriformis and trochanteric bursal region.  She feels there was some improvement though she still had areas that would bother her.  She also notes the left side is also slightly bothering her.  Remainder of her history is unchanged.    Past Medical History:   Diagnosis Date     Abnormal Pap smear of cervix 08/22/2018, 04/2/19 08/22/2018, 04/2/19, 04/16/21     Anemia      Cervical high risk HPV (human papillomavirus) test positive 04/16/2021 04/16/21     Gastroesophageal reflux disease 1/5/2006     Irritable bowel syndrome 1/5/2006     Preeclampsia 02/13/2019     Past Surgical History:   Procedure Laterality Date     CYSTOSCOPY, DILATE URETHRA, COMBINED  11/1999    for frequent UTI'S     INJECT SACROILIAC JOINT Right 1/24/2022    Procedure: INJECTION, SACROILIAC JOINT;  Surgeon: Arya Freeman MD;  Location: [facility] OR     INJECT STEROID TROCHANTERIC BURSA Right 1/24/2022    Procedure: INJECTION, STEROID, BURSA, TROCHANTERIC;  Surgeon: Arya Freeman MD;  Location: [facility] OR     INJECT TRIGGER POINT SINGLE / MULTIPLE 1 OR 2 MUSCLES Right 1/24/2022    Procedure: INJECTION, TRIGGER POINT, MUSCLE, 1 OR 2 MUSCLES;  Surgeon: Arya Freeman MD;  Location: [facility] OR     KNEE SURGERY  10/1997     Current Outpatient Medications   Medication Sig Dispense Refill     albuterol (PROAIR HFA/PROVENTIL HFA/VENTOLIN HFA) 108 (90 Base) MCG/ACT inhaler Inhale 2 puffs into the lungs every 6 hours 18 g 1     amphetamine-dextroamphetamine (ADDERALL XR) 10 MG 24 hr capsule Take 1 capsule (10 mg) by mouth daily 30 capsule 0     amphetamine-dextroamphetamine (ADDERALL XR) 10 MG 24 hr capsule Take 1 capsule (10 mg) by mouth daily Needs office visit prior to refills 30 capsule 0      amphetamine-dextroamphetamine (ADDERALL XR) 30 MG 24 hr capsule Take 1 capsule (30 mg) by mouth daily 30 capsule 0     amphetamine-dextroamphetamine (ADDERALL XR) 30 MG 24 hr capsule Take 1 capsule (30 mg) by mouth daily Needs office visit prior to refills 30 capsule 0     ibuprofen (ADVIL/MOTRIN) 600 MG tablet Take 600 mg by mouth       ketoconazole (NIZORAL) 2 % external shampoo LUIS EXT D PRF ITCHING OR IRRITATION 120 mL 11     ketoconazole (NIZORAL) 200 MG tablet Take 1 tablet (200 mg) by mouth daily 10 tablet 0     lidocaine (LIDODERM) 5 % patch Place 1 patch onto the skin every 24 hours To prevent lidocaine toxicity, patient should be patch free for 12 hrs daily. 10 patch 3     meloxicam (MOBIC) 15 MG tablet Take 1 tablet (15 mg) by mouth daily 30 tablet 0     methocarbamol (ROBAXIN) 500 MG tablet Take 1 tablet (500 mg) by mouth 4 times daily as needed for muscle spasms 30 tablet 1     BP (!) 139/99   Pulse 93   Resp 16   SpO2 98%        On examination, the patient is alert and cooperative.  She is able to get up and walk around without difficulty.  She is able to carry out straight leg raising test without difficulty.  She does have some difficulty with Girish's.  In a prone position, she has tenderness over the left piriformis region.  She has minimal tenderness in the right gluteal region.  The SI joint and the trochanteric bursa on either side are nontender.  There is no tenderness over the lumbar paraspinal region currently.    Impression: At this point, this patient with chronic pain for many years since age 17, has had ongoing pain.  She has undergone conservative treatment without improvement.  I would recommend trigger point injections today and reassess her for response.  If her symptoms persist, she may be a candidate for treatment with neurotoxin therapy.    She may need repeat injections of the SI joints at some point typically within 6 months..    PROCEDURE NOTE: With her informed consent,  after explaining the benefits and risks of the procedure, using 1 cc of Celestone with 5 cc of 0.5% bupivacaine, using a 25-gauge 2 inch needle, triggers in the piriformis and gluteal region on either side were injected with relief. 3.5 ml used.  She tolerated the procedure well.    She can ice the region, anticipate improvement over the next several days and update this clinic in 2 to 4 weeks time.    30 minutes for the evaluation and management portion of the visit, greater than 50% was for counseling above-mentioned issues.    Arya Freeman MD

## 2022-03-31 NOTE — LETTER
3/31/2022       RE: Traci Sloan  [address]  [place] [address]     Dear Colleague,    Thank you for referring your patient, Traci Sloan, to the [facility] PHYSICAL MEDICINE AND REHABILITATION CLINIC [place] at [facility]. Please see a copy of my visit note below.    The patient returns for a follow-up visit for ongoing issues related to chronic pain affecting the buttock region bilaterally, right side worse than the left.    She was last seen by me for undergoing injections to the right SI joint, piriformis and trochanteric bursal region.  She feels there was some improvement though she still had areas that would bother her.  She also notes the left side is also slightly bothering her.  Remainder of her history is unchanged.    Past Medical History:   Diagnosis Date     Abnormal Pap smear of cervix 08/22/2018, 04/2/19 08/22/2018, 04/2/19, 04/16/21     Anemia      Cervical high risk HPV (human papillomavirus) test positive 04/16/2021 04/16/21     Gastroesophageal reflux disease 1/5/2006     Irritable bowel syndrome 1/5/2006     Preeclampsia 02/13/2019     Past Surgical History:   Procedure Laterality Date     CYSTOSCOPY, DILATE URETHRA, COMBINED  11/1999    for frequent UTI'S     INJECT SACROILIAC JOINT Right 1/24/2022    Procedure: INJECTION, SACROILIAC JOINT;  Surgeon: Arya Freeman MD;  Location: [facility] OR     INJECT STEROID TROCHANTERIC BURSA Right 1/24/2022    Procedure: INJECTION, STEROID, BURSA, TROCHANTERIC;  Surgeon: Arya Freeman MD;  Location: [facility] OR     INJECT TRIGGER POINT SINGLE / MULTIPLE 1 OR 2 MUSCLES Right 1/24/2022    Procedure: INJECTION, TRIGGER POINT, MUSCLE, 1 OR 2 MUSCLES;  Surgeon: Arya Freeman MD;  Location: [facility] OR     KNEE SURGERY  10/1997     Current Outpatient Medications   Medication Sig Dispense Refill     albuterol (PROAIR HFA/PROVENTIL HFA/VENTOLIN HFA) 108 (90 Base) MCG/ACT  inhaler Inhale 2 puffs into the lungs every 6 hours 18 g 1     amphetamine-dextroamphetamine (ADDERALL XR) 10 MG 24 hr capsule Take 1 capsule (10 mg) by mouth daily 30 capsule 0     amphetamine-dextroamphetamine (ADDERALL XR) 10 MG 24 hr capsule Take 1 capsule (10 mg) by mouth daily Needs office visit prior to refills 30 capsule 0     amphetamine-dextroamphetamine (ADDERALL XR) 30 MG 24 hr capsule Take 1 capsule (30 mg) by mouth daily 30 capsule 0     amphetamine-dextroamphetamine (ADDERALL XR) 30 MG 24 hr capsule Take 1 capsule (30 mg) by mouth daily Needs office visit prior to refills 30 capsule 0     ibuprofen (ADVIL/MOTRIN) 600 MG tablet Take 600 mg by mouth       ketoconazole (NIZORAL) 2 % external shampoo LUIS EXT D PRF ITCHING OR IRRITATION 120 mL 11     ketoconazole (NIZORAL) 200 MG tablet Take 1 tablet (200 mg) by mouth daily 10 tablet 0     lidocaine (LIDODERM) 5 % patch Place 1 patch onto the skin every 24 hours To prevent lidocaine toxicity, patient should be patch free for 12 hrs daily. 10 patch 3     meloxicam (MOBIC) 15 MG tablet Take 1 tablet (15 mg) by mouth daily 30 tablet 0     methocarbamol (ROBAXIN) 500 MG tablet Take 1 tablet (500 mg) by mouth 4 times daily as needed for muscle spasms 30 tablet 1     BP (!) 139/99   Pulse 93   Resp 16   SpO2 98%        On examination, the patient is alert and cooperative.  She is able to get up and walk around without difficulty.  She is able to carry out straight leg raising test without difficulty.  She does have some difficulty with Girish's.  In a prone position, she has tenderness over the left piriformis region.  She has minimal tenderness in the right gluteal region.  The SI joint and the trochanteric bursa on either side are nontender.  There is no tenderness over the lumbar paraspinal region currently.    Impression: At this point, this patient with chronic pain for many years since age 17, has had ongoing pain.  She has undergone conservative  treatment without improvement.  I would recommend trigger point injections today and reassess her for response.  If her symptoms persist, she may be a candidate for treatment with neurotoxin therapy.    She may need repeat injections of the SI joints at some point typically within 6 months..    PROCEDURE NOTE: With her informed consent, after explaining the benefits and risks of the procedure, using 1 cc of Celestone with 5 cc of 0.5% bupivacaine, using a 25-gauge 2 inch needle, triggers in the piriformis and gluteal region on either side were injected with relief. 3.5 ml used.  She tolerated the procedure well.    She can ice the region, anticipate improvement over the next several days and update this clinic in 2 to 4 weeks time.    30 minutes for the evaluation and management portion of the visit, greater than 50% was for counseling above-mentioned issues.      Arya Freeman MD

## 2022-03-31 NOTE — NURSING NOTE
Chief Complaint   Patient presents with     RECHECK     [facility] Return - F/U          Cresencio Estrada

## 2022-04-01 RX ADMIN — BUPIVACAINE HYDROCHLORIDE 50 MG: 5 INJECTION, SOLUTION PERINEURAL at 09:06

## 2022-04-01 RX ADMIN — BETAMETHASONE SODIUM PHOSPHATE AND BETAMETHASONE ACETATE 30 MG: 3; 3 INJECTION, SUSPENSION INTRA-ARTICULAR; INTRALESIONAL; INTRAMUSCULAR; SOFT TISSUE at 09:06

## 2022-04-06 NOTE — TELEPHONE ENCOUNTER
[facility]    Who is the name of the provider?:  Dr. Springer      What is the location you see this provider at?: [place]    Can we leave a detailed message on this number? n/a    Reason for call:  Needs to schedule follow up with Dr. Springer. States that she did have labs completed in January 2022.     Please advise scheduling.       Sonal Canonn    [facility]   [phone]       Yee and POD,    Patient thought she had an appt today with Yee to discuss meds as she needs a refill and has been waiting a while now for refills. She realized she did not in fact have an appt and is not set to see her until 10/5. Patient is needing meds refilled. Is anyone able to bridge meds for pt?    Thanks,  JUNE Aguillon  [facility]

## 2022-04-13 ENCOUNTER — E-VISIT (OUTPATIENT)
Dept: FAMILY MEDICINE | Facility: CLINIC | Age: 42
End: 2022-04-13
Payer: COMMERCIAL

## 2022-04-13 DIAGNOSIS — S89.90XA INJURY OF CALF: Primary | ICD-10-CM

## 2022-04-13 PROCEDURE — 99421 OL DIG E/M SVC 5-10 MIN: CPT | Performed by: NURSE PRACTITIONER
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
## 2022-04-14 ENCOUNTER — TELEPHONE (OUTPATIENT)
Dept: ORTHOPEDICS | Facility: CLINIC | Age: 42
End: 2022-04-14
Payer: COMMERCIAL

## 2022-04-14 ENCOUNTER — OFFICE VISIT (OUTPATIENT)
Dept: ORTHOPEDICS | Facility: CLINIC | Age: 42
End: 2022-04-14
Payer: COMMERCIAL

## 2022-04-14 VITALS — WEIGHT: 163 LBS | BODY MASS INDEX: 24.71 KG/M2 | HEIGHT: 68 IN

## 2022-04-14 DIAGNOSIS — S86.812A STRAIN OF LEFT CALF MUSCLE: Primary | ICD-10-CM

## 2022-04-14 PROCEDURE — 99213 OFFICE O/P EST LOW 20 MIN: CPT | Performed by: FAMILY MEDICINE

## 2022-04-14 NOTE — TELEPHONE ENCOUNTER
I called the patient to assist in scheduling a same day appointment. She was scheduled with Dr. Atkins at 10 AM. The time and location of appointment was confirmed with the patient. Dolores Beasley ATC on 4/14/2022 at 8:46 AM
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
## 2022-04-14 NOTE — TELEPHONE ENCOUNTER
[facility] Call Center    Phone Message    May a detailed message be left on voicemail: no     Reason for Call: Other: Patient's provider Valarie Medrano is requesting patient get a same day appt today. Info in [employer] message     Action Taken: Message routed to:  [facility] ([facility]    Travel Screening: Not Applicable

## 2022-04-14 NOTE — PATIENT INSTRUCTIONS
WHAT IS A CALF STRAIN?      A calf strain is a stretch or tear of a muscle or tendon in the back of your leg below your knee. This area of your leg is called the calf. Tendons are strong bands of tissue that attach muscle to bone.    This type of injury is often called a pulled muscle.    WHAT IS THE CAUSE?    You can strain your calf muscle when you do an activity that involves pushing off forcefully from your toes. For example, it may happen when you are running, jumping, or lunging.    WHAT ARE THE SYMPTOMS?    Symptoms may include:    A snapping or popping sound at the time of the injury  Pain or burning in the back of your lower leg  A feeling that someone has hit you in the back of the leg  Trouble rising up on your toes  Swelling and bruising    HOW IS IT DIAGNOSED?    Your healthcare provider will ask about your symptoms, activities, and medical history and examine your leg.    HOW IS IT TREATED?    You will need to change or stop doing the activities that cause pain until your muscle or tendon has healed. For example, you may need to swim instead of run. You may need to use crutches if it s too painful to walk.    Your healthcare provider may recommend stretching and strengthening exercises. Your provider may refer you to physical therapy.    Your healthcare provider or physical therapist may tape the injured muscle while it heals to help you return to athletic activities. Taping helps reduce movement that may cause more muscle damage. They may recommend wearing an elastic or neoprene sleeve around your calf.    A mild strain may heal within a few weeks. A more severe strain may take 6 weeks or longer to heal.    HOW CAN I TAKE CARE OF MYSELF?    To help relieve swelling and pain:    Put an ice pack, gel pack, or package of frozen vegetables wrapped in a cloth on the sore area every 3 to 4 hours for up to 20 minutes at a time.  Do ice massage. To do this, freeze water in a Styrofoam cup, then peel the top  of the cup away to expose the ice. Hold the bottom of the cup and rub the ice over the painful area for 5 to 10 minutes. Do this several times a day while you have pain.  Keep your leg up on pillows when you sit or lie down.  Take nonprescription pain medicine, such as acetaminophen, ibuprofen, or naproxen. Read the label and take as directed. Nonsteroidal anti-inflammatory medicines (NSAIDs), such as ibuprofen or naproxen, may cause stomach bleeding and other problems. These risks increase with age. Unless recommended by your healthcare provider, do not take an NSAID for more than 10 days.  After you recover from your injury, moist heat may help relax your muscles and make it easier to use them. Put moist heat on your calf for 10 to 15 minutes before you do warm-up and stretching exercises. Moist heat includes heat patches or moist heating pads that you can buy at most [employer], a wet washcloth or towel that has been heated in a microwave or the dryer, or a hot shower. Don t use heat if you have swelling.    Use an elastic bandage when you return to your activities as directed by your provider.    Follow your healthcare provider's instructions, including any exercises recommended by your provider. Ask your provider:    How long it will take to recover  What activities you should avoid and when you can return to your normal activities  How to take care of yourself at home  What symptoms or problems you should watch for and what to do if you have them  Make sure you know when you should come back for a checkup.    HOW CAN I HELP PREVENT A CALF STRAIN?    Warm-up exercises and stretching before activities can help prevent injuries. This is especially important if you are doing jumping or sprinting sports.    Developed by [employer] ([employer]).  Published by [employer] ([employer]).  Copyright  2014 [employer] and/or one of its subsidiaries. All rights reserved.      CALF STRAIN EXERCISES   You can start gently stretching your  calf muscle with the towel stretch right away. Make sure you feel only a gentle pull and not a sharp pain in your calf while you are doing the stretch.    Wearing a quarter to half-inch heel lift in each shoe might reduce your pain as you start to recover from your injury. You can purchase heel lifts at most pharmacies. You can stop using the heel lift when you have no pain while walking.    Towel stretch: Sit on a hard surface with your injured leg stretched out in front of you. Loop a towel around your toes and the ball of your foot and pull the towel toward your body keeping your leg straight. Hold this position for 15 to 30 seconds and then relax. Repeat 3 times.  After you can do the towel stretch easily, you can start the standing calf stretch.    Standing calf stretch: Stand facing a wall with your hands on the wall at about eye level. Keep your injured leg back with your heel on the floor. Keep the other leg forward with the knee bent. Turn your back foot slightly inward (as if you were pigeon-toed). Slowly lean into the wall until you feel a stretch in the back of your calf. Hold the stretch for 15 to 30 seconds. Return to the starting position. Repeat 3 times. Do this exercise several times each day.    After a couple days of stretching (pain free walking and pain free stretching), you can begin strengthening your calf and lower leg muscles using elastic tubing as described in the next exercise.    Resisted ankle plantar flexion: Sit with your injured leg stretched out in front of you. Loop the tubing around the ball of your foot. Hold the ends of the tubing with both hands. Gently press the ball of your foot down and point your toes, stretching the tubing. Return to the starting position. Do 2 sets of 15.  You may do the last 4 exercises when you can stand on your toes without pain.    Heel raise: Stand behind a chair or counter with both feet flat on the floor. Using the chair or counter as a support,  "rise up onto your toes and hold for 5 seconds. Then slowly lower yourself down without holding onto the support. (It's OK to keep holding onto the support if you need to.) When this exercise becomes less painful, try doing this exercise while you are standing on the injured leg only. Repeat 15 times. Do 2 sets of 15. Rest 30 seconds between sets.  You can challenge yourself by standing on just your injured leg as you do this exercise.    Single leg balance: Stand without any support and try to balance on your injured leg. Keep standing on the one leg for 30 seconds. Repeat 3 times. Begin with your eyes open and then try to do the exercise with your eyes closed. When you have mastered this, try doing the exercise standing on a pillow.    Nose touch: Stand on one leg facing a wall. Stand 4 inches (10 centimeters) from the wall. Keep your body and leg straight. Slowly lean forward, trying to touch your nose to the wall and then return to the starting position. Make sure you do not bend forward at your waist. Do 2 sets of 10.    Wall jump: Face a wall and place a piece of masking tape about 2 feet (50 to 60 centimeters) above your head. Jump up with your arms above your head and try to touch the piece of tape. Make sure you do a \"spring\" type of motion and try to land softly on your feet. As the exercise gets easier, jump on just your injured leg. Do 2 sets of 15.    Side-lying leg lift: Lie on your uninjured side. Tighten the front thigh muscles on your injured leg and lift that leg 8 to 10 inches (20 to 25 centimeters) away from the other leg. Keep the leg straight and lower it slowly. Do 2 sets of 15.    When you can do these exercises without pain, you can start a light jogging program. You can return to your sport when there is no difference between your injured side and noninjured side when you do the exercises.    Developed by [employer].  Published by [employer].  Copyright  2014 [employer] and/or " one of its subsidiaries. All rights reserved.

## 2022-04-22 ENCOUNTER — MYC REFILL (OUTPATIENT)
Dept: FAMILY MEDICINE | Facility: CLINIC | Age: 42
End: 2022-04-22
Payer: COMMERCIAL

## 2022-04-22 DIAGNOSIS — F90.2 ATTENTION DEFICIT HYPERACTIVITY DISORDER (ADHD), COMBINED TYPE: ICD-10-CM

## 2022-04-22 NOTE — TELEPHONE ENCOUNTER
Requested Prescriptions   Pending Prescriptions Disp Refills     amphetamine-dextroamphetamine (ADDERALL XR) 10 MG 24 hr capsule 30 capsule 0     Sig: Take 1 capsule (10 mg) by mouth daily Needs office visit prior to refills       There is no refill protocol information for this order        amphetamine-dextroamphetamine (ADDERALL XR) 30 MG 24 hr capsule 30 capsule 0     Sig: Take 1 capsule (30 mg) by mouth daily Needs office visit prior to refills       There is no refill protocol information for this order        Routing refill request to provider for review/approval because:  Drug not on the [facility] refill protocol     Henna Herrmann RN  [facility]

## 2022-04-24 RX ORDER — DEXTROAMPHETAMINE SACCHARATE, AMPHETAMINE ASPARTATE MONOHYDRATE, DEXTROAMPHETAMINE SULFATE AND AMPHETAMINE SULFATE 2.5; 2.5; 2.5; 2.5 MG/1; MG/1; MG/1; MG/1
10 CAPSULE, EXTENDED RELEASE ORAL DAILY
Qty: 30 CAPSULE | Refills: 0 | Status: SHIPPED | OUTPATIENT
Start: 2022-04-24 | End: 2022-05-17

## 2022-04-24 RX ORDER — DEXTROAMPHETAMINE SACCHARATE, AMPHETAMINE ASPARTATE MONOHYDRATE, DEXTROAMPHETAMINE SULFATE AND AMPHETAMINE SULFATE 7.5; 7.5; 7.5; 7.5 MG/1; MG/1; MG/1; MG/1
30 CAPSULE, EXTENDED RELEASE ORAL DAILY
Qty: 30 CAPSULE | Refills: 0 | Status: SHIPPED | OUTPATIENT
Start: 2022-04-24 | End: 2022-05-17

## 2022-05-03 ENCOUNTER — PATIENT OUTREACH (OUTPATIENT)
Dept: OBGYN | Facility: CLINIC | Age: 42
End: 2022-05-03
Payer: COMMERCIAL

## 2022-05-03 DIAGNOSIS — R87.612 PAPANICOLAOU SMEAR OF CERVIX WITH LOW GRADE SQUAMOUS INTRAEPITHELIAL LESION (LGSIL): Primary | ICD-10-CM

## 2022-05-17 ENCOUNTER — MYC REFILL (OUTPATIENT)
Dept: FAMILY MEDICINE | Facility: CLINIC | Age: 42
End: 2022-05-17
Payer: COMMERCIAL

## 2022-05-17 DIAGNOSIS — F90.2 ATTENTION DEFICIT HYPERACTIVITY DISORDER (ADHD), COMBINED TYPE: ICD-10-CM

## 2022-05-17 RX ORDER — DEXTROAMPHETAMINE SACCHARATE, AMPHETAMINE ASPARTATE MONOHYDRATE, DEXTROAMPHETAMINE SULFATE AND AMPHETAMINE SULFATE 7.5; 7.5; 7.5; 7.5 MG/1; MG/1; MG/1; MG/1
30 CAPSULE, EXTENDED RELEASE ORAL DAILY
Qty: 30 CAPSULE | Refills: 0 | Status: SHIPPED | OUTPATIENT
Start: 2022-05-17 | End: 2022-05-17

## 2022-05-17 RX ORDER — DEXTROAMPHETAMINE SACCHARATE, AMPHETAMINE ASPARTATE MONOHYDRATE, DEXTROAMPHETAMINE SULFATE AND AMPHETAMINE SULFATE 2.5; 2.5; 2.5; 2.5 MG/1; MG/1; MG/1; MG/1
10 CAPSULE, EXTENDED RELEASE ORAL DAILY
Qty: 30 CAPSULE | Refills: 0 | Status: SHIPPED | OUTPATIENT
Start: 2022-05-17 | End: 2022-05-17

## 2022-05-17 RX ORDER — DEXTROAMPHETAMINE SACCHARATE, AMPHETAMINE ASPARTATE, DEXTROAMPHETAMINE SULFATE AND AMPHETAMINE SULFATE 5; 5; 5; 5 MG/1; MG/1; MG/1; MG/1
20 TABLET ORAL 2 TIMES DAILY
Qty: 60 TABLET | Refills: 0 | Status: SHIPPED | OUTPATIENT
Start: 2022-05-17 | End: 2022-06-07

## 2022-05-17 NOTE — TELEPHONE ENCOUNTER
The IR 20 mg BID may not be entirely equivalent, but it is an ok starting point.  They can be cut in half if patient wants to try 10 mg BID or 20 mg in AM and 10 mg in PM.  We should have a virtual visit (phone or evisit) in a month to check in on this change.  Sorry about the ridiculous insurance.  I definitely want to find something that there are no hassles with fills.  MARIBETH Hernandez

## 2022-05-17 NOTE — TELEPHONE ENCOUNTER
Requested Prescriptions   Pending Prescriptions Disp Refills     amphetamine-dextroamphetamine (ADDERALL XR) 30 MG 24 hr capsule 30 capsule 0     Sig: Take 1 capsule (30 mg) by mouth daily       There is no refill protocol information for this order        amphetamine-dextroamphetamine (ADDERALL XR) 10 MG 24 hr capsule 30 capsule 0     Sig: Take 1 capsule (10 mg) by mouth daily       There is no refill protocol information for this order        Unable to refill per protocol. Has appt 6-15-22.  Yessenia GIRON RN

## 2022-06-07 ENCOUNTER — MYC REFILL (OUTPATIENT)
Dept: FAMILY MEDICINE | Facility: CLINIC | Age: 42
End: 2022-06-07
Payer: COMMERCIAL

## 2022-06-07 DIAGNOSIS — F90.2 ATTENTION DEFICIT HYPERACTIVITY DISORDER (ADHD), COMBINED TYPE: ICD-10-CM

## 2022-06-07 RX ORDER — DEXTROAMPHETAMINE SACCHARATE, AMPHETAMINE ASPARTATE, DEXTROAMPHETAMINE SULFATE AND AMPHETAMINE SULFATE 5; 5; 5; 5 MG/1; MG/1; MG/1; MG/1
20 TABLET ORAL 2 TIMES DAILY
Qty: 60 TABLET | Refills: 0 | Status: SHIPPED | OUTPATIENT
Start: 2022-06-07 | End: 2022-06-23

## 2022-06-23 ENCOUNTER — VIRTUAL VISIT (OUTPATIENT)
Dept: FAMILY MEDICINE | Facility: CLINIC | Age: 42
End: 2022-06-23
Payer: COMMERCIAL

## 2022-06-23 DIAGNOSIS — R05.3 PERSISTENT COUGH FOR 3 WEEKS OR LONGER: ICD-10-CM

## 2022-06-23 DIAGNOSIS — F90.2 ATTENTION DEFICIT HYPERACTIVITY DISORDER (ADHD), COMBINED TYPE: Primary | ICD-10-CM

## 2022-06-23 PROCEDURE — 99214 OFFICE O/P EST MOD 30 MIN: CPT | Mod: 95 | Performed by: NURSE PRACTITIONER

## 2022-06-23 RX ORDER — DEXTROAMPHETAMINE SACCHARATE, AMPHETAMINE ASPARTATE MONOHYDRATE, DEXTROAMPHETAMINE SULFATE AND AMPHETAMINE SULFATE 7.5; 7.5; 7.5; 7.5 MG/1; MG/1; MG/1; MG/1
30 CAPSULE, EXTENDED RELEASE ORAL DAILY
Qty: 30 CAPSULE | Refills: 0 | Status: SHIPPED | OUTPATIENT
Start: 2022-06-23 | End: 2022-07-18

## 2022-06-23 RX ORDER — ALBUTEROL SULFATE 90 UG/1
2 AEROSOL, METERED RESPIRATORY (INHALATION) EVERY 6 HOURS
Qty: 18 G | Refills: 1 | Status: SHIPPED | OUTPATIENT
Start: 2022-06-23 | End: 2024-06-13

## 2022-06-23 RX ORDER — DEXTROAMPHETAMINE SACCHARATE, AMPHETAMINE ASPARTATE MONOHYDRATE, DEXTROAMPHETAMINE SULFATE AND AMPHETAMINE SULFATE 7.5; 7.5; 7.5; 7.5 MG/1; MG/1; MG/1; MG/1
30 CAPSULE, EXTENDED RELEASE ORAL DAILY
Qty: 30 CAPSULE | Refills: 0 | Status: SHIPPED | OUTPATIENT
Start: 2022-08-24 | End: 2022-09-23

## 2022-06-23 RX ORDER — DEXTROAMPHETAMINE SACCHARATE, AMPHETAMINE ASPARTATE MONOHYDRATE, DEXTROAMPHETAMINE SULFATE AND AMPHETAMINE SULFATE 2.5; 2.5; 2.5; 2.5 MG/1; MG/1; MG/1; MG/1
10 CAPSULE, EXTENDED RELEASE ORAL DAILY
Qty: 30 CAPSULE | Refills: 0 | Status: SHIPPED | OUTPATIENT
Start: 2022-07-24 | End: 2022-08-23

## 2022-06-23 RX ORDER — DEXTROAMPHETAMINE SACCHARATE, AMPHETAMINE ASPARTATE MONOHYDRATE, DEXTROAMPHETAMINE SULFATE AND AMPHETAMINE SULFATE 2.5; 2.5; 2.5; 2.5 MG/1; MG/1; MG/1; MG/1
10 CAPSULE, EXTENDED RELEASE ORAL DAILY
Qty: 30 CAPSULE | Refills: 0 | Status: SHIPPED | OUTPATIENT
Start: 2022-08-24 | End: 2022-09-23

## 2022-06-23 RX ORDER — DEXTROAMPHETAMINE SACCHARATE, AMPHETAMINE ASPARTATE MONOHYDRATE, DEXTROAMPHETAMINE SULFATE AND AMPHETAMINE SULFATE 2.5; 2.5; 2.5; 2.5 MG/1; MG/1; MG/1; MG/1
10 CAPSULE, EXTENDED RELEASE ORAL DAILY
Qty: 30 CAPSULE | Refills: 0 | Status: SHIPPED | OUTPATIENT
Start: 2022-06-23 | End: 2022-07-18

## 2022-06-23 RX ORDER — DEXTROAMPHETAMINE SACCHARATE, AMPHETAMINE ASPARTATE MONOHYDRATE, DEXTROAMPHETAMINE SULFATE AND AMPHETAMINE SULFATE 7.5; 7.5; 7.5; 7.5 MG/1; MG/1; MG/1; MG/1
30 CAPSULE, EXTENDED RELEASE ORAL DAILY
Qty: 30 CAPSULE | Refills: 0 | Status: SHIPPED | OUTPATIENT
Start: 2022-07-24 | End: 2022-08-23

## 2022-06-23 NOTE — PROGRESS NOTES
Traci is a 42 year old who is being evaluated via a billable video visit.      How would you like to obtain your AVS? MyChart  If the video visit is dropped, the invitation should be resent by: Send to e-mail at: [email or web address] [phone]   Will anyone else be joining your video visit? No        Assessment & Plan     Attention deficit hyperactivity disorder (ADHD), combined type  Plan for six consecutive prescriptions written at physical in September if the return to XR works  - amphetamine-dextroamphetamine (ADDERALL XR) 30 MG 24 hr capsule; Take 1 capsule (30 mg) by mouth daily for 30 days  - amphetamine-dextroamphetamine (ADDERALL XR) 30 MG 24 hr capsule; Take 1 capsule (30 mg) by mouth daily for 30 days  - amphetamine-dextroamphetamine (ADDERALL XR) 30 MG 24 hr capsule; Take 1 capsule (30 mg) by mouth daily for 30 days  - amphetamine-dextroamphetamine (ADDERALL XR) 10 MG 24 hr capsule; Take 1 capsule (10 mg) by mouth daily for 30 days  - amphetamine-dextroamphetamine (ADDERALL XR) 10 MG 24 hr capsule; Take 1 capsule (10 mg) by mouth daily for 30 days  - amphetamine-dextroamphetamine (ADDERALL XR) 10 MG 24 hr capsule; Take 1 capsule (10 mg) by mouth daily for 30 days    Persistent cough for 3 weeks or longer  Refilled  - albuterol (PROAIR HFA/PROVENTIL HFA/VENTOLIN HFA) 108 (90 Base) MCG/ACT inhaler; Inhale 2 puffs into the lungs every 6 hours      34 minutes spent on the date of the encounter doing chart review, history and exam, documentation and further activities per the note       Return in about 3 months (around 9/23/2022) for Physical Exam.    WINSTON Richards [facility]    Subjective   Traci is a 42 year old accompanied by her self., presenting for the following health issues:  No chief complaint on file.      HPI     Answers for HPI/ROS submitted by the patient on 6/22/2022  What is the reason for your visit today? : Medication adjustments  How many servings of  "fruits and vegetables do you eat daily?: 4 or more  On average, how many sweetened beverages do you drink each day (Examples: soda, juice, sweet tea, etc.  Do NOT count diet or artificially sweetened beverages)?: 0  How many minutes a day do you exercise enough to make your heart beat faster?: 30 to 60  How many days a week do you exercise enough to make your heart beat faster?: 7  How many days per week do you miss taking your medication?: 0      ADHD - insurance and pharmacy has been a hassle where the insurance was not covering the correct number of days. We changed medication to try to \"reset\" the system.  Would like to go back to original prescription.  Symptoms not as well controlled on IR.  Original prescription was Adderall XR 30 mg and 10 mg for a total of 40 mg.    PDMP reviewed - controlled medications reflected in PDMP, no concerns noted    MSK - calf strain April, repeat injury May.  Continues to need to be careful and doing Peloton lightly and doing calf rolls.  Has had several injections for SI joint, hip join in January that have been really helpful. Will continue to see PM&R if pain recurs.    Pap due - has appointment in July, physical scheduled for September    Recent URI - ran through family, significant congestion, sinus pressure and cough.  Some of the symptoms continue to linger such as sinus pressure and cough.  Albuterol has helped before.  Negative covid test    Review of Systems   GENERAL: No weight change; has fatigue 2/2 significant viral URIs  SKIN: No rash, hives, or significant lesions  HEENT: Hearing/vision: No Eye redness/discharge, nasal congestion, sneezing, snoring other than as noted above  RESP: No cough, wheezing, SOB other than as noted above,   CV: No palpitations, syncope, chest pain  GI: No constipation, diarrhea, abdominal pain  Neuro: No headaches, tics, migraines, tremor  PSYCH: anxiety, depression, suicide attempts      Objective         Vitals:  No vitals were " obtained today due to virtual visit.    Physical Exam   GENERAL: Healthy, alert and no distress  EYES: Eyes grossly normal to inspection.  No discharge or erythema, or obvious scleral/conjunctival abnormalities.  RESP: No audible wheeze, cough, or visible cyanosis.  No visible retractions or increased work of breathing.    SKIN: Visible skin clear. No significant rash, abnormal pigmentation or lesions.  NEURO: Cranial nerves grossly intact.  Mentation and speech appropriate for age.  PSYCH: Mentation appears normal, affect normal/bright, judgement and insight intact, normal speech and appearance well-groomed.          Video-Visit Details      Type of service:  Video Visit    Start: 06/23/2022 07:28 am  Stop: 06/23/2022 07:55 am    Originating Location (pt. Location): Home    Distant Location (provider location):  [facility]     Platform used for Video Visit: [employer]  [employer].

## 2022-06-26 ENCOUNTER — HEALTH MAINTENANCE LETTER (OUTPATIENT)
Age: 42
End: 2022-06-26

## 2022-07-18 ENCOUNTER — MYC REFILL (OUTPATIENT)
Dept: FAMILY MEDICINE | Facility: CLINIC | Age: 42
End: 2022-07-18

## 2022-07-18 DIAGNOSIS — F90.2 ATTENTION DEFICIT HYPERACTIVITY DISORDER (ADHD), COMBINED TYPE: ICD-10-CM

## 2022-07-19 RX ORDER — DEXTROAMPHETAMINE SACCHARATE, AMPHETAMINE ASPARTATE MONOHYDRATE, DEXTROAMPHETAMINE SULFATE AND AMPHETAMINE SULFATE 2.5; 2.5; 2.5; 2.5 MG/1; MG/1; MG/1; MG/1
10 CAPSULE, EXTENDED RELEASE ORAL DAILY
Qty: 30 CAPSULE | Refills: 0 | Status: SHIPPED | OUTPATIENT
Start: 2022-08-19 | End: 2022-09-05

## 2022-07-19 RX ORDER — DEXTROAMPHETAMINE SACCHARATE, AMPHETAMINE ASPARTATE MONOHYDRATE, DEXTROAMPHETAMINE SULFATE AND AMPHETAMINE SULFATE 7.5; 7.5; 7.5; 7.5 MG/1; MG/1; MG/1; MG/1
30 CAPSULE, EXTENDED RELEASE ORAL DAILY
Qty: 30 CAPSULE | Refills: 0 | Status: SHIPPED | OUTPATIENT
Start: 2022-07-21 | End: 2023-03-09

## 2022-07-19 RX ORDER — DEXTROAMPHETAMINE SACCHARATE, AMPHETAMINE ASPARTATE MONOHYDRATE, DEXTROAMPHETAMINE SULFATE AND AMPHETAMINE SULFATE 7.5; 7.5; 7.5; 7.5 MG/1; MG/1; MG/1; MG/1
30 CAPSULE, EXTENDED RELEASE ORAL DAILY
Qty: 30 CAPSULE | Refills: 0 | Status: SHIPPED | OUTPATIENT
Start: 2022-08-19 | End: 2022-09-05

## 2022-07-19 RX ORDER — DEXTROAMPHETAMINE SACCHARATE, AMPHETAMINE ASPARTATE MONOHYDRATE, DEXTROAMPHETAMINE SULFATE AND AMPHETAMINE SULFATE 2.5; 2.5; 2.5; 2.5 MG/1; MG/1; MG/1; MG/1
10 CAPSULE, EXTENDED RELEASE ORAL DAILY
Qty: 30 CAPSULE | Refills: 0 | Status: SHIPPED | OUTPATIENT
Start: 2022-07-21 | End: 2022-10-13

## 2022-07-19 NOTE — TELEPHONE ENCOUNTER
Confirmed with pharmacy corrected dates (7/21 and 8/19) for refills    Susu Shepard RN  [facility]

## 2022-07-19 NOTE — TELEPHONE ENCOUNTER
Routing refill request to provider for review/approval because:  Drug not on the [facility] refill protocol refill request for Adderall     Susu Shepard RN  [facility]

## 2022-07-19 NOTE — TELEPHONE ENCOUNTER
I rewrote the prescriptions for the dates patient noted 7/21 and 8/19. Can you please make sure the pharmacy is using the correctly dates prescriptions? Thanks!  MARIBETH Hernandez

## 2022-09-05 ENCOUNTER — MYC REFILL (OUTPATIENT)
Dept: FAMILY MEDICINE | Facility: CLINIC | Age: 42
End: 2022-09-05

## 2022-09-05 DIAGNOSIS — F90.2 ATTENTION DEFICIT HYPERACTIVITY DISORDER (ADHD), COMBINED TYPE: ICD-10-CM

## 2022-09-06 RX ORDER — DEXTROAMPHETAMINE SACCHARATE, AMPHETAMINE ASPARTATE MONOHYDRATE, DEXTROAMPHETAMINE SULFATE AND AMPHETAMINE SULFATE 2.5; 2.5; 2.5; 2.5 MG/1; MG/1; MG/1; MG/1
10 CAPSULE, EXTENDED RELEASE ORAL DAILY
Qty: 30 CAPSULE | Refills: 0 | Status: SHIPPED | OUTPATIENT
Start: 2022-09-06 | End: 2023-03-09

## 2022-09-06 RX ORDER — DEXTROAMPHETAMINE SACCHARATE, AMPHETAMINE ASPARTATE MONOHYDRATE, DEXTROAMPHETAMINE SULFATE AND AMPHETAMINE SULFATE 7.5; 7.5; 7.5; 7.5 MG/1; MG/1; MG/1; MG/1
30 CAPSULE, EXTENDED RELEASE ORAL DAILY
Qty: 30 CAPSULE | Refills: 0 | Status: SHIPPED | OUTPATIENT
Start: 2022-09-06 | End: 2022-10-13

## 2022-09-08 ENCOUNTER — OFFICE VISIT (OUTPATIENT)
Dept: OBGYN | Facility: CLINIC | Age: 42
End: 2022-09-08
Payer: COMMERCIAL

## 2022-09-08 VITALS
WEIGHT: 175.9 LBS | DIASTOLIC BLOOD PRESSURE: 100 MMHG | SYSTOLIC BLOOD PRESSURE: 150 MMHG | BODY MASS INDEX: 26.66 KG/M2 | OXYGEN SATURATION: 100 % | HEART RATE: 88 BPM | HEIGHT: 68 IN

## 2022-09-08 DIAGNOSIS — Z00.00 PREVENTATIVE HEALTH CARE: ICD-10-CM

## 2022-09-08 DIAGNOSIS — Z01.419 ENCOUNTER FOR GYNECOLOGICAL EXAMINATION WITHOUT ABNORMAL FINDING: Primary | ICD-10-CM

## 2022-09-08 DIAGNOSIS — R87.612 PAPANICOLAOU SMEAR OF CERVIX WITH LOW GRADE SQUAMOUS INTRAEPITHELIAL LESION (LGSIL): ICD-10-CM

## 2022-09-08 DIAGNOSIS — Z23 NEED FOR PROPHYLACTIC VACCINATION AND INOCULATION AGAINST INFLUENZA: ICD-10-CM

## 2022-09-08 PROCEDURE — 88175 CYTOPATH C/V AUTO FLUID REDO: CPT | Performed by: OBSTETRICS & GYNECOLOGY

## 2022-09-08 PROCEDURE — 87624 HPV HI-RISK TYP POOLED RSLT: CPT | Performed by: OBSTETRICS & GYNECOLOGY

## 2022-09-08 PROCEDURE — 90471 IMMUNIZATION ADMIN: CPT | Performed by: OBSTETRICS & GYNECOLOGY

## 2022-09-08 PROCEDURE — 90686 IIV4 VACC NO PRSV 0.5 ML IM: CPT | Performed by: OBSTETRICS & GYNECOLOGY

## 2022-09-08 PROCEDURE — 99396 PREV VISIT EST AGE 40-64: CPT | Mod: 25 | Performed by: OBSTETRICS & GYNECOLOGY

## 2022-09-08 ASSESSMENT — PATIENT HEALTH QUESTIONNAIRE - PHQ9: SUM OF ALL RESPONSES TO PHQ QUESTIONS 1-9: 1

## 2022-09-08 NOTE — PROGRESS NOTES
Traci is a 42 year old  female who presents for annual exam.     Menses are regular q 28-30 days and normal lasting 4-7 days.  Menses flow: normal.  Patient's last menstrual period was 2022.. Using none for contraception.  She is not currently considering pregnancy.  Besides routine health maintenance, she has no other health concerns today.  She had a colp  for LSIL, +HPV, was normal.  Needs repeat pap today.  Daughter started  today.  GYNECOLOGIC HISTORY:  Menarche:  Traci is sexually active with 1male partner(s) and is currently in monogamous relationship.    History sexually transmitted infections:HPV  STI testing offered?  Declined  BRITTNEE exposure: Unknown  History of abnormal Pap smear: YES - updated in Problem List and Health Maintenance accordingly  Family history of breast CA: Yes (Please explain): mgm  Family history of uterine/ovarian CA: No    Family history of colon CA: Yes (Please explain): mgm    HEALTH MAINTENANCE:  Cholesterol: (  Cholesterol   Date Value Ref Range Status   2021 202 (H) <200 mg/dL Final     Comment:     Desirable:       <200 mg/dl    History of abnormal lipids: Yes  Mammo: na . History of abnormal Mammo: Not applicable.  Regular Self Breast Exams: Yes  Calcium/Vitamin D intake: source:  dairy, dietary supplement(s) Adequate? Yes  TSH: (  TSH   Date Value Ref Range Status   2021 2.14 0.40 - 4.00 mU/L Final    )  Pap; (  Lab Results   Component Value Date    PAP LSIL 2021    PAP ASC-US 2019    PAP LSIL 2018    )    HISTORY:  OB History    Para Term  AB Living   4 2 0 2 2 2   SAB IAB Ectopic Multiple Live Births   1 0 0 0 2      # Outcome Date GA Lbr Scar/2nd Weight Sex Delivery Anes PTL Lv   4  02/15/19 36w4d / 00:20 2.8 kg (6 lb 2.8 oz) M Vag-Spont EPI Y ALKA      Name: BA,MALE-TRACI      Apgar1: 8  Apgar5: 8   3  16 36w0d 77:00 / 00:31 2.39 kg (5 lb 4.3 oz) F Vag-Spont EPI  ALKA      Name:  BA,BABY1 CARLOS      Apgar1: 9  Apgar5: 9   2 SAB 13 5w0d          1 AB 2010 5w0d             Obstetric Comments   Sofia Bang     Past Medical History:   Diagnosis Date     Abnormal Pap smear of cervix 2018, 2018, 19, 21     Anemia      Cervical high risk HPV (human papillomavirus) test positive 2021     Gastroesophageal reflux disease 2006     Irritable bowel syndrome 2006     Preeclampsia 2019     Past Surgical History:   Procedure Laterality Date     CYSTOSCOPY, DILATE URETHRA, COMBINED  1999    for frequent UTI'S     INJECT SACROILIAC JOINT Right 2022    Procedure: INJECTION, SACROILIAC JOINT;  Surgeon: Arya Freeman MD;  Location: [facility] OR     INJECT STEROID TROCHANTERIC BURSA Right 2022    Procedure: INJECTION, STEROID, BURSA, TROCHANTERIC;  Surgeon: Arya Freeman MD;  Location: [facility] OR     INJECT TRIGGER POINT SINGLE / MULTIPLE 1 OR 2 MUSCLES Right 2022    Procedure: INJECTION, TRIGGER POINT, MUSCLE, 1 OR 2 MUSCLES;  Surgeon: Arya Freeman MD;  Location: [facility] OR     KNEE SURGERY  10/1997     Family History   Problem Relation Age of Onset     Hypertension Sister      Heart Failure Maternal Grandmother      Breast Cancer Maternal Grandmother      Colon Cancer Maternal Grandmother      Cerebrovascular Disease Maternal Grandfather      Diabetes Maternal Grandfather      Other Cancer Maternal Grandfather      Mental Illness Paternal Grandfather      Social History     Socioeconomic History     Marital status: Single   Tobacco Use     Smoking status: Former Smoker     Quit date: 10/25/2015     Years since quittin.8     Smokeless tobacco: Never Used   Substance and Sexual Activity     Alcohol use: Yes     Comment: 1 x per week -2 glasses of wine     Drug use: No     Sexual activity: Not Currently     Partners: Male     Birth control/protection: None, Condom   Other Topics Concern      Parent/sibling w/ CABG, MI or angioplasty before 65F 55M? No   Social History Narrative    Caffeine intake/servings daily - 1    Calcium intake/servings daily - 3    Exercise 5 times weekly - describe ; bikes    Sunscreen used - Yes    Seatbelts used - Yes    Guns stored in the home - No    Self Breast Exam - No    Pap test up to date -  No    Eye exam up to date -  No    Dental exam up to date -  No    DEXA scan up to date -  No    Flex Sig/Colonoscopy up to date -  No    Mammography up to date -  No    Immunizations reviewed and up to date - Yes    Abuse: Current or Past (Physical, Sexual or Emotional) - No    Do you feel safe in your environment - Yes    Do you cope well with stress - Yes    Do you suffer from insomnia - No               Current Outpatient Medications:      albuterol (PROAIR HFA/PROVENTIL HFA/VENTOLIN HFA) 108 (90 Base) MCG/ACT inhaler, Inhale 2 puffs into the lungs every 6 hours, Disp: 18 g, Rfl: 1     amphetamine-dextroamphetamine (ADDERALL XR) 10 MG 24 hr capsule, Take 1 capsule (10 mg) by mouth daily, Disp: 30 capsule, Rfl: 0     amphetamine-dextroamphetamine (ADDERALL XR) 10 MG 24 hr capsule, Take 1 capsule (10 mg) by mouth daily, Disp: 30 capsule, Rfl: 0     amphetamine-dextroamphetamine (ADDERALL XR) 10 MG 24 hr capsule, Take 1 capsule (10 mg) by mouth daily for 30 days, Disp: 30 capsule, Rfl: 0     amphetamine-dextroamphetamine (ADDERALL XR) 30 MG 24 hr capsule, Take 1 capsule (30 mg) by mouth daily, Disp: 30 capsule, Rfl: 0     amphetamine-dextroamphetamine (ADDERALL XR) 30 MG 24 hr capsule, Take 1 capsule (30 mg) by mouth daily, Disp: 30 capsule, Rfl: 0     amphetamine-dextroamphetamine (ADDERALL XR) 30 MG 24 hr capsule, Take 1 capsule (30 mg) by mouth daily for 30 days, Disp: 30 capsule, Rfl: 0     ibuprofen (ADVIL/MOTRIN) 600 MG tablet, Take 600 mg by mouth, Disp: , Rfl:      ketoconazole (NIZORAL) 2 % external shampoo, LUIS EXT D PRF ITCHING OR IRRITATION, Disp: 120 mL, Rfl: 11      "ketoconazole (NIZORAL) 200 MG tablet, Take 1 tablet (200 mg) by mouth daily, Disp: 10 tablet, Rfl: 0     lidocaine (LIDODERM) 5 % patch, Place 1 patch onto the skin every 24 hours To prevent lidocaine toxicity, patient should be patch free for 12 hrs daily., Disp: 10 patch, Rfl: 3     meloxicam (MOBIC) 15 MG tablet, Take 1 tablet (15 mg) by mouth daily, Disp: 30 tablet, Rfl: 0     methocarbamol (ROBAXIN) 500 MG tablet, Take 1 tablet (500 mg) by mouth 4 times daily as needed for muscle spasms, Disp: 30 tablet, Rfl: 1   No Known Allergies    Past medical, surgical, social and family history were reviewed and updated in EPIC.    ROS:   C:     NEGATIVE for fever, chills, change in weight  I:       NEGATIVE for worrisome rashes, moles or lesions  E:     NEGATIVE for vision changes or irritation  E/M: NEGATIVE for ear, mouth and throat problems  R:     NEGATIVE for significant cough or SOB  CV:   NEGATIVE for chest pain, palpitations or peripheral edema  GI:     NEGATIVE for nausea, abdominal pain, heartburn, or change in bowel habits  :   NEGATIVE for frequency, dysuria, hematuria, vaginal discharge, or irregular bleeding  M:     NEGATIVE for significant arthralgias or myalgia  N:      NEGATIVE for weakness, dizziness or paresthesias  E:      NEGATIVE for temperature intolerance, skin/hair changes  P:      NEGATIVE for changes in mood or affect.    EXAM:  BP (!) 150/100   Pulse 88   Ht 1.727 m (5' 8\")   Wt 79.8 kg (175 lb 14.4 oz)   LMP 08/28/2022   SpO2 100%   BMI 26.75 kg/m     BMI: Body mass index is 26.75 kg/m .  Constitutional: healthy, alert and no distress  Head: Normocephalic. No masses, lesions, tenderness or abnormalities  Neck: Neck supple. Trachea midline. No adenopathy. Thyroid symmetric, normal size.   Cardiovascular: RRR.   Respiratory: Negative.   Breast: No nodularity, asymmetry or nipple discharge bilaterally.  Gastrointestinal: Abdomen soft, non-tender, non-distended. No masses, " organomegaly.  :  Vulva:  No external lesions, normal female hair distribution, no inguinal adenopathy.    Urethra:  Midline, non-tender, well supported, no discharge  Vagina:  Moist, pink, no abnormal discharge, no lesions  Uterus:  Normal size, non-tender, freely mobile  Ovaries:  No masses appreciated, non-tender, mobile  Rectal Exam: deferred  Musculoskeletal: extremities normal  Skin: no suspicious lesions or rashes  Psychiatric: Affect appropriate, cooperative,mentation appears normal.     COUNSELING:   Reviewed preventive health counseling, as reflected in patient instructions  Special attention given to:        Regular exercise       Healthy diet/nutrition       Osteoporosis prevention/bone health   reports that she quit smoking about 6 years ago. She has never used smokeless tobacco.    Body mass index is 26.75 kg/m .  Weight management plan: Discussed healthy diet and exercise guidelines  FRAX Risk Assessment    ASSESSMENT:  42 year old female with satisfactory annual exam.  LSIL/+HPV pap 2021  Plan: dx cotesting done.  Labs with PCP.  mammo referral.  RTC yearly or prn.    JAZMIN CHRISTIANSON MD

## 2022-09-08 NOTE — NURSING NOTE
Clinic Administered Medication Documentation          Injectable Medication Documentation    Patient was given flu vaccine. Prior to medication administration, verified patients identity using patient s name and date of birth. Please see MAR and medication order for additional information. Patient instructed to remain in clinic for 15 minutes.      Was entire vial of medication used? Yes  Vial/Syringe: Single dose vial  Expiration Date:  6/30/23  Was this medication supplied by the patient? No

## 2022-09-12 LAB
BKR LAB AP GYN ADEQUACY: NORMAL
BKR LAB AP GYN INTERPRETATION: NORMAL
BKR LAB AP HPV REFLEX: NORMAL
BKR LAB AP PREVIOUS ABNL DX: NORMAL
BKR LAB AP PREVIOUS ABNORMAL: NORMAL
PATH REPORT.COMMENTS IMP SPEC: NORMAL
PATH REPORT.COMMENTS IMP SPEC: NORMAL
PATH REPORT.RELEVANT HX SPEC: NORMAL

## 2022-09-14 LAB
HUMAN PAPILLOMA VIRUS 16 DNA: NEGATIVE
HUMAN PAPILLOMA VIRUS 18 DNA: NEGATIVE
HUMAN PAPILLOMA VIRUS FINAL DIAGNOSIS: ABNORMAL
HUMAN PAPILLOMA VIRUS OTHER HR: POSITIVE

## 2022-09-15 ENCOUNTER — PATIENT OUTREACH (OUTPATIENT)
Dept: OBGYN | Facility: CLINIC | Age: 42
End: 2022-09-15

## 2022-09-15 DIAGNOSIS — R87.612 PAPANICOLAOU SMEAR OF CERVIX WITH LOW GRADE SQUAMOUS INTRAEPITHELIAL LESION (LGSIL): Primary | ICD-10-CM

## 2022-09-28 ENCOUNTER — MYC REFILL (OUTPATIENT)
Dept: FAMILY MEDICINE | Facility: CLINIC | Age: 42
End: 2022-09-28

## 2022-09-28 DIAGNOSIS — F90.2 ATTENTION DEFICIT HYPERACTIVITY DISORDER (ADHD), COMBINED TYPE: ICD-10-CM

## 2022-09-28 DIAGNOSIS — I10 ESSENTIAL HYPERTENSION: Primary | ICD-10-CM

## 2022-09-28 RX ORDER — DEXTROAMPHETAMINE SACCHARATE, AMPHETAMINE ASPARTATE MONOHYDRATE, DEXTROAMPHETAMINE SULFATE AND AMPHETAMINE SULFATE 7.5; 7.5; 7.5; 7.5 MG/1; MG/1; MG/1; MG/1
30 CAPSULE, EXTENDED RELEASE ORAL DAILY
Qty: 30 CAPSULE | Refills: 0 | Status: CANCELLED | OUTPATIENT
Start: 2022-09-28

## 2022-09-28 RX ORDER — DEXTROAMPHETAMINE SACCHARATE, AMPHETAMINE ASPARTATE, DEXTROAMPHETAMINE SULFATE AND AMPHETAMINE SULFATE 5; 5; 5; 5 MG/1; MG/1; MG/1; MG/1
20 TABLET ORAL DAILY
Qty: 30 TABLET | Refills: 0 | Status: CANCELLED | OUTPATIENT
Start: 2022-09-28 | End: 2022-10-28

## 2022-09-28 RX ORDER — DEXTROAMPHETAMINE SACCHARATE, AMPHETAMINE ASPARTATE, DEXTROAMPHETAMINE SULFATE AND AMPHETAMINE SULFATE 5; 5; 5; 5 MG/1; MG/1; MG/1; MG/1
20 TABLET ORAL DAILY
Qty: 30 TABLET | Refills: 0 | Status: CANCELLED | OUTPATIENT
Start: 2022-10-29 | End: 2022-11-28

## 2022-09-28 RX ORDER — DEXTROAMPHETAMINE SACCHARATE, AMPHETAMINE ASPARTATE, DEXTROAMPHETAMINE SULFATE AND AMPHETAMINE SULFATE 5; 5; 5; 5 MG/1; MG/1; MG/1; MG/1
20 TABLET ORAL DAILY
Qty: 30 TABLET | Refills: 0 | Status: CANCELLED | OUTPATIENT
Start: 2022-11-29 | End: 2022-12-29

## 2022-09-28 NOTE — TELEPHONE ENCOUNTER
6-23-22  Attention deficit hyperactivity disorder (ADHD), combined type  Plan for six consecutive prescriptions written at [facility] in September if the return to XR works  - amphetamine-dextroamphetamine (ADDERALL XR) 30 MG 24 hr capsule; Take 1 capsule (30 mg) by mouth daily for 30 days  - amphetamine-dextroamphetamine (ADDERALL XR) 30 MG 24 hr capsule; Take 1 capsule (30 mg) by mouth daily for 30 days  - amphetamine-dextroamphetamine (ADDERALL XR) 30 MG 24 hr capsule; Take 1 capsule (30 mg) by mouth daily for 30 days  - amphetamine-dextroamphetamine (ADDERALL XR) 10 MG 24 hr capsule; Take 1 capsule (10 mg) by mouth daily for 30 days  - amphetamine-dextroamphetamine (ADDERALL XR) 10 MG 24 hr capsule; Take 1 capsule (10 mg) by mouth daily for 30 days  - amphetamine-dextroamphetamine (ADDERALL XR) 10 MG 24 hr capsule; Take 1 capsule (10 mg) by mouth daily for 30 days    Return in about 3 months (around 9/23/2022) for Physical Exam.

## 2022-09-30 NOTE — TELEPHONE ENCOUNTER
I checked her [place] medication history   [facility] refilled the 30 mg XR on 9/7 and the 10 MG XR on 9/15 so she is not yet due for refills   Ok to wait for PCP

## 2022-10-06 ENCOUNTER — VIRTUAL VISIT (OUTPATIENT)
Dept: FAMILY MEDICINE | Facility: CLINIC | Age: 42
End: 2022-10-06
Payer: COMMERCIAL

## 2022-10-06 DIAGNOSIS — Z83.49 FAMILY HISTORY OF THYROID PROBLEM: ICD-10-CM

## 2022-10-06 DIAGNOSIS — I10 ESSENTIAL HYPERTENSION: Primary | ICD-10-CM

## 2022-10-06 DIAGNOSIS — Z13.220 LIPID SCREENING: ICD-10-CM

## 2022-10-06 DIAGNOSIS — Z82.49 FAMILY HISTORY OF ESSENTIAL HYPERTENSION: ICD-10-CM

## 2022-10-06 DIAGNOSIS — F90.2 ATTENTION DEFICIT HYPERACTIVITY DISORDER (ADHD), COMBINED TYPE: ICD-10-CM

## 2022-10-06 PROCEDURE — 99215 OFFICE O/P EST HI 40 MIN: CPT | Mod: 95 | Performed by: NURSE PRACTITIONER

## 2022-10-06 PROCEDURE — 99417 PROLNG OP E/M EACH 15 MIN: CPT | Mod: 95 | Performed by: NURSE PRACTITIONER

## 2022-10-06 RX ORDER — OLMESARTAN MEDOXOMIL AND HYDROCHLOROTHIAZIDE 20/12.5 20; 12.5 MG/1; MG/1
1 TABLET ORAL DAILY
Qty: 90 TABLET | Refills: 1 | Status: SHIPPED | OUTPATIENT
Start: 2022-10-06 | End: 2023-03-16

## 2022-10-06 NOTE — PROGRESS NOTES
Traci is a 42 year old who is being evaluated via a billable video visit.      How would you like to obtain your AVS? MyChart  If the video visit is dropped, the invitation should be resent by: Text to cell phone: [phone]  Will anyone else be joining your video visit? No      Assessment & Plan     Essential hypertension  In review of historical blood pressure, there is a clear trend starting at immediate postpartum in 2019 of increasing blood pressures. Was treated with nifedipine in 2019 for several months. Has significant family history of HTN. I feel confident in diagnosing HTN and starting treatment. Will do typical new diagnosis labs and consider nephrology referral if it appears there may be underlying etiology rather than essential HTN. Discussed medication options ACE/ARB vs. Diuretic vs. CCB. Discussed rationale for olmesartan-hydrochlorothiazide (longer-acting ARB covers 24 hours and two agents increased chances of coverage with one pill) and the more standard start with lisinopril. Patient verbalized understanding and agreement with olmesartan-hydrochlorothiazide. Start with 1/2 tab for 1-2 weeks, then increase unless home BP is consistently 120/80 or below with 1/2 tab. Follow-up will be in two months at preventative visit.   I do want to look into cryotherapy and milk thistle for any potential effects on BP.  Patient aware that adderall is associated with hypertension. Due to family history, I do not think this is sole factor in diagnosis, but we may want to consider dose changes or discontinuation, particularly if we are not successful in BP reduction on meds.  - Comprehensive metabolic panel (BMP + Alb, Alk Phos, ALT, AST, Total. Bili, TP); Future  - TSH with free T4 reflex; Future  - CBC with platelets and differential; Future  - Hemoglobin A1c; Future  - Lipid panel reflex to direct LDL Fasting; Future  - Albumin Random Urine Quantitative with Creat Ratio; Future  -  olmesartan-hydrochlorothiazide (BENICAR HCT) 20-12.5 MG tablet; Take 1 tablet by mouth daily  - UA reflex to Microscopic - lab collect; Future  - Home Blood Pressure Monitor Order for DME - ONLY FOR DME    Attention deficit hyperactivity disorder (ADHD), combined type  I will see if [facility] pharmacies can more consistently fill Adderall. If those pharmacies are also experiencing supply chain issues, I will write paper prescriptions so that patient can take to any pharmacy that has the medication in stock    Lipid screening  - Lipid panel reflex to direct LDL Fasting; Future    Family history of essential hypertension  Noted    Family history of thyroid problem  Noted - TSH will be included in HTN work up      Ordering of each unique test  Prescription drug management  72 minutes spent on the date of the encounter doing chart review, history and exam, documentation and further activities per the note       Patient Instructions   Come in for labs fasting (10-12 hours no calories) within the next week    Start olmesartan-hydrochlorothiazide 20-12.5 mg (half-tablet for one-two weeks)   blood pressure    I will plan to write written prescriptions next week unless [facility] pharmacies can fill confidently      Return in about 8 weeks (around 12/3/2022) for Physical Exam.    WINSTON Richards CNP  [facility]    Jhon Aviles is a 42 year old accompanied by her self, presenting for the following health issues:  Recheck Medication      HPI     Answers for HPI/ROS submitted by the patient on 9/29/2022  What is the reason for your visit today? : Would like to discuss meds, as well as future bloodwork/check hormone levels in future at in-person appointment  How many servings of fruits and vegetables do you eat daily?: 4 or more  On average, how many sweetened beverages do you drink each day (Examples: soda, juice, sweet tea, etc.  Do NOT count diet or artificially sweetened beverages)?: 0  How many  minutes a day do you exercise enough to make your heart beat faster?: 60 or more  How many days a week do you exercise enough to make your heart beat faster?: 7  How many days per week do you miss taking your medication?: 2  What makes it hard for you to take your medication every day?: other    ADHD medications - have been running out of her medication. Pharmacy recommended possibly changing to a different medication. Patient was wondering if we can do written prescriptions until supply chain is corrected. Just filled Adderall XR 30 mg yesterday, but will run out of XR 10 mg before thirty days. Supply hasn't seemed to be a problem for the 10 mg dose.    General health - coming in December for preventative. Recently had pap smear and had questions about result. Also wondering about hormone and inflammation levels. Blood pressure was elevated at the last visit.  Mother has thyroid disease.  Has had weight gain and no loss with increased exercise and diet changes.    Blood pressure - no HA, vision changes, chest pain, irregular heartbeat, abdominal pain, or swelling in legs or feet. With end of pregnancy hypertension, could feel a pressure in her head. Has not experienced that recently. Many of the blood pressures in the record were taken during duress of illness or injury.  Son was born 2019. Had been on blood pressure medication at birth and for three months post-. Sister and father have hypertension and father had a MI. Patient has history of kidney stone (passed without intervention) around 10 years ago and kidney infection 2/2 recurrent UTI in high school (about 20 years ago). Does not recall any diagnosis of acute kidney injury.    Doing cryotherapy (cold showers, cold gas exposure - with head, hands and feet excepted) - 2 minutes three times a week. Started for back pain and has been really helpful. Taking organic MVI, organic krill oil for omega-3-fatty acids, turmeric, milk thistle, vitamin C with  turmeric-casie. Yoga, exercise bike and stretching daily.    Hasn't taken ketoconazole in awhile - starting to get a bit of tinea on the face, but not body.    Review of Systems   Constitutional, HEENT, cardiovascular, pulmonary, gi and gu systems are negative, except as otherwise noted.      Objective           Vitals:  No vitals were obtained today due to virtual visit.    Physical Exam   GENERAL: Healthy, alert and no distress  EYES: Eyes grossly normal to inspection.  No discharge or erythema, or obvious scleral/conjunctival abnormalities.  RESP: No audible wheeze, cough, or visible cyanosis.  No visible retractions or increased work of breathing.    SKIN: Visible skin clear. No significant rash, abnormal pigmentation or lesions.  NEURO: Cranial nerves grossly intact.  Mentation and speech appropriate for age.  PSYCH: Mentation appears normal, affect normal/bright, judgement and insight intact, normal speech and appearance well-groomed.        Video-Visit Details    Video Start Time: 8:08 AM    Type of service:  Video Visit    Video End Time:9:09 AM    Originating Location (pt. Location): Home    Distant Location (provider location):  [facility]     Platform used for Video Visit: [employer]

## 2022-10-06 NOTE — PATIENT INSTRUCTIONS
Come in for labs fasting (10-12 hours no calories) within the next week    Start olmesartan-hydrochlorothiazide 20-12.5 mg (half-tablet for one-two weeks)   blood pressure    I will plan to write written prescriptions next week unless  pharmacies can fill confidently

## 2022-10-07 ENCOUNTER — LAB (OUTPATIENT)
Dept: LAB | Facility: CLINIC | Age: 42
End: 2022-10-07
Payer: COMMERCIAL

## 2022-10-07 DIAGNOSIS — Z11.59 NEED FOR HEPATITIS C SCREENING TEST: Primary | ICD-10-CM

## 2022-10-07 DIAGNOSIS — I10 ESSENTIAL HYPERTENSION: ICD-10-CM

## 2022-10-07 DIAGNOSIS — Z13.220 LIPID SCREENING: ICD-10-CM

## 2022-10-07 LAB
ALBUMIN SERPL-MCNC: 4 G/DL (ref 3.4–5)
ALBUMIN UR-MCNC: NEGATIVE MG/DL
ALP SERPL-CCNC: 92 U/L (ref 40–150)
ALT SERPL W P-5'-P-CCNC: 23 U/L (ref 0–50)
ANION GAP SERPL CALCULATED.3IONS-SCNC: 8 MMOL/L (ref 3–14)
APPEARANCE UR: CLEAR
AST SERPL W P-5'-P-CCNC: 15 U/L (ref 0–45)
BASOPHILS # BLD AUTO: 0 10E3/UL (ref 0–0.2)
BASOPHILS NFR BLD AUTO: 0 %
BILIRUB SERPL-MCNC: 0.4 MG/DL (ref 0.2–1.3)
BILIRUB UR QL STRIP: NEGATIVE
BUN SERPL-MCNC: 13 MG/DL (ref 7–30)
CALCIUM SERPL-MCNC: 9.1 MG/DL (ref 8.5–10.1)
CHLORIDE BLD-SCNC: 104 MMOL/L (ref 94–109)
CHOLEST SERPL-MCNC: 170 MG/DL
CO2 SERPL-SCNC: 23 MMOL/L (ref 20–32)
COLOR UR AUTO: YELLOW
CREAT SERPL-MCNC: 0.58 MG/DL (ref 0.52–1.04)
CREAT UR-MCNC: 46 MG/DL
EOSINOPHIL # BLD AUTO: 0.1 10E3/UL (ref 0–0.7)
EOSINOPHIL NFR BLD AUTO: 1 %
ERYTHROCYTE [DISTWIDTH] IN BLOOD BY AUTOMATED COUNT: 12.5 % (ref 10–15)
FASTING STATUS PATIENT QL REPORTED: YES
GFR SERPL CREATININE-BSD FRML MDRD: >90 ML/MIN/1.73M2
GLUCOSE BLD-MCNC: 93 MG/DL (ref 70–99)
GLUCOSE UR STRIP-MCNC: NEGATIVE MG/DL
HBA1C MFR BLD: 5.1 % (ref 0–5.6)
HCT VFR BLD AUTO: 38 % (ref 35–47)
HCV AB SERPL QL IA: NONREACTIVE
HDLC SERPL-MCNC: 93 MG/DL
HGB BLD-MCNC: 12.3 G/DL (ref 11.7–15.7)
HGB UR QL STRIP: NEGATIVE
KETONES UR STRIP-MCNC: NEGATIVE MG/DL
LDLC SERPL CALC-MCNC: 63 MG/DL
LEUKOCYTE ESTERASE UR QL STRIP: NEGATIVE
LYMPHOCYTES # BLD AUTO: 2.1 10E3/UL (ref 0.8–5.3)
LYMPHOCYTES NFR BLD AUTO: 26 %
MCH RBC QN AUTO: 29.4 PG (ref 26.5–33)
MCHC RBC AUTO-ENTMCNC: 32.4 G/DL (ref 31.5–36.5)
MCV RBC AUTO: 91 FL (ref 78–100)
MICROALBUMIN UR-MCNC: <5 MG/L
MICROALBUMIN/CREAT UR: NORMAL MG/G{CREAT}
MONOCYTES # BLD AUTO: 0.7 10E3/UL (ref 0–1.3)
MONOCYTES NFR BLD AUTO: 8 %
NEUTROPHILS # BLD AUTO: 5.2 10E3/UL (ref 1.6–8.3)
NEUTROPHILS NFR BLD AUTO: 65 %
NITRATE UR QL: NEGATIVE
NONHDLC SERPL-MCNC: 77 MG/DL
PH UR STRIP: 5.5 [PH] (ref 5–7)
PLATELET # BLD AUTO: 309 10E3/UL (ref 150–450)
POTASSIUM BLD-SCNC: 3.8 MMOL/L (ref 3.4–5.3)
PROT SERPL-MCNC: 7.3 G/DL (ref 6.8–8.8)
RBC # BLD AUTO: 4.19 10E6/UL (ref 3.8–5.2)
SODIUM SERPL-SCNC: 135 MMOL/L (ref 133–144)
SP GR UR STRIP: <=1.005 (ref 1–1.03)
TRIGL SERPL-MCNC: 69 MG/DL
TSH SERPL DL<=0.005 MIU/L-ACNC: 1.97 MU/L (ref 0.4–4)
UROBILINOGEN UR STRIP-ACNC: 0.2 E.U./DL
WBC # BLD AUTO: 8.1 10E3/UL (ref 4–11)

## 2022-10-07 PROCEDURE — 86803 HEPATITIS C AB TEST: CPT

## 2022-10-07 PROCEDURE — 36415 COLL VENOUS BLD VENIPUNCTURE: CPT

## 2022-10-07 PROCEDURE — 82043 UR ALBUMIN QUANTITATIVE: CPT

## 2022-10-07 PROCEDURE — 80050 GENERAL HEALTH PANEL: CPT

## 2022-10-07 PROCEDURE — 81003 URINALYSIS AUTO W/O SCOPE: CPT

## 2022-10-07 PROCEDURE — 83036 HEMOGLOBIN GLYCOSYLATED A1C: CPT

## 2022-10-07 PROCEDURE — 80061 LIPID PANEL: CPT

## 2022-10-07 NOTE — TELEPHONE ENCOUNTER
There are supply chain issue for Adderall and, as such, patient has been needing to get multiple electronic rx sent to pharmacies in order to find a pharmacy with the medication in stock. This creates a situation of looking like over-requesting medication. We will transition to paper copies that patient can physically take to the pharmacy. MARIBETH Hernandez

## 2022-10-10 NOTE — RESULT ENCOUNTER NOTE
Traci,    Your labs were all normal.  If you have any questions, please feel free to contact the clinic.    MARIBETH Hernandez

## 2022-10-13 DIAGNOSIS — F90.2 ATTENTION DEFICIT HYPERACTIVITY DISORDER (ADHD), COMBINED TYPE: ICD-10-CM

## 2022-10-13 RX ORDER — DEXTROAMPHETAMINE SACCHARATE, AMPHETAMINE ASPARTATE MONOHYDRATE, DEXTROAMPHETAMINE SULFATE AND AMPHETAMINE SULFATE 2.5; 2.5; 2.5; 2.5 MG/1; MG/1; MG/1; MG/1
10 CAPSULE, EXTENDED RELEASE ORAL DAILY
Qty: 30 CAPSULE | Refills: 0 | Status: SHIPPED | OUTPATIENT
Start: 2022-10-13 | End: 2023-03-09

## 2022-10-13 RX ORDER — DEXTROAMPHETAMINE SACCHARATE, AMPHETAMINE ASPARTATE MONOHYDRATE, DEXTROAMPHETAMINE SULFATE AND AMPHETAMINE SULFATE 7.5; 7.5; 7.5; 7.5 MG/1; MG/1; MG/1; MG/1
30 CAPSULE, EXTENDED RELEASE ORAL DAILY
Qty: 30 CAPSULE | Refills: 0 | Status: SHIPPED | OUTPATIENT
Start: 2022-10-13 | End: 2023-03-09

## 2022-10-13 NOTE — TELEPHONE ENCOUNTER
Patient was in clinic today to  a paper script for the medication that have been reorder      According to the visit with Yee on 10/6 she was going to provide a paper script for patient to take to any pharmacy since her preferred pharmacy has been running out.     If possible can we get the paper scripts ready and placed at  for patient to come back and ?

## 2022-10-13 NOTE — TELEPHONE ENCOUNTER
Huddled with agustina Murphy doing ONE month. Rx for Adderall XR 10mg and Adderall XR 30 mg for 30 capsules signed by Dr. Grayson and handed to patient 10-13-22.   Will need to follow with PCP for future rx.     Yessenia GIRON RN

## 2022-10-13 NOTE — TELEPHONE ENCOUNTER
10-6-22 Note:  ADHD medications - have been running out of her medication. Pharmacy recommended possibly changing to a different medication. Patient was wondering if we can do written prescriptions until supply chain is corrected. Just filled Adderall XR 30 mg yesterday, but will run out of XR 10 mg before thirty days. Supply hasn't seemed to be a problem for the 10 mg dose.     Messaged with provider, she is asking if other provider willing to sign paper copies for her?    Yessenia GIRON RN

## 2022-10-21 RX ORDER — DEXTROAMPHETAMINE SACCHARATE, AMPHETAMINE ASPARTATE MONOHYDRATE, DEXTROAMPHETAMINE SULFATE AND AMPHETAMINE SULFATE 2.5; 2.5; 2.5; 2.5 MG/1; MG/1; MG/1; MG/1
10 CAPSULE, EXTENDED RELEASE ORAL DAILY
Qty: 30 CAPSULE | Refills: 0 | Status: SHIPPED | OUTPATIENT
Start: 2022-11-11 | End: 2023-03-09

## 2022-10-21 RX ORDER — DEXTROAMPHETAMINE SACCHARATE, AMPHETAMINE ASPARTATE MONOHYDRATE, DEXTROAMPHETAMINE SULFATE AND AMPHETAMINE SULFATE 7.5; 7.5; 7.5; 7.5 MG/1; MG/1; MG/1; MG/1
30 CAPSULE, EXTENDED RELEASE ORAL DAILY
Qty: 30 CAPSULE | Refills: 0 | Status: SHIPPED | OUTPATIENT
Start: 2022-12-10 | End: 2022-12-26

## 2022-10-21 RX ORDER — DEXTROAMPHETAMINE SACCHARATE, AMPHETAMINE ASPARTATE MONOHYDRATE, DEXTROAMPHETAMINE SULFATE AND AMPHETAMINE SULFATE 7.5; 7.5; 7.5; 7.5 MG/1; MG/1; MG/1; MG/1
30 CAPSULE, EXTENDED RELEASE ORAL DAILY
Qty: 30 CAPSULE | Refills: 0 | Status: SHIPPED | OUTPATIENT
Start: 2022-11-11 | End: 2023-03-09

## 2022-10-21 RX ORDER — DEXTROAMPHETAMINE SACCHARATE, AMPHETAMINE ASPARTATE MONOHYDRATE, DEXTROAMPHETAMINE SULFATE AND AMPHETAMINE SULFATE 2.5; 2.5; 2.5; 2.5 MG/1; MG/1; MG/1; MG/1
10 CAPSULE, EXTENDED RELEASE ORAL DAILY
Qty: 30 CAPSULE | Refills: 0 | Status: SHIPPED | OUTPATIENT
Start: 2022-12-10 | End: 2022-12-26

## 2022-10-26 NOTE — TELEPHONE ENCOUNTER
3 mo follow up  Amy Dennis, CMA     Thumbtack message sent to patient.    Luna Bran RN  [facility]

## 2022-12-26 ENCOUNTER — MYC REFILL (OUTPATIENT)
Dept: FAMILY MEDICINE | Facility: CLINIC | Age: 42
End: 2022-12-26

## 2022-12-26 DIAGNOSIS — F90.2 ATTENTION DEFICIT HYPERACTIVITY DISORDER (ADHD), COMBINED TYPE: ICD-10-CM

## 2022-12-26 RX ORDER — DEXTROAMPHETAMINE SACCHARATE, AMPHETAMINE ASPARTATE MONOHYDRATE, DEXTROAMPHETAMINE SULFATE AND AMPHETAMINE SULFATE 2.5; 2.5; 2.5; 2.5 MG/1; MG/1; MG/1; MG/1
10 CAPSULE, EXTENDED RELEASE ORAL DAILY
Qty: 30 CAPSULE | Refills: 0 | Status: SHIPPED | OUTPATIENT
Start: 2022-12-26 | End: 2023-01-23

## 2022-12-26 RX ORDER — DEXTROAMPHETAMINE SACCHARATE, AMPHETAMINE ASPARTATE MONOHYDRATE, DEXTROAMPHETAMINE SULFATE AND AMPHETAMINE SULFATE 7.5; 7.5; 7.5; 7.5 MG/1; MG/1; MG/1; MG/1
30 CAPSULE, EXTENDED RELEASE ORAL DAILY
Qty: 30 CAPSULE | Refills: 0 | Status: SHIPPED | OUTPATIENT
Start: 2022-12-26 | End: 2023-02-03

## 2022-12-26 NOTE — TELEPHONE ENCOUNTER
Requested Prescriptions   Pending Prescriptions Disp Refills     amphetamine-dextroamphetamine (ADDERALL XR) 30 MG 24 hr capsule 30 capsule 0     Sig: Take 1 capsule (30 mg) by mouth daily       There is no refill protocol information for this order        amphetamine-dextroamphetamine (ADDERALL XR) 10 MG 24 hr capsule 30 capsule 0     Sig: Take 1 capsule (10 mg) by mouth daily       There is no refill protocol information for this order          Routing refill request to provider for review/approval because:  Drug not on the [facility] refill protocol     Beatriz Barrera RN  [facility]

## 2023-01-23 ENCOUNTER — MYC REFILL (OUTPATIENT)
Dept: FAMILY MEDICINE | Facility: CLINIC | Age: 43
End: 2023-01-23
Payer: COMMERCIAL

## 2023-01-23 DIAGNOSIS — F90.2 ATTENTION DEFICIT HYPERACTIVITY DISORDER (ADHD), COMBINED TYPE: ICD-10-CM

## 2023-01-23 NOTE — TELEPHONE ENCOUNTER
Requested Prescriptions   Pending Prescriptions Disp Refills     amphetamine-dextroamphetamine (ADDERALL XR) 10 MG 24 hr capsule 30 capsule 0     Sig: Take 1 capsule (10 mg) by mouth daily       There is no refill protocol information for this order        Routing refill request to provider for review/approval because:  Drug not on the [facility] refill protocol     Beatriz Barrera RN  [facility]

## 2023-01-24 RX ORDER — DEXTROAMPHETAMINE SACCHARATE, AMPHETAMINE ASPARTATE MONOHYDRATE, DEXTROAMPHETAMINE SULFATE AND AMPHETAMINE SULFATE 2.5; 2.5; 2.5; 2.5 MG/1; MG/1; MG/1; MG/1
10 CAPSULE, EXTENDED RELEASE ORAL DAILY
Qty: 30 CAPSULE | Refills: 0 | Status: SHIPPED | OUTPATIENT
Start: 2023-01-24 | End: 2023-02-17

## 2023-02-03 ENCOUNTER — MYC REFILL (OUTPATIENT)
Dept: FAMILY MEDICINE | Facility: CLINIC | Age: 43
End: 2023-02-03
Payer: COMMERCIAL

## 2023-02-03 DIAGNOSIS — F90.2 ATTENTION DEFICIT HYPERACTIVITY DISORDER (ADHD), COMBINED TYPE: ICD-10-CM

## 2023-02-06 NOTE — TELEPHONE ENCOUNTER
Routing refill request to provider for review/approval because:  Drug not on the [facility] refill protocol     Susu GIRON RN  [facility]

## 2023-02-07 RX ORDER — DEXTROAMPHETAMINE SACCHARATE, AMPHETAMINE ASPARTATE MONOHYDRATE, DEXTROAMPHETAMINE SULFATE AND AMPHETAMINE SULFATE 7.5; 7.5; 7.5; 7.5 MG/1; MG/1; MG/1; MG/1
30 CAPSULE, EXTENDED RELEASE ORAL DAILY
Qty: 30 CAPSULE | Refills: 0 | Status: SHIPPED | OUTPATIENT
Start: 2023-02-07 | End: 2023-03-09

## 2023-02-22 ENCOUNTER — MYC REFILL (OUTPATIENT)
Dept: FAMILY MEDICINE | Facility: CLINIC | Age: 43
End: 2023-02-22
Payer: COMMERCIAL

## 2023-02-22 DIAGNOSIS — F90.2 ATTENTION DEFICIT HYPERACTIVITY DISORDER (ADHD), COMBINED TYPE: ICD-10-CM

## 2023-02-22 RX ORDER — DEXTROAMPHETAMINE SACCHARATE, AMPHETAMINE ASPARTATE MONOHYDRATE, DEXTROAMPHETAMINE SULFATE AND AMPHETAMINE SULFATE 2.5; 2.5; 2.5; 2.5 MG/1; MG/1; MG/1; MG/1
10 CAPSULE, EXTENDED RELEASE ORAL DAILY
Qty: 30 CAPSULE | Refills: 0 | Status: CANCELLED | OUTPATIENT
Start: 2023-02-22

## 2023-02-22 NOTE — TELEPHONE ENCOUNTER
Requested Prescriptions   Pending Prescriptions Disp Refills     amphetamine-dextroamphetamine (ADDERALL XR) 10 MG 24 hr capsule 30 capsule 0     Sig: Take 1 capsule (10 mg) by mouth daily       There is no refill protocol information for this order        Routing refill request to provider for review/approval because:  Drug not on the [facility] refill protocol     Thanks!  David Kaufman RN   [facility]

## 2023-03-09 ENCOUNTER — VIRTUAL VISIT (OUTPATIENT)
Dept: FAMILY MEDICINE | Facility: CLINIC | Age: 43
End: 2023-03-09
Payer: COMMERCIAL

## 2023-03-09 ENCOUNTER — TELEPHONE (OUTPATIENT)
Dept: FAMILY MEDICINE | Facility: CLINIC | Age: 43
End: 2023-03-09

## 2023-03-09 DIAGNOSIS — I10 ESSENTIAL HYPERTENSION: ICD-10-CM

## 2023-03-09 DIAGNOSIS — F90.2 ATTENTION DEFICIT HYPERACTIVITY DISORDER (ADHD), COMBINED TYPE: Primary | ICD-10-CM

## 2023-03-09 PROCEDURE — 99214 OFFICE O/P EST MOD 30 MIN: CPT | Mod: VID | Performed by: NURSE PRACTITIONER

## 2023-03-09 RX ORDER — DEXTROAMPHETAMINE SACCHARATE, AMPHETAMINE ASPARTATE MONOHYDRATE, DEXTROAMPHETAMINE SULFATE AND AMPHETAMINE SULFATE 2.5; 2.5; 2.5; 2.5 MG/1; MG/1; MG/1; MG/1
10 CAPSULE, EXTENDED RELEASE ORAL DAILY
Qty: 90 CAPSULE | Refills: 0 | Status: SHIPPED | OUTPATIENT
Start: 2023-03-30 | End: 2023-04-25

## 2023-03-09 RX ORDER — DEXTROAMPHETAMINE SACCHARATE, AMPHETAMINE ASPARTATE MONOHYDRATE, DEXTROAMPHETAMINE SULFATE AND AMPHETAMINE SULFATE 7.5; 7.5; 7.5; 7.5 MG/1; MG/1; MG/1; MG/1
30 CAPSULE, EXTENDED RELEASE ORAL DAILY
Qty: 90 CAPSULE | Refills: 0 | Status: SHIPPED | OUTPATIENT
Start: 2023-03-30 | End: 2023-04-25

## 2023-03-09 NOTE — LETTER
"March 10, 2023      Traci Sloan  [address]  [place] [address]        To Whom It May Concern,       I have attempted to relay the following message to a pharmasist at [facility] on multiple occassions, but I have not been able to get resolution, so I am now writing to request an early fill for Traci Sloan - ID #[id number]      amphetamine-dextroamphetamine (ADDERALL XR) 10 MG 24 hr capsule 90 capsule     amphetamin e-dextroamphetamine (ADDERALL XR) 30 MG 24 hr capsule 90 capsule     [facility] reports a two week time frame to process a refill request.     In order for the patient to receive her medication at the appropriate time without gap in medication, the process needs to be started ahead of the subsequent start date, even if this appears \"early.\".     To make this clear with the prescriptions, they are written with a \"first fill\" date two weeks ahead of the \"start\" date.     Secondly, due to previous shortages, her current XR 10 mg dose will run out a week ahead of her XR 30 mg dose. I wrote the prescriptions with the same start and fill dates with the intention of an early fill on the XR 30 mg in order to get the prescriptions back on the same dates.    Please contact with any questions. Phone: [phone].    Sincerely,        Valarie Medrano, WINSTON CNP          "

## 2023-03-09 NOTE — TELEPHONE ENCOUNTER
"Patient having a lot of trouble getting Adderall prescription at pharmacies. She has spoken with insurance and they recommend mail order.  I did write the prescription today during our appointment, but I want to make sure it will work how I intend.  The two components here  1) express scripts allows themselves two weeks to process a refill request. For controlled substances, this is a problem. To remedy this, I wrote the prescriptions with a \"first fill\" date two weeks ahead of the \"start\" date. In the future, patient is to request direct from me via [employer] to avoid confusion at [place] about \"early request\"  2) Due to previous shortages, her current XR 10 mg dose will dun out a week ahead of her XR 30 mg dose. I wrote the prescriptions with the same start and fill dates with the intention of an early fill on the XR 30 mg in order to get the prescriptions back on the same dates.    MARIBETH Hernandez   "

## 2023-03-09 NOTE — PROGRESS NOTES
"Traci is a 42 year old who is being evaluated via a billable video visit.      How would you like to obtain your AVS? MyChart  If the video visit is dropped, the invitation should be resent by: Text to cell phone: [phone]   Will anyone else be joining your video visit? No        Assessment & Plan     Attention deficit hyperactivity disorder (ADHD), combined type  We will try mail order for ease of filling in context of current shortages at [facility] and [facility]. This will allow 90 day fills. Possible hiccups include the two week leeway ES gives themselves to fill conflicts with requests timelines for controlled substances. Attempted to circumvent this with two week earlier \"first fill\" date than the \"start\" date. Second challenge is that prescriptions are currently mismatched on filling due to previous shortage issues. I'd like ES to do an early fill of the XR 30 mg so that it arrives next and going forward with the XR 10 mg dose. Has asked triage to assist.  - amphetamine-dextroamphetamine (ADDERALL XR) 30 MG 24 hr capsule; Take 1 capsule (30 mg) by mouth daily  - amphetamine-dextroamphetamine (ADDERALL XR) 10 MG 24 hr capsule; Take 1 capsule (10 mg) by mouth daily    Essential hypertension  Needs review as last was elevated and patient on anti-hypertensive. Will get patient in next Wed. 8 am      Prescription drug management  30 minutes spent on the date of the encounter doing chart review, history and exam, documentation and further activities per the note       Valarie Medrano, [facility]    Subjective   Traci is a 42 year old accompanied by her self, presenting for the following health issues:  A.D.H.D      HPI     Answers for HPI/ROS submitted by the patient on 3/9/2023  What is the reason for your visit today? : Meds. Switch to Delivery. Appt changed. Have work event @5pm.Info: Cynthia suggests home delivery. [facility] [address] [address] [place], " [place] [address] -Phone # for digital request is: [phone], or 2 -Fax [phone]. They prefer 90 day Rx  How many servings of fruits and vegetables do you eat daily?: 2-3  On average, how many sweetened beverages do you drink each day (Examples: soda, juice, sweet tea, etc.  Do NOT count diet or artificially sweetened beverages)?: 0  How many minutes a day do you exercise enough to make your heart beat faster?: 60 or more  How many days a week do you exercise enough to make your heart beat faster?: 7  How many days per week do you miss taking your medication?: 1  What makes it hard for you to take your medication every day?: other    Has to call pharmacy every month.  Adderall XR 30 mg plus XR 10 mg for a total for XR 40 mg once in the morning  Just filled XR 30 mg (3/7) and XR 10 mg (3/1) was filled one week ago  Two weeks before the next refill would be  XR 30 mg - in two weeks  XR 10 mg - in one week    Review of Systems       Objective           Vitals:  No vitals were obtained today due to virtual visit.    Physical Exam   GENERAL: Healthy, alert and no distress  EYES: Eyes grossly normal to inspection.  No discharge or erythema, or obvious scleral/conjunctival abnormalities.  RESP: No audible wheeze, cough, or visible cyanosis.  No visible retractions or increased work of breathing.    SKIN: Visible skin clear. No significant rash, abnormal pigmentation or lesions.  NEURO: Cranial nerves grossly intact.  Mentation and speech appropriate for age.  PSYCH: Mentation appears normal, affect normal/bright, judgement and insight intact, normal speech and appearance well-groomed.        Video-Visit Details    Type of service:  Video Visit   Originating Location (pt. Location): Home  Distant Location (provider location):  Off-site  Platform used for Video Visit: SceneChat

## 2023-03-09 NOTE — LETTER
"March 10, 2023      Traci Sloan  [address]  [facility] [address]        To Whom It May Concern,       I have attempted to relay the following message to a pharmasist at [facility] on multiple occassions, but I have not been able to get resolution, so I am now writing to request an early fill for Traci Sloan - ID #[id number]      amphetamine-dextroamphetamine (ADDERALL XR) 10 MG 24 hr capsule 90 capsule     amphetamin e-dextroamphetamine (ADDERALL XR) 30 MG 24 hr capsule 90 capsule     [facility] reports a two week time frame to process a refill request.     In order for the patient to receive her medication at the appropriate time without gap in medication, the process needs to be started ahead of the subsequent start date, even if this appears \"early.\".     To make this clear with the prescriptions, they are written with a \"first fill\" date two weeks ahead of the \"start\" date.     Secondly, due to previous shortages, her current XR 10 mg dose will run out a week ahead of her XR 30 mg dose. I wrote the prescriptions with the same start and fill dates with the intention of an early fill on the XR 30 mg in order to get the prescriptions back on the same dates.    Please contact with any questions. Phone: [phone].    Sincerely,        Valarie Medrano, WINSTON CNP          "

## 2023-03-10 NOTE — TELEPHONE ENCOUNTER
Yes - I reviewed and edited the letter and it would be great to get this faxed. Thank you!  MARIBETH Hernandez

## 2023-03-10 NOTE — TELEPHONE ENCOUNTER
"Called pharmacy again today - Awaiting call back from pharmacist (wait time was again 98 minutes) to relay provider msg below.     Pharmacy tech states Rx is under view and in process with an est shipment 3/15 - but it will kick back as \"refill too soon\" because patient picked up 10MG on 2/27 at local [facility] pharmacy 30 days      Yee - I did draft a letter that can be faxed to [facility] if you want to go that route?     Triage - please relay PCP msg when they call back    Please advise    Susu GIRON RN  [facility]    "

## 2023-03-16 ENCOUNTER — TELEPHONE (OUTPATIENT)
Dept: FAMILY MEDICINE | Facility: CLINIC | Age: 43
End: 2023-03-16
Payer: COMMERCIAL

## 2023-03-16 ENCOUNTER — MYC MEDICAL ADVICE (OUTPATIENT)
Dept: FAMILY MEDICINE | Facility: CLINIC | Age: 43
End: 2023-03-16
Payer: COMMERCIAL

## 2023-03-16 DIAGNOSIS — I10 ESSENTIAL HYPERTENSION: ICD-10-CM

## 2023-03-16 DIAGNOSIS — F90.2 ATTENTION DEFICIT HYPERACTIVITY DISORDER (ADHD), COMBINED TYPE: Primary | ICD-10-CM

## 2023-03-16 RX ORDER — OLMESARTAN MEDOXOMIL AND HYDROCHLOROTHIAZIDE 20/12.5 20; 12.5 MG/1; MG/1
1 TABLET ORAL DAILY
Qty: 90 TABLET | Refills: 1 | Status: SHIPPED | OUTPATIENT
Start: 2023-03-16 | End: 2023-09-01

## 2023-03-16 NOTE — TELEPHONE ENCOUNTER
"Requested Prescriptions   Pending Prescriptions Disp Refills     olmesartan-hydrochlorothiazide (BENICAR HCT) 20-12.5 MG tablet 90 tablet 1     Sig: Take 1 tablet by mouth daily       Angiotensin-II Receptors Failed - 3/16/2023  9:12 AM        Failed - Last blood pressure under 140/90 in past 12 months     BP Readings from Last 3 Encounters:   09/08/22 (!) 150/100   03/31/22 (!) 139/99   01/24/22 (!) 154/101                 Passed - Recent (12 mo) or future (30 days) visit within the authorizing provider's specialty     Patient has had an office visit with the authorizing provider or a provider within the authorizing providers department within the previous 12 mos or has a future within next 30 days. See \"Patient Info\" tab in inbasket, or \"Choose Columns\" in Meds & Orders section of the refill encounter.              Passed - Medication is active on med list        Passed - Patient is age 18 or older        Passed - No active pregnancy on record        Passed - Normal serum creatinine on file in past 12 months     Recent Labs   Lab Test 10/07/22  1023   CR 0.58       Ok to refill medication if creatinine is low          Passed - Normal serum potassium on file in past 12 months     Recent Labs   Lab Test 10/07/22  1023   POTASSIUM 3.8                    Passed - No positive pregnancy test in past 12 months         Routing refill request to provider for review/approval because medication did not pass protocol.    Pt has a appointment on 06/06/23    Beatriz Barrera RN  [facility]   "

## 2023-03-17 RX ORDER — DEXTROAMPHETAMINE SACCHARATE, AMPHETAMINE ASPARTATE MONOHYDRATE, DEXTROAMPHETAMINE SULFATE, AMPHETAMINE SULFATE 9.375; 9.375; 9.375; 9.375 MG/1; MG/1; MG/1; MG/1
37.5 CAPSULE, EXTENDED RELEASE ORAL DAILY
Qty: 30 CAPSULE | Refills: 0 | Status: SHIPPED | OUTPATIENT
Start: 2023-03-17 | End: 2023-04-10

## 2023-03-17 NOTE — ADDENDUM NOTE
Addended by: LORENZO REHMAN on: 3/17/2023 12:09 PM     Modules accepted: Orders     Saw pulmonologist so they are going to go ahead do echo another chest x-ray and something to do with muscles I asked if she could give them are fax number and keep us posted.

## 2023-03-27 ENCOUNTER — TELEPHONE (OUTPATIENT)
Dept: FAMILY MEDICINE | Facility: CLINIC | Age: 43
End: 2023-03-27
Payer: COMMERCIAL

## 2023-03-27 NOTE — TELEPHONE ENCOUNTER
Reason for Call:  Other appointment    Detailed comments: Patient would like to change her appointment that is scheduled with Feli for tomorrow (03/28). Please assist with rescheduling.    Phone Number Patient can be reached at: Home number on file [phone] (home)    Best Time: Any    Can we leave a detailed message on this number? YES    Call taken on 3/27/2023 at 10:03 AM by Yessenia Lux

## 2023-03-27 NOTE — TELEPHONE ENCOUNTER
Called pt and helped her reschedule her office appointment as requested. Thanks    Beatriz Barrera RN  [facility]

## 2023-04-10 ENCOUNTER — MYC REFILL (OUTPATIENT)
Dept: FAMILY MEDICINE | Facility: CLINIC | Age: 43
End: 2023-04-10
Payer: COMMERCIAL

## 2023-04-10 DIAGNOSIS — F90.2 ATTENTION DEFICIT HYPERACTIVITY DISORDER (ADHD), COMBINED TYPE: ICD-10-CM

## 2023-04-10 RX ORDER — DEXTROAMPHETAMINE SACCHARATE, AMPHETAMINE ASPARTATE MONOHYDRATE, DEXTROAMPHETAMINE SULFATE, AMPHETAMINE SULFATE 9.375; 9.375; 9.375; 9.375 MG/1; MG/1; MG/1; MG/1
37.5 CAPSULE, EXTENDED RELEASE ORAL DAILY
Qty: 30 CAPSULE | Refills: 0 | Status: SHIPPED | OUTPATIENT
Start: 2023-04-10 | End: 2023-05-11

## 2023-04-10 NOTE — TELEPHONE ENCOUNTER
Requested Prescriptions   Pending Prescriptions Disp Refills     Amphet-Dextroamphet 3-Bead ER 37.5 MG CP24 30 capsule 0     Sig: Take 37.5 mg by mouth daily       There is no refill protocol information for this order        Routing refill request to provider for review/approval because:  Drug not on the [facility] refill protocol     Beatriz Barrera RN  [facility]

## 2023-04-13 ENCOUNTER — ANCILLARY PROCEDURE (OUTPATIENT)
Dept: GENERAL RADIOLOGY | Facility: CLINIC | Age: 43
End: 2023-04-13
Attending: FAMILY MEDICINE
Payer: COMMERCIAL

## 2023-04-13 ENCOUNTER — ANCILLARY PROCEDURE (OUTPATIENT)
Dept: CT IMAGING | Facility: CLINIC | Age: 43
End: 2023-04-13
Attending: FAMILY MEDICINE
Payer: COMMERCIAL

## 2023-04-13 ENCOUNTER — PRE VISIT (OUTPATIENT)
Dept: ORTHOPEDICS | Facility: CLINIC | Age: 43
End: 2023-04-13
Payer: COMMERCIAL

## 2023-04-13 ENCOUNTER — MYC MEDICAL ADVICE (OUTPATIENT)
Dept: FAMILY MEDICINE | Facility: CLINIC | Age: 43
End: 2023-04-13

## 2023-04-13 ENCOUNTER — OFFICE VISIT (OUTPATIENT)
Dept: ORTHOPEDICS | Facility: CLINIC | Age: 43
End: 2023-04-13
Payer: COMMERCIAL

## 2023-04-13 DIAGNOSIS — S82.832A CLOSED FRACTURE OF DISTAL END OF LEFT FIBULA, UNSPECIFIED FRACTURE MORPHOLOGY, INITIAL ENCOUNTER: ICD-10-CM

## 2023-04-13 DIAGNOSIS — M79.673 PAIN OF FOOT, UNSPECIFIED LATERALITY: Primary | ICD-10-CM

## 2023-04-13 DIAGNOSIS — S92.102A CLOSED NONDISPLACED FRACTURE OF LEFT TALUS, UNSPECIFIED PORTION OF TALUS, INITIAL ENCOUNTER: Primary | ICD-10-CM

## 2023-04-13 DIAGNOSIS — M25.572 PAIN IN LEFT ANKLE AND JOINTS OF LEFT FOOT: ICD-10-CM

## 2023-04-13 DIAGNOSIS — S92.102A CLOSED NONDISPLACED FRACTURE OF LEFT TALUS, UNSPECIFIED PORTION OF TALUS, INITIAL ENCOUNTER: ICD-10-CM

## 2023-04-13 PROCEDURE — 73630 X-RAY EXAM OF FOOT: CPT | Mod: LT | Performed by: RADIOLOGY

## 2023-04-13 PROCEDURE — 73610 X-RAY EXAM OF ANKLE: CPT | Mod: LT | Performed by: RADIOLOGY

## 2023-04-13 PROCEDURE — 99213 OFFICE O/P EST LOW 20 MIN: CPT | Performed by: FAMILY MEDICINE

## 2023-04-13 PROCEDURE — 73700 CT LOWER EXTREMITY W/O DYE: CPT | Mod: LT | Performed by: RADIOLOGY

## 2023-04-13 RX ORDER — HYDROCODONE BITARTRATE AND ACETAMINOPHEN 5; 325 MG/1; MG/1
1 TABLET ORAL EVERY 6 HOURS PRN
Qty: 10 TABLET | Refills: 0 | Status: SHIPPED | OUTPATIENT
Start: 2023-04-13 | End: 2023-04-16
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
## 2023-04-13 NOTE — TELEPHONE ENCOUNTER
DIAGNOSIS: left Ankle    APPOINTMENT DATE: 4.13.23   NOTES STATUS DETAILS   OFFICE NOTE from other specialist Internal 7.12.18 Sorin Mcrae DPM    6.13.18 Valarie Medrano APRN CNP   MEDICATION LIST Internal    XRAYS (IMAGES & REPORTS) Internal 7.12.18 L ankle  6.13.18 L ankle
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
## 2023-04-13 NOTE — PROGRESS NOTES
DME FITTING    Relevant Diagnosis: Left ankle injury  Short walking boot, Medium was fit on patient's Left ankle.     Person(s) involved in teaching:   Patient    Brace was applied in standard Manner:  Yes  Brace fit well:  Yes  Patient reports brace to fit comfortably:  Yes    Education:   Patient shown self application and removal of brace: Yes  Patient shown how to adjust brace fit, if necessary: Yes  Patient educated on billing and return policy: Yes  Patient confirmed understanding when and how to contact clinic with concerns: Yes      Franky Holcomb ATC on 4/13/2023 at 10:55 AM

## 2023-04-14 ENCOUNTER — NURSE TRIAGE (OUTPATIENT)
Dept: NURSING | Facility: CLINIC | Age: 43
End: 2023-04-14

## 2023-04-14 ENCOUNTER — OFFICE VISIT (OUTPATIENT)
Dept: URGENT CARE | Facility: URGENT CARE | Age: 43
End: 2023-04-14
Payer: COMMERCIAL

## 2023-04-14 VITALS
HEART RATE: 81 BPM | DIASTOLIC BLOOD PRESSURE: 71 MMHG | TEMPERATURE: 98.5 F | SYSTOLIC BLOOD PRESSURE: 124 MMHG | OXYGEN SATURATION: 98 % | WEIGHT: 175 LBS | BODY MASS INDEX: 26.61 KG/M2

## 2023-04-14 DIAGNOSIS — S92.102D CLOSED NONDISPLACED FRACTURE OF LEFT TALUS WITH ROUTINE HEALING, UNSPECIFIED PORTION OF TALUS, SUBSEQUENT ENCOUNTER: Primary | ICD-10-CM

## 2023-04-14 DIAGNOSIS — S82.832D CLOSED AVULSION FRACTURE OF DISTAL END OF LEFT FIBULA WITH ROUTINE HEALING, SUBSEQUENT ENCOUNTER: ICD-10-CM

## 2023-04-14 PROCEDURE — 99213 OFFICE O/P EST LOW 20 MIN: CPT | Performed by: EMERGENCY MEDICINE

## 2023-04-14 RX ORDER — OXYCODONE AND ACETAMINOPHEN 5; 325 MG/1; MG/1
1 TABLET ORAL EVERY 6 HOURS PRN
Qty: 12 TABLET | Refills: 0 | Status: SHIPPED | OUTPATIENT
Start: 2023-04-14 | End: 2023-04-17

## 2023-04-14 RX ORDER — MELOXICAM 15 MG/1
15 TABLET ORAL DAILY PRN
Qty: 30 TABLET | Refills: 0 | Status: SHIPPED | OUTPATIENT
Start: 2023-04-14 | End: 2023-07-14
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
## 2023-04-14 NOTE — TELEPHONE ENCOUNTER
Traci calling with severe pain left ankle. She was seen yesterday and Dx with a fracture. She is elevating, using ice packs and taking Meloxicam and Norco but still rates her pain 7-10. She states the pain keeps her from sleeping. She can be reached by cell phone or My Chart.  She will go to urgent care if she does not hear back from Sports medicine clinic.  Susanna White RN on 4/14/2023 at 3:09 PM    Reason for Disposition    Followed an injury    SEVERE pain (e.g., excruciating)    Additional Information    Negative: Major bleeding (actively dripping or spurting) that can't be stopped    Negative: Amputation or bone sticking through the skin    Negative: Looks like a dislocated joint (crooked or deformed)    Negative: Serious injury with multiple fractures (broken bones)    Negative: Sounds like a life-threatening emergency to the triager    Negative: Bullet, stabbed by knife or other serious penetrating wound    Negative: Can't stand (bear weight) or walk (e.g., 4 steps)    Negative: Skin is split open or gaping (length > 1/2 inch or 12 mm)    Negative: Bleeding won't stop after 10 minutes of direct pressure (using correct technique)    Negative: Dirt in the wound and not removed after 15 minutes of scrubbing    Negative: Numbness (new loss of sensation) of toe(s)    Negative: Looks infected (e.g., spreading redness, red streak, pus)    Negative: Sounds like a serious injury to the triager    Protocols used: ANKLE PAIN-A-OH, ANKLE AND FOOT INJURY-A-OH
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
## 2023-04-14 NOTE — TELEPHONE ENCOUNTER
I did send a refill on the meloxicam to the pharmacy.  It also looks like she got another pain medication (hydrocodone) from sports medicine.    Sorin Landeros MD

## 2023-04-18 ENCOUNTER — VIRTUAL VISIT (OUTPATIENT)
Dept: ORTHOPEDICS | Facility: CLINIC | Age: 43
End: 2023-04-18
Payer: COMMERCIAL

## 2023-04-18 DIAGNOSIS — S92.902D CLOSED FRACTURE OF LEFT FOOT WITH ROUTINE HEALING, SUBSEQUENT ENCOUNTER: ICD-10-CM

## 2023-04-18 DIAGNOSIS — S82.832D CLOSED FRACTURE OF DISTAL END OF LEFT FIBULA WITH ROUTINE HEALING, UNSPECIFIED FRACTURE MORPHOLOGY, SUBSEQUENT ENCOUNTER: ICD-10-CM

## 2023-04-18 DIAGNOSIS — S92.102D CLOSED NONDISPLACED FRACTURE OF LEFT TALUS WITH ROUTINE HEALING, UNSPECIFIED PORTION OF TALUS, SUBSEQUENT ENCOUNTER: Primary | ICD-10-CM

## 2023-04-18 PROCEDURE — 99213 OFFICE O/P EST LOW 20 MIN: CPT | Mod: 95 | Performed by: FAMILY MEDICINE
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
## 2023-04-25 ENCOUNTER — OFFICE VISIT (OUTPATIENT)
Dept: FAMILY MEDICINE | Facility: CLINIC | Age: 43
End: 2023-04-25
Payer: COMMERCIAL

## 2023-04-25 VITALS
SYSTOLIC BLOOD PRESSURE: 120 MMHG | TEMPERATURE: 97.5 F | RESPIRATION RATE: 14 BRPM | HEART RATE: 90 BPM | OXYGEN SATURATION: 99 % | HEIGHT: 67 IN | WEIGHT: 174 LBS | DIASTOLIC BLOOD PRESSURE: 75 MMHG | BODY MASS INDEX: 27.31 KG/M2

## 2023-04-25 DIAGNOSIS — R63.5 WEIGHT GAIN: ICD-10-CM

## 2023-04-25 DIAGNOSIS — I10 ESSENTIAL HYPERTENSION: Primary | ICD-10-CM

## 2023-04-25 DIAGNOSIS — B36.0 TINEA VERSICOLOR: ICD-10-CM

## 2023-04-25 DIAGNOSIS — M54.50 ACUTE LOW BACK PAIN, UNSPECIFIED BACK PAIN LATERALITY, UNSPECIFIED WHETHER SCIATICA PRESENT: ICD-10-CM

## 2023-04-25 PROCEDURE — 90746 HEPB VACCINE 3 DOSE ADULT IM: CPT | Performed by: NURSE PRACTITIONER

## 2023-04-25 PROCEDURE — 99214 OFFICE O/P EST MOD 30 MIN: CPT | Mod: 25 | Performed by: NURSE PRACTITIONER

## 2023-04-25 PROCEDURE — 90471 IMMUNIZATION ADMIN: CPT | Performed by: NURSE PRACTITIONER

## 2023-04-25 RX ORDER — METHOCARBAMOL 500 MG/1
500 TABLET, FILM COATED ORAL 4 TIMES DAILY PRN
Qty: 30 TABLET | Refills: 1 | Status: SHIPPED | OUTPATIENT
Start: 2023-04-25 | End: 2023-11-15

## 2023-04-25 RX ORDER — KETOCONAZOLE 20 MG/ML
SHAMPOO TOPICAL
Qty: 120 ML | Refills: 11 | Status: SHIPPED | OUTPATIENT
Start: 2023-04-25 | End: 2024-03-05

## 2023-04-25 RX ORDER — KETOCONAZOLE 200 MG/1
200 TABLET ORAL DAILY
Qty: 10 TABLET | Refills: 0 | Status: SHIPPED | OUTPATIENT
Start: 2023-04-25 | End: 2023-08-18

## 2023-04-25 ASSESSMENT — PAIN SCALES - GENERAL: PAINLEVEL: MODERATE PAIN (4)

## 2023-04-25 NOTE — PATIENT INSTRUCTIONS
You can take methocarbamol 750 mg (1.5 tablets) - if you take this dose, only take three times a day

## 2023-04-25 NOTE — PROGRESS NOTES
"  Assessment & Plan     Tinea versicolor  Refills - this is a recurring problem that does respond well to the ketoconazole  - ketoconazole (NIZORAL) 2 % external shampoo; LUIS EXT D PRF ITCHING OR IRRITATION  - ketoconazole (NIZORAL) 200 MG tablet; Take 1 tablet (200 mg) by mouth daily    Essential hypertension  At goal on olmesartan-hydrochlorothiazide 20-12.5 mg with no side effects. Continue medication. 6 month follow-up for HTN. Can co-occur with visit for other    Acute low back pain, unspecified back pain laterality, unspecified whether sciatica present  Relapsing and remitting problem. Working with PMR. Refilled  - methocarbamol (ROBAXIN) 500 MG tablet; Take 1 tablet (500 mg) by mouth 4 times daily as needed for muscle spasms    Weight gain  Discussed weight loss methods and medications in depth. At the activity level that patient is reporting, she should be losing weight with calorie intake of 1730-3508. She would not be a candidate for phentermine with adderall and HTN dx. Topiramate is an option, but she is not excited about the side effect profile. Another option would be bupropion-naltrexone, but again there is a concern, albeit, less, for HTN management. Patient would like to try for insurance coverage of semaglutide. She knows that this frequently is not covered well. She does not have a personal or family history of medullary thyroid cancer and no history of pancreatitis. We discussed common and rare side effects. Wegovy was prescribed and denied. She found that her insurance prefers Ozempic and so this was written (see MyChart encounters). Remains unclear if she has been able to obtain.      Prescription drug management         BMI:   Estimated body mass index is 27.21 kg/m  as calculated from the following:    Height as of this encounter: 1.703 m (5' 7.05\").    Weight as of this encounter: 78.9 kg (174 lb).   Weight management plan: Specific weight management program called medications " discussed    Patient Instructions   You can take methocarbamol 750 mg (1.5 tablets) - if you take this dose, only take three times a day          WINSTON Richards CNP [facility]    Jhon Aviles is a 42 year old, presenting for the following health issues:  Hypertension        4/25/2023     4:57 PM   Additional Questions   Roomed by Samina Valladares     History of Present Illness       Hypertension: She presents for follow up of hypertension.  She does check blood pressure  regularly outside of the clinic. Outpatient blood pressures have not been over 140/90. She follows a low salt diet.     Reason for visit:  Overall Health - Hormones, Bloodwork, Overweight strategies/analyses    She eats 4 or more servings of fruits and vegetables daily.She consumes 0 sweetened beverage(s) daily.She exercises with enough effort to increase her heart rate 60 or more minutes per day.  She exercises with enough effort to increase her heart rate 7 days per week. She is missing 1 dose(s) of medications per week.  She is not taking prescribed medications regularly due to other.     Nutrition - eating vegetable-focused meals. IF with 12:12 split plus 36 hours every other month. Counting calories 1788-4582 calories per day. Generally active both for transportation and exercise. Activity with heart rate elevated 6/7 days per week plus pilates.     Sleep - going to bed at 8 pm and getting up at 4 am to exercise and work.     Highest adult weight - mid 180 lbs  Lowest adult weight - 117 lbs  Goal 135-140 lb  Wt Readings from Last 5 Encounters:   04/25/23 78.9 kg (174 lb)   04/14/23 79.4 kg (175 lb)   09/08/22 79.8 kg (175 lb 14.4 oz)   04/14/22 73.9 kg (163 lb)   01/24/22 73.9 kg (163 lb)     Lab Results   Component Value Date    A1C 5.1 10/07/2022         Review of Systems   Constitutional, HEENT, cardiovascular, pulmonary, gi and gu systems are negative, except as otherwise noted.      Hypertension: BP at  "home have been 120's/70's. Has been taking her Olmesartan-HTZ regularly, missed two doses. Denies, headaches,chest pain, blurred vision or dizziness.       Objective    /75 (BP Location: Left arm, Patient Position: Sitting, Cuff Size: Adult Regular)   Pulse 90   Temp 97.5  F (36.4  C) (Temporal)   Resp 14   Ht 1.703 m (5' 7.05\")   Wt 78.9 kg (174 lb)   LMP 03/31/2023 (Exact Date)   SpO2 99%   Breastfeeding No   BMI 27.21 kg/m    Body mass index is 27.21 kg/m .  Physical Exam   GENERAL: healthy, alert and no distress  RESP: lungs clear to auscultation - no rales, rhonchi or wheezes  CV: regular rate and rhythm, normal S1 S2, no S3 or S4, no murmur, click or rub, no peripheral edema and peripheral pulses strong    No results found for any visits on 04/25/23.                "

## 2023-04-26 ENCOUNTER — MYC MEDICAL ADVICE (OUTPATIENT)
Dept: FAMILY MEDICINE | Facility: CLINIC | Age: 43
End: 2023-04-26
Payer: COMMERCIAL

## 2023-04-26 DIAGNOSIS — R63.5 WEIGHT GAIN: Primary | ICD-10-CM

## 2023-04-26 DIAGNOSIS — I10 ESSENTIAL HYPERTENSION: ICD-10-CM

## 2023-05-01 ENCOUNTER — TELEPHONE (OUTPATIENT)
Dept: FAMILY MEDICINE | Facility: CLINIC | Age: 43
End: 2023-05-01
Payer: COMMERCIAL

## 2023-05-01 NOTE — TELEPHONE ENCOUNTER
Wegovy changed to Ozempic per patient information from the insurance company.  MARIBETH Hernandez

## 2023-05-03 ENCOUNTER — TELEPHONE (OUTPATIENT)
Dept: FAMILY MEDICINE | Facility: CLINIC | Age: 43
End: 2023-05-03
Payer: COMMERCIAL

## 2023-05-03 DIAGNOSIS — I10 ESSENTIAL HYPERTENSION: ICD-10-CM

## 2023-05-03 DIAGNOSIS — R63.5 WEIGHT GAIN: ICD-10-CM

## 2023-05-11 ENCOUNTER — MYC REFILL (OUTPATIENT)
Dept: FAMILY MEDICINE | Facility: CLINIC | Age: 43
End: 2023-05-11
Payer: COMMERCIAL

## 2023-05-11 DIAGNOSIS — F90.2 ATTENTION DEFICIT HYPERACTIVITY DISORDER (ADHD), COMBINED TYPE: ICD-10-CM

## 2023-05-11 NOTE — TELEPHONE ENCOUNTER
Requested Prescriptions   Pending Prescriptions Disp Refills     Amphet-Dextroamphet 3-Bead ER 37.5 MG CP24 30 capsule 0     Sig: Take 37.5 mg by mouth daily       There is no refill protocol information for this order        Routing refill request to provider for review/approval because:  Drug not on the [facility] refill protocol     Beatriz Barrera RN  [facility]

## 2023-05-12 RX ORDER — DEXTROAMPHETAMINE SACCHARATE, AMPHETAMINE ASPARTATE MONOHYDRATE, DEXTROAMPHETAMINE SULFATE, AMPHETAMINE SULFATE 9.375; 9.375; 9.375; 9.375 MG/1; MG/1; MG/1; MG/1
37.5 CAPSULE, EXTENDED RELEASE ORAL DAILY
Qty: 30 CAPSULE | Refills: 0 | Status: SHIPPED | OUTPATIENT
Start: 2023-05-12 | End: 2023-06-06

## 2023-05-16 DIAGNOSIS — S92.102D CLOSED NONDISPLACED FRACTURE OF LEFT TALUS WITH ROUTINE HEALING, UNSPECIFIED PORTION OF TALUS, SUBSEQUENT ENCOUNTER: Primary | ICD-10-CM

## 2023-05-19 ENCOUNTER — MYC MEDICAL ADVICE (OUTPATIENT)
Dept: FAMILY MEDICINE | Facility: CLINIC | Age: 43
End: 2023-05-19
Payer: COMMERCIAL

## 2023-05-19 NOTE — TELEPHONE ENCOUNTER
PA Initiation    Medication: OZEMPIC (0.25 OR 0.5 MG/DOSE) 2 MG/3ML SC SOPN  Insurance Company: [employer] - Phone [phone] Fax [phone]  Pharmacy Filling the Rx: [id number] [place], [place] - [facility] AT [facility] & [address] [place] [address]  Filling Pharmacy Phone: [phone]  Filling Pharmacy Fax: [phone]  Start Date: 5/19/2023

## 2023-05-19 NOTE — TELEPHONE ENCOUNTER
Pt sent a my chart message requesting the status of her prior auth. Thanks    Beatriz Barrera RN  [facility]

## 2023-05-22 NOTE — TELEPHONE ENCOUNTER
PRIOR AUTHORIZATION DENIED    Medication: OZEMPIC (0.25 OR 0.5 MG/DOSE) [facility]  Insurance Company: [employer] - Phone [phone] Fax [phone]  Denial Date: 5/22/2023  Denial Rationale: Medication is only covered for diagnosis of type 2 diabetes.  Appeal Information: If provider would like to appeal this decision please attach a detailed letter of medical necessity to the patient's chart and route the encounter back to the PA Team.   Patient Notified: No

## 2023-05-23 ENCOUNTER — ANCILLARY PROCEDURE (OUTPATIENT)
Dept: GENERAL RADIOLOGY | Facility: CLINIC | Age: 43
End: 2023-05-23
Attending: FAMILY MEDICINE
Payer: COMMERCIAL

## 2023-05-23 ENCOUNTER — OFFICE VISIT (OUTPATIENT)
Dept: ORTHOPEDICS | Facility: CLINIC | Age: 43
End: 2023-05-23
Payer: COMMERCIAL

## 2023-05-23 DIAGNOSIS — S92.102D CLOSED NONDISPLACED FRACTURE OF LEFT TALUS WITH ROUTINE HEALING, UNSPECIFIED PORTION OF TALUS, SUBSEQUENT ENCOUNTER: ICD-10-CM

## 2023-05-23 DIAGNOSIS — S92.902D CLOSED FRACTURE OF LEFT FOOT WITH ROUTINE HEALING, SUBSEQUENT ENCOUNTER: Primary | ICD-10-CM

## 2023-05-23 PROCEDURE — 73610 X-RAY EXAM OF ANKLE: CPT | Mod: LT | Performed by: RADIOLOGY

## 2023-05-23 PROCEDURE — 99213 OFFICE O/P EST LOW 20 MIN: CPT | Performed by: FAMILY MEDICINE
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
## 2023-05-25 ASSESSMENT — ACTIVITIES OF DAILY LIVING (ADL)
GOING_UP_OR_DOWN_10_STAIRS: QUITE A BIT OF DIFFICULTY
PUTTING_ON_YOUR_SHOES_OR_SOCKS: A LITTLE BIT OF DIFFICULTY
RUNNING_ON_UNEVEN_GROUND: EXTREME DIFFICULTY OR UNABLE TO PERFORM ACTIVITY
WALKING_BETWEEN_ROOMS: A LITTLE BIT OF DIFFICULTY
ANY_OF_YOUR_USUAL_WORK,_HOUSEWORK_OR_SCHOOL_ACTIVITIES: MODERATE DIFFICULTY
GETTING_INTO_AND_OUT_OF_A_BATH: A LITTLE BIT OF DIFFICULTY
MAKING_SHARP_TURNS_WHILE_RUNNING_FAST: EXTREME DIFFICULTY OR UNABLE TO PERFORM ACTIVITY
LEFS_SCORE(%): 0
WALKING_A_MILE: EXTREME DIFFICULTY OR UNABLE TO PERFORM ACTIVITY
SQUATTING: EXTREME DIFFICULTY OR UNABLE TO PERFORM ACTIVITY
LEFS_RAW_SCORE: 0
SITTING_FOR_1_HOUR: A LITTLE BIT OF DIFFICULTY
LIFTING_AN_OBJECT,_LIKE_A_BAG_OF_GROCERIES_FROM_THE_FLOOR: MODERATE DIFFICULTY
YOUR_USUAL_HOBBIES,_RECREATIONAL_OR_SPORTING_ACTIVITIES: QUITE A BIT OF DIFFICULTY
WALKING_2_BLOCKS: QUITE A BIT OF DIFFICULTY
ROLLING_OVER_IN_BED: A LITTLE BIT OF DIFFICULTY
PLEASE_INDICATE_YOR_PRIMARY_REASON_FOR_REFERRAL_TO_THERAPY:: FOOT AND/OR ANKLE
SHOPPING: EXTREME DIFFICULTY OR UNABLE TO PERFORM ACTIVITY
GETTING_INTO_OR_OUT_OF_A_CAR: A LITTLE BIT OF DIFFICULTY
RUNNING_ON_EVEN_GROUND: EXTREME DIFFICULTY OR UNABLE TO PERFORM ACTIVITY
PERFORMING_HEAVY_ACTIVITIES_AROUND_YOUR_HOME: MODERATE DIFFICULTY
PERFORMING_LIGHT_ACTIVITIES_AROUND_YOUR_HOME: A LITTLE BIT OF DIFFICULTY
STANDING_FOR_1_HOUR: EXTREME DIFFICULTY OR UNABLE TO PERFORM ACTIVITY

## 2023-05-26 ENCOUNTER — THERAPY VISIT (OUTPATIENT)
Dept: PHYSICAL THERAPY | Facility: CLINIC | Age: 43
End: 2023-05-26
Attending: FAMILY MEDICINE
Payer: COMMERCIAL

## 2023-05-26 DIAGNOSIS — S92.902D CLOSED FRACTURE OF LEFT FOOT WITH ROUTINE HEALING, SUBSEQUENT ENCOUNTER: ICD-10-CM

## 2023-05-26 PROCEDURE — 97110 THERAPEUTIC EXERCISES: CPT | Mod: GP | Performed by: PHYSICAL THERAPIST

## 2023-05-26 PROCEDURE — 97161 PT EVAL LOW COMPLEX 20 MIN: CPT | Mod: GP | Performed by: PHYSICAL THERAPIST
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
## 2023-06-06 ENCOUNTER — OFFICE VISIT (OUTPATIENT)
Dept: FAMILY MEDICINE | Facility: CLINIC | Age: 43
End: 2023-06-06
Payer: COMMERCIAL

## 2023-06-06 VITALS
SYSTOLIC BLOOD PRESSURE: 114 MMHG | WEIGHT: 166 LBS | BODY MASS INDEX: 26.68 KG/M2 | RESPIRATION RATE: 8 BRPM | OXYGEN SATURATION: 100 % | HEIGHT: 66 IN | TEMPERATURE: 98.4 F | HEART RATE: 85 BPM | DIASTOLIC BLOOD PRESSURE: 77 MMHG

## 2023-06-06 DIAGNOSIS — R63.5 WEIGHT GAIN: Primary | ICD-10-CM

## 2023-06-06 DIAGNOSIS — F90.2 ATTENTION DEFICIT HYPERACTIVITY DISORDER (ADHD), COMBINED TYPE: ICD-10-CM

## 2023-06-06 DIAGNOSIS — Z23 NEED FOR HEPATITIS B VACCINATION: ICD-10-CM

## 2023-06-06 PROCEDURE — 99214 OFFICE O/P EST MOD 30 MIN: CPT | Mod: 25 | Performed by: NURSE PRACTITIONER

## 2023-06-06 PROCEDURE — 90471 IMMUNIZATION ADMIN: CPT | Performed by: NURSE PRACTITIONER

## 2023-06-06 PROCEDURE — 90746 HEPB VACCINE 3 DOSE ADULT IM: CPT | Performed by: NURSE PRACTITIONER

## 2023-06-06 RX ORDER — TOPIRAMATE 50 MG/1
50 TABLET, FILM COATED ORAL 2 TIMES DAILY
Qty: 180 TABLET | Refills: 1 | Status: SHIPPED | OUTPATIENT
Start: 2023-06-06 | End: 2023-09-07

## 2023-06-06 ASSESSMENT — PAIN SCALES - GENERAL: PAINLEVEL: NO PAIN (1)

## 2023-06-06 NOTE — PROGRESS NOTES
Assessment & Plan     Weight gain  Discussed medication options and current situation with GLP medications  Patient ok with trial of topiramate  - topiramate (TOPAMAX) 50 MG tablet; Take 1 tablet (50 mg) by mouth 2 times daily    Attention deficit hyperactivity disorder (ADHD), combined type  This formulation is working well  - Amphet-Dextroamphet 3-Bead ER 37.5 MG CP24; Take 37.5 mg by mouth daily    Need for hepatitis B vaccination    - HEPATITIS B VACCINE, ADULT, IM    Prescription drug management  34 minutes spent by me on the date of the encounter doing chart review, history and exam, documentation and further activities per the note       Patient Instructions   Start topiramate 25 mg (1/2 tablet) at night for one week  Increase topiramate to 50 mg nightly for one more week  Can increase to 25 mg in the AM and 50 mg at the PM if you wish at week 3 or you can just hang out with the evening dose for awhile. Could also increase to 50 mg twice a day if you want.    You cannot get pregnant on this medication so the vasectomy needs to be scheduled!  No sexy times until it is done.      [facility]    Jhon Aviles is a 42 year old, presenting for the following health issues:  Recheck Medication        6/6/2023     2:36 PM   Additional Questions   Roomed by Zoya Reynoso   Accompanied by N/A     Forms 6/6/2023   Any forms needing to be completed Yes     History of Present Illness       Reason for visit:  Follow-Up appointment, scheduled several months ago.  Discuss overall health and approaches to being overweight.    She eats 4 or more servings of fruits and vegetables daily.She consumes 0 sweetened beverage(s) daily.She exercises with enough effort to increase her heart rate 60 or more minutes per day.  She exercises with enough effort to increase her heart rate 7 days per week. She is missing 1 dose(s) of medications per week.  She is not taking prescribed  "medications regularly due to other.     Insurance hasn't covered Ozempic. Covered Wegovy, but copay was $1400. Has been able to increase exercise, but still not losing weight.      BP Readings from Last 6 Encounters:   06/06/23 114/77   04/25/23 120/75   04/14/23 124/71   09/08/22 (!) 150/100   03/31/22 (!) 139/99   01/24/22 (!) 154/101     ADHD - The Adderall 37.5 mg 3-Bead is working well and feels balanced. No problems with sleep, mood, constipation, or dry mouth.    Review of Systems     ROS:5 point ROS including CONST, HEENT, Respiratory, CV, and GI other than that noted in the HPI,  is negative         Objective    /77 (BP Location: Left arm, Patient Position: Sitting, Cuff Size: Adult Regular)   Pulse 85   Temp 98.4  F (36.9  C) (Temporal)   Resp (!) 8   Ht 1.685 m (5' 6.34\")   Wt 75.3 kg (166 lb)   LMP 03/28/2023 (Exact Date)   SpO2 100%   BMI 26.52 kg/m    Body mass index is 26.52 kg/m .  Physical Exam   GENERAL: healthy, alert and no distress  PSYCH: mentation appears normal, affect normal/bright        "

## 2023-06-06 NOTE — PATIENT INSTRUCTIONS
Start topiramate 25 mg (1/2 tablet) at night for one week  Increase topiramate to 50 mg nightly for one more week  Can increase to 25 mg in the AM and 50 mg at the PM if you wish at week 3 or you can just hang out with the evening dose for awhile. Could also increase to 50 mg twice a day if you want.    You cannot get pregnant on this medication so the vasectomy needs to be scheduled!  No sexy times until it is done.

## 2023-06-07 ENCOUNTER — THERAPY VISIT (OUTPATIENT)
Dept: PHYSICAL THERAPY | Facility: CLINIC | Age: 43
End: 2023-06-07
Payer: COMMERCIAL

## 2023-06-07 ENCOUNTER — MYC MEDICAL ADVICE (OUTPATIENT)
Dept: FAMILY MEDICINE | Facility: CLINIC | Age: 43
End: 2023-06-07

## 2023-06-07 DIAGNOSIS — S92.902D CLOSED FRACTURE OF LEFT FOOT WITH ROUTINE HEALING, SUBSEQUENT ENCOUNTER: Primary | ICD-10-CM

## 2023-06-07 PROCEDURE — 97530 THERAPEUTIC ACTIVITIES: CPT | Mod: GP | Performed by: PHYSICAL THERAPIST

## 2023-06-07 PROCEDURE — 97140 MANUAL THERAPY 1/> REGIONS: CPT | Mod: GP | Performed by: PHYSICAL THERAPIST

## 2023-06-07 NOTE — PROGRESS NOTES
Therapist Impression:   Good improvement over past 2 weeks with her DF ROM. 0.5 cm improvement with STM today (not much), may need to do more joint mobs in additional to STM.  Expect to get ROM back slowly over next 1 month    GOALS: Running, exercises    NEXT: STM and joint mobs    PTRX: not asked    Subjective:  Able to do peloton, pilates, UE strengthening.  Still feels tight.    Objective:  Ankle Range of Motion  Ankle AROM Dorsiflexion Plantarflexion Inversion Eversion WBing DF (cm)   Left 0 30 20 10 -3/ -2.5 post   Right 20 60 45 15 10   **WBing DF- distance between wall and great toe where pt can touch knee to wall and keep heel in contact with floor

## 2023-06-12 ENCOUNTER — MYC REFILL (OUTPATIENT)
Dept: FAMILY MEDICINE | Facility: CLINIC | Age: 43
End: 2023-06-12
Payer: COMMERCIAL

## 2023-06-12 DIAGNOSIS — F90.2 ATTENTION DEFICIT HYPERACTIVITY DISORDER (ADHD), COMBINED TYPE: ICD-10-CM

## 2023-06-12 NOTE — TELEPHONE ENCOUNTER
Requested Prescriptions   Pending Prescriptions Disp Refills     Amphet-Dextroamphet 3-Bead ER 37.5 MG CP24 30 capsule 0     Sig: Take 37.5 mg by mouth daily       There is no refill protocol information for this order        Routing refill request to provider for review/approval because:  Drug not on the [facility] refill protocol     Beatriz Barrera RN  [facility]

## 2023-06-13 RX ORDER — DEXTROAMPHETAMINE SACCHARATE, AMPHETAMINE ASPARTATE MONOHYDRATE, DEXTROAMPHETAMINE SULFATE, AMPHETAMINE SULFATE 9.375; 9.375; 9.375; 9.375 MG/1; MG/1; MG/1; MG/1
37.5 CAPSULE, EXTENDED RELEASE ORAL DAILY
Qty: 30 CAPSULE | Refills: 0 | OUTPATIENT
Start: 2023-06-13

## 2023-06-15 RX ORDER — DEXTROAMPHETAMINE SACCHARATE, AMPHETAMINE ASPARTATE MONOHYDRATE, DEXTROAMPHETAMINE SULFATE, AMPHETAMINE SULFATE 9.375; 9.375; 9.375; 9.375 MG/1; MG/1; MG/1; MG/1
37.5 CAPSULE, EXTENDED RELEASE ORAL DAILY
Qty: 90 CAPSULE | Refills: 0 | Status: SHIPPED | OUTPATIENT
Start: 2023-06-15 | End: 2023-07-12

## 2023-06-15 NOTE — PLAN OF CARE
Irrit per toco, pt reports not feeling contractions. Roxicet given approx 60 min prior to monitoring. Pt has not yet eaten lunch and just ordered, well hydrated today. FHR AGA. Dr. Marx updated and plan to monitor after eating.   Snap[employer] message sent to notify pt

## 2023-06-21 ENCOUNTER — THERAPY VISIT (OUTPATIENT)
Dept: PHYSICAL THERAPY | Facility: CLINIC | Age: 43
End: 2023-06-21
Payer: COMMERCIAL

## 2023-06-21 DIAGNOSIS — S92.902D CLOSED FRACTURE OF LEFT FOOT WITH ROUTINE HEALING, SUBSEQUENT ENCOUNTER: Primary | ICD-10-CM

## 2023-06-21 PROCEDURE — 97140 MANUAL THERAPY 1/> REGIONS: CPT | Mod: 59 | Performed by: PHYSICAL THERAPIST

## 2023-06-21 PROCEDURE — 97530 THERAPEUTIC ACTIVITIES: CPT | Mod: GP | Performed by: PHYSICAL THERAPIST

## 2023-06-21 ASSESSMENT — ACTIVITIES OF DAILY LIVING (ADL)
TOTAL_ADL_ITEM_SCORE:: 60
ACTIVITIES_OF_DAILY_LIVING_MEASURE_SCORE(%):: 71.43
HIGHEST_POTENTIAL_ADL_SCORE:: 84

## 2023-06-21 NOTE — PROGRESS NOTES
Therapist Impression:   Stiff due to not able to do her exercises over past week.  Improved to 1.5 cm post MT with emphasis on soft tissue.  No significant change after NWBing joint mobs      GOALS: Running, exercises    NEXT: STM and joint mobs    PTRX: not asked    Subjective:  Able to do peloton, pilates, UE strengthening.  Still feels tight.    Objective:  Ankle Range of Motion  Ankle AROM Dorsiflexion Plantarflexion Inversion Eversion WBing DF (cm)   Left 0 30 20 10 -4/ -1.5post   Right 20 60 45 15 10   **WBing DF- distance between wall and great toe where pt can touch knee to wall and keep heel in contact with floor

## 2023-07-06 ENCOUNTER — TELEPHONE (OUTPATIENT)
Dept: FAMILY MEDICINE | Facility: CLINIC | Age: 43
End: 2023-07-06
Payer: COMMERCIAL

## 2023-07-12 ENCOUNTER — MYC REFILL (OUTPATIENT)
Dept: FAMILY MEDICINE | Facility: CLINIC | Age: 43
End: 2023-07-12
Payer: COMMERCIAL

## 2023-07-12 DIAGNOSIS — F90.2 ATTENTION DEFICIT HYPERACTIVITY DISORDER (ADHD), COMBINED TYPE: ICD-10-CM

## 2023-07-13 NOTE — TELEPHONE ENCOUNTER
Requested Prescriptions   Pending Prescriptions Disp Refills     Amphet-Dextroamphet 3-Bead ER 37.5 MG CP24 90 capsule 0     Sig: Take 37.5 mg by mouth daily       There is no refill protocol information for this order        Per pt mychart needs to be 30 day supply instead of 90 due to insurance.    Thanks!  David Kaufman RN   [facility]

## 2023-07-14 RX ORDER — DEXTROAMPHETAMINE SACCHARATE, AMPHETAMINE ASPARTATE MONOHYDRATE, DEXTROAMPHETAMINE SULFATE, AMPHETAMINE SULFATE 9.375; 9.375; 9.375; 9.375 MG/1; MG/1; MG/1; MG/1
37.5 CAPSULE, EXTENDED RELEASE ORAL DAILY
Qty: 30 CAPSULE | Refills: 0 | Status: SHIPPED | OUTPATIENT
Start: 2023-07-14 | End: 2023-08-04

## 2023-07-28 NOTE — TELEPHONE ENCOUNTER
Yee,  Patient is requesting you take over prescription for NIFEdipine ER OSMOTIC (ADALAT CC) 60 MG 24 hr tablet.    Patient originally prescribed medication for: Pre-eclampsia in third trimester [O14.93] by Mandy Saldana MD while     Would you like office visit for HTN follow up?    Medication and pharmacy cued    Please review/sign or advise    Thank You!  Halie Gaitan RN  Triage Nurse       Kl[facility]ri Pregnancy And Lactation Text: It is unknown if this medication can harm a developing fetus or if it is excreted in breast milk.

## 2023-08-04 ENCOUNTER — MYC REFILL (OUTPATIENT)
Dept: FAMILY MEDICINE | Facility: CLINIC | Age: 43
End: 2023-08-04
Payer: COMMERCIAL

## 2023-08-04 DIAGNOSIS — F90.2 ATTENTION DEFICIT HYPERACTIVITY DISORDER (ADHD), COMBINED TYPE: ICD-10-CM

## 2023-08-04 DIAGNOSIS — R63.5 WEIGHT GAIN: ICD-10-CM

## 2023-08-04 DIAGNOSIS — I10 ESSENTIAL HYPERTENSION: ICD-10-CM

## 2023-08-04 RX ORDER — DEXTROAMPHETAMINE SACCHARATE, AMPHETAMINE ASPARTATE MONOHYDRATE, DEXTROAMPHETAMINE SULFATE, AMPHETAMINE SULFATE 9.375; 9.375; 9.375; 9.375 MG/1; MG/1; MG/1; MG/1
37.5 CAPSULE, EXTENDED RELEASE ORAL DAILY
Qty: 90 CAPSULE | Refills: 0 | Status: SHIPPED | OUTPATIENT
Start: 2023-08-11 | End: 2023-09-01

## 2023-08-04 NOTE — TELEPHONE ENCOUNTER
Requested Prescriptions   Pending Prescriptions Disp Refills    Amphet-Dextroamphet 3-Bead ER 37.5 MG CP24 30 capsule 0     Sig: Take 37.5 mg by mouth daily       There is no refill protocol information for this order        Routing refill request to provider for review/approval because:  Drug not on the [facility] refill protocol   Thanks,   Nickolas Miller RN  [facility]

## 2023-08-04 NOTE — TELEPHONE ENCOUNTER
"Requested Prescriptions   Pending Prescriptions Disp Refills    semaglutide (OZEMPIC) 2 MG/3ML pen 9 mL 0     Sig: Inject 0.25 mg Subcutaneous every 7 days for 30 days, THEN 0.5 mg every 7 days for 60 days.       GLP-1 Agonists Protocol Failed - 8/4/2023  8:11 AM        Failed - HgbA1C in past 3 or 6 months     If HgbA1C is 8 or greater, it needs to be on file within the past 3 months.  If less than 8, must be on file within the past 6 months.     Recent Labs   Lab Test 10/07/22  1023   A1C 5.1             Passed - Medication is active on med list        Passed - Patient is age 18 or older        Passed - No active pregnancy on record        Passed - Normal serum creatinine on file in past 12 months     Recent Labs   Lab Test 10/07/22  1023   CR 0.58       Ok to refill medication if creatinine is low          Passed - No positive pregnancy test in past 12 months        Passed - Recent (6 mo) or future (30 days) visit within the authorizing provider's specialty     Patient had office visit in the last 6 months or has a visit in the next 30 days with authorizing provider.  See \"Patient Info\" tab in inbasket, or \"Choose Columns\" in Meds & Orders section of the refill encounter.             Routing refill request to provider for review/approval because:  Labs not current:  10/7/22    Nickolas Miller RN  [facility]           "

## 2023-08-18 ENCOUNTER — MYC REFILL (OUTPATIENT)
Dept: FAMILY MEDICINE | Facility: CLINIC | Age: 43
End: 2023-08-18
Payer: COMMERCIAL

## 2023-08-18 DIAGNOSIS — B36.0 TINEA VERSICOLOR: ICD-10-CM

## 2023-08-18 RX ORDER — KETOCONAZOLE 200 MG/1
200 TABLET ORAL DAILY
Qty: 10 TABLET | Refills: 0 | Status: SHIPPED | OUTPATIENT
Start: 2023-08-18 | End: 2023-09-07

## 2023-08-18 NOTE — TELEPHONE ENCOUNTER
"Requested Prescriptions   Pending Prescriptions Disp Refills    ketoconazole (NIZORAL) 200 MG tablet 10 tablet 0     Sig: Take 1 tablet (200 mg) by mouth daily       There is no refill protocol information for this order        Per pt note \"Skin flare up - came on very quick and is pretty intense.  Primarily on upper back but also chest, arms, legs and neck.  My back is EXTREMELY itchy but also sensitive to touch.  I have the shampoo still.  I have been using it but it is still getting worse.\"      Thanks!  David Kaufman RN   [facility]    "

## 2023-08-22 ENCOUNTER — TELEPHONE (OUTPATIENT)
Dept: FAMILY MEDICINE | Facility: CLINIC | Age: 43
End: 2023-08-22
Payer: COMMERCIAL

## 2023-08-22 NOTE — TELEPHONE ENCOUNTER
Prior Authorization Request    Medication:     Semaglutide, 1 MG/DOSE, (OZEMPIC) 4 MG/3ML pen       Pharmacy:?    Covermymeds?  Y

## 2023-08-23 NOTE — TELEPHONE ENCOUNTER
PA Initiation    Medication: OZEMPIC (1 MG/DOSE) 4 MG/3ML SC SOPN  Insurance Company: [employer] Non-Specialty PA's - Phone [phone] Fax [phone]  Pharmacy Filling the Rx: [id number] [place] [address] [facility] [address]  Filling Pharmacy Phone: [phone]  Filling Pharmacy Fax:    Start Date: 8/23/2023  Central Prior Authorization Team   Phone: [phone]

## 2023-08-31 NOTE — TELEPHONE ENCOUNTER
PRIOR AUTHORIZATION DENIED    Medication: OZEMPIC (1 MG/DOSE) 4 MG/3ML SC SOPN  Insurance Company: [employer] Non-Specialty PA's - Phone [phone] Fax [phone]  Denial Date: 8/30/2023  Denial Rational:     Appeal Information:     Patient Notified: Yes

## 2023-09-01 ENCOUNTER — MYC REFILL (OUTPATIENT)
Dept: FAMILY MEDICINE | Facility: CLINIC | Age: 43
End: 2023-09-01
Payer: COMMERCIAL

## 2023-09-01 DIAGNOSIS — I10 ESSENTIAL HYPERTENSION: ICD-10-CM

## 2023-09-01 DIAGNOSIS — F90.2 ATTENTION DEFICIT HYPERACTIVITY DISORDER (ADHD), COMBINED TYPE: ICD-10-CM

## 2023-09-01 RX ORDER — OLMESARTAN MEDOXOMIL AND HYDROCHLOROTHIAZIDE 20/12.5 20; 12.5 MG/1; MG/1
1 TABLET ORAL DAILY
Qty: 90 TABLET | Refills: 1 | Status: SHIPPED | OUTPATIENT
Start: 2023-09-01 | End: 2024-03-05

## 2023-09-01 NOTE — TELEPHONE ENCOUNTER
Pending Prescriptions:                       Disp   Refills    olmesartan-hydrochlorothiazide (BENICAR H*90 tab*1            Sig: Take 1 tablet by mouth daily

## 2023-09-01 NOTE — TELEPHONE ENCOUNTER
"Requested Prescriptions   Pending Prescriptions Disp Refills    olmesartan-hydrochlorothiazide (BENICAR HCT) 20-12.5 MG tablet 90 tablet 1     Sig: Take 1 tablet by mouth daily       Angiotensin-II Receptors Passed - 9/1/2023  1:00 PM        Passed - Last blood pressure under 140/90 in past 12 months     BP Readings from Last 3 Encounters:   06/06/23 114/77   04/25/23 120/75   04/14/23 124/71                 Passed - Recent (12 mo) or future (30 days) visit within the authorizing provider's specialty     Patient has had an office visit with the authorizing provider or a provider within the authorizing providers department within the previous 12 mos or has a future within next 30 days. See \"Patient Info\" tab in inbasket, or \"Choose Columns\" in Meds & Orders section of the refill encounter.              Passed - Medication is active on med list        Passed - Patient is age 18 or older        Passed - No active pregnancy on record        Passed - Normal serum creatinine on file in past 12 months     Recent Labs   Lab Test 10/07/22  1023   CR 0.58       Ok to refill medication if creatinine is low          Passed - Normal serum potassium on file in past 12 months     Recent Labs   Lab Test 10/07/22  1023   POTASSIUM 3.8                    Passed - No positive pregnancy test in past 12 months         Last appointment on 6/6/23 for med check. Has upcoming OV on 9/14/23.    Prescription approved per [facility] Refill Protocol.    David Kaufman RN   [facility]      "

## 2023-09-01 NOTE — TELEPHONE ENCOUNTER
Requested Prescriptions   Pending Prescriptions Disp Refills    Amphet-Dextroamphet 3-Bead ER 37.5 MG CP24 90 capsule 0     Sig: Take 37.5 mg by mouth daily       There is no refill protocol information for this order         Routing refill request to provider for review/approval because:  Drug not on the [facility] refill protocol     Beatriz Barrera RN  [facility]

## 2023-09-05 RX ORDER — DEXTROAMPHETAMINE SACCHARATE, AMPHETAMINE ASPARTATE MONOHYDRATE, DEXTROAMPHETAMINE SULFATE, AMPHETAMINE SULFATE 9.375; 9.375; 9.375; 9.375 MG/1; MG/1; MG/1; MG/1
37.5 CAPSULE, EXTENDED RELEASE ORAL DAILY
Qty: 30 CAPSULE | Refills: 0 | Status: SHIPPED | OUTPATIENT
Start: 2023-09-05 | End: 2023-09-07

## 2023-09-07 ENCOUNTER — VIRTUAL VISIT (OUTPATIENT)
Dept: FAMILY MEDICINE | Facility: CLINIC | Age: 43
End: 2023-09-07
Payer: COMMERCIAL

## 2023-09-07 DIAGNOSIS — B36.0 TINEA VERSICOLOR: ICD-10-CM

## 2023-09-07 DIAGNOSIS — Z13.220 LIPID SCREENING: ICD-10-CM

## 2023-09-07 DIAGNOSIS — I10 ESSENTIAL HYPERTENSION: ICD-10-CM

## 2023-09-07 DIAGNOSIS — R63.5 WEIGHT GAIN: ICD-10-CM

## 2023-09-07 DIAGNOSIS — F90.2 ATTENTION DEFICIT HYPERACTIVITY DISORDER (ADHD), COMBINED TYPE: Primary | ICD-10-CM

## 2023-09-07 PROCEDURE — 99215 OFFICE O/P EST HI 40 MIN: CPT | Mod: VID | Performed by: NURSE PRACTITIONER

## 2023-09-07 RX ORDER — DEXTROAMPHETAMINE SACCHARATE, AMPHETAMINE ASPARTATE MONOHYDRATE, DEXTROAMPHETAMINE SULFATE, AMPHETAMINE SULFATE 9.375; 9.375; 9.375; 9.375 MG/1; MG/1; MG/1; MG/1
37.5 CAPSULE, EXTENDED RELEASE ORAL DAILY
Qty: 90 CAPSULE | Refills: 0 | Status: SHIPPED | OUTPATIENT
Start: 2023-09-07 | End: 2023-11-15

## 2023-09-07 NOTE — PATIENT INSTRUCTIONS
Tinea versicolor    - try vinegar baths   - consider gentian violet - do test patch first   - can repeat the ketoconazole after two months    ADHD    - continue triple bead    Weight loss   - continue Ozepmic 1 mg for the time being   - we can go up to 2 mg in the future if plaeau    Labs when you see Dr. Haider

## 2023-09-07 NOTE — PROGRESS NOTES
Traci is a 43 year old who is being evaluated via a billable video visit.      How would you like to obtain your AVS? MyChart  If the video visit is dropped, the invitation should be resent by: Text to cell phone: [phone]  Will anyone else be joining your video visit? No      Assessment & Plan     Attention deficit hyperactivity disorder (ADHD), combined type  Working well. Can get [facility] day fill [facility]. Re-prescribed order from 9/5 to change from #30 to #90.  - Amphet-Dextroamphet 3-Bead ER 37.5 MG CP24; Take 37.5 mg by mouth daily    Essential hypertension  Well controlled  Continue olmesartan-hydrochlorothiazide  BP in clinic at OB-GYN -ask for multiple measurements if not at goal on first check  - Basic metabolic panel  (Ca, Cl, CO2, Creat, Gluc, K, Na, BUN); Future    Tinea versicolor  I'd like to avoid a second course of oral ketoconazole at this time - would consider if still has rash two months after course in July. No clear identifiable cause. It was summer and very hot. Also started semaglutide which impacts glucose metabolism, but it doesn't look like dermatophyte infections appear on the side effect profile, even in post-market observation. At this time, trial vinegar baths and/or gentian violet. We could also consider fungal culture if we can do this with a skin scraping.  - Hemoglobin A1c; Future    Weight gain  Has been able to access ozempic with coupon and losing weight on the 1 mg doing. Weight loss has been good and continues. Having minimal side effects. No access issues with this dose. We'll stick to 1 mg dose at this time.  - Hemoglobin A1c; Future  - Basic metabolic panel  (Ca, Cl, CO2, Creat, Gluc, K, Na, BUN); Future  - Semaglutide, 1 MG/DOSE, (OZEMPIC) 4 MG/3ML pen; Inject 1 mg Subcutaneous every 7 days    Lipid screening  - Lipid panel reflex to direct LDL Fasting; Future    Ordering of each unique test  Prescription drug management  I spent a total of 53 minutes on the day  of the visit.   Time spent by me doing chart review, history and exam, documentation and further activities per the note       Patient Instructions   Tinea versicolor    - try vinegar baths   - consider gentian violet - do test patch first   - can repeat the ketoconazole after two months    ADHD    - continue triple bead    Weight loss   - continue Ozepmic 1 mg for the time being   - we can go up to 2 mg in the future if plaeau    Labs when you see Dr. Vitaly Medrano, [facility]    Jhon Aviles is a 43 year old, presenting for the following health issues:  Recheck Medication and A.D.H.D        6/6/2023     2:36 PM   Additional Questions   Roomed by Zoya Reynoso   Accompanied by N/A       History of Present Illness       Reason for visit:  General check-in/Medications.  Tinea Versicolor, ADHD & Ozempic.    She eats 4 or more servings of fruits and vegetables daily.She consumes 0 sweetened beverage(s) daily.She exercises with enough effort to increase her heart rate 60 or more minutes per day.  She exercises with enough effort to increase her heart rate 7 days per week. She is missing 1 dose(s) of medications per week.  She is not taking prescribed medications regularly due to other.     ADHD - insurance will not cover #90 for [facility], but they will for [facility] and [facility]. The triple bead has continued to work well. Taking medication right away before 6 am and the effect is very consistent. Notices it wears off very gradually. Not impacting sleep, headaches, irregular or rapid heart rate, decreased appetite, constipation or mood.    Blood pressure - 110/60-70's at home. No notable side effects with the olmesartan-hydrochlorothiazide    Tinea versicolor - ongoing, chronic condition. Gets about every 10-12 months. Will do a round of oral ketoconazole 200 mg for 10 days and rash resolves. Had rash that started in July that was in same location on upper back and  "moving down torso. Uses the ketoconazole shampoo on and off, but more regularly/daily with this rash. This has been more itchy than typical and hasn't responded to the medication as typical. Has improved, but resolved. Had vaginal yeast infection right before on July 15th. No other signs of yeast or fungal infection elsewhere. No other infection signs - fever, chills. Finished the oral ketoconazole 8/29. Rash hasn't worsened since finishing.     Weight loss - started taking semaglutide in May. Appetite hasn't changed at all. One episode of nausea. No vomiting. No other side effects. Has continued to exercise as robustly as before. Took 0.25 mg for one month and 0.5 mg for two months and now at 1 mg. Has been able to get to a weight that she hasn't been able to with exercise and diet previously. Weight currently at 156 lb. Starting weight 168 lb. Exercise does include both cardio and strength training. Paying out of pocket - found coupon \"Single [employer]\" that reduces cost 50%.       Review of Systems   Constitutional, HEENT, cardiovascular, pulmonary, gi and gu systems are negative, except as otherwise noted.      Objective           Vitals:  No vitals were obtained today due to virtual visit.    Physical Exam   GENERAL: Healthy, alert and no distress  EYES: Eyes grossly normal to inspection.  No discharge or erythema, or obvious scleral/conjunctival abnormalities.  RESP: No audible wheeze, cough, or visible cyanosis.  No visible retractions or increased work of breathing.    SKIN: Visible skin clear. No significant rash, abnormal pigmentation or lesions.  NEURO: Cranial nerves grossly intact.  Mentation and speech appropriate for age.  PSYCH: Mentation appears normal, affect normal/bright, judgement and insight intact, normal speech and appearance well-groomed.          Video-Visit Details    Type of service:  Video Visit   Originating Location (pt. Location): Home    Distant Location (provider location):  " Off-site  Platform used for Video Visit: Tamela

## 2023-10-02 ENCOUNTER — MYC MEDICAL ADVICE (OUTPATIENT)
Dept: FAMILY MEDICINE | Facility: CLINIC | Age: 43
End: 2023-10-02
Payer: COMMERCIAL

## 2023-10-30 ENCOUNTER — MYC MEDICAL ADVICE (OUTPATIENT)
Dept: FAMILY MEDICINE | Facility: CLINIC | Age: 43
End: 2023-10-30
Payer: COMMERCIAL

## 2023-10-30 DIAGNOSIS — R63.5 WEIGHT GAIN: ICD-10-CM

## 2023-10-31 NOTE — TELEPHONE ENCOUNTER
Please see My chart message. Order and pharmacy have been pended. Thanks    Beatriz Barrera RN  [facility]

## 2023-11-15 ENCOUNTER — E-VISIT (OUTPATIENT)
Dept: URGENT CARE | Facility: CLINIC | Age: 43
End: 2023-11-15
Payer: COMMERCIAL

## 2023-11-15 ENCOUNTER — MYC REFILL (OUTPATIENT)
Dept: FAMILY MEDICINE | Facility: CLINIC | Age: 43
End: 2023-11-15

## 2023-11-15 ENCOUNTER — VIRTUAL VISIT (OUTPATIENT)
Dept: FAMILY MEDICINE | Facility: CLINIC | Age: 43
End: 2023-11-15
Payer: COMMERCIAL

## 2023-11-15 DIAGNOSIS — U07.1 INFECTION DUE TO 2019 NOVEL CORONAVIRUS: Primary | ICD-10-CM

## 2023-11-15 DIAGNOSIS — F90.2 ATTENTION DEFICIT HYPERACTIVITY DISORDER (ADHD), COMBINED TYPE: ICD-10-CM

## 2023-11-15 DIAGNOSIS — M54.50 ACUTE LOW BACK PAIN, UNSPECIFIED BACK PAIN LATERALITY, UNSPECIFIED WHETHER SCIATICA PRESENT: ICD-10-CM

## 2023-11-15 DIAGNOSIS — U07.1 SARS-COV-2 POSITIVE: Primary | ICD-10-CM

## 2023-11-15 DIAGNOSIS — U07.1 INFECTION DUE TO 2019 NOVEL CORONAVIRUS: ICD-10-CM

## 2023-11-15 PROCEDURE — 99215 OFFICE O/P EST HI 40 MIN: CPT | Mod: VID | Performed by: NURSE PRACTITIONER

## 2023-11-15 PROCEDURE — 99207 PR NON-BILLABLE SERV PER CHARTING: CPT | Performed by: PHYSICIAN ASSISTANT

## 2023-11-15 RX ORDER — METHOCARBAMOL 500 MG/1
500 TABLET, FILM COATED ORAL 4 TIMES DAILY PRN
Qty: 30 TABLET | Refills: 1 | Status: SHIPPED | OUTPATIENT
Start: 2023-11-15 | End: 2024-01-10

## 2023-11-15 RX ORDER — DEXTROAMPHETAMINE SACCHARATE, AMPHETAMINE ASPARTATE MONOHYDRATE, DEXTROAMPHETAMINE SULFATE, AMPHETAMINE SULFATE 12.5; 12.5; 12.5; 12.5 MG/1; MG/1; MG/1; MG/1
50 CAPSULE, EXTENDED RELEASE ORAL DAILY
Qty: 30 CAPSULE | Refills: 0 | OUTPATIENT
Start: 2023-11-15

## 2023-11-15 RX ORDER — DEXTROAMPHETAMINE SACCHARATE, AMPHETAMINE ASPARTATE MONOHYDRATE, DEXTROAMPHETAMINE SULFATE, AMPHETAMINE SULFATE 12.5; 12.5; 12.5; 12.5 MG/1; MG/1; MG/1; MG/1
50 CAPSULE, EXTENDED RELEASE ORAL DAILY
Qty: 30 CAPSULE | Refills: 0 | Status: SHIPPED | OUTPATIENT
Start: 2023-11-15 | End: 2023-12-14

## 2023-11-15 NOTE — PATIENT INSTRUCTIONS
Hello,   Unfortunately we are not able to treat you with antivirals for COVID through an E-visit. You will need to schedule a virtual phone or video visit with a provider to review Paxlovid or other antiviral therapy. You can get scheduled for a this visit via [employer] or our scheduling line [phone] ([phone]).    Thank you,   Hany Stephen PA-C

## 2023-11-15 NOTE — PATIENT INSTRUCTIONS
Boogie Wipes saline inhaler    Paxlovid ordered    New dose Mydais ordered (50 mg)    Ideally, get second booster in three months

## 2023-11-15 NOTE — PROGRESS NOTES
"Traci is a 43 year old who is being evaluated via a billable video visit.      How would you like to obtain your AVS? MyChart  If the video visit is dropped, the invitation should be resent by: Text to cell phone: [phone]  Will anyone else be joining your video visit? No          Assessment & Plan     Infection due to 2019 novel coronavirus  Discussed all pros and cons and side effects. HTN has inconclusive data as a risk factor. Patient is former smoker and has been overweight. Renal function normal. She would like to take paxlovid. No medication interactions.  - nirmatrelvir and ritonavir (PAXLOVID) 300 mg/100 mg therapy pack; Take 3 tablets by mouth 2 times daily for 5 days (Take 2 Nirmatrelvir tablets and 1 Ritonavir tablet twice daily for 5 days)    Attention deficit hyperactivity disorder (ADHD), combined type  Increase to max dose of 50 mg daily.  - Amphet-Dextroamphet 3-Bead ER (MYDAYIS) 50 MG CP24; Take 50 mg by mouth daily    Acute low back pain, unspecified back pain laterality, unspecified whether sciatica present  REfilled  - methocarbamol (ROBAXIN) 500 MG tablet; Take 1 tablet (500 mg) by mouth 4 times daily as needed for muscle spasms    Weight management  Doing well on 2 mg and past plateau of 1 mg. Now into normal BMI with an estimated loss of 12% weight. Continues with previously established high level physical activity with strength training and good diet. No significant side effects. Continue semaglutide 2 mg at this time.    Prescription drug management  I spent a total of 45 minutes on the day of the visit.   Time spent by me doing chart review, history and exam, documentation and further activities per the note       BMI:   Estimated body mass index is 26.52 kg/m  as calculated from the following:    Height as of 6/6/23: 1.685 m (5' 6.34\").    Weight as of 6/6/23: 75.3 kg (166 lb).   Weight management plan: home weight now in non-overweight category    Patient Instructions   Kenn Martínez " "saline inhaler    Paxlovid ordered    New dose Mydais ordered (50 mg)    Ideally, get second booster in three months    Valarie Medrano, WINSTON CNP  [facility]    Jhon Aviles is a 43 year old, presenting for the following health issues:  No chief complaint on file.      History of Present Illness       Reason for visit:  Covid  Symptom onset:  1-3 days ago  Symptoms include:  Congestion, sore throat, achy.  Symptom intensity:  Moderate  Symptom progression:  Worsening  Had these symptoms before:  No  What makes it worse:  Using energy  What makes it better:  No    She eats 4 or more servings of fruits and vegetables daily.She consumes 0 sweetened beverage(s) daily.She exercises with enough effort to increase her heart rate 60 or more minutes per day.  She exercises with enough effort to increase her heart rate 7 days per week. She is missing 1 dose(s) of medications per week.  She is not taking prescribed medications regularly due to other.     ADHD - feels that the dose is too low. Has been struggling with ADHD symptoms and hasn't been able to get on top of symptoms with non-pharmacologic methods. Likes the consistent and slow release of this formulation.   MyChart message  \"finding it more difficult to focus - main issues are what has been most typically my issues with ADHD. 1. Remember what task it is I m doing. 2. Focusing on 1 task at a time. 3. Preparing, planning, staying organized. Etc. I tend to become more frustrated and irritable - the expression is mostly internal.\"    Blood pressure has been 110's/60's  BP Readings from Last 6 Encounters:   06/06/23 114/77   04/25/23 120/75   04/14/23 124/71   09/08/22 (!) 150/100   03/31/22 (!) 139/99   01/24/22 (!) 154/101     Covid - home test positive yesterday. Had booster three days ago. Son started being sick last week and positive yesterday. Sister and family positive last week. Hasn't had covid before in pandemic.   Symptoms: Fever, " "nasal congestion, achiness, mild ST, mild cough, fatigue, didn't sleep last night.  Would like to take paxlovid. Higher risk dx include HTN (inconsistent data), former smoker, overweight (improved on semaglutide).    Estimated body mass index is 26.52 kg/m  as calculated from the following:    Height as of 6/6/23: 1.685 m (5' 6.34\").    Weight as of 6/6/23: 75.3 kg (166 lb).    Weight - taking 2 mg for the past two weeks. Had plateau in August at 1 mg.   Starting weight 168 lbs  Current weight 148 lb  Has lost 20 lbs - ~12% loss  Continues vigorous exercise - seven days a week for at 20-45 minutes with heart rate elevated, bike, cross training with weights, yoga  Diet continues to be strict to fruits and vegetables, protein (including protein supplements in coffee and smoothies), free-range chicken and beef  Not drinking alcohol often - only organic red wine.   Drinking a lot of water  Taking probiotics  Side effects: mild and rare nausea (5 times in past 7 months), rare heartburn at night and one time vomiting, no abdominal pain. Bowel movements daily, not always a lot.     Wt Readings from Last 5 Encounters:   06/06/23 75.3 kg (166 lb)   04/25/23 78.9 kg (174 lb) - wearing ortho boot   04/14/23 79.4 kg (175 lb)   09/08/22 79.8 kg (175 lb 14.4 oz)   04/14/22 73.9 kg (163 lb)       Review of Systems   Constitutional, HEENT, cardiovascular, pulmonary, gi and gu systems are negative, except as otherwise noted.      Objective    Vitals - Patient Reported  Weight (Patient Reported): 67.1 kg (148 lb)      Vitals:  No vitals were obtained today due to virtual visit.    Physical Exam   GENERAL: alert and no distress  EYES: Eyes grossly normal to inspection.  No discharge or erythema, or obvious scleral/conjunctival abnormalities.  RESP: No audible wheeze or visible cyanosis.  No visible retractions or increased work of breathing. Mild dry cough noted two times throughout visit  SKIN: Visible skin clear. No significant " rash, abnormal pigmentation or lesions.  NEURO: Cranial nerves grossly intact.  Mentation and speech appropriate for age.  PSYCH: Mentation appears normal, affect normal/bright, judgement and insight intact, normal speech and appearance well-groomed.          Video-Visit Details    Type of service:  Video Visit   Originating Location (pt. Location): Home    Distant Location (provider location):  On-site  Platform used for Video Visit: Picklify

## 2023-11-25 ENCOUNTER — HEALTH MAINTENANCE LETTER (OUTPATIENT)
Age: 43
End: 2023-11-25

## 2023-11-28 ENCOUNTER — E-VISIT (OUTPATIENT)
Dept: FAMILY MEDICINE | Facility: CLINIC | Age: 43
End: 2023-11-28
Payer: COMMERCIAL

## 2023-11-28 DIAGNOSIS — U07.1 CLINICAL DIAGNOSIS OF COVID-19: ICD-10-CM

## 2023-11-28 DIAGNOSIS — J01.90 ACUTE SINUSITIS WITH SYMPTOMS > 10 DAYS: Primary | ICD-10-CM

## 2023-11-28 PROCEDURE — 99421 OL DIG E/M SVC 5-10 MIN: CPT | Performed by: NURSE PRACTITIONER

## 2023-11-28 RX ORDER — IPRATROPIUM BROMIDE 42 UG/1
2 SPRAY, METERED NASAL 4 TIMES DAILY
Qty: 15 ML | Refills: 0 | Status: SHIPPED | OUTPATIENT
Start: 2023-11-28 | End: 2024-06-13

## 2023-11-28 RX ORDER — FLUTICASONE PROPIONATE 50 MCG
1 SPRAY, SUSPENSION (ML) NASAL DAILY
Qty: 16 G | Refills: 0 | Status: SHIPPED | OUTPATIENT
Start: 2023-11-28 | End: 2024-01-09

## 2023-11-28 NOTE — PATIENT INSTRUCTIONS
Acute Sinusitis: Care Instructions  Overview     Acute sinusitis is an inflammation of the mucous membranes inside the nose and sinuses. Sinuses are the hollow spaces in your skull around the eyes and nose. Acute sinusitis often follows a cold. Acute sinusitis causes thick, discolored mucus that drains from the nose or down the back of the throat. It also can cause pain and pressure in your head and face along with a stuffy or blocked nose.  In most cases, sinusitis gets better on its own in 1 to 2 weeks. But some mild symptoms may last for several weeks. Sometimes antibiotics are needed if there is a bacterial infection.  Follow-up care is a key part of your treatment and safety. Be sure to make and go to all appointments, and call your doctor if you are having problems. It's also a good idea to know your test results and keep a list of the medicines you take.  How can you care for yourself at home?  Use saline (saltwater) nasal washes. This can help keep your nasal passages open and wash out mucus and allergens.  You can buy saline nose washes at a grocery store or drugstore. Follow the instructions on the package.  You can make your own at home. Add 1 teaspoon of non-iodized salt and 1 teaspoon of baking soda to 2 cups of distilled or boiled and cooled water. Fill a squeeze bottle or a nasal cleansing pot (such as a neti pot) with the nasal wash. Then put the tip into your nostril, and lean over the sink. With your mouth open, gently squirt the liquid. Repeat on the other side.  Try a decongestant nasal spray like oxymetazoline (Afrin). Do not use it for more than 3 days in a row. Using it for more than 3 days can make your congestion worse.  If needed, take an over-the-counter pain medicine, such as acetaminophen (Tylenol), ibuprofen (Advil, Motrin), or naproxen (Aleve). Read and follow all instructions on the label.  If the doctor prescribed antibiotics, take them as directed. Do not stop taking them just  "because you feel better. You need to take the full course of antibiotics.  Be careful when taking over-the-counter cold or flu medicines and Tylenol at the same time. Many of these medicines have acetaminophen, which is Tylenol. Read the labels to make sure that you are not taking more than the recommended dose. Too much acetaminophen (Tylenol) can be harmful.  Try a steroid nasal spray. It may help with your symptoms.  Breathe warm, moist air. You can use a steamy shower, a hot bath, or a sink filled with hot water. Avoid cold, dry air. Using a humidifier in your home may help. Follow the directions for cleaning the machine.  When should you call for help?   Call your doctor now or seek immediate medical care if:    You have new or worse swelling, redness, or pain in your face or around one or both of your eyes.     You have double vision or a change in your vision.     You have a high fever.     You have a severe headache and a stiff neck.     You have mental changes, such as feeling confused or much less alert.   Watch closely for changes in your health, and be sure to contact your doctor if:    You are not getting better as expected.   Where can you learn more?  Go to https://www.IR[employer] Connect.net/patiented  Enter I933 in the search box to learn more about \"Acute Sinusitis: Care Instructions.\"  Current as of: February 28, 2023               Content Version: 13.8    0735-4307 [employer].   Care instructions adapted under license by your healthcare professional. If you have questions about a medical condition or this instruction, always ask your healthcare professional. [employer] disclaims any warranty or liability for your use of this information.      You may want to try a nasal lavage (also known as nasal irrigation). You can find over-the-counter products, such as Neti-Pot, at retail locations or make your own at home. Instructions for homemade nasal lavage and more information on the " process are available online at http://www.aafp.org/afp/2009/1115/p1121.html.        After reviewing your responses, I've been able to diagnose you with    Acute sinusitis with symptoms > 10 days  Clinical diagnosis of COVID-19.      Based on your responses and diagnosis, I have prescribed   Orders Placed This Encounter   Medications     ipratropium (ATROVENT) 0.06 % nasal spray     Sig: Spray 2 sprays into both nostrils 4 times daily     Dispense:  15 mL     Refill:  0     amoxicillin-clavulanate (AUGMENTIN) 875-125 MG tablet     Sig: Take 1 tablet by mouth 2 times daily for 7 days     Dispense:  14 tablet     Refill:  0      to treat your symptoms. I have sent this to your pharmacy.?     Based on the information provided, you have viral sinusitis, also known as a sinus infection. Sinus infections are caused by bacteria or a virus and symptoms are almost always identical. The difference between the 2 types of infections is timing.  Sinus infections start as viral infections and symptoms improve on their own in about 7 days. If symptoms have not improved after 7 days or have even worsened, a bacterial infection may have developed.  It sounds like we crossed that point. You do not have to take antibiotics if you want to try other things first. Antibiotics do come with risks of gut problems and resistance. Some other options are listed below and you could try those for a couple days for some relief first before taking the antibiotic.      Fluticasone 50 mcg/actuation nasal spray. Inhale 2 sprays in each nostril 1 time per day; after 1 week, may adjust to 1 - 2 sprays in each nostril 1 time per day. This medication takes several days to start working, so keep taking it even if it doesn't help right away. There are no refills with this prescription.      Ibuprofen 800 mg oral tablet. Take 1 tablet by mouth 3 times per day as needed for 7 days. Do not exceed 2400mg in 24 hours. There are no refills with this  prescription.      Atrovent HFA 17 mcg/actuation aerosol inhaler. Inhale 2 puffs 4 times per day for 5 days (maximum dose: 12 inhalations in 24 hours). There are no refills with this prescription.       Over-the-counter medications do not require a prescription. Ask the pharmacist if you have any questions.      1.  Push fluids - drink 80 oz a day  2.  Saline nasal rinse - saline spray   3.  Humidifier - could put eucalyptus or peppermint or rosemary essential oil in the humidifier water  4.  Steamy bathroom  - breathe in the steamy air  5.  Yoink Gamess Soother - Tea Source or at [employer] [facility] [place] in bulk  6.  Hot Toddy (non-alcoholic) - lemon, ginger, honey, hot water, cayenne ([employer]- coffee bar makes a good one, also [employer] [facility])    Self care  The following tips will keep you as comfortable as possible while you recover:     Rest    Drink plenty of water and other liquids    Take a hot shower to loosen congestion    Use throat lozenges    Gargle with warm salt water (1/4 teaspoon of salt per 8 ounce glass of water)    Suck on frozen items such as popsicles or ice cubes    Drink hot tea with lemon and honey    Take a spoonful of honey to reduce your cough       Also, as your provider, I need you to know that becoming tobacco-free is the most important thing you can do to protect your current and future health.      When to seek care  Please be seen in a clinic or urgent care if new symptoms develop, or symptoms become worse.  Call 911 or go to the emergency room if you feel that your throat is closing off, you suddenly develop a rash, you are unable to swallow fluids, you are drooling, or you are having difficulty breathing.       Prescription: fluticasone 50 mcg/actuation nasal spray,suspension 1 120 spray aerosol with adapter (grams), 30 days supply. Inhale 2 sprays in each nostril 1 time per day; after 1 week, may adjust to 1 - 2 sprays in each nostril 1 time per day.. Refills: 0, Refill as needed: no,  Allow substitutions: yes  Prescription: Atrovent HFA 17 mcg/actuation inhalation HFA aerosol inhaler 1 200 inhalation aerosol with adapter, 5 days supply. Inhale 2 puffs 4 times per day for 5 days. Refills: 0, Refill as needed: no, Allow substitutions: yes  Prescription: ibuprofen (IBU) 800 mg oral tablet 21 tablet, 7 days supply. Take 1 tablet by mouth 3 times per day as needed for 7 days. Refills: 0, Refill as needed: no, Allow substitutions: yes        It is also important to stay well hydrated, get lots of rest and take over-the-counter decongestants,?tylenol?or ibuprofen if you?are able to?take those medications per your primary care provider to help relieve discomfort.?     It is important that you take?all of?your prescribed medication even if your symptoms are improving after a few doses.? Taking?all of?your medicine helps prevent the symptoms from returning.?     If your symptoms worsen, you develop severe headache, vomiting, high fever (>102), or are not improving in 7 days, please contact your primary care provider for an appointment or visit any of our convenient Walk-in Care or Urgent Care Centers to be seen which can be found on our website?here.?     Thanks again for choosing?us?as your health care partner,?   ?  Valarie Medrano, WINSTON CNP?

## 2023-12-07 ENCOUNTER — MYC REFILL (OUTPATIENT)
Dept: FAMILY MEDICINE | Facility: CLINIC | Age: 43
End: 2023-12-07
Payer: COMMERCIAL

## 2023-12-07 DIAGNOSIS — F90.2 ATTENTION DEFICIT HYPERACTIVITY DISORDER (ADHD), COMBINED TYPE: ICD-10-CM

## 2023-12-07 RX ORDER — DEXTROAMPHETAMINE SACCHARATE, AMPHETAMINE ASPARTATE MONOHYDRATE, DEXTROAMPHETAMINE SULFATE, AMPHETAMINE SULFATE 12.5; 12.5; 12.5; 12.5 MG/1; MG/1; MG/1; MG/1
50 CAPSULE, EXTENDED RELEASE ORAL DAILY
Qty: 30 CAPSULE | Refills: 0 | OUTPATIENT
Start: 2023-12-07

## 2023-12-14 ENCOUNTER — VIRTUAL VISIT (OUTPATIENT)
Dept: FAMILY MEDICINE | Facility: CLINIC | Age: 43
End: 2023-12-14
Payer: COMMERCIAL

## 2023-12-14 DIAGNOSIS — R63.5 WEIGHT GAIN: ICD-10-CM

## 2023-12-14 DIAGNOSIS — I10 ESSENTIAL HYPERTENSION: ICD-10-CM

## 2023-12-14 DIAGNOSIS — F90.2 ATTENTION DEFICIT HYPERACTIVITY DISORDER (ADHD), COMBINED TYPE: Primary | ICD-10-CM

## 2023-12-14 PROCEDURE — 99214 OFFICE O/P EST MOD 30 MIN: CPT | Mod: VID | Performed by: NURSE PRACTITIONER

## 2023-12-14 RX ORDER — DEXTROAMPHETAMINE SACCHARATE, AMPHETAMINE ASPARTATE MONOHYDRATE, DEXTROAMPHETAMINE SULFATE, AMPHETAMINE SULFATE 12.5; 12.5; 12.5; 12.5 MG/1; MG/1; MG/1; MG/1
50 CAPSULE, EXTENDED RELEASE ORAL DAILY
Qty: 90 CAPSULE | Refills: 0 | Status: SHIPPED | OUTPATIENT
Start: 2023-12-14 | End: 2023-12-15

## 2023-12-14 NOTE — PATIENT INSTRUCTIONS
Send me a BP from home via [employer]    https://www.You.i/coverage-savings    [email or web address]/enroll/[email or web address]    We will try a switch to tirzepatide to get the better cost coupon    If this works, let me know and I'll put in prescription for the 7.5 mg to start on month 2    Come fasting to preventative in March - 10-12 hours water only, DO take your blood pressure medication and ADHD med is ok too, do not take supplements

## 2023-12-15 ENCOUNTER — MYC REFILL (OUTPATIENT)
Dept: FAMILY MEDICINE | Facility: CLINIC | Age: 43
End: 2023-12-15
Payer: COMMERCIAL

## 2023-12-15 ENCOUNTER — MYC MEDICAL ADVICE (OUTPATIENT)
Dept: FAMILY MEDICINE | Facility: CLINIC | Age: 43
End: 2023-12-15
Payer: COMMERCIAL

## 2023-12-15 DIAGNOSIS — F90.2 ATTENTION DEFICIT HYPERACTIVITY DISORDER (ADHD), COMBINED TYPE: ICD-10-CM

## 2023-12-15 RX ORDER — DEXTROAMPHETAMINE SACCHARATE, AMPHETAMINE ASPARTATE MONOHYDRATE, DEXTROAMPHETAMINE SULFATE, AMPHETAMINE SULFATE 12.5; 12.5; 12.5; 12.5 MG/1; MG/1; MG/1; MG/1
50 CAPSULE, EXTENDED RELEASE ORAL DAILY
Qty: 30 CAPSULE | Refills: 0 | Status: SHIPPED | OUTPATIENT
Start: 2023-12-15 | End: 2024-01-10

## 2023-12-15 NOTE — TELEPHONE ENCOUNTER
Pt was out of stock sent to another pharmacy.   Thanks,   Nickolas Miller RN  [facility]

## 2023-12-15 NOTE — TELEPHONE ENCOUNTER
Refill at different pharmacy   As they did not have quantity, please resend   Nickolas Miller RN  [facility]

## 2023-12-18 RX ORDER — DEXTROAMPHETAMINE SACCHARATE, AMPHETAMINE ASPARTATE MONOHYDRATE, DEXTROAMPHETAMINE SULFATE, AMPHETAMINE SULFATE 12.5; 12.5; 12.5; 12.5 MG/1; MG/1; MG/1; MG/1
50 CAPSULE, EXTENDED RELEASE ORAL DAILY
Qty: 30 CAPSULE | Refills: 0 | Status: CANCELLED | OUTPATIENT
Start: 2023-12-18

## 2023-12-18 NOTE — TELEPHONE ENCOUNTER
Pharmacy change request by patient due to inventory   Patient been out of medication for 4 days     Order pended     Susu GIRON RN  [facility]

## 2023-12-19 RX ORDER — DEXTROAMPHETAMINE SACCHARATE, AMPHETAMINE ASPARTATE MONOHYDRATE, DEXTROAMPHETAMINE SULFATE, AMPHETAMINE SULFATE 12.5; 12.5; 12.5; 12.5 MG/1; MG/1; MG/1; MG/1
50 CAPSULE, EXTENDED RELEASE ORAL DAILY
Qty: 30 CAPSULE | Refills: 0 | OUTPATIENT
Start: 2023-12-19

## 2024-01-04 ENCOUNTER — MYC MEDICAL ADVICE (OUTPATIENT)
Dept: FAMILY MEDICINE | Facility: CLINIC | Age: 44
End: 2024-01-04

## 2024-01-04 DIAGNOSIS — F90.2 ATTENTION DEFICIT HYPERACTIVITY DISORDER (ADHD), COMBINED TYPE: ICD-10-CM

## 2024-01-04 RX ORDER — DEXTROAMPHETAMINE SACCHARATE, AMPHETAMINE ASPARTATE MONOHYDRATE, DEXTROAMPHETAMINE SULFATE, AMPHETAMINE SULFATE 12.5; 12.5; 12.5; 12.5 MG/1; MG/1; MG/1; MG/1
50 CAPSULE, EXTENDED RELEASE ORAL DAILY
Qty: 30 CAPSULE | Refills: 0 | OUTPATIENT
Start: 2024-01-04

## 2024-01-04 NOTE — TELEPHONE ENCOUNTER
Per pt logan requesting refill for mydayis for 90 day fill to [facility] that insurance is now requiring her to switch to.    Thanks!  David Kaufman RN   [facility]

## 2024-01-05 NOTE — PROGRESS NOTES
Traci is a 43 year old who is being evaluated via a billable video visit.      How would you like to obtain your AVS? [employer]  If the video visit is dropped, the invitation should be resent by: Text to cell phone: [phone]  Will anyone else be joining your video visit? No      Assessment & Plan     Attention deficit hyperactivity disorder (ADHD), combined type  Working really well - refilled at 90 days  - Amphet-Dextroamphet 3-Bead ER (MYDAYIS) 50 MG CP24; Take 50 mg by mouth daily    Essential hypertension  Has been well controlled at home, but I would like a BP from now since the increase of stimulant. Patient to [employer] message with this    Weight gain  Doing well on semaglutide. If she qualifies for Zepbound discount card, this will be more cost effective. Trail prescription. Discussed with MTM changing over and we will start at 5 mg tirzepatide. May not need escalation beyond 7.5 mg  - tirzepatide-Weight Management (ZEPBOUND) 5 MG/0.5ML prefilled pen; Inject 0.5 mLs (5 mg) Subcutaneous every 7 days for 28 days    Prescription drug management  I spent a total of 36 minutes on the day of the visit.   Time spent by me doing chart review, history and exam, documentation and further activities per the note       Patient Instructions   Send me a BP from home via [employer]    [email or web address]    [email or web address]    We will try a switch to tirzepatide to get the better cost coupon    If this works, let me know and I'll put in prescription for the 7.5 mg to start on month 2    Come fasting to preventative in March - 10-12 hours water only, DO take your blood pressure medication and ADHD med is ok too, do not take supplements        [facility]    Subjective   Traci is a 43 year old, presenting for the following health issues:  A.D.H.D    History of Present Illness       Reason for visit:  ADHD  - possible other follow-up    She eats 4 or more servings of fruits and vegetables daily.She consumes 0 sweetened beverage(s) daily.She exercises with enough effort to increase her heart rate 60 or more minutes per day.  She exercises with enough effort to increase her heart rate 7 days per week. She is missing 1 dose(s) of medications per week.  She is not taking prescribed medications regularly due to other.     Covid - recovered now. Had significant relief taking the antibiotic and sinus spray for lingering sinus symptoms.     ADHD - increased to MYdais 50 mg about a month ago and has noticed a benefit. More able to focus and keep train of thought. Increase frustration tolerance. This formulation has a steady and consistent administration. No HA, dry mouth, irregular heartbeat, higher blood pressure, abdominal pain, mood changes.    BP - runs in normal range, but hasn't checked in the past month. No vision changes, chest pain, peripheral edema. Takes the blood pressure medication daily.     Weight - continues taking semaglutide and getting through [employer] with discount card ($600-800 depending on the dose). Currently taking 2 mg. No increased weight loss since going from 1 mg to 2 mg. Doesn't change appetite much. Has not been able to drink alcohol (gets nauseous). Has had mild nausea the entire time on the medication without worsening as dose increases and it is tolerable. No diarrhea. Thinking about tapering off in the next couple months.  Starting weight: 170 lbs  Current weight: 148 lbs  Weight loss to date: 22 lbs.      Review of Systems   Constitutional, HEENT, cardiovascular, pulmonary, gi and gu systems are negative, except as otherwise noted.      Objective           Vitals:  No vitals were obtained today due to virtual visit.    Physical Exam   GENERAL: Healthy, alert and no distress  EYES: Eyes grossly normal to inspection.  No discharge or erythema, or obvious scleral/conjunctival  abnormalities.  RESP: No audible wheeze, cough, or visible cyanosis.  No visible retractions or increased work of breathing.    SKIN: Visible skin clear. No significant rash, abnormal pigmentation or lesions.  NEURO: Cranial nerves grossly intact.  Mentation and speech appropriate for age.  PSYCH: Mentation appears normal, affect normal/bright, judgement and insight intact, normal speech and appearance well-groomed.        Video-Visit Details    Type of service:  Video Visit   Originating Location (pt. Location): Home    Distant Location (provider location):  Off-site  Platform used for Video Visit: Tamela       MICROBIOLOGY:   Lab Results   Component Value Date/Time    CULTURE PSEUDOMONAS AERUGINOSA RARE GROWTH (A) 12/29/2023 12:45 PM       Imaging:    XR ABDOMEN (KUB) (SINGLE AP VIEW)    Result Date: 1/1/2024  Focally dilated loop of small bowel in the left mid abdomen measuring up to 4.5 cm. This is nonspecific and could represent a focal ileus.     MRI ANKLE RIGHT WO CONTRAST    Result Date: 12/31/2023  1. No MRI evidence of acute osteomyelitis in the leg or ankle. 2. Worsening since CT 12/17/2023, circumferential changes of superficial soft tissue edema, with developing areas of fluid along the superficial fascial margins in the medial and lateral leg and circumferentially at the ankle with skin blistering.  No walled-off fluid collection. 3. No intramuscular fluid collection.  No deep fascial fluid.     MRI TIBIA FIBULA RIGHT WO CONTRAST    Result Date: 12/31/2023  1. No MRI evidence of acute osteomyelitis in the leg or ankle. 2. Worsening since CT 12/17/2023, circumferential changes of superficial soft tissue edema, with developing areas of fluid along the superficial fascial margins in the medial and lateral leg and circumferentially at the ankle with skin blistering.  No walled-off fluid collection. 3. No intramuscular fluid collection.  No deep fascial fluid.     CT CHEST PULMONARY EMBOLISM W CONTRAST    Result Date: 12/31/2023  No evidence of pulmonary embolism. Small left pleural effusion with associated mild passive atelectasis.     XR CHEST PORTABLE    Result Date: 12/30/2023  Cardiomegaly with mild pulmonary venous congestive changes. Possible small bilateral pleural effusions.       ASSESSMENT & PLAN     Assessment and Plan:    Principal Problem:    S/P cardiac cath  Active Problems:    Atrial fibrillation with rapid ventricular response (HCC)    NSTEMI (non-ST elevated myocardial infarction) ([facility])    Venous insufficiency of both lower extremities    Primary hypertension    Coronary artery disease  involving native coronary artery of native heart without angina pectoris    Healing ulcers of both lower extremities, limited to breakdown of skin (HCC)    Pseudomonas aeruginosa infection    Bilateral lower leg cellulitis    Class 3 severe obesity due to excess calories with serious comorbidity and body mass index (BMI) of 50.0 to 59.9 in adult (HCC)  Resolved Problems:    * No resolved hospital problems. *      IMPRESSION  This is a 74 y.o. female with a PMHx significant for htn,chronic venous insufficiency,morbid obesity, afib  who presented as a transfer from [facility] where she was being managed for cellulitis, was found to have NSTEMI and multivessel cad and transferred for CT surgery evaluation.  Reviewed by cardiology but recommend staged PCI.        NSTEMI s/p coronary angiogram   Multivessel CAD   Troponins [id number]   S/p stents to LAD and diagonal 1/4/2024  Plan for staged PCI to RCA in 4 to 6 weeks  Plan for aspirin Brilinta and Eliquis for 2 weeks followed by Eliquis and Brilinta thereafter.  Patient is high bleeding risk due to recent nonbleeding ulcer on EGD 12/28/2023      Moderate stenosis b/l ICA   -vascular follow-up out patient        Chr heart failure with diastolic dysfunction   Echo on December showing preserved EF, severe LVH, hyperdynamic wall motion, trace AR  -daily weights   I/o monitoring   -fluid restriction 1500/day   -lasix 40 daily    Paroxysmal a fib      -To resume eliquis   -lopressor 50 bid      Chronic venous insufficiency complicated with venous stasis ulcer   BLE cellulitis with multiple wounds   Recent pseudomonas bacteremia   Wound cultures growing Pseudomonas aeruginosa, Enterococcus faecalis sensitive to Levaquin  Finished course of zyvox and levaquin   Continue Levaquin  -wound care eval , daily dressings         MICHELLE during recent hospitalisation - resolved   -Cr peaked to 3.9 , required temporary dialysis   -Cr currently wnl   -monitor urine output   -avoid

## 2024-01-09 DIAGNOSIS — J01.90 ACUTE SINUSITIS WITH SYMPTOMS > 10 DAYS: ICD-10-CM

## 2024-01-09 RX ORDER — FLUTICASONE PROPIONATE 50 MCG
1 SPRAY, SUSPENSION (ML) NASAL DAILY
Qty: 16 G | Refills: 2 | Status: SHIPPED | OUTPATIENT
Start: 2024-01-09 | End: 2024-06-13

## 2024-01-09 NOTE — TELEPHONE ENCOUNTER
Pending Prescriptions:                       Disp   Refills    fluticasone (FLONASE) 50 MCG/ACT nasal sp*16 g   0            Sig: Spray 1 spray into both nostrils daily

## 2024-01-10 ENCOUNTER — MYC REFILL (OUTPATIENT)
Dept: FAMILY MEDICINE | Facility: CLINIC | Age: 44
End: 2024-01-10
Payer: COMMERCIAL

## 2024-01-10 DIAGNOSIS — M54.50 ACUTE LOW BACK PAIN, UNSPECIFIED BACK PAIN LATERALITY, UNSPECIFIED WHETHER SCIATICA PRESENT: ICD-10-CM

## 2024-01-10 DIAGNOSIS — F90.2 ATTENTION DEFICIT HYPERACTIVITY DISORDER (ADHD), COMBINED TYPE: ICD-10-CM

## 2024-01-10 RX ORDER — DEXTROAMPHETAMINE SACCHARATE, AMPHETAMINE ASPARTATE MONOHYDRATE, DEXTROAMPHETAMINE SULFATE, AMPHETAMINE SULFATE 12.5; 12.5; 12.5; 12.5 MG/1; MG/1; MG/1; MG/1
50 CAPSULE, EXTENDED RELEASE ORAL DAILY
Qty: 30 CAPSULE | Refills: 0 | Status: SHIPPED | OUTPATIENT
Start: 2024-01-10 | End: 2024-02-02

## 2024-01-11 RX ORDER — LIDOCAINE 50 MG/G
1 PATCH TOPICAL EVERY 24 HOURS
Qty: 10 PATCH | Refills: 3 | Status: SHIPPED | OUTPATIENT
Start: 2024-01-11

## 2024-01-11 RX ORDER — METHOCARBAMOL 500 MG/1
500 TABLET, FILM COATED ORAL 4 TIMES DAILY PRN
Qty: 30 TABLET | Refills: 1 | Status: SHIPPED | OUTPATIENT
Start: 2024-01-11 | End: 2024-03-05

## 2024-01-12 PROBLEM — R87.612 PAPANICOLAOU SMEAR OF CERVIX WITH LOW GRADE SQUAMOUS INTRAEPITHELIAL LESION (LGSIL): Status: ACTIVE | Noted: 2018-08-22

## 2024-01-25 ENCOUNTER — MYC MEDICAL ADVICE (OUTPATIENT)
Dept: FAMILY MEDICINE | Facility: CLINIC | Age: 44
End: 2024-01-25
Payer: COMMERCIAL

## 2024-01-25 ENCOUNTER — TELEPHONE (OUTPATIENT)
Dept: FAMILY MEDICINE | Facility: CLINIC | Age: 44
End: 2024-01-25
Payer: COMMERCIAL

## 2024-01-25 DIAGNOSIS — R63.5 WEIGHT GAIN: ICD-10-CM

## 2024-01-25 NOTE — TELEPHONE ENCOUNTER
Pt calling regarding My chart message that was sent today. Message has already been forwarded to provider. Thanks    Beatriz Barrera RN  [facility]

## 2024-02-02 ENCOUNTER — MYC REFILL (OUTPATIENT)
Dept: FAMILY MEDICINE | Facility: CLINIC | Age: 44
End: 2024-02-02
Payer: COMMERCIAL

## 2024-02-02 DIAGNOSIS — F90.2 ATTENTION DEFICIT HYPERACTIVITY DISORDER (ADHD), COMBINED TYPE: ICD-10-CM

## 2024-02-03 ENCOUNTER — HEALTH MAINTENANCE LETTER (OUTPATIENT)
Age: 44
End: 2024-02-03

## 2024-02-05 RX ORDER — DEXTROAMPHETAMINE SACCHARATE, AMPHETAMINE ASPARTATE MONOHYDRATE, DEXTROAMPHETAMINE SULFATE, AMPHETAMINE SULFATE 12.5; 12.5; 12.5; 12.5 MG/1; MG/1; MG/1; MG/1
50 CAPSULE, EXTENDED RELEASE ORAL DAILY
Qty: 30 CAPSULE | Refills: 0 | Status: SHIPPED | OUTPATIENT
Start: 2024-02-05 | End: 2024-02-06

## 2024-02-05 NOTE — TELEPHONE ENCOUNTER
I am not confident [facility] can accept our paper scripts for controlled substances. I am ok with early refill and signed the prescription. Please communicate this to the pharmacy and the patient.  MARIBETH Hernandez

## 2024-02-06 ENCOUNTER — MYC MEDICAL ADVICE (OUTPATIENT)
Dept: FAMILY MEDICINE | Facility: CLINIC | Age: 44
End: 2024-02-06
Payer: COMMERCIAL

## 2024-02-06 DIAGNOSIS — F90.2 ATTENTION DEFICIT HYPERACTIVITY DISORDER (ADHD), COMBINED TYPE: ICD-10-CM

## 2024-02-06 RX ORDER — DEXTROAMPHETAMINE SACCHARATE, AMPHETAMINE ASPARTATE MONOHYDRATE, DEXTROAMPHETAMINE SULFATE, AMPHETAMINE SULFATE 12.5; 12.5; 12.5; 12.5 MG/1; MG/1; MG/1; MG/1
50 CAPSULE, EXTENDED RELEASE ORAL DAILY
Qty: 30 CAPSULE | Refills: 0 | Status: SHIPPED | OUTPATIENT
Start: 2024-02-06 | End: 2024-03-05

## 2024-02-13 ENCOUNTER — TELEPHONE (OUTPATIENT)
Dept: FAMILY MEDICINE | Facility: CLINIC | Age: 44
End: 2024-02-13
Payer: COMMERCIAL

## 2024-02-13 NOTE — TELEPHONE ENCOUNTER
tirzepatide-Weight Management (ZEPBOUND) 7.5 MG/0.5ML prefilled pen     Pharmacy: [id number] [facility] [id number]    [employer]:   Member ID # [id number]  Phone: [phone]

## 2024-02-15 ENCOUNTER — PATIENT OUTREACH (OUTPATIENT)
Dept: FAMILY MEDICINE | Facility: CLINIC | Age: 44
End: 2024-02-15
Payer: COMMERCIAL

## 2024-02-15 DIAGNOSIS — R87.612 PAPANICOLAOU SMEAR OF CERVIX WITH LOW GRADE SQUAMOUS INTRAEPITHELIAL LESION (LGSIL): Primary | ICD-10-CM

## 2024-02-23 ENCOUNTER — TELEPHONE (OUTPATIENT)
Dept: FAMILY MEDICINE | Facility: CLINIC | Age: 44
End: 2024-02-23
Payer: COMMERCIAL

## 2024-02-23 ENCOUNTER — MYC MEDICAL ADVICE (OUTPATIENT)
Dept: FAMILY MEDICINE | Facility: CLINIC | Age: 44
End: 2024-02-23
Payer: COMMERCIAL

## 2024-02-23 DIAGNOSIS — R63.5 WEIGHT GAIN: Primary | ICD-10-CM

## 2024-02-23 DIAGNOSIS — I10 ESSENTIAL HYPERTENSION: ICD-10-CM

## 2024-02-23 NOTE — TELEPHONE ENCOUNTER
Pt calling stating that the Pharmacy is requiring the prescription for Zepbound 7.5mg  to state that the patient has a BMI of over 28%. This is for insurance purposes.     Beatriz Barrera RN  [facility]

## 2024-02-26 NOTE — TELEPHONE ENCOUNTER
Retail Pharmacy Prior Authorization Team   Phone: [phone]    PA Initiation    Medication: ZEPBOUND 7.5 MG/0.5ML SC SOAJ  Insurance Company: [employer] - Phone [phone] Fax [phone]  Pharmacy Filling the Rx: [id number] [place], [place] - [address]  Filling Pharmacy Phone: [phone]  Filling Pharmacy Fax:    Start Date: 2/26/2024

## 2024-02-28 NOTE — TELEPHONE ENCOUNTER
Please see my chart message. Order and lab have been pended. Thanks    Beatriz Barrera RN  [facility]

## 2024-02-28 NOTE — TELEPHONE ENCOUNTER
I spoke to Pineda at [facility]. She states they did not receive this through [facility]. We reinitiated it via phone. Decision is due in 5 days.

## 2024-03-01 NOTE — TELEPHONE ENCOUNTER
PRIOR AUTHORIZATION DENIED    Medication: ZEPBOUND 7.5 MG/0.5ML SC SOAJ  Insurance Company: [employer] - Phone [phone] Fax [phone]  Denial Date: 2/28/2024  Denial Reason(s): Excluded        Appeal Information:

## 2024-03-04 SDOH — HEALTH STABILITY: PHYSICAL HEALTH: ON AVERAGE, HOW MANY MINUTES DO YOU ENGAGE IN EXERCISE AT THIS LEVEL?: 120 MIN

## 2024-03-04 SDOH — HEALTH STABILITY: PHYSICAL HEALTH: ON AVERAGE, HOW MANY DAYS PER WEEK DO YOU ENGAGE IN MODERATE TO STRENUOUS EXERCISE (LIKE A BRISK WALK)?: 7 DAYS

## 2024-03-04 ASSESSMENT — SOCIAL DETERMINANTS OF HEALTH (SDOH): HOW OFTEN DO YOU GET TOGETHER WITH FRIENDS OR RELATIVES?: ONCE A WEEK

## 2024-03-04 NOTE — TELEPHONE ENCOUNTER
Spoke with pt and she stated she has an appointment with provider tomorrow and will discuss options    Lexy Luciano RN   [facility]

## 2024-03-04 NOTE — TELEPHONE ENCOUNTER
Can you please let patient know that the tirzepatide was not covered - we weren't expecting it to be, but patient had wondered about the compounding pharmacy and cash price. I found out that the compounding pharmacy is only compounding semaglutide and not tirzepatide. If she wants to go back to semaglutide, I can prescribe that to compounding. The price is based on the dose and starts at $250 for the smallest dose and goes up from there.  ZANA Medrano, [facility]

## 2024-03-05 ENCOUNTER — PATIENT OUTREACH (OUTPATIENT)
Dept: ONCOLOGY | Facility: CLINIC | Age: 44
End: 2024-03-05

## 2024-03-05 ENCOUNTER — OFFICE VISIT (OUTPATIENT)
Dept: FAMILY MEDICINE | Facility: CLINIC | Age: 44
End: 2024-03-05
Payer: COMMERCIAL

## 2024-03-05 VITALS
WEIGHT: 142.5 LBS | OXYGEN SATURATION: 97 % | DIASTOLIC BLOOD PRESSURE: 82 MMHG | RESPIRATION RATE: 11 BRPM | HEIGHT: 66 IN | SYSTOLIC BLOOD PRESSURE: 122 MMHG | TEMPERATURE: 98.3 F | HEART RATE: 82 BPM | BODY MASS INDEX: 22.9 KG/M2

## 2024-03-05 DIAGNOSIS — M54.50 ACUTE LOW BACK PAIN, UNSPECIFIED BACK PAIN LATERALITY, UNSPECIFIED WHETHER SCIATICA PRESENT: ICD-10-CM

## 2024-03-05 DIAGNOSIS — Z13.220 LIPID SCREENING: ICD-10-CM

## 2024-03-05 DIAGNOSIS — Z80.3 FAMILY HISTORY OF MALIGNANT NEOPLASM OF BREAST: ICD-10-CM

## 2024-03-05 DIAGNOSIS — F90.2 ATTENTION DEFICIT HYPERACTIVITY DISORDER (ADHD), COMBINED TYPE: ICD-10-CM

## 2024-03-05 DIAGNOSIS — B36.0 TINEA VERSICOLOR: ICD-10-CM

## 2024-03-05 DIAGNOSIS — Z12.4 CERVICAL CANCER SCREENING: ICD-10-CM

## 2024-03-05 DIAGNOSIS — I10 ESSENTIAL HYPERTENSION: ICD-10-CM

## 2024-03-05 DIAGNOSIS — Z00.00 ROUTINE GENERAL MEDICAL EXAMINATION AT A HEALTH CARE FACILITY: Primary | ICD-10-CM

## 2024-03-05 DIAGNOSIS — R63.5 WEIGHT GAIN: ICD-10-CM

## 2024-03-05 LAB — HBA1C MFR BLD: 4.9 % (ref 0–5.6)

## 2024-03-05 PROCEDURE — 90686 IIV4 VACC NO PRSV 0.5 ML IM: CPT | Performed by: NURSE PRACTITIONER

## 2024-03-05 PROCEDURE — 90471 IMMUNIZATION ADMIN: CPT | Performed by: NURSE PRACTITIONER

## 2024-03-05 PROCEDURE — 99214 OFFICE O/P EST MOD 30 MIN: CPT | Mod: 25 | Performed by: NURSE PRACTITIONER

## 2024-03-05 PROCEDURE — 80053 COMPREHEN METABOLIC PANEL: CPT | Performed by: NURSE PRACTITIONER

## 2024-03-05 PROCEDURE — 99396 PREV VISIT EST AGE 40-64: CPT | Mod: 25 | Performed by: NURSE PRACTITIONER

## 2024-03-05 PROCEDURE — 90472 IMMUNIZATION ADMIN EACH ADD: CPT | Performed by: NURSE PRACTITIONER

## 2024-03-05 PROCEDURE — 84443 ASSAY THYROID STIM HORMONE: CPT | Performed by: NURSE PRACTITIONER

## 2024-03-05 PROCEDURE — 90746 HEPB VACCINE 3 DOSE ADULT IM: CPT | Performed by: NURSE PRACTITIONER

## 2024-03-05 PROCEDURE — 80061 LIPID PANEL: CPT | Performed by: NURSE PRACTITIONER

## 2024-03-05 PROCEDURE — 36415 COLL VENOUS BLD VENIPUNCTURE: CPT | Performed by: NURSE PRACTITIONER

## 2024-03-05 PROCEDURE — 83036 HEMOGLOBIN GLYCOSYLATED A1C: CPT | Performed by: NURSE PRACTITIONER

## 2024-03-05 RX ORDER — DEXTROAMPHETAMINE SACCHARATE, AMPHETAMINE ASPARTATE MONOHYDRATE, DEXTROAMPHETAMINE SULFATE, AMPHETAMINE SULFATE 12.5; 12.5; 12.5; 12.5 MG/1; MG/1; MG/1; MG/1
50 CAPSULE, EXTENDED RELEASE ORAL DAILY
Qty: 30 CAPSULE | Refills: 0 | Status: SHIPPED | OUTPATIENT
Start: 2024-03-05 | End: 2024-03-28

## 2024-03-05 RX ORDER — OLMESARTAN MEDOXOMIL AND HYDROCHLOROTHIAZIDE 20/12.5 20; 12.5 MG/1; MG/1
1 TABLET ORAL DAILY
Qty: 90 TABLET | Refills: 1 | Status: SHIPPED | OUTPATIENT
Start: 2024-03-05 | End: 2024-08-06

## 2024-03-05 RX ORDER — KETOCONAZOLE 20 MG/ML
SHAMPOO TOPICAL
Qty: 120 ML | Refills: 11 | Status: SHIPPED | OUTPATIENT
Start: 2024-03-05

## 2024-03-05 RX ORDER — METHOCARBAMOL 500 MG/1
500 TABLET, FILM COATED ORAL 4 TIMES DAILY PRN
Qty: 30 TABLET | Refills: 1 | Status: SHIPPED | OUTPATIENT
Start: 2024-03-05 | End: 2024-04-25

## 2024-03-05 ASSESSMENT — PAIN SCALES - GENERAL: PAINLEVEL: NO PAIN (1)

## 2024-03-05 NOTE — PROGRESS NOTES
Preventive Care Visit  [facility]  WINSTON Richards CNP, Nurse Practitioner - Family  Mar 5, 2024    Assessment & Plan     Routine general medical examination at a health care facility  Advised mammogram and genetic consult related to family history. Discussed Advanced Care Planning. Has pap schedule with midwifery.    Weight gain  Would like to switch to [facility] semaglutide 2.5 mg weekly due to cost. Did not have side effects with semaglutide. Discussed this option will only last while there is national shortage. Also discussed the dosing may not be commensurate.  - Hemoglobin A1c  - COMPOUNDED NON-CONTROLLED SUBSTANCE (CMPD RX) - PHARMACY TO MIX COMPOUNDED MEDICATION; semaglutide 2.5 mg injection  every 7 days      Essential hypertension  Well controlled. Continue medication  - olmesartan-hydrochlorothiazide (BENICAR HCT) 20-12.5 MG tablet; Take 1 tablet by mouth daily  - TSH with free T4 reflex  - Comprehensive metabolic panel (BMP + Alb, Alk Phos, ALT, AST, Total. Bili, TP)    Attention deficit hyperactivity disorder (ADHD), combined type  Doing very well with this medication. Requested #30 today, but if filled without problem, would like to do #90 with next fill  - Amphet-Dextroamphet 3-Bead ER (MYDAYIS) 50 MG CP24; Take 50 mg by mouth daily    Tinea versicolor  None today, but expects return in the summer  - ketoconazole (NIZORAL) 2 % external shampoo; LUIS EXT D PRF ITCHING OR IRRITATION  - Hemoglobin A1c    Acute low back pain, unspecified back pain laterality, unspecified whether sciatica present  Continues to have MSK problems - will return to physical therapy   - methocarbamol (ROBAXIN) 500 MG tablet; Take 1 tablet (500 mg) by mouth 4 times daily as needed for muscle spasms      Family history of malignant neoplasm of breast  - Adult Oncology/Hematology  Referral; Future    Lipid screening  -  - Lipid panel reflex to direct LDL Fasting    Cervical  "cancer screening  Scheduled with midwifery    Patient has been advised of split billing requirements and indicates understanding: Yes  Ordering of each unique test  Prescription drug management        Counseling  Appropriate preventive services were discussed with this patient, including applicable screening as appropriate for fall prevention, nutrition, physical activity, Tobacco-use cessation, weight loss and cognition.  Checklist reviewing preventive services available has been given to the patient.  Reviewed patient's diet, addressing concerns and/or questions.   She is at risk for psychosocial distress and has been provided with information to reduce risk.       Patient Instructions   Make an advanced care directive  [email or web address]    Move to semaglutide 2.5 mg weekly through the [facility]    Schedule mammogram  Schedule genetic counseling    E-visit ADHD in three months      1.\"Eat food.  Not too much.  Mostly plants\" - Sorin Pollen - see link below  - Aim for 5-7 servings of vegetables (raw vegetables - 1 serving = 1/2 cup; raw greens - 1 serving = 1 cup)   - Eat grains, but don't make them the superstar of your meal and DO make them whole grains  2. AVOID sugar.  There is no nutritional benefit to sugar.  3. Drink lots of water (avoid juice and soda)  4. MOVE your body daily - even if some days this means 5 minutes of movement. Walking is great for your bones and brain.  Do aim for 150 minutes per week of activity that raises your heart rate, but \"don't let the ideal get in the way of the good enough\"  5. Get good sleep (6-8 hours/night- less sleep is associated with disease/illness/obesity)   6. Smile and laugh every day - even in the face of tragedy  7. Start a meditation or mindfulness practice    CALCIUM: The average recommended intake for women is 0876-5332 mg calcium daily. It is best to get this from your diet (Milk, yogurt, and cheese, vegetables such as Chinese cabbage, kale, " spinach and broccoli). If you are not eating enough calcium, then I recommend Calcium Citrate/vitD supplements daily.     Vitamin D is an essential nutritient that many [place] lack, especially in the winter. It is vital for healthy immune system functioning and can be linked to diseases such as obesity, diabetes, body aches/pain, etc. It is super safe and highly beneficial for a [place] to take a vitamin D3 2000 IU once daily during winter months. Daily vitamin D for life is recommended for anyone who has ever been found deficient.    Other things to consider: do not drive while under the influence of alcohol, do not drive while texting, always wear a seatbelt, wear a helmet when biking, wear sunscreen to help prevent skin cancer, use DEET mosquito spray when outdoors to prevent mosquito and tick bites, & avoid smoking. If you do get bit by a DEER tick, please contact my office immediately to consider lyme prophylaxis. Dental visits recommended every 6-12 months.    Sorin Martinez's 7 Rules for Eating  [email or web address] Lifestyle/food-recipes/news/03953463/7-rules-for-eating#1    My typical clinic hours are as follows:  Monday 12-5 pm  Tuesday 8am-3pm  Wednesday 7:20am-1pm  Thursday virtual appts 8:20am-12:20 pm  Friday 8-11 am  If you need an appointment urgently and do not find one available on [employer] or with Central scheduling, please send me a [employer] message and I will work to get you scheduled with myself or a colleague here     Clinic notes  Lab and imaging results are now available for you to view immediately on completion.  Please know that I often have not seen the result before you see results.  I will interpret and discuss the results and meaning of the results as soon as I am able to review them.   [employer] is the best way to reach the clinic directly.  When you send a [employer] message this will be read by our clinic RNs.  Please know that when you call the clinic number, you are not speaking to a  "staff member from our local clinic.  You can ask that staff to speak to a clinic staff directly if you wish.  You will be able to read my documentation (\"provider notes\") when I finish up and close your chart.  I encourage you to read through to understand your diagnosis and the plan and how we arrived there.  It is also an opportunity to make sure I fully understood your concerns.      Jhon Aviles is a 43 year old, presenting for the following:  Physical (Blood work) and Recheck Medication        3/5/2024    10:28 AM   Additional Questions   Roomed by Jessi Espinosa        Via the Health Maintenance questionnaire, the patient has reported the following services have been completed -Mammogram, this information has been sent to the abstraction team.  Health Care Directive  Patient does not have a Health Care Directive or Living Will: Discussed advance care planning with patient; information given to patient to review.    HPI    Weight management  Has a one week gap in Zepbound 7.5 mg due to  breach. No significant symptoms when restarting. Would like to consider semaglutide compounded at [facility] due to cost. Previous highest dose of semaglutide was    ADHD - Mydais 50 mg has been best ADHD medication that patient has taken. The triple release is much more balanced and consistent coverage of symptoms. With immediate release formulations would get big peaks and valleys of effect, as well as increased anxiety and panic attacks. It was also too easy to forget or miss the second or third doses. Does much better with extended release, in general. With Mydais doesn't have times of feeling overmedicated, jittery, overly hyperfocused.  No HA, dry mouth, increased constipation, irritability, anxiety.     HTN - taking olmesartan-hydrochlorothiazide and this is working well without side effects. Had HTN starting in pregnancy, stabilized after pregnancy for a short time, but then went back up and we started " medication.        3/4/2024   General Health   How would you rate your overall physical health? Good   Feel stress (tense, anxious, or unable to sleep) Only a little   (!) STRESS CONCERN      3/4/2024   Nutrition   Three or more servings of calcium each day? Yes   Diet: Other   If other, please elaborate: In general, I fast 12-16 hrs/day.  Have a healthy/organic protein smoothie in morning.  Consume as little processed foods as possible.  Organic when possible.  Grass fed, free range animal products. High fiber and protein.  Supplements include Seed pre/probiotic, milk thistle, vitamin C and turmeric.   How many servings of fruit and vegetables per day? 4 or more   How many sweetened beverages each day? 0-1         3/4/2024   Exercise   Days per week of moderate/strenous exercise 7 days   Average minutes spent exercising at this level 120 min         3/4/2024   Social Factors   Frequency of gathering with friends or relatives Once a week   Worry food won't last until get money to buy more No   Food not last or not have enough money for food? No   Do you have housing?  Yes   Are you worried about losing your housing? No   Lack of transportation? No   Unable to get utilities (heat,electricity)? No         3/4/2024   Dental   Dentist two times every year? Yes         3/4/2024   TB Screening   Were you born outside of [place]?  No         Today's PHQ-2 Score:       3/4/2024     3:52 PM   PHQ-2 (  [employer])   Q1: Little interest or pleasure in doing things 0   Q2: Feeling down, depressed or hopeless 0   PHQ-2 Score 0   Q1: Little interest or pleasure in doing things Not at all   Q2: Feeling down, depressed or hopeless Not at all   PHQ-2 Score 0           3/4/2024   Substance Use   Alcohol more than 3/day or more than 7/wk No   Do you use any other substances recreationally? (!) ALCOHOL     Social History     Tobacco Use    Smoking status: Former     Types: Cigarettes     Quit date: 10/25/2015     Years since quittin.3      Passive exposure: Never    Smokeless tobacco: Never   Vaping Use    Vaping Use: Never used   Substance Use Topics    Alcohol use: Yes     Comment: drinks with occasions only typically - average 3 glasses of wine per week    Drug use: No           3/4/2024   Breast Cancer Screening   Family history of breast, colon, or ovarian cancer? Yes         3/4/2024   LAST FHS-7 RESULTS   1st degree relative breast or ovarian cancer Yes   Any relative bilateral breast cancer Yes   Any male have breast cancer No   Any ONE woman have BOTH breast AND ovarian cancer No   Any woman with breast cancer before 50yrs No   2 or more relatives with breast AND/OR ovarian cancer Yes   2 or more relatives with breast AND/OR bowel cancer No     Mother had stage 1 breast cancer fall 2023  Mammogram Screening - Mammogram every 1-2 years updated in Health Maintenance based on mutual decision making        3/4/2024   STI Screening   New sexual partner(s) since last STI/HIV test? No     History of abnormal Pap smear: YES - updated in Problem List and Health Maintenance accordingly        Latest Ref Rng & Units 9/8/2022    11:35 AM 4/16/2021     4:13 PM 4/16/2021     9:45 AM   PAP / HPV   PAP  Negative for Intraepithelial Lesion or Malignancy (NILM)      PAP (Historical)   LSIL     HPV 16 DNA Negative Negative   Negative    HPV 18 DNA Negative Negative   Negative    Other HR HPV Negative Positive   Positive      ASCVD Risk   The 10-year ASCVD risk score (Cherelle SELBY, et al., 2019) is: 0.2%    Values used to calculate the score:      Age: 43 years      Sex: Female      Is Non- : No      Diabetic: No      Tobacco smoker: No      Systolic Blood Pressure: 122 mmHg      Is BP treated: Yes      HDL Cholesterol: 93 mg/dL      Total Cholesterol: 170 mg/dL        3/4/2024   Contraception/Family Planning   Questions about contraception or family planning No        Reviewed and updated as needed this visit by Provider    "Tobacco     Med Hx  Surg Hx  Fam Hx  Soc Hx Sexual Activity          Past Medical History:   Diagnosis Date    Abnormal Pap smear of cervix 08/22/2018, 04/2/19 08/22/2018, 04/2/19, 04/16/21    Anemia     Cervical high risk HPV (human papillomavirus) test positive 04/16/2021 04/16/21    Gastroesophageal reflux disease 1/5/2006    Irritable bowel syndrome 1/5/2006    Preeclampsia 02/13/2019     Past Surgical History:   Procedure Laterality Date    CYSTOSCOPY, DILATE URETHRA, COMBINED  11/1999    for frequent UTI'S    INJECT SACROILIAC JOINT Right 1/24/2022    Procedure: INJECTION, SACROILIAC JOINT;  Surgeon: Arya Freeman MD;  Location: [facility] OR    INJECT STEROID TROCHANTERIC BURSA Right 1/24/2022    Procedure: INJECTION, STEROID, BURSA, TROCHANTERIC;  Surgeon: Arya Freeman MD;  Location: [facility] OR    INJECT TRIGGER POINT SINGLE / MULTIPLE 1 OR 2 MUSCLES Right 1/24/2022    Procedure: INJECTION, TRIGGER POINT, MUSCLE, 1 OR 2 MUSCLES;  Surgeon: Arya Freeman MD;  Location: [facility] OR    KNEE SURGERY  10/1997         Review of Systems  Constitutional, HEENT, cardiovascular, pulmonary, gi and gu systems are negative, except as otherwise noted.     Objective    Exam  /82 (BP Location: Right arm, Patient Position: Sitting, Cuff Size: Adult Regular)   Pulse 82   Temp 98.3  F (36.8  C) (Temporal)   Resp 11   Ht 1.673 m (5' 5.87\")   Wt 64.6 kg (142 lb 8 oz)   LMP 02/13/2024 (Within Days)   SpO2 97%   BMI 23.09 kg/m     Estimated body mass index is 23.09 kg/m  as calculated from the following:    Height as of this encounter: 1.673 m (5' 5.87\").    Weight as of this encounter: 64.6 kg (142 lb 8 oz).    Physical Exam  GENERAL: alert and no distress  EYES: Eyes grossly normal to inspection, PERRL and conjunctivae and sclerae normal  HENT: ear canals and TM's normal, nose and mouth without ulcers or lesions  NECK: no adenopathy, no asymmetry, masses, or scars  RESP: lungs clear to " auscultation - no rales, rhonchi or wheezes  BREAST: normal without masses, tenderness or nipple discharge and no palpable axillary masses or adenopathy  CV: regular rate and rhythm, normal S1 S2, no S3 or S4, no murmur, click or rub, no peripheral edema  ABDOMEN: soft, nontender, no hepatosplenomegaly, no masses and bowel sounds normal  MS: no gross musculoskeletal defects noted, no edema  SKIN: no suspicious lesions or rashes  NEURO: Normal strength and tone, mentation intact and speech normal  PSYCH: mentation appears normal, affect normal/bright      Signed Electronically by: WINSTON Richards CNP

## 2024-03-05 NOTE — RESULT ENCOUNTER NOTE
Traci,    Hemoglobin A1C was normal.  If you have any questions, please feel free to contact the clinic.    MARIBETH Hernandez

## 2024-03-05 NOTE — PROGRESS NOTES
Writer received Cancer Risk Management Program referral, referred for:    Family history of malignant neoplasm of breast       Reviewed for appropriate plan, and sent to New Patient Scheduling for completion.

## 2024-03-05 NOTE — PATIENT INSTRUCTIONS
"Make an advanced care directive  [email or web address]    Move to semaglutide 2.5 mg weekly through the [facility]    Schedule mammogram  Schedule genetic counseling    E-visit ADHD in three months      1.\"Eat food.  Not too much.  Mostly plants\" - Sorin Martinez - see link below  - Aim for 5-7 servings of vegetables (raw vegetables - 1 serving = 1/2 cup; raw greens - 1 serving = 1 cup)   - Eat grains, but don't make them the superstar of your meal and DO make them whole grains  2. AVOID sugar.  There is no nutritional benefit to sugar.  3. Drink lots of water (avoid juice and soda)  4. MOVE your body daily - even if some days this means 5 minutes of movement. Walking is great for your bones and brain.  Do aim for 150 minutes per week of activity that raises your heart rate, but \"don't let the ideal get in the way of the good enough\"  5. Get good sleep (6-8 hours/night- less sleep is associated with disease/illness/obesity)   6. Smile and laugh every day - even in the face of tragedy  7. Start a meditation or mindfulness practice    CALCIUM: The average recommended intake for women is 8117-5586 mg calcium daily. It is best to get this from your diet (Milk, yogurt, and cheese, vegetables such as Chinese cabbage, kale, spinach and broccoli). If you are not eating enough calcium, then I recommend Calcium Citrate/vitD supplements daily.     Vitamin D is an essential nutritient that many [place] lack, especially in the winter. It is vital for healthy immune system functioning and can be linked to diseases such as obesity, diabetes, body aches/pain, etc. It is super safe and highly beneficial for a [place] to take a vitamin D3 2000 IU once daily during winter months. Daily vitamin D for life is recommended for anyone who has ever been found deficient.    Other things to consider: do not drive while under the influence of alcohol, do not drive while texting, always wear a seatbelt, wear a helmet when " "biking, wear sunscreen to help prevent skin cancer, use DEET mosquito spray when outdoors to prevent mosquito and tick bites, & avoid smoking. If you do get bit by a DEER tick, please contact my office immediately to consider lyme prophylaxis. Dental visits recommended every 6-12 months.    Sorin Martinez's 7 Rules for Eating  https://www.[employer]/food-recipes/news/28763551/7-rules-for-eating#1    My typical clinic hours are as follows:  Monday 12-5 pm  Tuesday 8am-3pm  Wednesday 7:20am-1pm  Thursday virtual appts 8:20am-12:20 pm  Friday 8-11 am  If you need an appointment urgently and do not find one available on [employer] or with Central scheduling, please send me a [employer] message and I will work to get you scheduled with myself or a colleague here     Clinic notes  Lab and imaging results are now available for you to view immediately on completion.  Please know that I often have not seen the result before you see results.  I will interpret and discuss the results and meaning of the results as soon as I am able to review them.   [employer] is the best way to reach the clinic directly.  When you send a [employer] message this will be read by our clinic RNs.  Please know that when you call the clinic number, you are not speaking to a staff member from our local clinic.  You can ask that staff to speak to a clinic staff directly if you wish.  You will be able to read my documentation (\"provider notes\") when I finish up and close your chart.  I encourage you to read through to understand your diagnosis and the plan and how we arrived there.  It is also an opportunity to make sure I fully understood your concerns.    "

## 2024-03-06 LAB
ALBUMIN SERPL BCG-MCNC: 4.5 G/DL (ref 3.5–5.2)
ALP SERPL-CCNC: 72 U/L (ref 40–150)
ALT SERPL W P-5'-P-CCNC: 11 U/L (ref 0–50)
ANION GAP SERPL CALCULATED.3IONS-SCNC: 11 MMOL/L (ref 7–15)
AST SERPL W P-5'-P-CCNC: 15 U/L (ref 0–45)
BILIRUB SERPL-MCNC: 0.2 MG/DL
BUN SERPL-MCNC: 11.4 MG/DL (ref 6–20)
CALCIUM SERPL-MCNC: 9.1 MG/DL (ref 8.6–10)
CHLORIDE SERPL-SCNC: 101 MMOL/L (ref 98–107)
CHOLEST SERPL-MCNC: 178 MG/DL
CREAT SERPL-MCNC: 0.67 MG/DL (ref 0.51–0.95)
DEPRECATED HCO3 PLAS-SCNC: 24 MMOL/L (ref 22–29)
EGFRCR SERPLBLD CKD-EPI 2021: >90 ML/MIN/1.73M2
FASTING STATUS PATIENT QL REPORTED: YES
GLUCOSE SERPL-MCNC: 82 MG/DL (ref 70–99)
HDLC SERPL-MCNC: 87 MG/DL
LDLC SERPL CALC-MCNC: 80 MG/DL
NONHDLC SERPL-MCNC: 91 MG/DL
POTASSIUM SERPL-SCNC: 4.1 MMOL/L (ref 3.4–5.3)
PROT SERPL-MCNC: 6.8 G/DL (ref 6.4–8.3)
SODIUM SERPL-SCNC: 136 MMOL/L (ref 135–145)
TRIGL SERPL-MCNC: 55 MG/DL
TSH SERPL DL<=0.005 MIU/L-ACNC: 1.65 UIU/ML (ref 0.3–4.2)

## 2024-03-07 NOTE — RESULT ENCOUNTER NOTE
Traci,    The rest of the labs look great, as well!  If you have any questions, please feel free to contact the clinic.    MARIBETH Hernandez

## 2024-03-28 ENCOUNTER — TRANSFERRED RECORDS (OUTPATIENT)
Dept: MULTI SPECIALTY CLINIC | Facility: CLINIC | Age: 44
End: 2024-03-28
Payer: COMMERCIAL

## 2024-03-28 ENCOUNTER — MYC REFILL (OUTPATIENT)
Dept: FAMILY MEDICINE | Facility: CLINIC | Age: 44
End: 2024-03-28
Payer: COMMERCIAL

## 2024-03-28 DIAGNOSIS — F90.2 ATTENTION DEFICIT HYPERACTIVITY DISORDER (ADHD), COMBINED TYPE: ICD-10-CM

## 2024-03-28 DIAGNOSIS — R63.5 WEIGHT GAIN: ICD-10-CM

## 2024-03-28 RX ORDER — DEXTROAMPHETAMINE SACCHARATE, AMPHETAMINE ASPARTATE MONOHYDRATE, DEXTROAMPHETAMINE SULFATE, AMPHETAMINE SULFATE 12.5; 12.5; 12.5; 12.5 MG/1; MG/1; MG/1; MG/1
50 CAPSULE, EXTENDED RELEASE ORAL DAILY
Qty: 90 CAPSULE | Refills: 0 | Status: SHIPPED | OUTPATIENT
Start: 2024-03-28 | End: 2024-06-13

## 2024-04-11 ENCOUNTER — E-VISIT (OUTPATIENT)
Dept: FAMILY MEDICINE | Facility: CLINIC | Age: 44
End: 2024-04-11
Payer: COMMERCIAL

## 2024-04-11 ENCOUNTER — OFFICE VISIT (OUTPATIENT)
Dept: FAMILY MEDICINE | Facility: CLINIC | Age: 44
End: 2024-04-11
Payer: COMMERCIAL

## 2024-04-11 VITALS
HEART RATE: 88 BPM | WEIGHT: 134.8 LBS | DIASTOLIC BLOOD PRESSURE: 60 MMHG | SYSTOLIC BLOOD PRESSURE: 100 MMHG | HEIGHT: 66 IN | BODY MASS INDEX: 21.66 KG/M2 | TEMPERATURE: 99.7 F | OXYGEN SATURATION: 100 % | RESPIRATION RATE: 18 BRPM

## 2024-04-11 DIAGNOSIS — R50.9 FEVER, UNSPECIFIED FEVER CAUSE: Primary | ICD-10-CM

## 2024-04-11 DIAGNOSIS — R52 BODY ACHES: ICD-10-CM

## 2024-04-11 DIAGNOSIS — M79.10 MYALGIA: Primary | ICD-10-CM

## 2024-04-11 LAB
FLUAV RNA SPEC QL NAA+PROBE: NEGATIVE
FLUBV RNA RESP QL NAA+PROBE: NEGATIVE
RSV RNA SPEC NAA+PROBE: NEGATIVE
SARS-COV-2 RNA RESP QL NAA+PROBE: NEGATIVE

## 2024-04-11 PROCEDURE — 99207 PR NON-BILLABLE SERV PER CHARTING: CPT | Performed by: FAMILY MEDICINE

## 2024-04-11 PROCEDURE — 99213 OFFICE O/P EST LOW 20 MIN: CPT

## 2024-04-11 PROCEDURE — 87637 SARSCOV2&INF A&B&RSV AMP PRB: CPT

## 2024-04-11 ASSESSMENT — PAIN SCALES - GENERAL: PAINLEVEL: MILD PAIN (2)

## 2024-04-11 NOTE — PROGRESS NOTES
Assessment & Plan     (R50.9) Fever, unspecified fever cause  (primary encounter diagnosis)  Comment: Acute and stable. No signs of respiratory distress, vital signs stable, and no red flag signs requiring immediate need for medical attention.  Close likely diagnosis is minor viral illness.  Previous concern for possible meningitis considering symptom of neck discomfort, but after thorough physical examination, this concern is much less likely considering full range of motion, and ability to touch chin to chest without ease.  Patient's fever is also low-grade, and headache is very dull and frontal.  Thorough discussion regarding symptoms that would require need for immediate medical attention, as there is some possibility that this may progress to more concerning illness.  Patient verbalized understanding of symptoms that would require the need to seek immediate medical attention.  Will otherwise test for influenza, COVID, and RSV today.  This is all negative, we will continue to monitor symptoms, and treatment conservatively with ibuprofen and Tylenol, rest, and hydration.  Encourage patient to follow-up in 3 to 5 days if symptoms worsen or do not improve as we may need to consider the possibility for bacterial infection that would be better treated with antibiotics.  Vital signs otherwise stable today.  Plan: Symptomatic Influenza A/B, RSV, & SARS-CoV2 PCR        (COVID-19) Nasopharyngeal    (R52) Body aches  Comment: Acute and stable.  Differential diagnoses include cervical radiculopathy versus muscular strain versus viral illness versus meningitis.  Spurling test negative bilaterally, which helps to reduce the concern for cervical radiculopathy.  Patient declines any burning, numbness, or tingling, and also declines any shooting pain that would cause concern for cervical radiculopathy.  Some possibility for muscular strain considering frequent physical activity, but patient declines noting any specific  exercise that would have caused her a neck strain.  Very possible patient is experiencing bodyaches related to a viral illness.  Less concerned about meningitis after physical examination was largely unremarkable.  I did discuss in depth the symptoms that would cause concern for meningitis, and encourage patient to continue to monitor and seek emergency medical attention if any of these symptoms present.  Plan: Ibuprofen and Tylenol to manage discomfort  Follow-up in 3 to 5 days if symptoms worsen or do not improve    Ordering of each unique test  I spent a total of 22 minutes on the day of the visit.   Time spent by me doing chart review, history and exam, documentation and further activities per the note      FUTURE APPOINTMENTS:       - Follow-up visit in 3 to 5 days if symptoms worsen or do not improve       - Follow-up for annual visit or as needed  Patient Instructions   Continue to monitor your symptoms and manage with the home remedies listed below:    -Pain Medication: take tylenol and/or ibuprofen per the label instructions to manage pain and discomfort    -Rest: get adequate rest including 7-8 hours of sleep, and low activity levels during the day to encourage healing. Avoid high impact exercise and rigorous physical labor    -Nutrition: support healing by fueling your body with healthy foods. Fruits, vegetables, and whole foods are all great options!    -Hydration: increase fluid intake. Illness leads to increased dehydration, so your body needs more fluid intake than it might when you are healthy. Viera and sugar free teas are great options. Try to avoid beverages that cause more dehydration including coffee, soda, energy drinks, and alcohol.     If your symptoms have not improved after 7-10 days, please reach out so that we can make a plan to help you feel better!     It is important to seek immediate medical attention if you are having symptoms of chest pain, high grade fever, shortness of breath,  palpitations, severe headache, nausea and vomiting, neck stiffness, or blurry/double vision or any changes in your mental status.    Subjective   Traci is a 43 year old, presenting for the following health issues:  Neck Pain (Started on the left side and then moved to right sided as well./Pain also has started moving down the body as well. Pain level between 3-5 depending on what she is doing at the time./Wondering about chest congestion- no pain, but feels it could be viral. No other respiratory sx.)      4/11/2024     9:50 AM   Additional Questions   Roomed by JUNE Thorne     History of Present Illness       Back Pain:  She presents for follow up of back pain. Patient's back pain is a new problem.    Original cause of back pain: not sure  First noticed back pain: in the last week  Patient feels back pain: constantlyLocation of back pain:  Right lower back, left lower back, right upper back, left upper back, right side of neck and left side of neck  Description of back pain: dull ache  Back pain spreads: right side of neck and left side of neck    Since patient first noticed back pain, pain is: gradually worsening  Does back pain interfere with her job:  Yes  On a scale of 1-10 (10 being the worst), patient describes pain as:  5  What makes back pain worse: other   Acupuncture: not tried  Acetaminophen: not helpful  Activity or exercise: not helpful  Chiropractor:  Not helpful  Cold: not helpful  Heat: not helpful  Massage: not helpful  Muscle relaxants: not helpful  NSAIDS: not helpful  Opioids: not tried  Physical Therapy: not tried  Rest: not helpful  Steroid Injection: not tried  Stretching: not helpful  Surgery: not tried  TENS unit: not helpful  Topical pain relievers: not helpful  Other healthcare providers patient is seeing for back pain: None    Headaches:   Since the patient's last clinic visit, headaches are: worsened  The patient is getting headaches:  N/a  She is not able to do normal daily activities  "when she has a migraine.  The patient is taking the following rescue/relief medications:  Other   Patient states \"I get no relief\" from the rescue/relief medications.   The patient is taking the following medications to prevent migraines:  No medications to prevent migraines  In the past 4 weeks, the patient has gone to an Urgent Care or Emergency Room 0 times times due to headaches.    She eats 4 or more servings of fruits and vegetables daily.She consumes 0 sweetened beverage(s) daily.She exercises with enough effort to increase her heart rate 60 or more minutes per day.  She exercises with enough effort to increase her heart rate 7 days per week.   She is taking medications regularly.  Traci is a 43 female with a past medical history significant for chronic right-sided low back pain without sciatica, right hip bursitis, preeclampsia, essential hypertension, ADHD, and history of chronic UTIs who presents today with concerns for fever, and general body aches.  Patient reports on April 6 she began to experience some left-sided neck pain, which she assumed was likely from physical exercise that she had done recently, but noted increased concern when the discomfort moved to her right side as well.  She reported that her neck was tender to the touch, but declined any stiffness, numbness, tingling, or inability to move her neck.  She noted that as the days progressed this neck discomfort turned into a persistent frontal headache, generalized bodyaches, some bilateral flank pain, generally feeling \"a bit off\".  Patient then reported a low-grade fever on April 10, which prompted her to make an appointment in primary care for assessment.  Patient does have a history of frequent urinary tract infections, but declines that any of her symptoms feel like pyelonephritis, or acute cystitis that she has had in the past.  She notes that her bilateral flank pain has improved, and declines any dysuria, frequency, urgency, nausea, " "abdominal pain, or blood in her urine.  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  She declines any other upper respiratory type symptoms such as cough, chest congestion, sinus congestion, sore throat, or ear pain.  She declines any other neurological symptoms including dizziness, lightheadedness, thunderclap headache, blurry or double vision, changes in her hearing, difficulty swallowing, or changes in strength and sensation.  She declines any exposure to illness that she knows of.  She declines any recent travel.  She declines any GI upset such as nausea, vomiting, and diarrhea, and reports that she is still eating and drinking well.  She also declines any other acute symptoms such as chest pain or shortness of breath.      Review of Systems  Constitutional, HEENT, cardiovascular, pulmonary, gi and gu systems are negative, except as otherwise noted.      Objective    /60 (BP Location: Left arm, Patient Position: Sitting, Cuff Size: Adult Regular)   Pulse 88   Temp 99.7  F (37.6  C) (Tympanic)   Resp 18   Ht 1.676 m (5' 6\")   Wt 61.1 kg (134 lb 12.8 oz)   LMP 03/16/2024 (Within Days)   SpO2 100%   BMI 21.76 kg/m    Body mass index is 21.76 kg/m .  Physical Exam   GENERAL: alert and no distress  EYES: Eyes grossly normal to inspection, PERRL and conjunctivae and sclerae normal  HENT: ear canals and TM's normal, nose and mouth without ulcers or lesions  NECK: no adenopathy, no asymmetry, masses, or scars  RESP: lungs clear to auscultation - no rales, rhonchi or wheezes  CV: regular rate and rhythm, normal S1 S2, no S3 or S4, no murmur, click or rub, no peripheral edema  ABDOMEN: soft, nontender, no hepatosplenomegaly, no masses and bowel sounds normal  MS: no gross musculoskeletal defects noted, no edema. Spurling test negative bilaterally. Full " ROM, strength, and sensation in cervical spine and bilateral upper extremities  SKIN: no suspicious lesions or rashes  NEURO: Normal strength and tone, mentation intact and speech normal. CN II-XII grossly intact. Touches chin to chest with ease  BACK: no CVA tenderness, no paralumbar tenderness  PSYCH: mentation appears normal, affect normal/bright  LYMPH: no cervical, supraclavicular, axillary, or inguinal adenopathy    Micaela Rosenberg DNP FNP-C  Family Nurse Practitioner - Same Day Provider  [facility] [place]          Signed Electronically by: WINSTON Zuñiga CNP

## 2024-04-11 NOTE — PATIENT INSTRUCTIONS
Continue to monitor your symptoms and manage with the home remedies listed below:    -Pain Medication: take tylenol and/or ibuprofen per the label instructions to manage pain and discomfort    -Rest: get adequate rest including 7-8 hours of sleep, and low activity levels during the day to encourage healing. Avoid high impact exercise and rigorous physical labor    -Nutrition: support healing by fueling your body with healthy foods. Fruits, vegetables, and whole foods are all great options!    -Hydration: increase fluid intake. Illness leads to increased dehydration, so your body needs more fluid intake than it might when you are healthy. Viera and sugar free teas are great options. Try to avoid beverages that cause more dehydration including coffee, soda, energy drinks, and alcohol.     If your symptoms have not improved after 7-10 days, please reach out so that we can make a plan to help you feel better!     It is important to seek immediate medical attention if you are having symptoms of chest pain, high grade fever, shortness of breath, palpitations, severe headache, nausea and vomiting, neck stiffness, or blurry/double vision or any changes in your mental status.

## 2024-04-25 DIAGNOSIS — M54.50 ACUTE LOW BACK PAIN, UNSPECIFIED BACK PAIN LATERALITY, UNSPECIFIED WHETHER SCIATICA PRESENT: ICD-10-CM

## 2024-04-25 RX ORDER — METHOCARBAMOL 500 MG/1
500 TABLET, FILM COATED ORAL 4 TIMES DAILY PRN
Qty: 30 TABLET | Refills: 1 | Status: SHIPPED | OUTPATIENT
Start: 2024-04-25 | End: 2024-06-13

## 2024-05-20 ENCOUNTER — LAB (OUTPATIENT)
Dept: LAB | Facility: CLINIC | Age: 44
End: 2024-05-20
Attending: PREVENTIVE MEDICINE
Payer: COMMERCIAL

## 2024-05-20 ENCOUNTER — E-VISIT (OUTPATIENT)
Dept: URGENT CARE | Facility: CLINIC | Age: 44
End: 2024-05-20
Payer: COMMERCIAL

## 2024-05-20 DIAGNOSIS — J02.9 SORE THROAT: Primary | ICD-10-CM

## 2024-05-20 DIAGNOSIS — J02.9 SORE THROAT: ICD-10-CM

## 2024-05-20 DIAGNOSIS — J02.0 STREP THROAT: Primary | ICD-10-CM

## 2024-05-20 LAB — DEPRECATED S PYO AG THROAT QL EIA: POSITIVE

## 2024-05-20 PROCEDURE — 87635 SARS-COV-2 COVID-19 AMP PRB: CPT

## 2024-05-20 PROCEDURE — 87880 STREP A ASSAY W/OPTIC: CPT

## 2024-05-20 PROCEDURE — 99421 OL DIG E/M SVC 5-10 MIN: CPT | Performed by: PREVENTIVE MEDICINE

## 2024-05-20 RX ORDER — AMOXICILLIN 500 MG/1
500 CAPSULE ORAL 2 TIMES DAILY
Qty: 20 CAPSULE | Refills: 0 | Status: SHIPPED | OUTPATIENT
Start: 2024-05-20 | End: 2024-05-30

## 2024-05-20 NOTE — PATIENT INSTRUCTIONS
Dear Traci,    After reviewing your responses, I would like you to come in for a sw2ab to make sure we treat you correctly. This swab is to evaluate you for possible COVID and Strep Throat, and should be scheduled for today or tomorrow. Please use the Schedule Now button in [email or web address]) to schedule your swab. Otherwise, click this link to schedule a lab only appointment.    Lab appointments are not available at most locations on the weekends. If no Lab Only appointment is available, you should be seen in any of our convenient Urgent Care Centers for an in person visit, which can be found on our website here.    You will receive instructions with your results in [employer] once they are available.     If your symptoms worsen, you develop difficulty breathing, difficulty with drinking enough to stay hydrated, difficulty swallowing your saliva or have fevers for more than 5 days, please contact your primary care provider for an appointment or visit an Urgent Care Center to be seen.      Thanks again for choosing us as your health care partner.   Taurus Griffin MD, MD  Sore Throat: Care Instructions  Overview     Infection by bacteria or a virus causes most sore throats. Cigarette smoke, dry air, air pollution, allergies, and yelling can also cause a sore throat. Sore throats can be painful and annoying. Fortunately, most sore throats go away on their own. If you have a bacterial infection, your doctor may prescribe antibiotics.  Follow-up care is a key part of your treatment and safety. Be sure to make and go to all appointments, and call your doctor if you are having problems. It's also a good idea to know your test results and keep a list of the medicines you take.  How can you care for yourself at home?  If your doctor prescribed antibiotics, take them as directed. Do not stop taking them just because you feel better. You need to take the full course of antibiotics.  Gargle with warm salt water several  "times a day to help reduce swelling and relieve pain. Mix 1/2 teaspoon of salt in 1 cup of warm water.  Take an over-the-counter pain medicine, such as acetaminophen (Tylenol), ibuprofen (Advil, Motrin), or naproxen (Aleve). Read and follow all instructions on the label.  Be careful when taking over-the-counter cold or flu medicines and Tylenol at the same time. Many of these medicines have acetaminophen, which is Tylenol. Read the labels to make sure that you are not taking more than the recommended dose. Too much acetaminophen (Tylenol) can be harmful.  Drink plenty of fluids. Fluids may help soothe an irritated throat. Hot fluids, such as tea or soup, may help decrease throat pain.  Use over-the-counter throat lozenges to soothe pain. Regular cough drops or hard candy may also help. These should not be given to young children because of the risk of choking.  Do not smoke or allow others to smoke around you. If you need help quitting, talk to your doctor about stop-smoking programs and medicines. These can increase your chances of quitting for good.  Use a vaporizer or humidifier to add moisture to your bedroom. Follow the directions for cleaning the machine.  When should you call for help?   Call your doctor now or seek immediate medical care if:    You have trouble breathing.     Your sore throat gets much worse on one side.     You have new or worse trouble swallowing.     You have a new or higher fever.   Watch closely for changes in your health, and be sure to contact your doctor if you do not get better as expected.  Where can you learn more?  Go to https://www.health[employer].net/patiented  Enter U420 in the search box to learn more about \"Sore Throat: Care Instructions.\"  Current as of: September 27, 2023               Content Version: 14.0    1090-7515 [employer].   Care instructions adapted under license by your healthcare professional. If you have questions about a medical condition or this " instruction, always ask your healthcare professional. [employer] disclaims any warranty or liability for your use of this information.

## 2024-05-21 LAB — SARS-COV-2 RNA RESP QL NAA+PROBE: NEGATIVE

## 2024-06-06 ENCOUNTER — E-VISIT (OUTPATIENT)
Dept: URGENT CARE | Facility: CLINIC | Age: 44
End: 2024-06-06
Payer: COMMERCIAL

## 2024-06-06 DIAGNOSIS — J06.9 VIRAL URI WITH COUGH: Primary | ICD-10-CM

## 2024-06-06 PROCEDURE — 99421 OL DIG E/M SVC 5-10 MIN: CPT | Performed by: PHYSICIAN ASSISTANT

## 2024-06-06 RX ORDER — BENZONATATE 100 MG/1
CAPSULE ORAL
Qty: 30 CAPSULE | Refills: 0 | Status: SHIPPED | OUTPATIENT
Start: 2024-06-06

## 2024-06-06 NOTE — PATIENT INSTRUCTIONS
"Dear Traci Sloan,    After reviewing your responses, I've been able to diagnose you with \"Bronchitis\" which is a common infection of your lungs that is nearly always caused by a virus. Antibiotics will treat bacterial infections, but have no effect on viral infections.  The virus causes swelling and irritation of the air passages of your lungs which leads to cough. The illness spreads from your nose and throat to your windpipe and airways. It is often called a \"chest cold\" and can last up to 2 weeks, but is not a serious illness. Exposure to cigarette smoke usually makes this significantly worse.     To treat bronchitis, the main thing to do is drink lots of fluids and rest. Honey may soothe a sore throat and help manage your cough. You can take this straight or add to tea (or just warm water) with lemon juice. Sit in the bathroom with a hot shower running and breathe in the steam to loosen any congestion in your chest. Avoid smoke (cigarettes, bonfires, fireplace, wood burning stoves).    Medications to help your symptoms are also available over the counter. Take Tylenol or an NSAID such as ibuprofen or naproxen as needed for pain. Do not take ibuprofen if you have kidney disease, stomach ulcers or allergy to aspirin.  Delsym (dextromethorphan polistirex) is an over the counter cough medication that lasts 12 hours. Mucinex or Robitussin (guiafenesin) thin mucus and may help it to loosen more quickly. Cough drops can calm your cough as well.    I have sent in a prescription for tessalon perles which are pills to help control your cough.    Bronchitis is most often highly contagious as viruses are spread through the air or touch. Avoid contact with others who may become infected, particularly children, the elderly and those whose immune systems might be weak.     If your symptoms worsen, you develop chest pain or shortness of breath, fevers over 101, or are not improving at all in 7 days, please contact your " primary care provider for an appointment or visit any of our convenient Walk-in Care or Urgent Care Centers to be seen which can be found on our website here.    Thanks again for choosing us as your health care partner,    Eileen Frausto PA-C    Viral Respiratory Infection: Care Instructions  Overview     A viral respiratory infection is an infection of the nose, sinuses, or throat caused by a virus. Colds and the flu are common types of viral respiratory infections.  The symptoms of a viral respiratory infection often start quickly. They include a fever, sore throat, and runny nose. You may also just not feel well. Or you may not want to eat much.  Most viral infections can be treated with home care. This may include drinking lots of fluids and taking over-the-counter pain medicine. You will probably feel better in 4 to 10 days.  Antibiotics are not used to treat a viral infection. Antibiotics don't kill viruses, so they won't help cure a viral illness.  In some cases, a doctor may prescribe antiviral medicine to help your body fight a serious viral infection.  Follow-up care is a key part of your treatment and safety. Be sure to make and go to all appointments, and call your doctor if you are having problems. It's also a good idea to know your test results and keep a list of the medicines you take.  How can you care for yourself at home?  To prevent dehydration, drink plenty of fluids. Choose water and other clear liquids until you feel better. If you have kidney, heart, or liver disease and have to limit fluids, talk with your doctor before you increase the amount of fluids you drink.  Ask your doctor if you can take an over-the-counter pain medicine, such as acetaminophen (Tylenol), ibuprofen (Advil, Motrin), or naproxen (Aleve). Be safe with medicines. Read and follow all instructions on the label. No one younger than 20 should take aspirin. It has been linked to Reye syndrome, a serious illness.  Be careful  "when taking over-the-counter cold or flu medicines and Tylenol at the same time. Many of these medicines have acetaminophen, which is Tylenol. Read the labels to make sure that you are not taking more than the recommended dose. Too much acetaminophen (Tylenol) can be harmful.  Get plenty of rest.  Use saline (saltwater) nasal washes to help keep your nasal passages open and wash out mucus and allergens. You can buy saline nose sprays at a grocery store or drugstore. Follow the instructions on the package. Or you can make your own at home. Add 1 teaspoon of non-iodized salt and 1 teaspoon of baking soda to 2 cups of distilled or boiled and cooled water. Fill a squeeze bottle or neti pot with the nasal wash. Then put the tip into your nostril, and lean over the sink. With your mouth open, gently squirt the liquid. Repeat on the other side.  Use a vaporizer or humidifier to add moisture to your bedroom. Follow the instructions for cleaning the machine.  Do not smoke or allow others to smoke around you. If you need help quitting, talk to your doctor about stop-smoking programs and medicines. These can increase your chances of quitting for good.  When should you call for help?   Call 911 anytime you think you may need emergency care. For example, call if:    You have severe trouble breathing.   Call your doctor now or seek immediate medical care if:    You have a new or higher fever.     Your fever lasts more than 48 hours.     You have trouble breathing.     You have a fever with a stiff neck or a severe headache.     You are sensitive to light.     You feel very sleepy or confused.   Watch closely for changes in your health, and be sure to contact your doctor if:    You do not get better as expected.   Where can you learn more?  Go to https://www.healthwise.net/patiented  Enter Q795 in the search box to learn more about \"Viral Respiratory Infection: Care Instructions.\"  Current as of: June 12, " 2023               Content Version: 14.0    7822-3207 [employer].   Care instructions adapted under license by your healthcare professional. If you have questions about a medical condition or this instruction, always ask your healthcare professional. [employer] disclaims any warranty or liability for your use of this information.

## 2024-06-13 ENCOUNTER — MYC REFILL (OUTPATIENT)
Dept: FAMILY MEDICINE | Facility: CLINIC | Age: 44
End: 2024-06-13
Payer: COMMERCIAL

## 2024-06-13 DIAGNOSIS — J01.90 ACUTE SINUSITIS WITH SYMPTOMS > 10 DAYS: ICD-10-CM

## 2024-06-13 DIAGNOSIS — F90.2 ATTENTION DEFICIT HYPERACTIVITY DISORDER (ADHD), COMBINED TYPE: ICD-10-CM

## 2024-06-13 DIAGNOSIS — M54.50 ACUTE LOW BACK PAIN, UNSPECIFIED BACK PAIN LATERALITY, UNSPECIFIED WHETHER SCIATICA PRESENT: ICD-10-CM

## 2024-06-13 DIAGNOSIS — R05.3 PERSISTENT COUGH FOR 3 WEEKS OR LONGER: ICD-10-CM

## 2024-06-13 RX ORDER — IPRATROPIUM BROMIDE 42 UG/1
2 SPRAY, METERED NASAL 4 TIMES DAILY
Qty: 15 ML | Refills: 1 | Status: SHIPPED | OUTPATIENT
Start: 2024-06-13

## 2024-06-13 RX ORDER — FLUTICASONE PROPIONATE 50 MCG
1 SPRAY, SUSPENSION (ML) NASAL DAILY
Qty: 16 G | Refills: 3 | Status: SHIPPED | OUTPATIENT
Start: 2024-06-13

## 2024-06-14 RX ORDER — METHOCARBAMOL 500 MG/1
500 TABLET, FILM COATED ORAL 4 TIMES DAILY PRN
Qty: 30 TABLET | Refills: 1 | Status: SHIPPED | OUTPATIENT
Start: 2024-06-14 | End: 2024-09-27

## 2024-06-14 RX ORDER — ALBUTEROL SULFATE 90 UG/1
2 AEROSOL, METERED RESPIRATORY (INHALATION) EVERY 6 HOURS
Qty: 18 G | Refills: 1 | Status: SHIPPED | OUTPATIENT
Start: 2024-06-14

## 2024-06-14 RX ORDER — DEXTROAMPHETAMINE SACCHARATE, AMPHETAMINE ASPARTATE MONOHYDRATE, DEXTROAMPHETAMINE SULFATE, AMPHETAMINE SULFATE 12.5; 12.5; 12.5; 12.5 MG/1; MG/1; MG/1; MG/1
50 CAPSULE, EXTENDED RELEASE ORAL DAILY
Qty: 30 CAPSULE | Refills: 0 | Status: SHIPPED | OUTPATIENT
Start: 2024-06-14 | End: 2024-07-15

## 2024-06-19 ENCOUNTER — MYC REFILL (OUTPATIENT)
Dept: FAMILY MEDICINE | Facility: CLINIC | Age: 44
End: 2024-06-19
Payer: COMMERCIAL

## 2024-06-19 DIAGNOSIS — R63.5 WEIGHT GAIN: ICD-10-CM

## 2024-07-15 ENCOUNTER — MYC REFILL (OUTPATIENT)
Dept: FAMILY MEDICINE | Facility: CLINIC | Age: 44
End: 2024-07-15
Payer: COMMERCIAL

## 2024-07-15 DIAGNOSIS — F90.2 ATTENTION DEFICIT HYPERACTIVITY DISORDER (ADHD), COMBINED TYPE: ICD-10-CM

## 2024-07-16 RX ORDER — DEXTROAMPHETAMINE SACCHARATE, AMPHETAMINE ASPARTATE MONOHYDRATE, DEXTROAMPHETAMINE SULFATE, AMPHETAMINE SULFATE 12.5; 12.5; 12.5; 12.5 MG/1; MG/1; MG/1; MG/1
50 CAPSULE, EXTENDED RELEASE ORAL DAILY
Qty: 30 CAPSULE | Refills: 0 | Status: SHIPPED | OUTPATIENT
Start: 2024-07-16 | End: 2024-08-06

## 2024-07-25 NOTE — TELEPHONE ENCOUNTER
Dr. Haider,    Patient is lost to pap tracking follow-up. Attempts to contact pt have been made per reminder process and there has been no reply and/or no appt scheduled.      Liz Dennison, RN  Cervical Cancer Resulting RN  (CCR-RN)     8/22/18: LSIL, Neg HPV, pregnant. Plan [place]. [place] not done.  04/2/19: ASCUS Pap, Neg HR HPV result. Plan [place] per provider.    05/1/19: [place] Bx Neg for Dysplasia. Plan cotest in 1 year.    03/22/21 Lost to follow-up for pap tracking, fyi routed to provider  04/16/21 LSIL Pap, + HR HPV (not 16 or 18). Plan [place] due bef 07/16/21.   5/18/21 [place]: Bx-ARINA 1. ECC-neg. Plan: cotest in 1 year  9/8/22 NIL, +HR HPV, not 16/18. Plan 1 yr co-test  9/13/23 Visit - notes added - cancelled  10/24/23 Visit for pap - cancelled  11/28/23 Pap visit - cancelled  1/4/24 Visit for pap - cancelled  1/31/24 Visit for pap  2/27/24 Visit for pap and MyChart reminder  4/9/24 Pap appt  5/21/24 Visit - note added  6/7/24 Reminder call. Left msg  7/16/24 Annual visit - canceled

## 2024-08-06 ENCOUNTER — MYC REFILL (OUTPATIENT)
Dept: FAMILY MEDICINE | Facility: CLINIC | Age: 44
End: 2024-08-06
Payer: COMMERCIAL

## 2024-08-06 DIAGNOSIS — F90.2 ATTENTION DEFICIT HYPERACTIVITY DISORDER (ADHD), COMBINED TYPE: ICD-10-CM

## 2024-08-06 DIAGNOSIS — I10 ESSENTIAL HYPERTENSION: ICD-10-CM

## 2024-08-08 RX ORDER — OLMESARTAN MEDOXOMIL AND HYDROCHLOROTHIAZIDE 20/12.5 20; 12.5 MG/1; MG/1
1 TABLET ORAL DAILY
Qty: 90 TABLET | Refills: 1 | Status: SHIPPED | OUTPATIENT
Start: 2024-08-08

## 2024-08-09 RX ORDER — DEXTROAMPHETAMINE SACCHARATE, AMPHETAMINE ASPARTATE MONOHYDRATE, DEXTROAMPHETAMINE SULFATE, AMPHETAMINE SULFATE 12.5; 12.5; 12.5; 12.5 MG/1; MG/1; MG/1; MG/1
50 CAPSULE, EXTENDED RELEASE ORAL DAILY
Qty: 30 CAPSULE | Refills: 0 | Status: SHIPPED | OUTPATIENT
Start: 2024-08-09 | End: 2024-09-01

## 2024-09-01 ENCOUNTER — MYC REFILL (OUTPATIENT)
Dept: FAMILY MEDICINE | Facility: CLINIC | Age: 44
End: 2024-09-01
Payer: COMMERCIAL

## 2024-09-01 DIAGNOSIS — F90.2 ATTENTION DEFICIT HYPERACTIVITY DISORDER (ADHD), COMBINED TYPE: ICD-10-CM

## 2024-09-03 RX ORDER — DEXTROAMPHETAMINE SACCHARATE, AMPHETAMINE ASPARTATE MONOHYDRATE, DEXTROAMPHETAMINE SULFATE, AMPHETAMINE SULFATE 12.5; 12.5; 12.5; 12.5 MG/1; MG/1; MG/1; MG/1
50 CAPSULE, EXTENDED RELEASE ORAL DAILY
Qty: 30 CAPSULE | Refills: 0 | Status: SHIPPED | OUTPATIENT
Start: 2024-09-03 | End: 2024-09-27

## 2024-09-29 ENCOUNTER — OFFICE VISIT (OUTPATIENT)
Dept: URGENT CARE | Facility: URGENT CARE | Age: 44
End: 2024-09-29
Payer: COMMERCIAL

## 2024-09-29 ENCOUNTER — E-VISIT (OUTPATIENT)
Dept: URGENT CARE | Facility: CLINIC | Age: 44
End: 2024-09-29
Payer: COMMERCIAL

## 2024-09-29 VITALS
WEIGHT: 130 LBS | RESPIRATION RATE: 14 BRPM | HEART RATE: 69 BPM | DIASTOLIC BLOOD PRESSURE: 76 MMHG | TEMPERATURE: 98.7 F | BODY MASS INDEX: 20.98 KG/M2 | SYSTOLIC BLOOD PRESSURE: 104 MMHG | OXYGEN SATURATION: 100 %

## 2024-09-29 DIAGNOSIS — R21 RASH AND NONSPECIFIC SKIN ERUPTION: Primary | ICD-10-CM

## 2024-09-29 DIAGNOSIS — G57.12 MERALGIA PARESTHETICA OF LEFT SIDE: Primary | ICD-10-CM

## 2024-09-29 PROCEDURE — 99207 PR NON-BILLABLE SERV PER CHARTING: CPT | Performed by: INTERNAL MEDICINE

## 2024-09-29 PROCEDURE — 99213 OFFICE O/P EST LOW 20 MIN: CPT | Performed by: INTERNAL MEDICINE

## 2024-09-29 RX ORDER — GABAPENTIN 100 MG/1
100-300 CAPSULE ORAL 3 TIMES DAILY PRN
Qty: 90 CAPSULE | Refills: 0 | Status: SHIPPED | OUTPATIENT
Start: 2024-09-29

## 2024-09-29 ASSESSMENT — PAIN SCALES - GENERAL: PAINLEVEL: SEVERE PAIN (6)

## 2024-09-29 NOTE — PROGRESS NOTES
"SUBJECTIVE:  Noting about one week of irritation in the groin region that came up after shaving.  She is not seeing a clear cut rash.  Feels tender and kind of burning.  The skin itself is hypersensitive.  She denies paresthesias.  The pain has a general aching quality and feels like it's \"coming from the inside out\" and she notes discomfort with lying on that side.  Discomfort from clothing touching the upper thigh.    OBJECTIVE:  /76   Pulse 69   Temp 98.7  F (37.1  C) (Oral)   Resp 14   Wt 59 kg (130 lb)   LMP 09/08/2024 (Approximate)   SpO2 100%   BMI 20.98 kg/m    GEN: alert and interactive female  M/SKEL: FROM in the left hip with flexion, extension, internal rotation, external rotation, abduction; ALEKSANDR negative.  No pain with ROM, Tender at the greater trochanter (which is a known site of bursitis and a different pain than her current).  SKIN: no erythema, warmth or skin disruption  SOFT TISSUES: tender to palpation in the medial groin with hyperalgesia extending over the surface of the anterior proximal thigh    ASSESSMENT/PLAN:    ICD-10-CM    1. Meralgia paresthetica of left side  G57.12 gabapentin (NEURONTIN) 100 MG capsule        Considered differential diagnosis of proximal thigh pain to include myofascial pain syndromes, nerve compression syndromes, infectious conditions, hernia, pain originating from the hip joint.  There is no physical exam evidence for any suppurative process, hip joint process or muscular process.  The nature of the pain is neuropathic so a meralgia paresthetica femoral cutaneous nerve compression syndrome related to overuse and her intense exercise is most likely. Treat with gabapentin and rest.    Jez Scott MD   "

## 2024-09-29 NOTE — PATIENT INSTRUCTIONS
Dear Traci Sloan,    We are sorry you are not feeling well. Based on the responses you provided, it is recommended that you be seen in-person in urgent care so we can better evaluate your symptoms as you could have a specific infection that needs specific treatment. Please click here to find the nearest urgent care location to you.   You will not be charged for this Visit. Thank you for trusting us with your care.    Anahi Del Angel MD

## 2024-10-06 DIAGNOSIS — G57.12 MERALGIA PARESTHETICA OF LEFT SIDE: ICD-10-CM

## 2024-10-07 RX ORDER — GABAPENTIN 100 MG/1
CAPSULE ORAL
Qty: 90 CAPSULE | Refills: 0 | OUTPATIENT
Start: 2024-10-07

## 2024-10-16 ENCOUNTER — OFFICE VISIT (OUTPATIENT)
Dept: FAMILY MEDICINE | Facility: CLINIC | Age: 44
End: 2024-10-16
Payer: COMMERCIAL

## 2024-10-16 VITALS
WEIGHT: 130 LBS | HEART RATE: 90 BPM | BODY MASS INDEX: 20.89 KG/M2 | TEMPERATURE: 98.3 F | HEIGHT: 66 IN | RESPIRATION RATE: 16 BRPM | DIASTOLIC BLOOD PRESSURE: 74 MMHG | OXYGEN SATURATION: 100 % | SYSTOLIC BLOOD PRESSURE: 112 MMHG

## 2024-10-16 DIAGNOSIS — F90.2 ATTENTION DEFICIT HYPERACTIVITY DISORDER (ADHD), COMBINED TYPE: ICD-10-CM

## 2024-10-16 DIAGNOSIS — Z12.4 CERVICAL CANCER SCREENING: ICD-10-CM

## 2024-10-16 DIAGNOSIS — M54.50 CHRONIC RIGHT-SIDED LOW BACK PAIN WITHOUT SCIATICA: ICD-10-CM

## 2024-10-16 DIAGNOSIS — M70.61 TROCHANTERIC BURSITIS OF RIGHT HIP: ICD-10-CM

## 2024-10-16 DIAGNOSIS — S82.832A OTHER CLOSED FRACTURE OF DISTAL END OF LEFT FIBULA, INITIAL ENCOUNTER: ICD-10-CM

## 2024-10-16 DIAGNOSIS — G57.01 PIRIFORMIS SYNDROME, RIGHT: Primary | ICD-10-CM

## 2024-10-16 DIAGNOSIS — S92.902D CLOSED FRACTURE OF LEFT FOOT WITH ROUTINE HEALING, SUBSEQUENT ENCOUNTER: ICD-10-CM

## 2024-10-16 DIAGNOSIS — G89.29 CHRONIC RIGHT-SIDED LOW BACK PAIN WITHOUT SCIATICA: ICD-10-CM

## 2024-10-16 DIAGNOSIS — M54.16 LUMBAR RADICULOPATHY: ICD-10-CM

## 2024-10-16 DIAGNOSIS — I10 ESSENTIAL HYPERTENSION: ICD-10-CM

## 2024-10-16 DIAGNOSIS — R63.5 WEIGHT GAIN: ICD-10-CM

## 2024-10-16 LAB
AMPHETAMINES UR QL SCN: ABNORMAL
ANION GAP SERPL CALCULATED.3IONS-SCNC: 10 MMOL/L (ref 7–15)
BARBITURATES UR QL SCN: ABNORMAL
BENZODIAZ UR QL SCN: ABNORMAL
BUN SERPL-MCNC: 13 MG/DL (ref 6–20)
BZE UR QL SCN: ABNORMAL
CALCIUM SERPL-MCNC: 9.3 MG/DL (ref 8.8–10.4)
CANNABINOIDS UR QL SCN: ABNORMAL
CHLORIDE SERPL-SCNC: 98 MMOL/L (ref 98–107)
CREAT SERPL-MCNC: 0.74 MG/DL (ref 0.51–0.95)
CREAT UR-MCNC: 54.3 MG/DL
EGFRCR SERPLBLD CKD-EPI 2021: >90 ML/MIN/1.73M2
FENTANYL UR QL: ABNORMAL
GLUCOSE SERPL-MCNC: 90 MG/DL (ref 70–99)
HCO3 SERPL-SCNC: 25 MMOL/L (ref 22–29)
MICROALBUMIN UR-MCNC: <12 MG/L
MICROALBUMIN/CREAT UR: NORMAL MG/G{CREAT}
OPIATES UR QL SCN: ABNORMAL
PCP QUAL URINE (ROCHE): ABNORMAL
POTASSIUM SERPL-SCNC: 3.8 MMOL/L (ref 3.4–5.3)
SODIUM SERPL-SCNC: 133 MMOL/L (ref 135–145)

## 2024-10-16 PROCEDURE — 82570 ASSAY OF URINE CREATININE: CPT | Mod: 59 | Performed by: NURSE PRACTITIONER

## 2024-10-16 PROCEDURE — 82043 UR ALBUMIN QUANTITATIVE: CPT | Performed by: NURSE PRACTITIONER

## 2024-10-16 PROCEDURE — 91320 SARSCV2 VAC 30MCG TRS-SUC IM: CPT | Performed by: NURSE PRACTITIONER

## 2024-10-16 PROCEDURE — 99215 OFFICE O/P EST HI 40 MIN: CPT | Mod: 25 | Performed by: NURSE PRACTITIONER

## 2024-10-16 PROCEDURE — 99417 PROLNG OP E/M EACH 15 MIN: CPT | Performed by: NURSE PRACTITIONER

## 2024-10-16 PROCEDURE — 90471 IMMUNIZATION ADMIN: CPT | Performed by: NURSE PRACTITIONER

## 2024-10-16 PROCEDURE — 80307 DRUG TEST PRSMV CHEM ANLYZR: CPT | Performed by: NURSE PRACTITIONER

## 2024-10-16 PROCEDURE — 36415 COLL VENOUS BLD VENIPUNCTURE: CPT | Performed by: NURSE PRACTITIONER

## 2024-10-16 PROCEDURE — 90656 IIV3 VACC NO PRSV 0.5 ML IM: CPT | Performed by: NURSE PRACTITIONER

## 2024-10-16 PROCEDURE — 80048 BASIC METABOLIC PNL TOTAL CA: CPT | Performed by: NURSE PRACTITIONER

## 2024-10-16 PROCEDURE — 90480 ADMN SARSCOV2 VAC 1/ONLY CMP: CPT | Performed by: NURSE PRACTITIONER

## 2024-10-16 RX ORDER — OLMESARTAN MEDOXOMIL AND HYDROCHLOROTHIAZIDE 20/12.5 20; 12.5 MG/1; MG/1
1 TABLET ORAL DAILY
Qty: 90 TABLET | Refills: 1 | Status: SHIPPED | OUTPATIENT
Start: 2024-10-16

## 2024-10-16 RX ORDER — DEXTROAMPHETAMINE SULFATE, DEXTROAMPHETAMINE SACCHARATE, AMPHETAMINE ASPARTATE MONOHYDRATE, AND AMPHETAMINE SULFATE 12.5; 12.5; 12.5; 12.5 MG/1; MG/1; MG/1; MG/1
50 CAPSULE, EXTENDED RELEASE ORAL DAILY
Qty: 30 CAPSULE | Refills: 0 | Status: SHIPPED | OUTPATIENT
Start: 2024-10-25

## 2024-10-16 RX ORDER — DEXTROAMPHETAMINE SULFATE, DEXTROAMPHETAMINE SACCHARATE, AMPHETAMINE ASPARTATE MONOHYDRATE, AND AMPHETAMINE SULFATE 12.5; 12.5; 12.5; 12.5 MG/1; MG/1; MG/1; MG/1
50 CAPSULE, EXTENDED RELEASE ORAL DAILY
Qty: 30 CAPSULE | Refills: 0 | Status: SHIPPED | OUTPATIENT
Start: 2024-11-23

## 2024-10-16 RX ORDER — DEXTROAMPHETAMINE SULFATE, DEXTROAMPHETAMINE SACCHARATE, AMPHETAMINE ASPARTATE MONOHYDRATE, AND AMPHETAMINE SULFATE 12.5; 12.5; 12.5; 12.5 MG/1; MG/1; MG/1; MG/1
50 CAPSULE, EXTENDED RELEASE ORAL DAILY
Qty: 30 CAPSULE | Refills: 0 | Status: SHIPPED | OUTPATIENT
Start: 2024-12-21

## 2024-10-16 ASSESSMENT — PAIN SCALES - GENERAL: PAINLEVEL: MILD PAIN (3)

## 2024-10-16 NOTE — PROGRESS NOTES
Assessment & Plan     Piriformis syndrome, right  Revisit PMR after imaging    Trochanteric bursitis of right hip  As above     Chronic right-sided low back pain without sciatica  As above    Essential hypertension  Well controlled. No changes to medications. Labs due  BP Readings from Last 6 Encounters:   10/23/24 112/60   10/16/24 112/74   09/29/24 104/76   04/11/24 100/60   03/05/24 122/82   06/06/23 114/77       - olmesartan-hydrochlorothiazide (BENICAR HCT) 20-12.5 MG tablet; Take 1 tablet by mouth daily.  - Basic metabolic panel  (Ca, Cl, CO2, Creat, Gluc, K, Na, BUN); Future  - Albumin Random Urine Quantitative with Creat Ratio; Future  - Basic metabolic panel  (Ca, Cl, CO2, Creat, Gluc, K, Na, BUN)  - Albumin Random Urine Quantitative with Creat Ratio    Closed fracture of left foot with routine healing, subsequent encounter  Needs to see ortho for complicated healing  - Orthopedic  Referral; Future    Other closed fracture of distal end of left fibula, initial encounter  - Orthopedic  Referral; Future    Attention deficit hyperactivity disorder (ADHD), combined type  Working veyr well. CSA and UDS updated  - MYDAYIS 50 MG CP24; Take 50 mg by mouth daily.  - MYDAYIS 50 MG CP24; Take 50 mg by mouth daily.  - MYDAYIS 50 MG CP24; Take 50 mg by mouth daily.  - Urine Drug Screen; Future  - Urine Drug Screen    Cervical cancer screening  Advised to schedule    Lumbar radiculopathy  Warrants imaging  - MR Lumbar Spine w/o Contrast; Future    Weight gain  Continue 2.4 mg dosing  - COMPOUNDED NON-CONTROLLED SUBSTANCE (CMPD RX) - PHARMACY TO MIX COMPOUNDED MEDICATION; Inject 2.4 mg semaglutide subcutaneous weekly        Patient Instructions   Back and groin - schedule MRI  If no spinal stenosis (which would move things up and you'd see neurosurgery), then move forward with revisiting PM&R  Go ahead and schedule with Dr. Freeman    Foot and ankle  Schedule with orthopedic subspecialist for  foot and ankle - I'm not sure what is the best imaging  Schedule for complications after fracture of the fibula and foot    Blood pressure  Continue same medicine    ADHD  No changes    Pap  Schedule today    The longitudinal plan of care for the diagnosis(es)/condition(s) as documented were addressed during this visit. Due to the added complexity in care, I will continue to support Traci in the subsequent management and with ongoing continuity of care.Review of the result(s) of each unique test - PMR, ortho  Ordering of each unique test  Prescription drug management  I spent a total of 61 minutes on the day of the visit.   Time spent by me today doing chart review, history and exam, documentation and further activities per the note    Subjective   Traci is a 44 year old, presenting for the following health issues:  Recheck Medication, Back Pain, and Leg Pain    History of Present Illness       Back Pain:  She presents for follow up of back pain. Patient's back pain is a chronic problem.  Location of back pain:  Right lower back, left lower back, right hip, left hip and left side of waist  Description of back pain: cramping, sharp and other  Back pain spreads: left thigh and left foot    Since patient first noticed back pain, pain is: gradually worsening  Does back pain interfere with her job:  Yes       Reason for visit:  Medication and address prior injuries    She eats 4 or more servings of fruits and vegetables daily.She consumes 0 sweetened beverage(s) daily.She exercises with enough effort to increase her heart rate 60 or more minutes per day.  She exercises with enough effort to increase her heart rate 7 days per week. She is missing 1 dose(s) of medications per week.  She is not taking prescribed medications regularly due to other.     ADHD - no change. Mydais works very well - best of the medications she's tried. No side effects    Back and hip pain - three weeks ago noticed new skin sensation along left  anterior thigh. Pain originates in the inner left groin and wraps around laterally/horizontally. Pain is described as bruised and irritation of the skin. No skin changes. No changes to bowel or bladder function. Seen in UC and diagnosed with meralgia paresthetica and prescribed gabapentin.     PMR consult 1/6/22 - right SI joint dysfunction, right piriformis condition, right trochanteric bursitis. Recommended injections of these three areas which happened 3/31/22. Noted possible need for treatment of lumbar facets for suspects lumbar spondylosis.    Left leg and foot fracture - fracture of fibula in 2018 and again 2023. In 2023 also had fracture of multiple metatarsals. Treatment was immobilization in a boot in 2018 and boot plus non-weight bearing for two weeks. Completed physical therapy after second episode, but never regained FROM of foot or ankle.      CT 4/13/23: IMPRESSION:      Multiple ankle/foot fractures:  *  Acute on chronic fracture deformity of the lateral malleolus.  *  Apparent avulsion fracture from the posterior distal fibula in the  region of the posterior talofibular ligament.  *  Avulsive fracture fragment arising from the posterior facet of the  medial plantar talus.  *  Tiny ossific fragment anterior to the talar dome/tibial plafond of  uncertain acuity.   *  Small fracture of the anterior process of the calcaneus.  *  Possible tiny dorsal talonavicular avulsion fracture.   *  Nondisplaced intra-articular fracture of the distal cuboid at the  articulation with the fourth metatarsal base.  *  Cortical irregularity of the proximal dorsal cuboid, fracture not  excluded.    Blood pressure - good at home      Review of Systems  Constitutional, neuro, ENT, endocrine, pulmonary, cardiac, gastrointestinal, genitourinary, musculoskeletal, integument and psychiatric systems are negative, except as otherwise noted.      Objective    /74 (BP Location: Left arm, Patient Position: Sitting, Cuff Size:  "Adult Regular)   Pulse 90   Temp 98.3  F (36.8  C) (Temporal)   Resp 16   Ht 1.675 m (5' 5.95\")   Wt 59 kg (130 lb)   LMP 10/15/2024 (Approximate)   SpO2 100%   BMI 21.02 kg/m    Body mass index is 21.02 kg/m .  Physical Exam   GENERAL: alert and no distress  NECK: no adenopathy, no asymmetry, masses, or scars  RESP: lungs clear to auscultation - no rales, rhonchi or wheezes  CV: regular rate and rhythm, normal S1 S2, no S3 or S4, no murmur, click or rub, no peripheral edema   ABDOMEN: soft, nontender, no hepatosplenomegaly, no masses and bowel sounds normal            Signed Electronically by: WINSTON Richards CNP    "

## 2024-10-16 NOTE — PATIENT INSTRUCTIONS
Back and groin - schedule MRI  If no spinal stenosis (which would move things up and you'd see neurosurgery), then move forward with revisiting PM&R  Go ahead and schedule with Dr. Freeman    Foot and ankle  Schedule with orthopedic subspecialist for foot and ankle - I'm not sure what is the best imaging  Schedule for complications after fracture of the fibula and foot    Blood pressure  Continue same medicine    ADHD  No changes    Pap  Schedule today

## 2024-10-16 NOTE — LETTER
[facility]  10/16/24  Patient: Traci Sloan  YOB: 1980  Medical Record Number: [id number]                                                                                  Non-Opioid Controlled Substance Agreement    This is an agreement between you and your provider regarding safe and appropriate use of controlled substances prescribed by your care team. Controlled substances are?medicines that can cause physical and mental dependence (abuse).     There are strict laws about having and using these medicines. We here at [facility] are  committed to working with you in your efforts to get better. To support you in this work, we'll help you schedule regular office appointments for medicine refills. If we must cancel or change your appointment for any reason, we'll make sure you have enough medicine to last until your next appointment.     As a Provider, I will:   Listen carefully to your concerns while treating you with respect.   Recommend a treatment plan that I believe is in your best interest and may involve therapies other than medicine.    Talk with you often about the possible benefits and the risk of harm of any medicine that we prescribe for you.  Assess the safety of this medicine and check how well it works.    Provide a plan on how to taper (discontinue or go off) using this medicine if the decision is made to stop its use.      ::  As a Patient, I understand controlled substances:     Are prescribed by my care provider to help me function or work and manage my condition(s).?  Are strong medicines and can cause serious side effects.     Need to be taken exactly as prescribed.?Combining controlled substances with certain medicines or chemicals (such as illegal drugs, alcohol, sedatives, sleeping pills, and benzodiazepines) can be dangerous or even fatal.? If I stop taking my medicines suddenly, I may have severe withdrawal symptoms.     The risks,  benefits, and side effects of these medicine(s) were explained to me. I agree that:    I will take part in other treatments as advised by my care team. This may be psychiatry or counseling, physical therapy, behavioral therapy, group treatment or a referral to specialist.    I will keep all my appointments and understand this is part of the monitoring of controlled substances.?My care team may require an office visit for EVERY controlled substance refill. If I miss appointments or don t follow instructions, my care team may stop my medicine    I will take my medicines as prescribed. I will not change the dose or schedule unless my care team tells me to. There will be no refills if I run out early.      I may be asked to come to the clinic and complete a urine drug test or complete a pill count. If I don t give a urine sample or participate in a pill count, the care team may stop my medicine.    I will only receive controlled substance prescriptions from this clinic. If I am treated by another provider, I will tell them that I am taking controlled substances and that I have a treatment agreement with this provider. I will inform my [facility] care team within one business day if I am given a prescription for any controlled substance by another healthcare provider. My [facility] care team can contact other providers and pharmacists about my use of any medicines.    It is up to me to make sure that I don't run out of my medicines on weekends or holidays.?If my care team is willing to refill my prescription without a visit, I must request refills only during office hours. Refills may take up to 3 business days to process. I will use one pharmacy to fill all my controlled substance prescriptions. I will notify the clinic about any changes to my insurance or medicine availability.    I am responsible for my prescriptions. If the medicine/prescription is lost, stolen or destroyed, it will not be replaced.?I  also agree not to share controlled substance medicines with anyone.     I am aware I should not use any illegal or recreational drugs. I agree not to drink alcohol unless my care team says I can.     If I enroll in the [place] Medical Cannabis program, I will tell my care team before my next refill.    I will tell my care team right away if I become pregnant, have a new medical problem treated outside of my regular clinic, or have a change in my medicines.     I understand that this medicine can affect my thinking, judgment and reaction time.? Alcohol and drugs affect the brain and body, which can affect the safety of my driving. Being under the influence of alcohol or drugs can affect my decision-making, behaviors, personal safety and the safety of others. Driving while impaired (DWI) can occur if a person is driving, operating or in physical control of a car, motorcycle, boat, snowmobile, ATV, motorbike, off-road vehicle or any other motor vehicle ([place] Statute 169A.20). I understand the risk if I choose to drive or operate any vehicle or machinery.    I understand that if I do not follow any of the conditions above, my prescriptions or treatment may be stopped or changed.   I agree that my provider, clinic care team and pharmacy may work with any city, state or federal law enforcement agency that investigates the misuse, sale or other diversion of my controlled medicine. I will allow my provider to discuss my care with, or share a copy of, this agreement with any other treating provider, pharmacy or emergency room where I receive care.     I have read this agreement and have asked questions about anything I did not understand.    ________________________________________________________  Patient Signature - Traci Sloan     ___________________                   Date     ________________________________________________________  Provider Signature - WINTSON Richards CNP       ___________________                    Date     ________________________________________________________  Witness Signature (required if provider not present while patient signing)          ___________________                   Date

## 2024-10-17 ENCOUNTER — PATIENT OUTREACH (OUTPATIENT)
Dept: CARE COORDINATION | Facility: CLINIC | Age: 44
End: 2024-10-17
Payer: COMMERCIAL

## 2024-10-18 NOTE — RESULT ENCOUNTER NOTE
rTaci,    Your labs were all normal.  If you have any questions, please feel free to contact the clinic.    MARIBETH Hernandez
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
## 2024-10-23 ENCOUNTER — ANCILLARY PROCEDURE (OUTPATIENT)
Dept: GENERAL RADIOLOGY | Facility: CLINIC | Age: 44
End: 2024-10-23
Attending: STUDENT IN AN ORGANIZED HEALTH CARE EDUCATION/TRAINING PROGRAM
Payer: COMMERCIAL

## 2024-10-23 ENCOUNTER — OFFICE VISIT (OUTPATIENT)
Dept: ORTHOPEDICS | Facility: CLINIC | Age: 44
End: 2024-10-23
Attending: NURSE PRACTITIONER
Payer: COMMERCIAL

## 2024-10-23 VITALS — DIASTOLIC BLOOD PRESSURE: 60 MMHG | SYSTOLIC BLOOD PRESSURE: 112 MMHG

## 2024-10-23 DIAGNOSIS — S96.212A STRAIN OF INTRINSIC MUSCLE AND TENDON AT ANKLE AND FOOT LEVEL, LEFT FOOT, INITIAL ENCOUNTER: Primary | ICD-10-CM

## 2024-10-23 DIAGNOSIS — S82.832A OTHER CLOSED FRACTURE OF DISTAL END OF LEFT FIBULA, INITIAL ENCOUNTER: ICD-10-CM

## 2024-10-23 DIAGNOSIS — S92.902D CLOSED FRACTURE OF LEFT FOOT WITH ROUTINE HEALING, SUBSEQUENT ENCOUNTER: ICD-10-CM

## 2024-10-23 DIAGNOSIS — M76.829 TIBIALIS POSTERIOR DYSFUNCTION: ICD-10-CM

## 2024-10-23 PROCEDURE — 73610 X-RAY EXAM OF ANKLE: CPT | Mod: TC | Performed by: RADIOLOGY

## 2024-10-23 PROCEDURE — 99215 OFFICE O/P EST HI 40 MIN: CPT | Performed by: STUDENT IN AN ORGANIZED HEALTH CARE EDUCATION/TRAINING PROGRAM

## 2024-10-23 PROCEDURE — 73630 X-RAY EXAM OF FOOT: CPT | Mod: TC | Performed by: RADIOLOGY
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
## 2024-10-23 NOTE — PATIENT INSTRUCTIONS
Traci Sloan, It was nice to see you in our office today.      DIAGNOSIS:   1. Strain of intrinsic muscle and tendon at ankle and foot level, left foot   2. Tibialis Posterior tendon dysfunction   3. Other closed fracture of distal end of left fibula with routine healing   4. Closed fracture of left foot with routine healing       INSTRUCTIONS FOR FOLLOW-UP CARE:  Activity Status Recommended: Activity as tolerated let pain be your guide: Use the following simple  Traffic Light System  as it relates to tendon pain:    Green Light (0-3/10 pain): No increases in symptoms, you are OK to continue the activity, or perhaps increase load slightly.    Yellow light (4-6/10 pain): Minor increase in symptoms, but you can move normally within an hour of exercise, and pain reduces to normal within the next 24 hours. Proceed with caution, you can do minor bouts of loading, but too much will aggravate your symptoms and increase pain.    Red light (7-10/10 pain): Cease activity, as you have exceeded your tolerance. Generally, involves a significant increase in pain, which may not settle in the following 24 hours. Rest for 24-48 hours, allow symptoms to settle down, then begin gentle movement, and monitor your progression.  Imaging ordered: We reviewed left ankle/foot radiographs and recommend an MRI which has been ordered  Rehabilitation: Physical Therapy recommended, prescription provided  Bracing/Orthotics: None at this time, pending MRI  US guided injections/Interventions: None at this time, pending MRI  Medications: Okay to continue over the counter analgesics as needed  Follow up: 4 weeks      CLINIC LOCATIONS:     [place] [address], [address] TRIAGE LINE: [phone]   [place] [address] APPOINTMENTS: [phone]   (Monday & Friday) RADIOLOGY: [phone]    MRI/CT [phone]   [place] [facility]: [phone] [address] BILLING QUESTIONS: [phone]   [place] [address] FAX: [phone]   (Tuesday & Wednesday)        Thank you for choosing [facility] Sports Medicine!    If you have any questions, please do not hesitate to reach out on [employer] or Call [phone] and ask for my team.    Elsa Kincaid MD, [facility] and [facility] Medicine

## 2024-11-15 DIAGNOSIS — J01.90 ACUTE SINUSITIS WITH SYMPTOMS > 10 DAYS: ICD-10-CM

## 2024-11-15 RX ORDER — IPRATROPIUM BROMIDE 42 UG/1
2 SPRAY, METERED NASAL 4 TIMES DAILY
Qty: 15 ML | Refills: 1 | Status: SHIPPED | OUTPATIENT
Start: 2024-11-15

## 2024-11-21 DIAGNOSIS — M54.50 ACUTE LOW BACK PAIN, UNSPECIFIED BACK PAIN LATERALITY, UNSPECIFIED WHETHER SCIATICA PRESENT: ICD-10-CM

## 2024-11-21 RX ORDER — METHOCARBAMOL 500 MG/1
500 TABLET, FILM COATED ORAL 4 TIMES DAILY PRN
Qty: 30 TABLET | Refills: 1 | Status: SHIPPED | OUTPATIENT
Start: 2024-11-21

## 2024-12-26 ENCOUNTER — MYC REFILL (OUTPATIENT)
Dept: FAMILY MEDICINE | Facility: CLINIC | Age: 44
End: 2024-12-26
Payer: COMMERCIAL

## 2024-12-26 DIAGNOSIS — R63.5 WEIGHT GAIN: ICD-10-CM

## 2025-01-12 ENCOUNTER — MYC REFILL (OUTPATIENT)
Dept: FAMILY MEDICINE | Facility: CLINIC | Age: 45
End: 2025-01-12
Payer: COMMERCIAL

## 2025-01-12 DIAGNOSIS — F90.2 ATTENTION DEFICIT HYPERACTIVITY DISORDER (ADHD), COMBINED TYPE: ICD-10-CM

## 2025-01-13 RX ORDER — DEXTROAMPHETAMINE SULFATE, DEXTROAMPHETAMINE SACCHARATE, AMPHETAMINE ASPARTATE MONOHYDRATE, AND AMPHETAMINE SULFATE 12.5; 12.5; 12.5; 12.5 MG/1; MG/1; MG/1; MG/1
50 CAPSULE, EXTENDED RELEASE ORAL DAILY
Qty: 30 CAPSULE | Refills: 0 | Status: SHIPPED | OUTPATIENT
Start: 2025-01-13

## 2025-01-20 ENCOUNTER — MYC REFILL (OUTPATIENT)
Dept: FAMILY MEDICINE | Facility: CLINIC | Age: 45
End: 2025-01-20

## 2025-01-20 DIAGNOSIS — R63.5 WEIGHT GAIN: ICD-10-CM

## 2025-02-05 ENCOUNTER — HOSPITAL ENCOUNTER (OUTPATIENT)
Dept: MRI IMAGING | Facility: CLINIC | Age: 45
Discharge: HOME | End: 2025-02-05
Attending: NURSE PRACTITIONER
Payer: COMMERCIAL

## 2025-02-05 DIAGNOSIS — M54.16 LUMBAR RADICULOPATHY: ICD-10-CM

## 2025-02-05 PROCEDURE — 72148 MRI LUMBAR SPINE W/O DYE: CPT | Mod: 26 | Performed by: RADIOLOGY

## 2025-02-05 PROCEDURE — 72148 MRI LUMBAR SPINE W/O DYE: CPT

## 2025-02-06 ENCOUNTER — MYC MEDICAL ADVICE (OUTPATIENT)
Dept: FAMILY MEDICINE | Facility: CLINIC | Age: 45
End: 2025-02-06
Payer: COMMERCIAL

## 2025-02-06 NOTE — RESULT ENCOUNTER NOTE
Traci,    The MRI doesn't show central canal stenosis which is what I'd be more emergently concerned about. There are some fairly big changes in terms of degenerative changes and foraminal stenosis since the MRI you had in 2016 - at least in terms of how they were read (in 2016, it was an emergency room visit so they were looking for anything emergent and may not have commented on chronic changes). Do you have an appt with PMR scheduled? I recommend reviewing your symptoms and the imaging with them for a plan. If you have any questions, please feel free to contact the clinic.    MARIBETH Hernanedz

## 2025-02-09 ENCOUNTER — HOSPITAL ENCOUNTER (OUTPATIENT)
Dept: MRI IMAGING | Facility: CLINIC | Age: 45
Discharge: HOME | End: 2025-02-09
Attending: STUDENT IN AN ORGANIZED HEALTH CARE EDUCATION/TRAINING PROGRAM | Admitting: STUDENT IN AN ORGANIZED HEALTH CARE EDUCATION/TRAINING PROGRAM
Payer: COMMERCIAL

## 2025-02-09 DIAGNOSIS — S96.212A STRAIN OF INTRINSIC MUSCLE AND TENDON AT ANKLE AND FOOT LEVEL, LEFT FOOT, INITIAL ENCOUNTER: ICD-10-CM

## 2025-02-09 DIAGNOSIS — S82.832A OTHER CLOSED FRACTURE OF DISTAL END OF LEFT FIBULA, INITIAL ENCOUNTER: ICD-10-CM

## 2025-02-09 DIAGNOSIS — M76.829 TIBIALIS POSTERIOR DYSFUNCTION: ICD-10-CM

## 2025-02-09 DIAGNOSIS — S92.902D CLOSED FRACTURE OF LEFT FOOT WITH ROUTINE HEALING, SUBSEQUENT ENCOUNTER: ICD-10-CM

## 2025-02-09 PROCEDURE — 73721 MRI JNT OF LWR EXTRE W/O DYE: CPT | Mod: 26 | Performed by: RADIOLOGY

## 2025-02-09 PROCEDURE — 73721 MRI JNT OF LWR EXTRE W/O DYE: CPT | Mod: LT

## 2025-02-10 ENCOUNTER — MYC REFILL (OUTPATIENT)
Dept: FAMILY MEDICINE | Facility: CLINIC | Age: 45
End: 2025-02-10
Payer: COMMERCIAL

## 2025-02-10 DIAGNOSIS — F90.2 ATTENTION DEFICIT HYPERACTIVITY DISORDER (ADHD), COMBINED TYPE: ICD-10-CM

## 2025-02-10 RX ORDER — DEXTROAMPHETAMINE SULFATE, DEXTROAMPHETAMINE SACCHARATE, AMPHETAMINE ASPARTATE MONOHYDRATE, AND AMPHETAMINE SULFATE 12.5; 12.5; 12.5; 12.5 MG/1; MG/1; MG/1; MG/1
50 CAPSULE, EXTENDED RELEASE ORAL DAILY
Qty: 90 CAPSULE | Refills: 0 | Status: SHIPPED | OUTPATIENT
Start: 2025-02-10

## 2025-02-12 DIAGNOSIS — F90.2 ATTENTION DEFICIT HYPERACTIVITY DISORDER (ADHD), COMBINED TYPE: ICD-10-CM

## 2025-02-13 RX ORDER — DEXTROAMPHETAMINE SULFATE, DEXTROAMPHETAMINE SACCHARATE, AMPHETAMINE ASPARTATE MONOHYDRATE, AND AMPHETAMINE SULFATE 12.5; 12.5; 12.5; 12.5 MG/1; MG/1; MG/1; MG/1
50 CAPSULE, EXTENDED RELEASE ORAL DAILY
Qty: 90 CAPSULE | Refills: 0 | Status: SHIPPED | OUTPATIENT
Start: 2025-02-13

## 2025-02-19 ENCOUNTER — E-VISIT (OUTPATIENT)
Dept: FAMILY MEDICINE | Facility: CLINIC | Age: 45
End: 2025-02-19
Payer: COMMERCIAL

## 2025-02-19 DIAGNOSIS — G57.12 MERALGIA PARESTHETICA OF LEFT SIDE: ICD-10-CM

## 2025-02-19 DIAGNOSIS — M79.605 LOW BACK PAIN RADIATING TO LEFT LOWER EXTREMITY: Primary | ICD-10-CM

## 2025-02-19 DIAGNOSIS — M54.50 LOW BACK PAIN RADIATING TO LEFT LOWER EXTREMITY: Primary | ICD-10-CM

## 2025-02-20 ENCOUNTER — MYC REFILL (OUTPATIENT)
Dept: FAMILY MEDICINE | Facility: CLINIC | Age: 45
End: 2025-02-20
Payer: COMMERCIAL

## 2025-02-20 DIAGNOSIS — R63.5 WEIGHT GAIN: ICD-10-CM

## 2025-02-20 RX ORDER — GABAPENTIN 100 MG/1
100-300 CAPSULE ORAL 3 TIMES DAILY PRN
Qty: 90 CAPSULE | Refills: 0 | Status: SHIPPED | OUTPATIENT
Start: 2025-02-20

## 2025-02-25 ENCOUNTER — OFFICE VISIT (OUTPATIENT)
Dept: ORTHOPEDICS | Facility: CLINIC | Age: 45
End: 2025-02-25
Payer: COMMERCIAL

## 2025-02-25 DIAGNOSIS — M76.829 TIBIALIS POSTERIOR DYSFUNCTION: ICD-10-CM

## 2025-02-25 DIAGNOSIS — S92.902D CLOSED FRACTURE OF LEFT FOOT WITH ROUTINE HEALING, SUBSEQUENT ENCOUNTER: Primary | ICD-10-CM

## 2025-02-25 DIAGNOSIS — S93.422S TEAR OF DELTOID LIGAMENT OF ANKLE, LEFT, SEQUELA: ICD-10-CM

## 2025-02-25 PROCEDURE — 99213 OFFICE O/P EST LOW 20 MIN: CPT | Performed by: STUDENT IN AN ORGANIZED HEALTH CARE EDUCATION/TRAINING PROGRAM

## 2025-02-25 NOTE — LETTER
2/25/2025      Traci Sloan  [address]  [facility] [address]      Dear Colleague,    Thank you for referring your patient, Traci Sloan, to the [facility] [place]. Please see a copy of my visit note below.    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      Previous Visit (10/23/24)   At last visit, left ankle/foot radiographs reviewed, MRI ordered, physical therapy referral placed, with plans to consider podiatry referral if symptoms persist.    Today (2/25/2025)  Since last visit, same pain/discomfort from previous visit. MRI was completed and plans to go over imaging today.      Previous Treatments:  -Rehabilitation: 3 PT visits done in the months following injury- helpful  -Durable Medical Equipment:initially in a walking boot and crutches x2 week.   -Injections: No  -Modalities: Normatec boots  -Other Providers seen: Initial workup from Dr. Bonner, (sports med) and referral for [facility] omes from PCP Marcela LOGAN.   -Medications: robaxin as needed    Sports/hobbies/activities:  - biking/peloton  - running, light weight training    Average hours of sleep per night: 5-6 hours    Average minutes of exercise per day: Daily 60 mins    Area of Problem  10/23/2024  Date 2 Date 3 Date 4 Date 5   Function Ability in last week - % of Baseline (0 is worst & 100 is best)  70%*       Sport/Activity Ability in last week - % of Baseline (0 is worst & 100 is best) 30%**       Pain Level in the last week (0 is best & 10 is worst) 1/10-3/10       *going up the stairs, walking, putting on clothes, bending over  **Can't push foot down, can't wear shoes with weights  due to increased instability, she can't stretch like she used to    Additional Information of consideration:  -Poor Balance?Yes  -Numbness/paresthesias in extremities?Yes in toes/foot    MEDICATIONS    Current Outpatient Medications:      albuterol (PROAIR HFA/PROVENTIL HFA/VENTOLIN HFA) 108 (90 Base) MCG/ACT inhaler, Inhale 2 puffs into the lungs every 6 hours, Disp: 18 g, Rfl: 1     COMPOUNDED NON-CONTROLLED SUBSTANCE (CMPD RX) - PHARMACY TO MIX COMPOUNDED MEDICATION, Inject 2.4 mg semaglutide subcutaneous weekly, Disp: 4 each, Rfl: 3     COMPOUNDED NON-CONTROLLED SUBSTANCE (CMPD RX) - PHARMACY TO MIX COMPOUNDED MEDICATION, semaglutide 2.5 mg injection  every 7 days, Disp: 4 each, Rfl: 3     fluticasone (FLONASE) 50 MCG/ACT nasal spray, Spray 1 spray into both nostrils daily, Disp: 16 g, Rfl: 3     gabapentin (NEURONTIN) 100 MG capsule, Take 1-3 capsules (100-300 mg) by mouth 3 times daily as needed for neuropathic pain., Disp: 90 capsule, Rfl: 0     ipratropium (ATROVENT) 0.06 % nasal spray, Spray 2 sprays into both nostrils 4 times daily, Disp: 15 mL, Rfl: 1     ketoconazole (NIZORAL) 2 % external shampoo, LUIS EXT D PRF ITCHING OR IRRITATION, Disp: 120 mL, Rfl: 11     lidocaine (LIDODERM) 5 % patch, Place 1 patch onto the skin every 24 hours To prevent lidocaine toxicity, patient should be patch free for 12 hrs daily. (Patient taking differently: Place onto the skin every 24 hours. To prevent lidocaine toxicity, patient should be patch free for 12 hrs daily.), Disp: 10 patch, Rfl: 3     methocarbamol (ROBAXIN) 500 MG tablet, Take 1 tablet (500 mg) by mouth 4 times daily as needed for muscle spasms., Disp: 30 tablet, Rfl: 1     [START ON 10/25/2024] MYDAYIS 50 MG CP24, Take 50 mg by mouth daily., Disp: 30 capsule, Rfl: 0     [START ON 11/23/2024] MYDAYIS 50 MG CP24, Take 50 mg by mouth daily., Disp: 30 capsule, Rfl: 0     [START ON 12/21/2024] MYDAYIS 50 MG CP24, Take 50 mg by mouth daily., Disp: 30 capsule, Rfl:  0     MYDAYIS 50 MG CP24, Take 50 mg by mouth daily., Disp: 30 capsule, Rfl: 0     olmesartan-hydrochlorothiazide (BENICAR HCT) 20-12.5 MG tablet, Take 1 tablet by mouth daily., Disp: 90 tablet, Rfl: 1     tirzepatide-Weight Management (ZEPBOUND) 7.5 MG/0.5ML prefilled pen, Inject 0.5 mLs (7.5 mg) Subcutaneous every 7 days (Patient not taking: Reported on 2024), Disp: 6 mL, Rfl: 1    ALLERGIES  No Known Allergies    PAST MEDICAL HISTORY  Past Medical History:   Diagnosis Date     Abnormal Pap smear of cervix 2018, 2018, 19, 21     Anemia      Cervical high risk HPV (human papillomavirus) test positive 2021     Gastroesophageal reflux disease 2006     Irritable bowel syndrome 2006     Preeclampsia 2019       PAST SURGICAL HISTORY  Past Surgical History:   Procedure Laterality Date     CYSTOSCOPY, DILATE URETHRA, COMBINED  1999    for frequent UTI'S     INJECT SACROILIAC JOINT Right 2022    Procedure: INJECTION, SACROILIAC JOINT;  Surgeon: Arya Freeman MD;  Location: [facility] OR     INJECT STEROID TROCHANTERIC BURSA Right 2022    Procedure: INJECTION, STEROID, BURSA, TROCHANTERIC;  Surgeon: Arya Freeman MD;  Location: [facility] OR     INJECT TRIGGER POINT SINGLE / MULTIPLE 1 OR 2 MUSCLES Right 2022    Procedure: INJECTION, TRIGGER POINT, MUSCLE, 1 OR 2 MUSCLES;  Surgeon: Arya Freeman MD;  Location: [facility] OR     KNEE SURGERY  10/1997       SOCIAL HISTORY  Social History     Tobacco Use     Smoking status: Former     Current packs/day: 0.00     Types: Cigarettes     Quit date: 10/25/2015     Years since quittin.3     Passive exposure: Never     Smokeless tobacco: Never   Vaping Use     Vaping status: Never Used   Substance Use Topics     Alcohol use: Yes     Comment: drinks with occasions only typically - average 3 glasses of wine per week     Drug use: No       FAMILY HISTORY  Family History   Problem  Relation Age of Onset     Hypothyroidism Mother      Breast Cancer Mother      Myocardial Infarction Father      Hypertension Father      Hypertension Sister      Heart Failure Maternal Grandmother         2/2 rheumatic fever     Breast Cancer Maternal Grandmother      Colon Cancer Maternal Grandmother      Cerebrovascular Disease Maternal Grandfather         mid-80's     Prostate Cancer Maternal Grandfather      Mental Illness Paternal Grandmother      Diabetes Paternal Grandfather      Myocardial Infarction Paternal Grandfather      No Known Problems Daughter      No Known Problems Son        REVIEW OF SYSTEMS  Complete 12 system Review of Systems performed and was negative except for HPI.    VITALS  Vitals:    10/23/24 1034   BP: 112/60       PHYSICAL EXAMINATION   General: Age appropriate appearing, no acute distress  HEENT: normocephalic, atraumatic, sclera non-icteric  Skin: No open skin lesion noted in visible areas.  Respiratory: Non labored breathing, No wheezes.  Cardiac/Vessels: No edema, cyanosis, clubbing noted in all extremities.  Lymph: No palpable lymph node swelling noted around the affected area.  Mental: There was no signs of aberrant behaviors noted. Patient was pleasant throughout the encounter.    Functional Movements: Non-antalgic gait appreciated.  Double leg squat reveals dynamic left compared to right leg instability.    LEFT ANKLE/FOOT: Shoe Wear: boots  Inspection: bilateral hindfoot valgus, normal arch noted, left more than right foot supination with walking, positve too many toes sign  Palpation: TTP left tibial head, navicular, and around the posteromedial aspects of the lateral malleolus.  No TTP medial malleoli   Range of Motion [estimated degrees, active (patient performed) unless noted, L/R]:   Inversion [20*/30]   Eversion [20/20]  Dorsiflexion [20/20]  Plantarflexion(in knee flexion = soleus vs. knee extension = gastroc) [40/40]  *loss of left foot inversion ROM and  "plantarflexion strength compared to right  Sensation: Numbness to light touch throughout left leg and foot compared to right.  Strength [L (0-5) / R (0-5)]: Ankle: Eversion [5/5], Inversion [5/5], Dorsiflexion [5/5], Plantarflexion [4*/5]   Special Testing:   Squeeze test (High ankle sprain or Chakraborty's Neuroma): (-)  Anterior drawer test (ATFL): (+)  Talar tilt test (CFL): (+)    IMAGING STUDIES:  2/9/2025 Left ankle MRI: \"Impression:  1. Tibialis posterior anterior dislocation.  *  Tendon coursing deep to the thickened medial flexor retinaculum.   2. 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  3. Sequale of chronic injury to deltoid ligamentous complex.  *  Marked thickening of superficial component of the deltoid ligament.     *  High-grade chronic-appearing tear of the deep component of the  deltoid with relatively thickened anterior tibiotalar ligament.\"    10/23/2024 left ankle and foot radiographs: \"1. Tibialis posterior anterior dislocation.  *  Tendon coursing deep to the thickened medial flexor retinaculum.   2. Chronic ununited infra-syndesmotic fracture with opposing bony edema.  3. Sequale of chronic injury to deltoid ligamentous complex.  *  Marked thickening of superficial component of the deltoid ligament.     *  High-grade chronic-appearing tear of the deep component of the deltoid with relatively thickened anterior tibiotalar ligament.\"    \"Negative for acute left foot or ankle fracture. Normal joint spacing. There are chronic appearing small ununited fracture fragments adjacent to the lateral malleolus and medial talus and likely along the medial malleolus more superiorly as well.\"    4/13/2023 left ankle CT without contrast \"Multiple ankle/foot fractures: Acute on chronic fracture deformity of the lateral malleolus.  Apparent avulsion fracture from the posterior distal fibula in the region of the posterior talofibular ligament.  Avulsive fracture fragment arising from " "the posterior facet of the  medial plantar talus.  Tiny ossific fragment anterior to the talar dome/tibial plafond of uncertain acuity.  Small fracture of the anterior process of the calcaneus.  Possible tiny dorsal talonavicular avulsion fracture.  Nondisplaced intra-articular fracture of the distal cuboid at the articulation with the fourth metatarsal base. Cortical irregularity of the proximal dorsal cuboid, fracture not excluded.\"    10/23/24 left ankle and foot radiographs \"Negative for acute left foot or ankle fracture. Normal joint spacing. There are chronic appearing small ununited fracture fragments adjacent to the lateral malleolus and medial talus and likely along the medial malleolus more superiorly as well.\"    I personally reviewed these images and agree with the radiology report and shared the findings with the patient.    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    PLAN  The following was discussed with the patient:  Activity Modification: As tolerated  Imaging/Tests: Left ankle MRI reviewed  Rehabilitation: Continue home exercises  Orthotics/Bracing: Ankle brace recommended with activity  Medication: Tylenol 500-1000mg (up to three times per day) and ibuprofen 600mg (up to three times per day) as needed for pain and swelling. Always take ibuprofen with food.     Interventions: None recommended at this time  Referral: Podiatry referral placed  Follow-up Plan: As needed  Resources Provided: Written and verbal information detailing above findings and plan provided including after visit summary.    They were " encouraged to message me on [employer] whenever they needed.    The patient was in agreement with this plan. All questions were answered to the best of my ability.    Total time (face-to-face and non-face-to-face) spent on today's visit was 25 minutes. This included preparation for the visit (i.e. reviewing test results), performance of a medically appropriate history and examination, placing orders for medications, tests or other procedures, and discussing the plan of care with the patient. This time is exclusive of procedures performed and time spent teaching.     Elsa Kincaid MD, [facility]  Sports Medicine Attending Physician  Department of Physical Medicine & Rehabilitation      Again, thank you for allowing me to participate in the care of your patient.        Sincerely,        Elsa Kincaid MD    Electronically signed

## 2025-02-25 NOTE — PROGRESS NOTES
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      Previous Visit (10/23/24)   At last visit, left ankle/foot radiographs reviewed, MRI ordered, physical therapy referral placed, with plans to consider podiatry referral if symptoms persist.    Today (2/25/2025)  Since last visit, same pain/discomfort from previous visit. MRI was completed and plans to go over imaging today.      Previous Treatments:  -Rehabilitation: 3 PT visits done in the months following injury- helpful  -Durable Medical Equipment:initially in a walking boot and crutches x2 week.   -Injections: No  -Modalities: Normatec boots  -Other Providers seen: Initial workup from Dr. Bonner, (sports med) and referral for [facility] omes from PCP Marcela LOGAN.   -Medications: robaxin as needed    Sports/hobbies/activities:  - biking/peloton  - running, light weight training    Average hours of sleep per night: 5-6 hours    Average minutes of exercise per day: Daily 60 mins    Area of Problem  10/23/2024  Date 2 Date 3 Date 4 Date 5   Function Ability in last week - % of Baseline (0 is worst & 100 is best)  70%*       Sport/Activity Ability in last week - % of Baseline (0 is worst & 100 is best) 30%**       Pain Level in the last week (0 is best & 10 is worst) 1/10-3/10       *going up the stairs, walking, putting on clothes, bending over  **Can't push foot down, can't wear shoes with weights due to increased instability, she can't stretch like she used to    Additional Information of consideration:  -Poor Balance?Yes  -Numbness/paresthesias in extremities?Yes in toes/foot    MEDICATIONS    Current Outpatient Medications:     albuterol (PROAIR  HFA/PROVENTIL HFA/VENTOLIN HFA) 108 (90 Base) MCG/ACT inhaler, Inhale 2 puffs into the lungs every 6 hours, Disp: 18 g, Rfl: 1    COMPOUNDED NON-CONTROLLED SUBSTANCE (CMPD RX) - PHARMACY TO MIX COMPOUNDED MEDICATION, Inject 2.4 mg semaglutide subcutaneous weekly, Disp: 4 each, Rfl: 3    COMPOUNDED NON-CONTROLLED SUBSTANCE (CMPD RX) - PHARMACY TO MIX COMPOUNDED MEDICATION, semaglutide 2.5 mg injection  every 7 days, Disp: 4 each, Rfl: 3    fluticasone (FLONASE) 50 MCG/ACT nasal spray, Spray 1 spray into both nostrils daily, Disp: 16 g, Rfl: 3    gabapentin (NEURONTIN) 100 MG capsule, Take 1-3 capsules (100-300 mg) by mouth 3 times daily as needed for neuropathic pain., Disp: 90 capsule, Rfl: 0    ipratropium (ATROVENT) 0.06 % nasal spray, Spray 2 sprays into both nostrils 4 times daily, Disp: 15 mL, Rfl: 1    ketoconazole (NIZORAL) 2 % external shampoo, LUIS EXT D PRF ITCHING OR IRRITATION, Disp: 120 mL, Rfl: 11    lidocaine (LIDODERM) 5 % patch, Place 1 patch onto the skin every 24 hours To prevent lidocaine toxicity, patient should be patch free for 12 hrs daily. (Patient taking differently: Place onto the skin every 24 hours. To prevent lidocaine toxicity, patient should be patch free for 12 hrs daily.), Disp: 10 patch, Rfl: 3    methocarbamol (ROBAXIN) 500 MG tablet, Take 1 tablet (500 mg) by mouth 4 times daily as needed for muscle spasms., Disp: 30 tablet, Rfl: 1    [START ON 10/25/2024] MYDAYIS 50 MG CP24, Take 50 mg by mouth daily., Disp: 30 capsule, Rfl: 0    [START ON 11/23/2024] MYDAYIS 50 MG CP24, Take 50 mg by mouth daily., Disp: 30 capsule, Rfl: 0    [START ON 12/21/2024] MYDAYIS 50 MG CP24, Take 50 mg by mouth daily., Disp: 30 capsule, Rfl: 0    MYDAYIS 50 MG CP24, Take 50 mg by mouth daily., Disp: 30 capsule, Rfl: 0    olmesartan-hydrochlorothiazide (BENICAR HCT) 20-12.5 MG tablet, Take 1 tablet by mouth daily., Disp: 90 tablet, Rfl: 1    tirzepatide-Weight Management (ZEPBOUND) 7.5 MG/0.5ML prefilled  pen, Inject 0.5 mLs (7.5 mg) Subcutaneous every 7 days (Patient not taking: Reported on 2024), Disp: 6 mL, Rfl: 1    ALLERGIES  No Known Allergies    PAST MEDICAL HISTORY  Past Medical History:   Diagnosis Date    Abnormal Pap smear of cervix 2018, 2018, 19, 21    Anemia     Cervical high risk HPV (human papillomavirus) test positive 2021    Gastroesophageal reflux disease 2006    Irritable bowel syndrome 2006    Preeclampsia 2019       PAST SURGICAL HISTORY  Past Surgical History:   Procedure Laterality Date    CYSTOSCOPY, DILATE URETHRA, COMBINED  1999    for frequent UTI'S    INJECT SACROILIAC JOINT Right 2022    Procedure: INJECTION, SACROILIAC JOINT;  Surgeon: Arya Freeman MD;  Location: [facility] OR    INJECT STEROID TROCHANTERIC BURSA Right 2022    Procedure: INJECTION, STEROID, BURSA, TROCHANTERIC;  Surgeon: Arya Freeman MD;  Location: [facility] OR    INJECT TRIGGER POINT SINGLE / MULTIPLE 1 OR 2 MUSCLES Right 2022    Procedure: INJECTION, TRIGGER POINT, MUSCLE, 1 OR 2 MUSCLES;  Surgeon: Arya Freeman MD;  Location: [facility] OR    KNEE SURGERY  10/1997       SOCIAL HISTORY  Social History     Tobacco Use    Smoking status: Former     Current packs/day: 0.00     Types: Cigarettes     Quit date: 10/25/2015     Years since quittin.3     Passive exposure: Never    Smokeless tobacco: Never   Vaping Use    Vaping status: Never Used   Substance Use Topics    Alcohol use: Yes     Comment: drinks with occasions only typically - average 3 glasses of wine per week    Drug use: No       FAMILY HISTORY  Family History   Problem Relation Age of Onset    Hypothyroidism Mother     Breast Cancer Mother     Myocardial Infarction Father     Hypertension Father     Hypertension Sister     Heart Failure Maternal Grandmother         2/2 rheumatic fever    Breast Cancer Maternal Grandmother     Colon Cancer Maternal  Grandmother     Cerebrovascular Disease Maternal Grandfather         mid-80's    Prostate Cancer Maternal Grandfather     Mental Illness Paternal Grandmother     Diabetes Paternal Grandfather     Myocardial Infarction Paternal Grandfather     No Known Problems Daughter     No Known Problems Son        REVIEW OF SYSTEMS  Complete 12 system Review of Systems performed and was negative except for HPI.    VITALS  Vitals:    10/23/24 1034   BP: 112/60       PHYSICAL EXAMINATION   General: Age appropriate appearing, no acute distress  HEENT: normocephalic, atraumatic, sclera non-icteric  Skin: No open skin lesion noted in visible areas.  Respiratory: Non labored breathing, No wheezes.  Cardiac/Vessels: No edema, cyanosis, clubbing noted in all extremities.  Lymph: No palpable lymph node swelling noted around the affected area.  Mental: There was no signs of aberrant behaviors noted. Patient was pleasant throughout the encounter.    Functional Movements: Non-antalgic gait appreciated.  Double leg squat reveals dynamic left compared to right leg instability.    LEFT ANKLE/FOOT: Shoe Wear: boots  Inspection: bilateral hindfoot valgus, normal arch noted, left more than right foot supination with walking, positve too many toes sign  Palpation: TTP left tibial head, navicular, and around the posteromedial aspects of the lateral malleolus.  No TTP medial malleoli   Range of Motion [estimated degrees, active (patient performed) unless noted, L/R]:   Inversion [20*/30]   Eversion [20/20]  Dorsiflexion [20/20]  Plantarflexion(in knee flexion = soleus vs. knee extension = gastroc) [40/40]  *loss of left foot inversion ROM and plantarflexion strength compared to right  Sensation: Numbness to light touch throughout left leg and foot compared to right.  Strength [L (0-5) / R (0-5)]: Ankle: Eversion [5/5], Inversion [5/5], Dorsiflexion [5/5], Plantarflexion [4*/5]   Special Testing:   Squeeze test (High ankle sprain or Chakraborty's  "Neuroma): (-)  Anterior drawer test (ATFL): (+)  Talar tilt test (CFL): (+)    IMAGING STUDIES:  2/9/2025 Left ankle MRI: \"Impression:  1. Tibialis posterior anterior dislocation.  *  Tendon coursing deep to the thickened medial flexor retinaculum.   2. 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  3. Sequale of chronic injury to deltoid ligamentous complex.  *  Marked thickening of superficial component of the deltoid ligament.     *  High-grade chronic-appearing tear of the deep component of the  deltoid with relatively thickened anterior tibiotalar ligament.\"    10/23/2024 left ankle and foot radiographs: \"1. Tibialis posterior anterior dislocation.  *  Tendon coursing deep to the thickened medial flexor retinaculum.   2. Chronic ununited infra-syndesmotic fracture with opposing bony edema.  3. Sequale of chronic injury to deltoid ligamentous complex.  *  Marked thickening of superficial component of the deltoid ligament.     *  High-grade chronic-appearing tear of the deep component of the deltoid with relatively thickened anterior tibiotalar ligament.\"    \"Negative for acute left foot or ankle fracture. Normal joint spacing. There are chronic appearing small ununited fracture fragments adjacent to the lateral malleolus and medial talus and likely along the medial malleolus more superiorly as well.\"    4/13/2023 left ankle CT without contrast \"Multiple ankle/foot fractures: Acute on chronic fracture deformity of the lateral malleolus.  Apparent avulsion fracture from the posterior distal fibula in the region of the posterior talofibular ligament.  Avulsive fracture fragment arising from the posterior facet of the  medial plantar talus.  Tiny ossific fragment anterior to the talar dome/tibial plafond of uncertain acuity.  Small fracture of the anterior process of the calcaneus.  Possible tiny dorsal talonavicular avulsion fracture.  Nondisplaced intra-articular fracture of the distal " "cuboid at the articulation with the fourth metatarsal base. Cortical irregularity of the proximal dorsal cuboid, fracture not excluded.\"    10/23/24 left ankle and foot radiographs \"Negative for acute left foot or ankle fracture. Normal joint spacing. There are chronic appearing small ununited fracture fragments adjacent to the lateral malleolus and medial talus and likely along the medial malleolus more superiorly as well.\"    I personally reviewed these images and agree with the radiology report and shared the findings with the patient.    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    PLAN  The following was discussed with the patient:  Activity Modification: As tolerated  Imaging/Tests: Left ankle MRI reviewed  Rehabilitation: Continue home exercises  Orthotics/Bracing: Ankle brace recommended with activity  Medication: Tylenol 500-1000mg (up to three times per day) and ibuprofen 600mg (up to three times per day) as needed for pain and swelling. Always take ibuprofen with food.     Interventions: None recommended at this time  Referral: Podiatry referral placed  Follow-up Plan: As needed  Resources Provided: Written and verbal information detailing above findings and plan provided including after visit summary.    They were encouraged to message me on happn whenever they needed.    The patient was in agreement with this plan. All questions were answered to the best of my ability.    Total time (face-to-face and non-face-to-face) spent on today's visit was 25 minutes. This included preparation for the " visit (i.e. reviewing test results), performance of a medically appropriate history and examination, placing orders for medications, tests or other procedures, and discussing the plan of care with the patient. This time is exclusive of procedures performed and time spent teaching.     Elsa Kincaid MD, [facility]  Sports Medicine Attending Physician  Department of Physical Medicine & Rehabilitation

## 2025-02-25 NOTE — PATIENT INSTRUCTIONS
Traci Sloan, It was nice to see you in our office today.      DIAGNOSIS:   1. Closed fracture of left foot with routine healing, subsequent encounter    2. Tibialis posterior dysfunction    3. Tear of deltoid ligament of ankle, left, sequela       INSTRUCTIONS FOR FOLLOW-UP CARE:  Activity Modification: As tolerated  Imaging/Tests: Left ankle MRI reviewed  Rehabilitation: Continue home exercises  Orthotics/Bracing: Ankle brace recommended with activity  Medication: Tylenol 500-1000mg (up to three times per day) and ibuprofen 600mg (up to three times per day) as needed for pain and swelling. Always take ibuprofen with food.     Interventions: None recommended at this time  Referral: Podiatry referral placed  Follow-up Plan: As needed    CLINIC LOCATIONS:     [place] [facility] RECORDS:  [phone] [address], [address] [employer] HELP LINE: [phone]   [place] [address] TRIAGE LINE: [phone]   (Monday & Friday) APPOINTMENTS: [phone]    RADIOLOGY: [phone]   [place] [facility] [phone]   [address] PHYSICAL & OCCUPATIONAL THERAPY: [phone]*   [place] [address] BILLING QUESTIONS: [phone]   (Tuesday & Thursday) FAX: [phone]       *Therapy locations that offer Saturday options: [place], [place], [place]    Thank you for choosing [facility] Sports Medicine!    If you have any questions, please do not hesitate to reach out on VEASYT or Call [phone] and ask for my team.    Elsa Kincaid MD, [facility] and Sports Medicine

## 2025-02-26 ENCOUNTER — PATIENT OUTREACH (OUTPATIENT)
Dept: CARE COORDINATION | Facility: CLINIC | Age: 45
End: 2025-02-26
Payer: COMMERCIAL

## 2025-03-03 ENCOUNTER — TELEPHONE (OUTPATIENT)
Dept: ORTHOPEDICS | Facility: CLINIC | Age: 45
End: 2025-03-03
Payer: COMMERCIAL

## 2025-03-03 NOTE — TELEPHONE ENCOUNTER
Please see pt's ORTHO referral (ON FILE) from MD Sanjiv    Fracture  Other (please see all notes on referral)    Recent MRI ON FILE    Please triage and CALL pt to schedule. Thank you.

## 2025-03-04 NOTE — TELEPHONE ENCOUNTER
DIAGNOSIS:   Closed fracture of left foot with routine healing, subsequent encounter [R93.061G]  - Primary  Tibialis posterior dysfunction [M76.829]  Tear of deltoid ligament of ankle, left, sequela [S93.422S]   APPOINTMENT DATE: 03/11/2025   NOTES STATUS DETAILS   OFFICE NOTE from referring provider Internal 02/25/2025 - Elsa Kincaid MD - [facility] Sports Medicine   OFFICE NOTE from other specialist Internal 05/23/2023 - Girish Bonner MD - [facility] Sports Medicine    04/14/2022 - Rigo Atkins MD- [facility] Sports Medicine    07/12/2018 - Sorin Mcrae DPM   - [facility] Podiatry   (IMAGES & REPORTS) Internal

## 2025-03-04 NOTE — TELEPHONE ENCOUNTER
Patient confirmed scheduled appointment:  Date: 3/11/2025  Time: 2:10pm  Visit type: NEW FOOT/ANKLE  Provider:   Location: [facility] [place]

## 2025-03-10 DIAGNOSIS — G57.12 MERALGIA PARESTHETICA OF LEFT SIDE: ICD-10-CM

## 2025-03-10 DIAGNOSIS — M54.50 LOW BACK PAIN RADIATING TO LEFT LOWER EXTREMITY: ICD-10-CM

## 2025-03-10 DIAGNOSIS — M79.605 LOW BACK PAIN RADIATING TO LEFT LOWER EXTREMITY: ICD-10-CM

## 2025-03-10 RX ORDER — GABAPENTIN 100 MG/1
100-300 CAPSULE ORAL 3 TIMES DAILY PRN
Qty: 90 CAPSULE | Refills: 0 | Status: SHIPPED | OUTPATIENT
Start: 2025-03-10

## 2025-03-11 ENCOUNTER — PRE VISIT (OUTPATIENT)
Dept: ORTHOPEDICS | Facility: CLINIC | Age: 45
End: 2025-03-11

## 2025-03-11 ENCOUNTER — OFFICE VISIT (OUTPATIENT)
Dept: ORTHOPEDICS | Facility: CLINIC | Age: 45
End: 2025-03-11
Attending: STUDENT IN AN ORGANIZED HEALTH CARE EDUCATION/TRAINING PROGRAM
Payer: COMMERCIAL

## 2025-03-11 VITALS — BODY MASS INDEX: 20.72 KG/M2 | WEIGHT: 132 LBS | HEIGHT: 67 IN

## 2025-03-11 DIAGNOSIS — S93.422S TEAR OF DELTOID LIGAMENT OF ANKLE, LEFT, SEQUELA: ICD-10-CM

## 2025-03-11 DIAGNOSIS — M76.829 TIBIALIS POSTERIOR DYSFUNCTION: ICD-10-CM

## 2025-03-11 DIAGNOSIS — S92.902D CLOSED FRACTURE OF LEFT FOOT WITH ROUTINE HEALING, SUBSEQUENT ENCOUNTER: ICD-10-CM

## 2025-03-11 PROCEDURE — 99204 OFFICE O/P NEW MOD 45 MIN: CPT | Performed by: ORTHOPAEDIC SURGERY

## 2025-03-11 PROCEDURE — 1125F AMNT PAIN NOTED PAIN PRSNT: CPT | Performed by: ORTHOPAEDIC SURGERY

## 2025-03-11 NOTE — NURSING NOTE
"Reason For Visit:   Chief Complaint   Patient presents with    Consult     Closed fracture of left foot with routine healing       Ht 1.702 m (5' 7\")   Wt 59.9 kg (132 lb)   BMI 20.67 kg/m      Pain Assessment  Patient Currently in Pain: Yes  0-10 Pain Scale: 1 (standing will increase the pain to a 3)  Primary Pain Location: Foot (Left)  Pain Descriptors: Constant    Hanna Cazares, ATC    "

## 2025-03-11 NOTE — PROGRESS NOTES
Chief complaint: Left ankle stiffness and pain    Patient is a 44-year-old female who presents today for evaluation of her left ankle.  She reports to have had a fracture on April 2023 which consist of a lateral malleolus avulsion fracture.  Patient reports to have been treated with a cam walker and eventually crutches for a few weeks.  Subsequent to that she has developed quite a bit of stiffness and discomfort to the point that she cannot does break through the dorsiflexion of the ankle.    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  Reports to have also young children that requires attention and to enjoy biking and a variety of activities including paddle board and summertime.    Overall she reports to be quite limited by this to be in constant pain and to look for some alternative from her to improve her discomfort across the ankle joint.  Patient reports to be unable to run secondary to the pain.  When asked about the pain she will point to the lateral anterior and posterior aspect of the ankle joint.    We reviewed today her past medical and surgical history current medications and drug allergies    Into the visit she presents as a pleasant female in no apparent distress with a weight of 132 pounds.  Denies to have any constitutional symptoms    On today's visit she presents with an ankle dorsiflexion of approximately 5 degrees which is less than the opposite side.  I suspect the Achilles tendon is a contributing factor to the stiffness.  Is impossible to tell how much the capsule is contributing to the stiffness.  There is pain with the patient to the lateral aspect of the lateral malleolus along the tip of the lateral malleolus where the avulsion fragment is located.  Anterior medial gutter also somewhat tender.  There is no effusion.  There are palpable pulses.    An MRI of the left ankle was reviewed today which is grossly nondiagnostic except for the presence  of a nonunion across the lateral malleolus fracture.  I did not visualize any anterior blockage or Achilles tendon pathology.    Assessment: Left ankle fibrosis with the stiffness.  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  Patient also will be provided with a Dynasplint to injury try to increase dorsiflexion as much as possible    The 3 possible scenarios coming up would be that we do not gain any dorsiflexion with the use of the Botox which would suggest to have very tight posterior capsule which would require surgical release versus to gain some dorsiflexion that eventually it is lost once the Botox and affect disappears versus to gain dorsiflexion that he will be maintained once the Botox and it is resolved.    Patient will contact us with regards to her progress.  In the meantime she has no restrictions.  All questions were answered.    TT 30 minutes

## 2025-03-11 NOTE — LETTER
3/11/2025      Traci Sloan  [address] [facility] [address]      Dear Colleague,    Thank you for referring your patient, Traci Sloan, to the [facility] [place]. Please see a copy of my visit note below.    Chief complaint: Left ankle stiffness and pain    Patient is a 44-year-old female who presents today for evaluation of her left ankle.  She reports to have had a fracture on April 2023 which consist of a lateral malleolus avulsion fracture.  Patient reports to have been treated with a cam walker and eventually crutches for a few weeks.  Subsequent to that she has developed quite a bit of stiffness and discomfort to the point that she cannot does break through the dorsiflexion of the ankle.    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  Reports to have also young children that requires attention and to enjoy biking and a variety of activities including paddle board and summertime.    Overall she reports to be quite limited by this to be in constant pain and to look for some alternative from her to improve her discomfort across the ankle joint.  Patient reports to be unable to run secondary to the pain.  When asked about the pain she will point to the lateral anterior and posterior aspect of the ankle joint.    We reviewed today her past medical and surgical history current medications and drug allergies    Into the visit she presents as a pleasant female in no apparent distress with a weight of 132 pounds.  Denies to have any constitutional symptoms    On today's visit she presents with an ankle dorsiflexion of approximately 5 degrees which is less than the opposite side.  I suspect the Achilles tendon is a contributing factor to the stiffness.  Is impossible to tell how much the capsule is contributing to the stiffness.  There is pain with the patient to the lateral aspect of the lateral malleolus along the tip of the lateral  malleolus where the avulsion fragment is located.  Anterior medial gutter also somewhat tender.  There is no effusion.  There are palpable pulses.    An MRI of the left ankle was reviewed today which is grossly nondiagnostic except for the presence of a nonunion across the lateral malleolus fracture.  I did not visualize any anterior blockage or Achilles tendon pathology.    Assessment: Left ankle fibrosis with the stiffness.  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  Patient also will be provided with a Dynasplint to injury try to increase dorsiflexion as much as possible    The 3 possible scenarios coming up would be that we do not gain any dorsiflexion with the use of the Botox which would suggest to have very tight posterior capsule which would require surgical release versus to gain some dorsiflexion that eventually it is lost once the Botox and affect disappears versus to gain dorsiflexion that he will be maintained once the Botox and it is resolved.    Patient will contact us with regards to her progress.  In the meantime she has no restrictions.  All questions were answered.    TT 30 minutes      Again, thank you for allowing me to participate in the care of your patient.        Sincerely,        Tony Telles MD    Electronically signed

## 2025-03-18 ENCOUNTER — MEDICAL CORRESPONDENCE (OUTPATIENT)
Dept: HEALTH INFORMATION MANAGEMENT | Facility: CLINIC | Age: 45
End: 2025-03-18
Payer: COMMERCIAL

## 2025-03-27 ENCOUNTER — OFFICE VISIT (OUTPATIENT)
Dept: PHYSICAL MEDICINE AND REHAB | Facility: CLINIC | Age: 45
End: 2025-03-27
Payer: COMMERCIAL

## 2025-03-27 DIAGNOSIS — R26.9 ABNORMAL GAIT: ICD-10-CM

## 2025-03-27 DIAGNOSIS — M53.3 DISORDER OF SACRUM: ICD-10-CM

## 2025-03-27 DIAGNOSIS — M62.838 SPASM OF MUSCLE: ICD-10-CM

## 2025-03-27 DIAGNOSIS — M79.18 MYALGIA, OTHER SITE: ICD-10-CM

## 2025-03-27 DIAGNOSIS — G24.8 FOCAL DYSTONIA: Primary | ICD-10-CM

## 2025-03-27 DIAGNOSIS — M70.61 TROCHANTERIC BURSITIS OF RIGHT HIP: ICD-10-CM

## 2025-03-27 NOTE — LETTER
3/27/2025       RE: Traci Sloan  [address]  [facility] [address]     Dear Colleague,    Thank you for referring your patient, Traci Sloan, to the [facility] PHYSICAL MED[facility]INE [facility]ABILIT[facility]IN[facility] [place] [facility]. Please see a copy of my visit note below.    CC: I am seeing this patient for evaluation of chronic low back pain affecting the right side.     History of present illness: Patient is a very pleasant 44 -year-old woman who reports having low back pain since age 17.  She did gymnastics.  She recalls requiring surgery to her right knee while in 10th grade.  During this time she was walking with some imbalance.  She also has history of tear in her right knee cartilage that also affected her gait.  She had pain worsening a few years back when she attempted to handover her 1-1/2-year-old child to her mother and her back seized up.  She rated the pain at a 10 out of 10.  It slowly improved over time.  Did physical therapy.  However the pain never went away completely.  On a scale of 0-10 she rated it at its best 1, most often at 3 and at worst a 7.  In addition to physical therapy she has tried medications without improvement.  She has had imaging studies including MRI of the lumbar spine which was negative.  It was done at the time when she was having back pain with fever and they were evaluating for discitis.  She denies any numbness or tingling denies any bowel bladder disturbance.  She subsequently had SI joint, piriformis and trochanteric bursal injection done on the right side and had good improvement.    Currently she has been dealing with a recurrence of pain as well as difficulty with her left foot.  She has history of fractures and had been wearing boots for some time.  She finds the gastrocnemius and other muscles of the foot have started tightening up.  She finds this affects her gait and her activities.  Her physician  suggested she could benefit from Botox she is here for evaluation.    Past Medical History        Past Medical History:   Diagnosis Date     Abnormal Pap smear of cervix 08/22/2018, 04/2/19 08/22/2018, 04/2/19, 04/16/21     Anemia       Cervical high risk HPV (human papillomavirus) test positive 04/16/2021 04/16/21     Gastroesophageal reflux disease 1/5/2006     Irritable bowel syndrome 1/5/2006     Preeclampsia 02/13/2019            Past Surgical History:   Procedure Laterality Date     CYSTOSCOPY, DILATE URETHRA, COMBINED  11/1999    for frequent UTI'S     INJECT SACROILIAC JOINT Right 1/24/2022    Procedure: INJECTION, SACROILIAC JOINT;  Surgeon: Arya Freeman MD;  Location: [facility] OR     INJECT STEROID TROCHANTERIC BURSA Right 1/24/2022    Procedure: INJECTION, STEROID, BURSA, TROCHANTERIC;  Surgeon: Arya Freeman MD;  Location: [facility] OR     INJECT TRIGGER POINT SINGLE / MULTIPLE 1 OR 2 MUSCLES Right 1/24/2022    Procedure: INJECTION, TRIGGER POINT, MUSCLE, 1 OR 2 MUSCLES;  Surgeon: Arya Freeman MD;  Location: [facility] OR     KNEE SURGERY  10/1997                Family History         Problem (# of Occurrences) Relation (Name,Age of Onset)     Breast Cancer (1) Maternal Grandmother     Cerebrovascular Disease (1) Maternal Grandfather     Colon Cancer (1) Maternal Grandmother     Diabetes (1) Maternal Grandfather     Heart Failure (1) Maternal Grandmother     Hypertension (1) Sister     Mental Illness (1) Paternal Grandfather     Other Cancer (1) Maternal Grandfather                Social History          Social History Narrative     Caffeine intake/servings daily - 1     Calcium intake/servings daily - 3     Exercise 5 times weekly - describe ; bikes     Sunscreen used - Yes     Seatbelts used - Yes     Guns stored in the home - No     Self Breast Exam - No     Pap test up to date -  No     Eye exam up to date -  No     Dental exam up to date -  No     DEXA scan up to date -  No      Flex Sig/Colonoscopy up to date -  No     Mammography up to date -  No     Immunizations reviewed and up to date - Yes     Abuse: Current or Past (Physical, Sexual or Emotional) - No     Do you feel safe in your environment - Yes     Do you cope well with stress - Yes     Do you suffer from insomnia - No                        Current Outpatient Medications   Medication Sig Dispense Refill     albuterol (PROAIR HFA/PROVENTIL HFA/VENTOLIN HFA) 108 (90 Base) MCG/ACT inhaler Inhale 2 puffs into the lungs every 6 hours 18 g 1     COMPOUNDED NON-CONTROLLED SUBSTANCE (CMPD RX) - PHARMACY TO MIX COMPOUNDED MEDICATION Inject 2.4 mg semaglutide subcutaneous weekly 4 each 3     COMPOUNDED NON-CONTROLLED SUBSTANCE (CMPD RX) - PHARMACY TO MIX COMPOUNDED MEDICATION semaglutide 2.5 mg injection  every 7 days 4 each 3     fluticasone (FLONASE) 50 MCG/ACT nasal spray Spray 1 spray into both nostrils daily 16 g 3     gabapentin (NEURONTIN) 100 MG capsule Take 1-3 capsules (100-300 mg) by mouth 3 times daily as needed for neuropathic pain. 90 capsule 0     ipratropium (ATROVENT) 0.06 % nasal spray Spray 2 sprays into both nostrils 4 times daily. 15 mL 1     ketoconazole (NIZORAL) 2 % external shampoo LUIS EXT D PRF ITCHING OR IRRITATION 120 mL 11     lidocaine (LIDODERM) 5 % patch Place 1 patch onto the skin every 24 hours To prevent lidocaine toxicity, patient should be patch free for 12 hrs daily. (Patient taking differently: Place onto the skin every 24 hours. To prevent lidocaine toxicity, patient should be patch free for 12 hrs daily.) 10 patch 3     methocarbamol (ROBAXIN) 500 MG tablet Take 1 tablet (500 mg) by mouth 4 times daily as needed for muscle spasms. 30 tablet 1     MYDAYIS 50 MG CP24 Take 50 mg by mouth daily. 90 capsule 0     MYDAYIS 50 MG CP24 Take 50 mg by mouth daily. 90 capsule 0     MYDAYIS 50 MG CP24 Take 50 mg by mouth daily. 30 capsule 0     MYDAYIS 50 MG CP24 Take 50 mg by mouth daily. 30 capsule 0      olmesartan-hydrochlorothiazide (BENICAR HCT) 20-12.5 MG tablet Take 1 tablet by mouth daily. 90 tablet 1     tirzepatide-Weight Management (ZEPBOUND) 7.5 MG/0.5ML prefilled pen Inject 0.5 mLs (7.5 mg) Subcutaneous every 7 days (Patient not taking: Reported on 9/29/2024) 6 mL 1         EXAM: MR LUMBAR SPINE W/O CONTRAST  2/5/2025 3:15 PM      HISTORY: History of chronic LBP with new symptoms of altered sensation  in groin and upper leg in past three weeks; Lumbar radiculopathy.        COMPARISON: 1/18/2016     TECHNIQUE: Sagittal T1-weighted, sagittal STIR, sagittal  diffusion-weighted (with ADC map), axial T1-weighted, 3D volumetric  axial and sagittal reconstructed T2-weighted images of the lumbar  spine were obtained without intravenous contrast.     FINDINGS:  There are 5 lumbar type vertebrae, used for the purposes of this  dictation. Conus tip at approximately L1. Normal lumbar vertebral  alignment. No loss of disc height. No loss of vertebral body height.  Subtle loss of normal T2 signal within the L3-4 disc. Normal marrow  signal. Normal cauda equina. Scattered small perineural cysts.     On a level by level basis, the findings are as follows:     L1-2: No spinal canal or neural foraminal stenosis.     L2-3: No spinal canal or neural foraminal stenosis.     L3-4: Mild right foraminal/extraforaminal disc protrusion with mild  right neural foraminal narrowing. Mild facet arthropathy. Right facet  effusion. No spinal canal or left neural foraminal stenosis.     L4-5: Bilateral facet arthropathy and mild right facet effusion.  Minimal right neural foraminal narrowing. No spinal canal or left  neural foraminal stenosis.     L5-S1: Tiny disc bulge. No spinal canal or neural foraminal stenosis.                                                                      IMPRESSION:  Mild lumbar degenerative, most pronounced at L3-4 with small right  foraminal/extraforaminal disc protrusion with mild right neural  foraminal  narrowing. No high-grade neural foraminal or spinal canal  narrowing.      I have personally reviewed the examination and initial interpretation  and I agree with the findings.     NOBLE SOL MD     MR left ankle without  contrast 2/10/2025 7:50 AM     History: Prior history of ankle fracture in 2023 with persistent ankle  pain. Please evaluate for intraarticular pathology; Closed fracture of  left foot with routine healing, subsequent encounter; Other closed  fracture of distal end of left fibula, initial encounter; Strain of  intrinsic muscle and tendon at ankle and foot level, left foot,  initial encounter; Tibialis posterior dysfunction     Techniques: Multiplanar multisequence imaging of the left ankle was  obtained without  administration of intra-articular or intravenous  contrast.     Comparison: 10/23/2024, CT 4/13/2023     Findings:     MEDIAL COMPARTMENT     Tendons: Anterior dislocation course of of the tibialis posterior  along distal tibia, out of medial malleolar groove. Tendon coursing  deep to the thickened medial flexor retinaculum. Otherwise medial  flexor tendons are intact.     Ligaments: Marked thickening of superficial component of the deltoid  ligament. High-grade chronic-appearing tear of the deep component of  the deltoid with relatively thickened anterior tibiotalar ligament.     Spring ligamentous complex appears intact.     LATERAL COMPARTMENT     Tendons: Peroneal tendons are intact. Small amount of tenosynovium  fluid.     Ligaments: Mildly thickened intact anterior talofibular and  calcaneofibular ligaments, attaching the ununited distal fibular  ossicle. Posterior talofibular ligament and syndesmotic ligamentous  complex are intact.     POSTERIOR COMPARTMENT     Achilles tendon: Tiny focal, short segment tear of the distal Achilles  tendon deep fiber adjacent to the calcaneal attachment (image 24  series 6). Otherwise intact. Query mild peritendinitis. Mild distal  soleus muscle  edema.     Plantar fascia: Normal.      ANTERIOR COMPARTMENT     Tendons: Anterior extensor tendons are intact.     JOINTS     Small ankle joint effusion.     GENERAL FINDINGS     Bones: Chronic sequelae of remote infra-syndesmotic distal fibular  fracture with ununited ossific fragment with opposing edema like  intensity. Mild medial malleolar bony proliferation, consistent with  sequelae of remote trauma.      Ossific fragment seen on comparison studies medial to talus is  relatively difficult to see on MR but likely corresponds to the area  of apparent bony density along the superficial component of deltoid  ligament distally.     No acute fracture, marrow contusion or infiltrative change. No talar  dome osteochondral lesion. Subchondral cystic change at the  intermediate cuneiform, presumably degenerative.     Muscles: Normal.     Tarsal tunnel: Normal.      Sinus tarsi: Normal.      ANCILLARY FINDINGS                                                                         Impression:  1. Tibialis posterior anterior dislocation.  *  Tendon coursing deep to the thickened medial flexor retinaculum.   2. 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  3. Sequale of chronic injury to deltoid ligamentous complex.  *  Marked thickening of superficial component of the deltoid ligament.     *  High-grade chronic-appearing tear of the deep component of the  deltoid with relatively thickened anterior tibiotalar ligament.     [facility]        MRI LUMBAR SPINE WITHOUT AND WITH CONTRAST   1/18/2016 4:16 PM      HISTORY: Back pain and fever. Evaluate for discitis.     TECHNIQUE: Multiplanar, multisequence MRI of the lumbar spine without  and with 6.5 mL Gadavist.     COMPARISON: None.     FINDINGS: The report is dictated assuming five lumbar-type vertebral  bodies, and radiographic correlation may be necessary.  Sagittal  images demonstrate normal vertebral body height.  Bone marrow signal  is  unremarkable. Tip of the conus medullaris and cauda equina are  normal.     T12-L1:  No disc herniation or stenosis.  Facet joints are  unremarkable.     L1-L2:  No disc herniation or stenosis.  Facet joints are  unremarkable.     L2-L3:  No disc herniation or stenosis.  Facet joints are  unremarkable.       L3-L4:  No disc herniation or stenosis.  Facet joints are  unremarkable.     L4-L5:  No disc herniation or stenosis.  Facet joints are  unremarkable.      L5-S1:  No disc herniation or stenosis.  Facet joints are  unremarkable.       Paraspinous soft tissues:  No inflammation or evidence of infection.        IMPRESSION: Unremarkable lumbar spine MRI.     FIDEL BARNHART MD     There were no vitals taken for this visit.       On examination, the patient is alert and cooperative.  Vitals are stable.     HEENT is negative.  Face is symmetric.  Neck is supple.  She is able to move her upper extremities functionally.     She is able to carry out straight leg raising test without difficulty.  She does have difficulty with pain rotating the right hip. Girish test is positive on the right. She is tender over the right trochanter bursa, right piriformis and in a prone position she is tender over the right SI joint as well.  Gaenslen's test is positive on the right as is compression test.     The left side was nontender.  Examination of the left foot shows difficulty with dorsiflexion which is restricted due to tightness in the gastrocnemius.  There is also increased tone noted in the tibialis posterior and flexor digitorum longus.     Gait is unremarkable.  Romberg is negative.  Strength and sensation are full and reflexes are symmetric and plantars are downgoing.     Impression: At this point this patient with chronic pain affecting the right lower back and hip region extending down to the thigh on the right side has features of right SI joint dysfunction along with right piriformis and trochanteric bursal involvement.   She has some mild degenerative changes in the lumbar spine without stenosis.  She has received good response with previous injections to these and I will repeat them. Currently her pain is affecting her activity and function.  I would therefore recommend treating this with SI joint, trochanter bursa and piriformis injections and also bring her for Botox injections to the left leg.  I would go with 100 units to start with but may require more depending on her response.    This was reviewed at length with the patient and she expressed understanding and would like to proceed with injection therapy at this point.     40 minutes spent for this visit, all on the date of encounter, greater than 50% was for counseling on above-mentioned issues.     Thank you much for allow me to assist in her care.     Arya Freeman MD .       Again, thank you for allowing me to participate in the care of your patient.      Sincerely,    Arya Freeman MD

## 2025-03-27 NOTE — PROGRESS NOTES
CC: I am seeing this patient for evaluation of chronic low back pain affecting the right side.     History of present illness: Patient is a very pleasant 44 -year-old woman who reports having low back pain since age 17.  She did gymnastics.  She recalls requiring surgery to her right knee while in 10th grade.  During this time she was walking with some imbalance.  She also has history of tear in her right knee cartilage that also affected her gait.  She had pain worsening a few years back when she attempted to handover her 1-1/2-year-old child to her mother and her back seized up.  She rated the pain at a 10 out of 10.  It slowly improved over time.  Did physical therapy.  However the pain never went away completely.  On a scale of 0-10 she rated it at its best 1, most often at 3 and at worst a 7.  In addition to physical therapy she has tried medications without improvement.  She has had imaging studies including MRI of the lumbar spine which was negative.  It was done at the time when she was having back pain with fever and they were evaluating for discitis.  She denies any numbness or tingling denies any bowel bladder disturbance.  She subsequently had SI joint, piriformis and trochanteric bursal injection done on the right side and had good improvement.    Currently she has been dealing with a recurrence of pain as well as difficulty with her left foot.  She has history of fractures and had been wearing boots for some time.  She finds the gastrocnemius and other muscles of the foot have started tightening up.  She finds this affects her gait and her activities.  Her physician suggested she could benefit from Botox she is here for evaluation.    Past Medical History        Past Medical History:   Diagnosis Date    Abnormal Pap smear of cervix 08/22/2018, 04/2/19 08/22/2018, 04/2/19, 04/16/21    Anemia      Cervical high risk HPV (human papillomavirus) test positive 04/16/2021 04/16/21    Gastroesophageal  reflux disease 1/5/2006    Irritable bowel syndrome 1/5/2006    Preeclampsia 02/13/2019            Past Surgical History:   Procedure Laterality Date    CYSTOSCOPY, DILATE URETHRA, COMBINED  11/1999    for frequent UTI'S    INJECT SACROILIAC JOINT Right 1/24/2022    Procedure: INJECTION, SACROILIAC JOINT;  Surgeon: Arya Freeman MD;  Location: [facility] OR    INJECT STEROID TROCHANTERIC BURSA Right 1/24/2022    Procedure: INJECTION, STEROID, BURSA, TROCHANTERIC;  Surgeon: Arya Freeman MD;  Location: [facility] OR    INJECT TRIGGER POINT SINGLE / MULTIPLE 1 OR 2 MUSCLES Right 1/24/2022    Procedure: INJECTION, TRIGGER POINT, MUSCLE, 1 OR 2 MUSCLES;  Surgeon: Arya Freeman MD;  Location: [facility] OR    KNEE SURGERY  10/1997                Family History         Problem (# of Occurrences) Relation (Name,Age of Onset)     Breast Cancer (1) Maternal Grandmother     Cerebrovascular Disease (1) Maternal Grandfather     Colon Cancer (1) Maternal Grandmother     Diabetes (1) Maternal Grandfather     Heart Failure (1) Maternal Grandmother     Hypertension (1) Sister     Mental Illness (1) Paternal Grandfather     Other Cancer (1) Maternal Grandfather                Social History          Social History Narrative     Caffeine intake/servings daily - 1     Calcium intake/servings daily - 3     Exercise 5 times weekly - describe ; bikes     Sunscreen used - Yes     Seatbelts used - Yes     Guns stored in the home - No     Self Breast Exam - No     Pap test up to date -  No     Eye exam up to date -  No     Dental exam up to date -  No     DEXA scan up to date -  No     Flex Sig/Colonoscopy up to date -  No     Mammography up to date -  No     Immunizations reviewed and up to date - Yes     Abuse: Current or Past (Physical, Sexual or Emotional) - No     Do you feel safe in your environment - Yes     Do you cope well with stress - Yes     Do you suffer from insomnia - No                        Current Outpatient  Medications   Medication Sig Dispense Refill    albuterol (PROAIR HFA/PROVENTIL HFA/VENTOLIN HFA) 108 (90 Base) MCG/ACT inhaler Inhale 2 puffs into the lungs every 6 hours 18 g 1    COMPOUNDED NON-CONTROLLED SUBSTANCE (CMPD RX) - PHARMACY TO MIX COMPOUNDED MEDICATION Inject 2.4 mg semaglutide subcutaneous weekly 4 each 3    COMPOUNDED NON-CONTROLLED SUBSTANCE (CMPD RX) - PHARMACY TO MIX COMPOUNDED MEDICATION semaglutide 2.5 mg injection  every 7 days 4 each 3    fluticasone (FLONASE) 50 MCG/ACT nasal spray Spray 1 spray into both nostrils daily 16 g 3    gabapentin (NEURONTIN) 100 MG capsule Take 1-3 capsules (100-300 mg) by mouth 3 times daily as needed for neuropathic pain. 90 capsule 0    ipratropium (ATROVENT) 0.06 % nasal spray Spray 2 sprays into both nostrils 4 times daily. 15 mL 1    ketoconazole (NIZORAL) 2 % external shampoo LUIS EXT D PRF ITCHING OR IRRITATION 120 mL 11    lidocaine (LIDODERM) 5 % patch Place 1 patch onto the skin every 24 hours To prevent lidocaine toxicity, patient should be patch free for 12 hrs daily. (Patient taking differently: Place onto the skin every 24 hours. To prevent lidocaine toxicity, patient should be patch free for 12 hrs daily.) 10 patch 3    methocarbamol (ROBAXIN) 500 MG tablet Take 1 tablet (500 mg) by mouth 4 times daily as needed for muscle spasms. 30 tablet 1    MYDAYIS 50 MG CP24 Take 50 mg by mouth daily. 90 capsule 0    MYDAYIS 50 MG CP24 Take 50 mg by mouth daily. 90 capsule 0    MYDAYIS 50 MG CP24 Take 50 mg by mouth daily. 30 capsule 0    MYDAYIS 50 MG CP24 Take 50 mg by mouth daily. 30 capsule 0    olmesartan-hydrochlorothiazide (BENICAR HCT) 20-12.5 MG tablet Take 1 tablet by mouth daily. 90 tablet 1    tirzepatide-Weight Management (ZEPBOUND) 7.5 MG/0.5ML prefilled pen Inject 0.5 mLs (7.5 mg) Subcutaneous every 7 days (Patient not taking: Reported on 9/29/2024) 6 mL 1         EXAM: MR LUMBAR SPINE W/O CONTRAST  2/5/2025 3:15 PM      HISTORY: History of  chronic LBP with new symptoms of altered sensation  in groin and upper leg in past three weeks; Lumbar radiculopathy.        COMPARISON: 1/18/2016     TECHNIQUE: Sagittal T1-weighted, sagittal STIR, sagittal  diffusion-weighted (with ADC map), axial T1-weighted, 3D volumetric  axial and sagittal reconstructed T2-weighted images of the lumbar  spine were obtained without intravenous contrast.     FINDINGS:  There are 5 lumbar type vertebrae, used for the purposes of this  dictation. Conus tip at approximately L1. Normal lumbar vertebral  alignment. No loss of disc height. No loss of vertebral body height.  Subtle loss of normal T2 signal within the L3-4 disc. Normal marrow  signal. Normal cauda equina. Scattered small perineural cysts.     On a level by level basis, the findings are as follows:     L1-2: No spinal canal or neural foraminal stenosis.     L2-3: No spinal canal or neural foraminal stenosis.     L3-4: Mild right foraminal/extraforaminal disc protrusion with mild  right neural foraminal narrowing. Mild facet arthropathy. Right facet  effusion. No spinal canal or left neural foraminal stenosis.     L4-5: Bilateral facet arthropathy and mild right facet effusion.  Minimal right neural foraminal narrowing. No spinal canal or left  neural foraminal stenosis.     L5-S1: Tiny disc bulge. No spinal canal or neural foraminal stenosis.                                                                      IMPRESSION:  Mild lumbar degenerative, most pronounced at L3-4 with small right  foraminal/extraforaminal disc protrusion with mild right neural  foraminal narrowing. No high-grade neural foraminal or spinal canal  narrowing.      I have personally reviewed the examination and initial interpretation  and I agree with the findings.     NOBLE SOL MD     MR left ankle without  contrast 2/10/2025 7:50 AM     History: Prior history of ankle fracture in 2023 with persistent ankle  pain. Please evaluate for  intraarticular pathology; Closed fracture of  left foot with routine healing, subsequent encounter; Other closed  fracture of distal end of left fibula, initial encounter; Strain of  intrinsic muscle and tendon at ankle and foot level, left foot,  initial encounter; Tibialis posterior dysfunction     Techniques: Multiplanar multisequence imaging of the left ankle was  obtained without  administration of intra-articular or intravenous  contrast.     Comparison: 10/23/2024, CT 4/13/2023     Findings:     MEDIAL COMPARTMENT     Tendons: Anterior dislocation course of of the tibialis posterior  along distal tibia, out of medial malleolar groove. Tendon coursing  deep to the thickened medial flexor retinaculum. Otherwise medial  flexor tendons are intact.     Ligaments: Marked thickening of superficial component of the deltoid  ligament. High-grade chronic-appearing tear of the deep component of  the deltoid with relatively thickened anterior tibiotalar ligament.     Spring ligamentous complex appears intact.     LATERAL COMPARTMENT     Tendons: Peroneal tendons are intact. Small amount of tenosynovium  fluid.     Ligaments: Mildly thickened intact anterior talofibular and  calcaneofibular ligaments, attaching the ununited distal fibular  ossicle. Posterior talofibular ligament and syndesmotic ligamentous  complex are intact.     POSTERIOR COMPARTMENT     Achilles tendon: Tiny focal, short segment tear of the distal Achilles  tendon deep fiber adjacent to the calcaneal attachment (image 24  series 6). Otherwise intact. Query mild peritendinitis. Mild distal  soleus muscle edema.     Plantar fascia: Normal.      ANTERIOR COMPARTMENT     Tendons: Anterior extensor tendons are intact.     JOINTS     Small ankle joint effusion.     GENERAL FINDINGS     Bones: Chronic sequelae of remote infra-syndesmotic distal fibular  fracture with ununited ossific fragment with opposing edema like  intensity. Mild medial malleolar bony  proliferation, consistent with  sequelae of remote trauma.      Ossific fragment seen on comparison studies medial to talus is  relatively difficult to see on MR but likely corresponds to the area  of apparent bony density along the superficial component of deltoid  ligament distally.     No acute fracture, marrow contusion or infiltrative change. No talar  dome osteochondral lesion. Subchondral cystic change at the  intermediate cuneiform, presumably degenerative.     Muscles: Normal.     Tarsal tunnel: Normal.      Sinus tarsi: Normal.      ANCILLARY FINDINGS                                                                         Impression:  1. Tibialis posterior anterior dislocation.  *  Tendon coursing deep to the thickened medial flexor retinaculum.   2. 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  3. Sequale of chronic injury to deltoid ligamentous complex.  *  Marked thickening of superficial component of the deltoid ligament.     *  High-grade chronic-appearing tear of the deep component of the  deltoid with relatively thickened anterior tibiotalar ligament.     [facility]        MRI LUMBAR SPINE WITHOUT AND WITH CONTRAST   1/18/2016 4:16 PM      HISTORY: Back pain and fever. Evaluate for discitis.     TECHNIQUE: Multiplanar, multisequence MRI of the lumbar spine without  and with 6.5 mL Gadavist.     COMPARISON: None.     FINDINGS: The report is dictated assuming five lumbar-type vertebral  bodies, and radiographic correlation may be necessary.  Sagittal  images demonstrate normal vertebral body height.  Bone marrow signal  is unremarkable. Tip of the conus medullaris and cauda equina are  normal.     T12-L1:  No disc herniation or stenosis.  Facet joints are  unremarkable.     L1-L2:  No disc herniation or stenosis.  Facet joints are  unremarkable.     L2-L3:  No disc herniation or stenosis.  Facet joints are  unremarkable.       L3-L4:  No disc herniation or stenosis.   Facet joints are  unremarkable.     L4-L5:  No disc herniation or stenosis.  Facet joints are  unremarkable.      L5-S1:  No disc herniation or stenosis.  Facet joints are  unremarkable.       Paraspinous soft tissues:  No inflammation or evidence of infection.        IMPRESSION: Unremarkable lumbar spine MRI.     FIDEL BARNHART MD     There were no vitals taken for this visit.       On examination, the patient is alert and cooperative.  Vitals are stable.     HEENT is negative.  Face is symmetric.  Neck is supple.  She is able to move her upper extremities functionally.     She is able to carry out straight leg raising test without difficulty.  She does have difficulty with pain rotating the right hip. Girish test is positive on the right. She is tender over the right trochanter bursa, right piriformis and in a prone position she is tender over the right SI joint as well.  Gaenslen's test is positive on the right as is compression test.     The left side was nontender.  Examination of the left foot shows difficulty with dorsiflexion which is restricted due to tightness in the gastrocnemius.  There is also increased tone noted in the tibialis posterior and flexor digitorum longus.     Gait is unremarkable.  Romberg is negative.  Strength and sensation are full and reflexes are symmetric and plantars are downgoing.     Impression: At this point this patient with chronic pain affecting the right lower back and hip region extending down to the thigh on the right side has features of right SI joint dysfunction along with right piriformis and trochanteric bursal involvement.  She has some mild degenerative changes in the lumbar spine without stenosis.  She has received good response with previous injections to these and I will repeat them. Currently her pain is affecting her activity and function.  I would therefore recommend treating this with SI joint, trochanter bursa and piriformis injections and also bring her for  Botox injections to the left leg.  I would go with 100 units to start with but may require more depending on her response.    This was reviewed at length with the patient and she expressed understanding and would like to proceed with injection therapy at this point.     40 minutes spent for this visit, all on the date of encounter, greater than 50% was for counseling on above-mentioned issues.     Thank you much for allow me to assist in her care.     Arya Freeman MD .

## 2025-03-31 ENCOUNTER — TELEPHONE (OUTPATIENT)
Dept: PHYSICAL MEDICINE AND REHAB | Facility: CLINIC | Age: 45
End: 2025-03-31
Payer: COMMERCIAL

## 2025-03-31 NOTE — TELEPHONE ENCOUNTER
Phoned the patient and left VM. Stated to call the pain clinic to schedule injection procedure at [phone].

## 2025-04-01 ENCOUNTER — MYC REFILL (OUTPATIENT)
Dept: FAMILY MEDICINE | Facility: CLINIC | Age: 45
End: 2025-04-01
Payer: COMMERCIAL

## 2025-04-01 DIAGNOSIS — M54.50 LOW BACK PAIN RADIATING TO LEFT LOWER EXTREMITY: ICD-10-CM

## 2025-04-01 DIAGNOSIS — G57.12 MERALGIA PARESTHETICA OF LEFT SIDE: ICD-10-CM

## 2025-04-01 DIAGNOSIS — M79.605 LOW BACK PAIN RADIATING TO LEFT LOWER EXTREMITY: ICD-10-CM

## 2025-04-01 RX ORDER — GABAPENTIN 100 MG/1
100-300 CAPSULE ORAL 3 TIMES DAILY PRN
Qty: 90 CAPSULE | Refills: 0 | Status: SHIPPED | OUTPATIENT
Start: 2025-04-01

## 2025-04-02 DIAGNOSIS — M54.50 ACUTE LOW BACK PAIN, UNSPECIFIED BACK PAIN LATERALITY, UNSPECIFIED WHETHER SCIATICA PRESENT: ICD-10-CM

## 2025-04-02 RX ORDER — METHOCARBAMOL 500 MG/1
500 TABLET, FILM COATED ORAL 4 TIMES DAILY PRN
Qty: 30 TABLET | Refills: 1 | Status: SHIPPED | OUTPATIENT
Start: 2025-04-02

## 2025-04-04 PROBLEM — M79.18 MYALGIA, OTHER SITE: Status: ACTIVE | Noted: 2025-03-27

## 2025-04-09 DIAGNOSIS — G57.12 MERALGIA PARESTHETICA OF LEFT SIDE: ICD-10-CM

## 2025-04-09 DIAGNOSIS — M54.50 LOW BACK PAIN RADIATING TO LEFT LOWER EXTREMITY: ICD-10-CM

## 2025-04-09 DIAGNOSIS — M79.605 LOW BACK PAIN RADIATING TO LEFT LOWER EXTREMITY: ICD-10-CM

## 2025-04-09 RX ORDER — GABAPENTIN 100 MG/1
100-300 CAPSULE ORAL 3 TIMES DAILY PRN
Qty: 90 CAPSULE | Refills: 0 | Status: SHIPPED | OUTPATIENT
Start: 2025-04-09

## 2025-04-16 DIAGNOSIS — M54.50 LOW BACK PAIN RADIATING TO LEFT LOWER EXTREMITY: ICD-10-CM

## 2025-04-16 DIAGNOSIS — G57.12 MERALGIA PARESTHETICA OF LEFT SIDE: ICD-10-CM

## 2025-04-16 DIAGNOSIS — M79.605 LOW BACK PAIN RADIATING TO LEFT LOWER EXTREMITY: ICD-10-CM

## 2025-04-16 RX ORDER — GABAPENTIN 100 MG/1
100-300 CAPSULE ORAL 3 TIMES DAILY PRN
Qty: 90 CAPSULE | Refills: 0 | Status: SHIPPED | OUTPATIENT
Start: 2025-04-16

## 2025-04-19 ENCOUNTER — HEALTH MAINTENANCE LETTER (OUTPATIENT)
Age: 45
End: 2025-04-19

## 2025-04-28 ENCOUNTER — MYC REFILL (OUTPATIENT)
Dept: FAMILY MEDICINE | Facility: CLINIC | Age: 45
End: 2025-04-28
Payer: COMMERCIAL

## 2025-04-28 DIAGNOSIS — F90.2 ATTENTION DEFICIT HYPERACTIVITY DISORDER (ADHD), COMBINED TYPE: ICD-10-CM

## 2025-04-28 RX ORDER — DEXTROAMPHETAMINE SULFATE, DEXTROAMPHETAMINE SACCHARATE, AMPHETAMINE ASPARTATE MONOHYDRATE, AND AMPHETAMINE SULFATE 12.5; 12.5; 12.5; 12.5 MG/1; MG/1; MG/1; MG/1
50 CAPSULE, EXTENDED RELEASE ORAL DAILY
Qty: 30 CAPSULE | Refills: 0 | Status: SHIPPED | OUTPATIENT
Start: 2025-04-28

## 2025-04-28 NOTE — TELEPHONE ENCOUNTER
[facility] 10[facility]16[facility]24[facility]    Thanks   JUNE Cristobal

## 2025-05-05 DIAGNOSIS — I10 ESSENTIAL HYPERTENSION: ICD-10-CM

## 2025-05-05 RX ORDER — OLMESARTAN MEDOXOMIL AND HYDROCHLOROTHIAZIDE 20/12.5 20; 12.5 MG/1; MG/1
1 TABLET ORAL DAILY
Qty: 90 TABLET | Refills: 0 | Status: SHIPPED | OUTPATIENT
Start: 2025-05-05

## 2025-06-03 ENCOUNTER — OFFICE VISIT (OUTPATIENT)
Dept: FAMILY MEDICINE | Facility: CLINIC | Age: 45
End: 2025-06-03

## 2025-06-03 ENCOUNTER — MEDICAL CORRESPONDENCE (OUTPATIENT)
Dept: HEALTH INFORMATION MANAGEMENT | Facility: CLINIC | Age: 45
End: 2025-06-03

## 2025-06-03 VITALS
TEMPERATURE: 98.4 F | OXYGEN SATURATION: 100 % | DIASTOLIC BLOOD PRESSURE: 77 MMHG | RESPIRATION RATE: 15 BRPM | BODY MASS INDEX: 21.05 KG/M2 | HEART RATE: 77 BPM | SYSTOLIC BLOOD PRESSURE: 114 MMHG | WEIGHT: 131 LBS | HEIGHT: 66 IN

## 2025-06-03 DIAGNOSIS — M79.605 LOW BACK PAIN RADIATING TO LEFT LOWER EXTREMITY: ICD-10-CM

## 2025-06-03 DIAGNOSIS — M54.50 LOW BACK PAIN RADIATING TO LEFT LOWER EXTREMITY: ICD-10-CM

## 2025-06-03 DIAGNOSIS — Z13.220 LIPID SCREENING: ICD-10-CM

## 2025-06-03 DIAGNOSIS — R63.5 WEIGHT GAIN: ICD-10-CM

## 2025-06-03 DIAGNOSIS — Z12.4 CERVICAL CANCER SCREENING: ICD-10-CM

## 2025-06-03 DIAGNOSIS — F90.2 ATTENTION DEFICIT HYPERACTIVITY DISORDER (ADHD), COMBINED TYPE: Primary | ICD-10-CM

## 2025-06-03 DIAGNOSIS — I10 ESSENTIAL HYPERTENSION: ICD-10-CM

## 2025-06-03 DIAGNOSIS — G57.12 MERALGIA PARESTHETICA OF LEFT SIDE: ICD-10-CM

## 2025-06-03 PROCEDURE — 99214 OFFICE O/P EST MOD 30 MIN: CPT | Performed by: NURSE PRACTITIONER

## 2025-06-03 PROCEDURE — G2211 COMPLEX E/M VISIT ADD ON: HCPCS | Performed by: NURSE PRACTITIONER

## 2025-06-03 RX ORDER — DEXTROAMPHETAMINE SACCHARATE, AMPHETAMINE ASPARTATE, DEXTROAMPHETAMINE SULFATE AND AMPHETAMINE SULFATE 5; 5; 5; 5 MG/1; MG/1; MG/1; MG/1
20 TABLET ORAL 3 TIMES DAILY
Qty: 90 TABLET | Refills: 0 | Status: SHIPPED | OUTPATIENT
Start: 2025-06-03

## 2025-06-03 RX ORDER — GABAPENTIN 100 MG/1
100-300 CAPSULE ORAL 3 TIMES DAILY PRN
Qty: 90 CAPSULE | Refills: 1 | Status: SHIPPED | OUTPATIENT
Start: 2025-06-03

## 2025-06-03 ASSESSMENT — PAIN SCALES - GENERAL: PAINLEVEL_OUTOF10: NO PAIN (0)

## 2025-06-03 NOTE — PROGRESS NOTES
Assessment & Plan     Attention deficit hyperactivity disorder (ADHD), combined type  Switch to adderall IR TID for cost until insurance coverage and plan to switch back to Mydais once has coverage. No side effects. Will do UDS when has coverage  - amphetamine-dextroamphetamine (ADDERALL) 20 MG tablet; Take 1 tablet (20 mg) by mouth 3 times daily.    Essential hypertension  Well controlled, not needing refill at the moment. Defer labs until coverage    Lipid screening  As above    Cervical cancer screening  Schedule for June or July    Weight gain  Continue compounded semaglutide with B12 for fatigue  - COMPOUNDED NON-CONTROLLED SUBSTANCE (CMPD RX) - PHARMACY TO MIX COMPOUNDED MEDICATION; Inject 2.4 mg semaglutide subcutaneous weekly with medically necessary vitamin B12 for fatigue    Low back pain radiating to left lower extremity  Refilled  - gabapentin (NEURONTIN) 100 MG capsule; Take 1-3 capsules (100-300 mg) by mouth 3 times daily as needed for neuropathic pain.    Meralgia paresthetica of left side  refilled  - gabapentin (NEURONTIN) 100 MG capsule; Take 1-3 capsules (100-300 mg) by mouth 3 times daily as needed for neuropathic pain.        Follow-up   Return in about 1 month (around 7/3/2025) for Physical Exam, ADHD, BP Recheck.        The longitudinal plan of care for the diagnosis(es)/condition(s) as documented were addressed during this visit. Due to the added complexity in care, I will continue to support Traci in the subsequent management and with ongoing continuity of care.  Ordering of each unique test  Prescription drug management  I spent a total of 18 minutes on the day of the visit.   Time spent by me today doing chart review, history and exam, documentation and further activities per the note    Subjective   Traci is a 44 year old, presenting for the following health issues:  Recheck Medication        6/3/2025     9:06 AM   Additional Questions   Roomed by jennifer   Accompanied by self     History  "of Present Illness       Reason for visit:  Medication annual visitShe consumes 0 sweetened beverage(s) daily.She exercises with enough effort to increase her heart rate 60 or more minutes per day.  She exercises with enough effort to increase her heart rate 7 days per week. She is missing 1 dose(s) of medications per week.  She is not taking prescribed medications regularly due to other.      Laid of from job two weeks ago. Planning to become . Waiting for unemployment to get started and then will go on [employer].      ADHD - needs to make med changes for the month because the Mydais 50 mg is too expensive without coverage.     HTN - has enough medication to make it to coverage.    MSK low back pain and nerve pain - refill gabapentin. Has procedure scheduled for July for injections of hip bursitis. Will also be getting botox injections for calves.    Congestion today.         Review of Systems  Constitutional, HEENT, cardiovascular, pulmonary, gi and gu systems are negative, except as otherwise noted.      Objective    /77   Pulse 77   Temp 98.4  F (36.9  C) (Temporal)   Resp 15   Ht 1.68 m (5' 6.14\")   Wt 59.4 kg (131 lb)   LMP 05/15/2025   SpO2 100%   BMI 21.05 kg/m    Body mass index is 21.05 kg/m .  Physical Exam   GENERAL: alert and no distress  EYES: Eyes grossly normal to inspection, PERRL and conjunctivae and sclerae normal  HENT: ear canals and TM's normal, nose and mouth without ulcers or lesions  NECK: no adenopathy, no asymmetry, masses, or scars  RESP: lungs clear to auscultation - no rales, rhonchi or wheezes  CV: regular rate and rhythm, normal S1 S2, no S3 or S4, no murmur, click or rub, no peripheral edema   ABDOMEN: soft, nontender, no hepatosplenomegaly, no masses and bowel sounds normal  PSYCH: mentation appears normal, affect normal/bright            Signed Electronically by: WINSTON Richards CNP    "

## 2025-06-10 ENCOUNTER — MYC REFILL (OUTPATIENT)
Dept: FAMILY MEDICINE | Facility: CLINIC | Age: 45
End: 2025-06-10

## 2025-06-10 DIAGNOSIS — R63.5 WEIGHT GAIN: ICD-10-CM

## 2025-06-10 NOTE — TELEPHONE ENCOUNTER
Pharmacy change from [facility] to [facility]    Thank you  Susu GIRON RN  [facility]

## 2025-06-10 NOTE — TELEPHONE ENCOUNTER
Patient nearly out of medication  Requesting pharmacy change today if possible  Routing high priority for review  Thanks,  Milvia GONZÁLES RN

## 2025-07-12 ENCOUNTER — E-VISIT (OUTPATIENT)
Dept: URGENT CARE | Facility: CLINIC | Age: 45
End: 2025-07-12
Payer: COMMERCIAL

## 2025-07-12 DIAGNOSIS — N94.9 VAGINAL DISCOMFORT: Primary | ICD-10-CM

## 2025-07-12 NOTE — PATIENT INSTRUCTIONS
Thank you for choosing us for your care. Given your symptoms, I would like you to do a lab-only visit to determine what is causing them.  I have placed the orders.  Please schedule an appointment with the lab right here in [place], or call [phone].  I will let you know when the results are back and next steps to take.    Schedule a Lab Only appointment here.

## 2025-07-16 ENCOUNTER — E-VISIT (OUTPATIENT)
Dept: URGENT CARE | Facility: CLINIC | Age: 45
End: 2025-07-16
Payer: COMMERCIAL
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
## 2025-07-16 PROCEDURE — 99207 PR NON-BILLABLE SERV PER CHARTING: CPT | Performed by: FAMILY MEDICINE

## 2025-07-16 NOTE — PATIENT INSTRUCTIONS
Thank you for choosing us for your care.  After reviewing your responses it sounds consistent with a localized reaction that includes swelling and redness that will usually peak at 24 to 48 hours.  At this time I do not believe you need antibiotics as the initial incident happened earlier this morning.  My recommendation is to take the loratadine twice daily ice, elevate your extremity, and ibuprofen for discomfort.  If you do notice that it is itchy you can also use a over-the-counter hydrocortisone cream.    Insect Stings and Bites: Care Instructions  Overview  Stings and bites from bees, wasps, ants, and other insects often cause pain, swelling, redness, and itching. In some people, especially children, the redness and swelling may be worse. It may extend several inches beyond the affected area. But in most cases, stings and bites don't cause reactions all over the body.  If you have had a reaction to an insect sting or bite, you are at risk for a reaction if you get stung or bitten again.  Follow-up care is a key part of your treatment and safety. Be sure to make and go to all appointments, and call your doctor if you are having problems. It's also a good idea to know your test results and keep a list of the medicines you take.  How can you care for yourself at home?  Do not scratch or rub the skin where the sting or bite occurred.  Put a cold pack or ice cube on the area. Put a thin cloth between the ice and your skin. For some people, a paste of baking soda mixed with a little water helps relieve pain and decrease the reaction.  Take an over-the-counter antihistamine to help relieve swelling, redness, and itching. Calamine lotion or hydrocortisone cream may also help. Do not give antihistamines to your child unless you have checked with the doctor first. Read and follow all instructions on the label.  Be safe with medicines. If your doctor prescribed medicine for your allergy, take it exactly as prescribed.  "Call your doctor if you think you are having a problem with your medicine. You will get more details on the specific medicines your doctor prescribes.  Your doctor may prescribe a shot of epinephrine to carry with you in case you have a severe reaction. Learn how and when to give yourself the shot, and keep it with you at all times. Make sure it has not .  Go to the emergency room anytime you have a severe reaction. Go even if you have given yourself epinephrine and are feeling better. Symptoms can come back.  When should you call for help?   Call 911 anytime you think you may need emergency care. For example, call if:    You have symptoms of a severe allergic reaction. These may include:  Sudden raised, red areas (hives) all over your body.  Swelling of the throat, mouth, lips, or tongue.  Trouble breathing.  Passing out (losing consciousness). Or you may feel very lightheaded or suddenly feel weak, confused, or restless.  Severe belly pain, nausea, vomiting, or diarrhea.   Call your doctor now or seek immediate medical care if:    You have symptoms of an allergic reaction not right at the sting or bite, such as:  A rash or small area of hives (raised, red areas on the skin).  Itching.  Swelling.  Mild belly pain or nausea.     You have a lot of swelling around the site (such as your entire arm or leg is swollen).     You have signs of infection, such as:  Increased pain, swelling, redness, or warmth around the sting.  Red streaks leading from the area.  Pus draining from the sting.  A fever.   Watch closely for changes in your health, and be sure to contact your doctor if:    You do not get better as expected.   Where can you learn more?  Go to https://www.[employer].net/patiented  Enter P390 in the search box to learn more about \"Insect Stings and Bites: Care Instructions.\"  Current as of: 2024  Content Version: 14.5    2736-4847 [employer].   Care instructions adapted under license by " your healthcare professional. If you have questions about a medical condition or this instruction, always ask your healthcare professional. [employer] ([employer] disclaims any warranty or liability for your use of this information.         View your full visit summary for details by clicking on the link below. Your pharmacist will able to address any questions you may have about the medication.     If you're not feeling better within 5-7 days, please schedule an appointment.  You can [facility] an appointment right here [facility], or call [phone]  If the visit is for the same symptoms as your eVisit, we'll refund the cost of your eVisit if seen within seven days.

## 2025-07-21 ENCOUNTER — MYC REFILL (OUTPATIENT)
Dept: FAMILY MEDICINE | Facility: CLINIC | Age: 45
End: 2025-07-21
Payer: COMMERCIAL

## 2025-07-21 DIAGNOSIS — M79.605 LOW BACK PAIN RADIATING TO LEFT LOWER EXTREMITY: ICD-10-CM

## 2025-07-21 DIAGNOSIS — G57.12 MERALGIA PARESTHETICA OF LEFT SIDE: ICD-10-CM

## 2025-07-21 DIAGNOSIS — J01.90 ACUTE SINUSITIS WITH SYMPTOMS > 10 DAYS: ICD-10-CM

## 2025-07-21 DIAGNOSIS — F90.2 ATTENTION DEFICIT HYPERACTIVITY DISORDER (ADHD), COMBINED TYPE: ICD-10-CM

## 2025-07-21 DIAGNOSIS — M54.50 LOW BACK PAIN RADIATING TO LEFT LOWER EXTREMITY: ICD-10-CM

## 2025-07-21 RX ORDER — DEXTROAMPHETAMINE SULFATE, DEXTROAMPHETAMINE SACCHARATE, AMPHETAMINE ASPARTATE MONOHYDRATE, AND AMPHETAMINE SULFATE 12.5; 12.5; 12.5; 12.5 MG/1; MG/1; MG/1; MG/1
50 CAPSULE, EXTENDED RELEASE ORAL DAILY
Qty: 30 CAPSULE | Refills: 0 | Status: SHIPPED | OUTPATIENT
Start: 2025-07-21

## 2025-07-21 RX ORDER — GABAPENTIN 100 MG/1
100-300 CAPSULE ORAL 3 TIMES DAILY PRN
Qty: 90 CAPSULE | Refills: 1 | Status: SHIPPED | OUTPATIENT
Start: 2025-07-21

## 2025-07-21 RX ORDER — IPRATROPIUM BROMIDE 42 UG/1
2 SPRAY, METERED NASAL 4 TIMES DAILY
Qty: 15 ML | Refills: 1 | Status: SHIPPED | OUTPATIENT
Start: 2025-07-21

## 2025-07-21 ASSESSMENT — PATIENT HEALTH QUESTIONNAIRE - PHQ9
SUM OF ALL RESPONSES TO PHQ QUESTIONS 1-9: 3
SUM OF ALL RESPONSES TO PHQ QUESTIONS 1-9: 3

## 2025-07-21 NOTE — TELEPHONE ENCOUNTER
Clinic RN: Please investigate patient's chart or contact patient if the information cannot be found because this medication was prescribed for an acute condition. Confirm current symptoms/need for medication and possible need for appointment. If necessary, document reason and route refill encounter to provider for approval or denial.    ipratropium (ATROVENT) 0.06 % nasal spray  - for Acute Sinusitis on 11/15/24  Also, please confirm pt picked up last refill of gabapentin    Thanks   Susu Shepard RN on 7/21/2025 at 10:42 AM

## 2025-07-22 ENCOUNTER — TELEPHONE (OUTPATIENT)
Dept: FAMILY MEDICINE | Facility: CLINIC | Age: 45
End: 2025-07-22

## 2025-07-24 NOTE — PROGRESS NOTES
"Traci is a 40 year old who is being evaluated via a billable video visit.      How would you like to obtain your AVS? MyChart  If the video visit is dropped, the invitation should be resent by: Text to cell phone: [phone]  Will anyone else be joining your video visit? No    Video Start Time: 5:28 PM  Assessment & Plan     (F90.2) Attention deficit hyperactivity disorder (ADHD), combined type  (primary encounter diagnosis)  Comment:   Plan: amphetamine-dextroamphetamine (ADDERALL XR) 30         MG 24 hr capsule, amphetamine-dextroamphetamine        (ADDERALL XR) 30 MG 24 hr capsule,         amphetamine-dextroamphetamine (ADDERALL XR) 30         MG 24 hr capsule, amphetamine-dextroamphetamine        (ADDERALL XR) 10 MG 24 hr capsule,         amphetamine-dextroamphetamine (ADDERALL XR) 10         MG 24 hr capsule, amphetamine-dextroamphetamine        (ADDERALL XR) 10 MG 24 hr capsule            (R87.612) Papanicolaou smear of cervix with low grade squamous intraepithelial lesion (LGSIL)  Comment:   Plan: Scheduling appt with OB-GYN for pap    HTN in pregnancy - get BP at pap appt    31  minutes spent on the date of the encounter doing chart review, history and exam, documentation and further activities as noted above       BMI:   Estimated body mass index is 28.64 kg/m  as calculated from the following:    Height as of 6/9/20: 1.7 m (5' 6.93\").    Weight as of 6/9/20: 82.8 kg (182 lb 8 oz).   Weight management plan: Discussed healthy diet and exercise guidelines      Patient Instructions   Switch to the brand name adderall - written into the scripts    COVID testing - you can always contact me with questions or concerns and we do have testing at [facility]  I recommend [facility] covid testing for accessibility and turn around  In person testing (quicker, but around other people) - https://[place].gov/covid19/get-tested/testing-locations/community-testing.jsp  At home testing (a bit slower, but stay at home) - " With  called Pili to let her know Dr Goodwin said:  Sofia,      Seen in June with hemorrhagic cyst on right ovary.   Repeated her ultrasound this week with resolution of that cyst.   However now we see an elongated area concerning for fluid in her tube  I am sending antibiotic to take for this and we should repeat her imaging in 6 -12 weeks  Please arrange. Might need to see me after that ultrasound.              New Medications Ordered This Visit   Medications    doxycycline (MONODOX) 100 MG capsule       Sig: Take 1 capsule by mouth 2 (Two) Times a Day for 14 days.       Dispense:  28 capsule       Refill:  0       Scheduled 9/10/25 US and gyn   [email or web address].[place]/diseases/coronavirus/testsites/athome.html    With the new variants that are more contagious we do not yet know if our current precautions are sufficient.  Based on reports from the [place] suggesting that they might not be sufficient, I now recommend the following measures:  1) Get a well fitting surgical mask rather than a cloth mask (cloth masks have been effective with current strain, but were not meant to be long-term solution and it is unclear if they are effective with newer strains).  Look for masks that are [employer] certified.   - KN94 or KN95 are some options and this is one company in [place] with several good masks options -                      https://[employer]/   - this company has N95 masks which are pricier, but their Respokare version is reusable and self-sanitizing    https://z46ekdhwf.Blue Source/ or Gogetit  2) Do not enter stores or other indoor places unless absolutely needed.  Wear masks in situations you might not have previously (on a walk outside, for example)  3) Vaccine - the Pfizer and Moderna vaccines are essentially 100% effective.  Out of 32,000 people in the trials only 1 person who had been vaccinated got severe covid.  At this point, I expect that [place] Department of Health will be the source for when a vaccine is available to the general public.          Return in about 1 month (around 3/3/2021) for pap smear.    Valarie Medrano, WINSTON CNP  [facility]    Subjective     Traci is a 40 year old who presents to clinic today for the following health issues  accompanied by her self.    SUBJECTIVE:                                                    Traci Sloan is a 40 year old female who is here today for a medication check and ADHD follow up.      Working 50% remotely     CURRENT ADHD CONCERNS:  Has heard that the generics are allowed to be a higher percentage off from the brand name and wondering if she would have better effect on the  "brand name.  Also wondering about switching to IR if brand name is not accessible     CURRENT ADHD PRESCRIPTIONS:   1) Adderall XR 30 mg taken in the morning   2) Adderall XR 10 mg taken in the morning     MEDICATION BENEFITS:  Controlled symptoms:  Hyperactivity - motor restlessness, Attention span, Distractability, Finishing tasks, Frustration tolerance, Accepting limits and School failure  Uncontrolled symptoms:  Impulse control and Peer relations     MEDICATION SIDE EFFECTS:   Has:  none  Denies:  appetite suppression, weight loss, insomnia, tics, palpitations, stomach ache, headache, emotional lability, rebound irritability, drowsiness, \"zombie\" effect, growth suppression and dry mouth    Problem list and histories reviewed & adjusted, as indicated.  Additional history: as documented  I have reviewed and updated the patient's Past Medical History, Social History, Family History and Medication List    Got a [place].  Hasn't noticed and HTN symptoms.  Does have a BP cuff at home.      Patient Active Problem List   Diagnosis     Polypharmacy     Gastroesophageal reflux disease     Irritable bowel syndrome     History of chronic urinary tract infection     Attention deficit hyperactivity disorder (ADHD), combined type     Other closed fracture of distal end of left fibula, initial encounter     Need for Tdap vaccination     History of  delivery, currently pregnant     Papanicolaou smear of cervix with low grade squamous intraepithelial lesion (LGSIL)     Abdominal pain     Encounter for triage in pregnant patient     Vaginal bleeding in pregnancy     Gestational hypertension      (normal spontaneous vaginal delivery)     Preeclampsia in postpartum period     BP Readings from Last 6 Encounters:   20 138/86   19 (!) 140/96   19 126/68   19 126/68   19 125/84   19 144/88       Review of Systems     ROS:5 point ROS including CONST, HEENT, Respiratory, CV, and GI other than " that noted in the HPI,  is negative         Objective           Vitals:  No vitals were obtained today due to virtual visit.    Physical Exam   GENERAL: Healthy, alert and no distress  EYES: Eyes grossly normal to inspection.  No discharge or erythema, or obvious scleral/conjunctival abnormalities.  RESP: No audible wheeze, cough, or visible cyanosis.  No visible retractions or increased work of breathing.    SKIN: Visible skin clear. No significant rash, abnormal pigmentation or lesions.  NEURO: Cranial nerves grossly intact.  Mentation and speech appropriate for age.  PSYCH: Mentation appears normal, affect normal/bright, judgement and insight intact, normal speech and appearance well-groomed.          Video-Visit Details    Type of service:  Video Visit    Video End Time:5:52 PM    Originating Location (pt. Location): Home    Distant Location (provider location):  [facility]     Platform used for Video Visit: [email or web address]

## 2025-07-25 ENCOUNTER — TELEPHONE (OUTPATIENT)
Dept: FAMILY MEDICINE | Facility: CLINIC | Age: 45
End: 2025-07-25
Payer: COMMERCIAL

## 2025-07-26 NOTE — TELEPHONE ENCOUNTER
Retail Pharmacy Prior Authorization Team   Phone: [phone]    Prior Authorization Not Needed per Insurance    Medication: AMPHETAMINE-DEXTROAMPHETAMINE 20 MG PO TABS  Insurance Company: [employer] [place] - Phone [phone] Fax [phone]  Expected CoPay: $    Pharmacy Filling the Rx: [id number] [address] AT [address]  Pharmacy Notified: YES  Patient Notified: YES (faxed letter to pharmacy and pharmacy will notify the patient when ready)

## 2025-07-26 NOTE — TELEPHONE ENCOUNTER
Retail Pharmacy Prior Authorization Team   Phone: [phone]    PA Initiation    Medication: MYDAYIS 50 MG PO CP24  Insurance Company: [facility] - Phone [phone] Fax [phone]  Pharmacy Filling the Rx: [id number] [address] AT [facility] & [address] [place] [address]  Filling Pharmacy Phone: [phone]  Filling Pharmacy Fax:    Start Date: 7/26/2025    CARLOS COSME ([place]: [id number])

## 2025-07-28 NOTE — TELEPHONE ENCOUNTER
Prior Authorization Approval    Medication: MYDAYIS 50 MG PO CP24  Authorization Effective Date: 7/1/2025  Authorization Expiration Date: 7/26/2026  Insurance Company: [employer] [place] - Phone [phone] Fax [phone]  Which Pharmacy is filling the prescription: [id number] [address] AT [address]  Pharmacy Notified: YES  Patient Notified: YES (faxed approval letter to pharmacy and notified patient via Naehashart message)

## 2025-08-06 DIAGNOSIS — I10 ESSENTIAL HYPERTENSION: ICD-10-CM

## 2025-08-06 RX ORDER — OLMESARTAN MEDOXOMIL AND HYDROCHLOROTHIAZIDE 20/12.5 20; 12.5 MG/1; MG/1
1 TABLET ORAL DAILY
Qty: 90 TABLET | Refills: 1 | Status: SHIPPED | OUTPATIENT
Start: 2025-08-06

## 2025-08-21 DIAGNOSIS — G57.12 MERALGIA PARESTHETICA OF LEFT SIDE: ICD-10-CM

## 2025-08-21 DIAGNOSIS — M79.605 LOW BACK PAIN RADIATING TO LEFT LOWER EXTREMITY: ICD-10-CM

## 2025-08-21 DIAGNOSIS — M54.50 ACUTE LOW BACK PAIN, UNSPECIFIED BACK PAIN LATERALITY, UNSPECIFIED WHETHER SCIATICA PRESENT: ICD-10-CM

## 2025-08-21 DIAGNOSIS — M54.50 LOW BACK PAIN RADIATING TO LEFT LOWER EXTREMITY: ICD-10-CM

## 2025-08-21 RX ORDER — METHOCARBAMOL 500 MG/1
500 TABLET, FILM COATED ORAL 3 TIMES DAILY PRN
Qty: 30 TABLET | Refills: 0 | Status: SHIPPED | OUTPATIENT
Start: 2025-08-21

## 2025-08-21 RX ORDER — GABAPENTIN 100 MG/1
100-300 CAPSULE ORAL 3 TIMES DAILY PRN
Qty: 90 CAPSULE | Refills: 1 | Status: SHIPPED | OUTPATIENT
Start: 2025-08-21

## 2025-08-25 ENCOUNTER — MYC REFILL (OUTPATIENT)
Dept: FAMILY MEDICINE | Facility: CLINIC | Age: 45
End: 2025-08-25
Payer: COMMERCIAL

## 2025-08-25 DIAGNOSIS — F90.2 ATTENTION DEFICIT HYPERACTIVITY DISORDER (ADHD), COMBINED TYPE: ICD-10-CM

## 2025-08-26 RX ORDER — DEXTROAMPHETAMINE SULFATE, DEXTROAMPHETAMINE SACCHARATE, AMPHETAMINE ASPARTATE MONOHYDRATE, AND AMPHETAMINE SULFATE 12.5; 12.5; 12.5; 12.5 MG/1; MG/1; MG/1; MG/1
50 CAPSULE, EXTENDED RELEASE ORAL DAILY
Qty: 90 CAPSULE | Refills: 0 | Status: SHIPPED | OUTPATIENT
Start: 2025-08-26

## 2025-09-04 ENCOUNTER — OFFICE VISIT (OUTPATIENT)
Dept: OBGYN | Facility: CLINIC | Age: 45
End: 2025-09-04
Payer: COMMERCIAL

## 2025-09-04 VITALS
TEMPERATURE: 97.1 F | OXYGEN SATURATION: 99 % | HEIGHT: 66 IN | WEIGHT: 128.6 LBS | SYSTOLIC BLOOD PRESSURE: 104 MMHG | DIASTOLIC BLOOD PRESSURE: 69 MMHG | HEART RATE: 80 BPM | BODY MASS INDEX: 20.67 KG/M2

## 2025-09-04 DIAGNOSIS — Z01.419 WELL WOMAN EXAM WITH ROUTINE GYNECOLOGICAL EXAM: Primary | ICD-10-CM

## 2025-09-04 DIAGNOSIS — Z12.4 PAP SMEAR FOR CERVICAL CANCER SCREENING: ICD-10-CM

## 2025-09-04 ASSESSMENT — PATIENT HEALTH QUESTIONNAIRE - PHQ9
SUM OF ALL RESPONSES TO PHQ QUESTIONS 1-9: 1
4. FEELING TIRED OR HAVING LITTLE ENERGY: NOT AT ALL
5. POOR APPETITE OR OVEREATING: NOT AT ALL
SUM OF ALL RESPONSES TO PHQ QUESTIONS 1-9: 1
SUM OF ALL RESPONSES TO PHQ QUESTIONS 1-9: 3
7. TROUBLE CONCENTRATING ON THINGS, SUCH AS READING THE NEWSPAPER OR WATCHING TELEVISION: SEVERAL DAYS
SUM OF ALL RESPONSES TO PHQ QUESTIONS 1-9: 1
3. TROUBLE FALLING OR STAYING ASLEEP OR SLEEPING TOO MUCH: NOT AT ALL
SUM OF ALL RESPONSES TO PHQ QUESTIONS 1-9: 1
1. LITTLE INTEREST OR PLEASURE IN DOING THINGS: NOT AT ALL
10. IF YOU CHECKED OFF ANY PROBLEMS, HOW DIFFICULT HAVE THESE PROBLEMS MADE IT FOR YOU TO DO YOUR WORK, TAKE CARE OF THINGS AT HOME, OR GET ALONG WITH OTHER PEOPLE: SOMEWHAT DIFFICULT
2. FEELING DOWN, DEPRESSED, IRRITABLE, OR HOPELESS: NOT AT ALL
9. THOUGHTS THAT YOU WOULD BE BETTER OFF DEAD, OR OF HURTING YOURSELF: NOT AT ALL
10. IF YOU CHECKED OFF ANY PROBLEMS, HOW DIFFICULT HAVE THESE PROBLEMS MADE IT FOR YOU TO DO YOUR WORK, TAKE CARE OF THINGS AT HOME, OR GET ALONG WITH OTHER PEOPLE: SOMEWHAT DIFFICULT

## (undated) DEVICE — PREP CHLORAPREP W/ORANGE TINT 10.5ML 260715

## (undated) DEVICE — GLOVE PROTEXIS POWDER FREE SMT 7.0  2D72PT70X

## (undated) DEVICE — NDL SPINAL 25GA 3.5" QUINCKE 405180

## (undated) DEVICE — TRAY PAIN INJECTION 97A 640

## (undated) DEVICE — NDL SPINAL 22GA 3.5" QUINCKE 405181

## (undated) RX ORDER — BUPIVACAINE HYDROCHLORIDE 5 MG/ML
INJECTION, SOLUTION EPIDURAL; INTRACAUDAL
Status: DISPENSED
Start: 2022-03-31

## (undated) RX ORDER — BETAMETHASONE SODIUM PHOSPHATE AND BETAMETHASONE ACETATE 3; 3 MG/ML; MG/ML
INJECTION, SUSPENSION INTRA-ARTICULAR; INTRALESIONAL; INTRAMUSCULAR; SOFT TISSUE
Status: DISPENSED
Start: 2022-03-31